# Patient Record
Sex: FEMALE | Race: BLACK OR AFRICAN AMERICAN | NOT HISPANIC OR LATINO | Employment: OTHER | URBAN - METROPOLITAN AREA
[De-identification: names, ages, dates, MRNs, and addresses within clinical notes are randomized per-mention and may not be internally consistent; named-entity substitution may affect disease eponyms.]

---

## 2017-02-03 ENCOUNTER — HISTORICAL (OUTPATIENT)
Dept: INTERNAL MEDICINE | Facility: CLINIC | Age: 52
End: 2017-02-03

## 2017-03-03 ENCOUNTER — HISTORICAL (OUTPATIENT)
Dept: RADIOLOGY | Facility: HOSPITAL | Age: 52
End: 2017-03-03

## 2017-03-31 ENCOUNTER — HISTORICAL (OUTPATIENT)
Dept: SLEEP MEDICINE | Facility: HOSPITAL | Age: 52
End: 2017-03-31

## 2017-03-31 ENCOUNTER — HISTORICAL (OUTPATIENT)
Dept: INTERNAL MEDICINE | Facility: CLINIC | Age: 52
End: 2017-03-31

## 2017-07-07 ENCOUNTER — INITIAL CONSULT (OUTPATIENT)
Dept: RHEUMATOLOGY | Facility: CLINIC | Age: 52
End: 2017-07-07
Payer: COMMERCIAL

## 2017-07-07 VITALS
WEIGHT: 144.69 LBS | BODY MASS INDEX: 26.63 KG/M2 | HEART RATE: 80 BPM | SYSTOLIC BLOOD PRESSURE: 122 MMHG | HEIGHT: 62 IN | DIASTOLIC BLOOD PRESSURE: 74 MMHG

## 2017-07-07 DIAGNOSIS — R76.8 ANA POSITIVE: Primary | ICD-10-CM

## 2017-07-07 DIAGNOSIS — M15.9 PRIMARY OSTEOARTHRITIS INVOLVING MULTIPLE JOINTS: ICD-10-CM

## 2017-07-07 PROCEDURE — 99999 PR PBB SHADOW E&M-EST. PATIENT-LVL III: CPT | Mod: PBBFAC,,, | Performed by: INTERNAL MEDICINE

## 2017-07-07 PROCEDURE — 99205 OFFICE O/P NEW HI 60 MIN: CPT | Mod: S$GLB,,, | Performed by: INTERNAL MEDICINE

## 2017-07-07 ASSESSMENT — ROUTINE ASSESSMENT OF PATIENT INDEX DATA (RAPID3)
PATIENT GLOBAL ASSESSMENT SCORE: 1
MDHAQ FUNCTION SCORE: 0
FATIGUE SCORE: 0
PAIN SCORE: 5
TOTAL RAPID3 SCORE: 2
PSYCHOLOGICAL DISTRESS SCORE: 0

## 2017-07-07 NOTE — PROGRESS NOTES
Subjective:          Chief Complaint: Ashli Valencia is a 51 y.o. female who had concerns including Positive ANN MARIE (referred by Dr. Kennedy).    HPI:    Patient is a 51-year-old female referred for positive ANN MARIE.   ANN MARIE was 1:640 homogeneous pattern with a negative rheumatoid factor sedimentation rate that was within normal limits.    Patient states that joints began 9/2016 with stiffness in hips, knees with hyperflexion and sitting on leg, low back. Lateral epicodyles. Fingers stiff upon waking and if prolonged stiffness. Loosens in a few minutes. Seemed to follow stressful situations does have an intermittent pattern.  Currently she is having pain in her right 2nd MCP.   Hx of LPR-follows with ENT,  chronic microscopic hematuria followed with nephrology and urology. No renal renal bx present and stable for years.   No serologies when evaluated with Dr. Duffy for hematuria but did not think it was CTD/SLE.   Dry eyes was on restasis. No dry mouth.   Patient denies weight loss, rashes,  nasal or palatal ulcerations,  lymphadenopathy, Raynaud's, hx of DVT/miscarriages, psoriasis or family hx of psoriasis, rashes, serositis, anemia or other constitutional symptoms.  She denies any hx of celiac, but feels better off carbs.       REVIEW OF SYSTEMS:    Review of Systems   Constitutional: Negative for fever, malaise/fatigue and weight loss.   HENT: Negative for sore throat.    Eyes: Negative for double vision, photophobia and redness.   Respiratory: Negative for cough, shortness of breath and wheezing.    Cardiovascular: Negative for chest pain, palpitations and orthopnea.   Gastrointestinal: Negative for abdominal pain, constipation and diarrhea.   Genitourinary: Negative for dysuria, flank pain, hematuria and urgency.   Musculoskeletal: Positive for joint pain. Negative for back pain and myalgias.   Skin: Negative for rash.   Neurological: Negative for dizziness, tingling, focal weakness and headaches.    Endo/Heme/Allergies: Does not bruise/bleed easily.   Psychiatric/Behavioral: Negative for depression, hallucinations and suicidal ideas.               Objective:            Past Medical History:   Diagnosis Date    ANN MARIE positive     Anxiety     Arm pain     Back pain     Depression     Edema     Fatigue     Hematuria     History of chicken pox     Insomnia     Laceration     Leukopenia     Migraine     Muscle cramps     Neck pain     Plantar fasciitis     Rotator cuff syndrome of right shoulder     Sciatica     Umbilical hernia     Vertigo     Weight gain      Family History   Problem Relation Age of Onset    Hypertension Mother     Diabetes Mother     Cancer Maternal Grandfather      prostate    Hypertension Maternal Grandmother     Stroke Maternal Grandmother     Cancer Father      lung     Social History   Substance Use Topics    Smoking status: Never Smoker    Smokeless tobacco: Never Used    Alcohol use No         Current Outpatient Prescriptions on File Prior to Visit   Medication Sig Dispense Refill    furosemide (LASIX) 20 MG tablet Take 1 tablet (20 mg total) by mouth daily as needed. 30 tablet 4    multivitamin capsule Take 1 capsule by mouth once daily.      OMEPRAZOLE MAGNESIUM ORAL Take 20.6 mg by mouth once daily.      diethylpropion 25 mg Tab 1 po BID 60 each 1    meloxicam (MOBIC) 15 MG tablet Take 1 tablet (15 mg total) by mouth once daily. 30 tablet 3    rizatriptan (MAXALT) 10 MG tablet Take 1 tablet (10 mg total) by mouth as needed for Migraine. 9 tablet 3     No current facility-administered medications on file prior to visit.        Vitals:    07/07/17 1514   BP: 122/74   Pulse: 80       Physical Exam:    Physical Exam   Constitutional: She appears well-developed and well-nourished.   HENT:   Nose: No septal deviation.   Mouth/Throat: Mucous membranes are normal. No oral lesions.   Eyes: Pupils are equal, round, and reactive to light. Right conjunctiva is  not injected. Left conjunctiva is not injected.   Neck: No JVD present. No thyroid mass and no thyromegaly present.   Cardiovascular: Normal rate, regular rhythm and normal pulses.    No edema   Pulmonary/Chest: Effort normal and breath sounds normal.   Abdominal: Soft. Normal appearance. There is no hepatosplenomegaly.   Musculoskeletal:        Right shoulder: She exhibits normal range of motion, no tenderness and no swelling.        Left shoulder: She exhibits normal range of motion, no tenderness and no swelling.        Right elbow: She exhibits normal range of motion and no swelling. No tenderness found.        Left elbow: She exhibits normal range of motion and no swelling. No tenderness found.        Right wrist: She exhibits normal range of motion, no tenderness and no swelling.        Left wrist: She exhibits normal range of motion, no tenderness and no swelling.        Right hip: She exhibits normal range of motion, normal strength and no swelling.        Left hip: She exhibits normal range of motion, no tenderness and no swelling.        Right knee: She exhibits normal range of motion and no swelling. No tenderness found.        Left knee: She exhibits normal range of motion and no swelling. No tenderness found.        Right ankle: She exhibits normal range of motion and no swelling. No tenderness.        Left ankle: She exhibits normal range of motion and no swelling. No tenderness.        Right hand: She exhibits tenderness. She exhibits no swelling.   5/5 upper and lower extremity bilateral muscle strength   Lymphadenopathy:     She has no cervical adenopathy.     She has no axillary adenopathy.   Neurological: She has normal strength and normal reflexes.   Skin: Skin is dry and intact.   Psychiatric: She has a normal mood and affect.             Assessment:       Encounter Diagnoses   Name Primary?    ANN MARIE positive Yes    Primary osteoarthritis involving multiple joints           Plan:        ANN MARIE  positive  -     Anti Sm/RNP Antibody; Future; Expected date: 07/07/2017  -     Anti-DNA antibody, double-stranded; Future; Expected date: 07/07/2017  -     Anti-Histone Antibody; Future; Expected date: 07/07/2017  -     Anti-Smith antibody; Future; Expected date: 07/07/2017  -     Beta 2 Glycoprotein I Antibody, IgA; Future; Expected date: 07/07/2017  -     C3 complement; Future; Expected date: 07/07/2017  -     C4 complement; Future; Expected date: 07/07/2017  -     Sjogrens syndrome-A extractable nuclear antibody; Future; Expected date: 07/07/2017  -     Sjogrens syndrome-B extractable nuclear antibody; Future; Expected date: 07/07/2017  -     THYROID PEROXIDASE ANTIBODY; Future; Expected date: 07/07/2017  -     Anti-thyroglobulin antibody; Future; Expected date: 07/07/2017  -     C-reactive protein; Future; Expected date: 07/07/2017  -     X-Ray Hand PA Bilateral; Future; Expected date: 07/07/2017  -     X-Ray Knee 1 or 2 View Left; Future; Expected date: 07/07/2017    Primary osteoarthritis involving multiple joints    Will r/o CTD. Not suspicious but given +ANN MARIE  Will complete workup. Pattern of joint pains most suggestive of Degenerative arthritis.     Return in about 4 months (around 11/7/2017).        Thank you for allowing me to participate in the care of this very pleasant patient.    60min consultation with greater than 50% spent in counseling, chart review and coordination of care. All questions answered.

## 2017-07-07 NOTE — LETTER
July 12, 2017      MD Jamey [address], [address]  [place] [address]           [facility]  [address] [facility] [address] [place] [address]  Phone: [phone]  Fax: [phone]          Patient: Ashli Valencia   MR Number: [id number]   YOB: 1965   Date of Visit: 7/7/2017       Dear Dr. Chele Kennedy II:    Thank you for referring Ashli Valencia to me for evaluation. Attached you will find relevant portions of my assessment and plan of care.    If you have questions, please do not hesitate to call me. I look forward to following Ashli Valencia along with you.    Sincerely,    Alyssa Monroe  CC:  No Recipients    If you would like to receive this communication electronically, please contact [email or web address] or [phone] to request more information on [employer] Link access.    For providers and/or their staff who would like to refer a patient to [facility], please contact us through our one-stop-shop provider referral line, Oliver Lawrence, at [phone].    If you feel you have received this communication in error or would no longer like to receive these types of communications, please e-mail [email or web address]

## 2017-07-13 ENCOUNTER — HOSPITAL ENCOUNTER (OUTPATIENT)
Dept: RADIOLOGY | Facility: HOSPITAL | Age: 52
Discharge: HOME | End: 2017-07-13
Attending: INTERNAL MEDICINE
Payer: COMMERCIAL

## 2017-07-13 DIAGNOSIS — R76.8 ANA POSITIVE: ICD-10-CM

## 2017-07-13 PROCEDURE — 73560 X-RAY EXAM OF KNEE 1 OR 2: CPT | Mod: TC,PO,LT

## 2017-07-13 PROCEDURE — 73120 X-RAY EXAM OF HAND: CPT | Mod: TC,50,PO,LT

## 2017-07-13 PROCEDURE — 73560 X-RAY EXAM OF KNEE 1 OR 2: CPT | Mod: 26,LT,, | Performed by: RADIOLOGY

## 2017-07-13 PROCEDURE — 73120 X-RAY EXAM OF HAND: CPT | Mod: 26,50,, | Performed by: RADIOLOGY

## 2017-07-20 ENCOUNTER — OFFICE VISIT (OUTPATIENT)
Dept: RHEUMATOLOGY | Facility: CLINIC | Age: 52
End: 2017-07-20
Payer: COMMERCIAL

## 2017-07-20 ENCOUNTER — TELEPHONE (OUTPATIENT)
Dept: RHEUMATOLOGY | Facility: CLINIC | Age: 52
End: 2017-07-20

## 2017-07-20 VITALS
HEIGHT: 62 IN | HEART RATE: 94 BPM | SYSTOLIC BLOOD PRESSURE: 124 MMHG | BODY MASS INDEX: 26.75 KG/M2 | DIASTOLIC BLOOD PRESSURE: 70 MMHG | WEIGHT: 145.38 LBS

## 2017-07-20 DIAGNOSIS — E06.3 HASHIMOTO'S THYROIDITIS: ICD-10-CM

## 2017-07-20 DIAGNOSIS — R76.8 ANA POSITIVE: Primary | ICD-10-CM

## 2017-07-20 PROCEDURE — 99999 PR PBB SHADOW E&M-EST. PATIENT-LVL III: CPT | Mod: PBBFAC,,, | Performed by: INTERNAL MEDICINE

## 2017-07-20 PROCEDURE — 99214 OFFICE O/P EST MOD 30 MIN: CPT | Mod: S$GLB,,, | Performed by: INTERNAL MEDICINE

## 2017-07-20 NOTE — PROGRESS NOTES
Subjective:          Chief Complaint: Ashli Valencia is a 51 y.o. female who had concerns including Results.    HPI:    Patient is a 51-year-old female referred for positive ANN MARIE.   ANN MARIE was 1:640 homogeneous pattern with a negative rheumatoid factor sedimentation rate that was within normal limits.    Patient states that joints began 9/2016 with stiffness in hips, knees with hyperflexion and sitting on leg, low back. Lateral epicodyles. Fingers stiff upon waking and if prolonged stiffness. Loosens in a few minutes. Seemed to follow stressful situations does have an intermittent pattern.  Currently she is having pain in her right 2nd MCP.   Hx of LPR-follows with ENT,  chronic microscopic hematuria followed with nephrology and urology. No renal renal bx present and stable for years.   No serologies when evaluated with Dr. Duffy for hematuria but did not think it was CTD/SLE.   Dry eyes was on restasis. No dry mouth.   Patient denies weight loss, rashes,  nasal or palatal ulcerations,  lymphadenopathy, Raynaud's, hx of DVT/miscarriages, psoriasis or family hx of psoriasis, rashes, serositis, anemia or other constitutional symptoms.  She denies any hx of celiac, but feels better off carbs.     Component      Latest Ref Rng & Units 7/13/2017 4/3/2017   Anti Sm/RNP Antibody      0.00 - 19.99 EU 0.95    Anti-Sm/RNP Interpretation      Negative Negative    Anti Sm Antibody      0.00 - 19.99 EU 0.54    Anti-Sm Interpretation      Negative Negative    Anti-SSA Antibody      0.00 - 19.99 EU 1.94    Anti-SSA Interpretation      Negative Negative    Anti-SSB Antibody      0.00 - 19.99 EU 1.69    Anti-SSB Interpretation      Negative Negative    Sed Rate      0 - 29 mm/Hr  2   Rheumatoid Factor      0.0 - 15.0 IU/mL  <8.6   ANN MARIE Screen      None Detected  Detected (A)   ANN MARIE IgG by IFA      <1:40  1:640 (H)   ds DNA Ab      Negative 1:10 Negative 1:10    Anti-Histone Antibody      0.0 - 0.9 Units 0.6    Beta-2 Glyco 1  IgA      <=20 DELFINA <9    Complement (C-3)      50 - 180 mg/dL 134    Complement (C-4)      11 - 44 mg/dL 28    Thyroperoxidase Antibodies      <6.0 IU/mL 394.7 (H)    Thyroglobulin Ab Screen      0.0 - 3.9 IU/mL 10.6 (H)      REVIEW OF SYSTEMS:    Review of Systems   Constitutional: Negative for fever, malaise/fatigue and weight loss.   HENT: Negative for sore throat.    Eyes: Negative for double vision, photophobia and redness.   Respiratory: Negative for cough, shortness of breath and wheezing.    Cardiovascular: Negative for chest pain, palpitations and orthopnea.   Gastrointestinal: Negative for abdominal pain, constipation and diarrhea.   Genitourinary: Negative for dysuria, flank pain, hematuria and urgency.   Musculoskeletal: Positive for joint pain. Negative for back pain and myalgias.   Skin: Negative for rash.   Neurological: Negative for dizziness, tingling, focal weakness and headaches.   Endo/Heme/Allergies: Does not bruise/bleed easily.   Psychiatric/Behavioral: Negative for depression, hallucinations and suicidal ideas.               Objective:            Past Medical History:   Diagnosis Date    ANN MARIE positive     Anxiety     Arm pain     Back pain     Depression     Edema     Fatigue     Hematuria     History of chicken pox     Insomnia     Laceration     Leukopenia     Migraine     Muscle cramps     Neck pain     Plantar fasciitis     Rotator cuff syndrome of right shoulder     Sciatica     Umbilical hernia     Vertigo     Weight gain      Family History   Problem Relation Age of Onset    Hypertension Mother     Diabetes Mother     Cancer Maternal Grandfather      prostate    Hypertension Maternal Grandmother     Stroke Maternal Grandmother     Cancer Father      lung     Social History   Substance Use Topics    Smoking status: Never Smoker    Smokeless tobacco: Never Used    Alcohol use No         Current Outpatient Prescriptions on File Prior to Visit   Medication Sig  Dispense Refill    furosemide (LASIX) 20 MG tablet Take 1 tablet (20 mg total) by mouth daily as needed. 30 tablet 4    multivitamin capsule Take 1 capsule by mouth once daily.      OMEPRAZOLE MAGNESIUM ORAL Take 20.6 mg by mouth once daily.      diethylpropion 25 mg Tab 1 po BID 60 each 1    meloxicam (MOBIC) 15 MG tablet Take 1 tablet (15 mg total) by mouth once daily. 30 tablet 3    rizatriptan (MAXALT) 10 MG tablet Take 1 tablet (10 mg total) by mouth as needed for Migraine. 9 tablet 3     No current facility-administered medications on file prior to visit.        Vitals:    07/20/17 1026   BP: 124/70   Pulse: 94       Physical Exam:    Physical Exam   Constitutional: She appears well-developed and well-nourished.   HENT:   Nose: No septal deviation.   Mouth/Throat: Mucous membranes are normal. No oral lesions.   Eyes: Pupils are equal, round, and reactive to light. Right conjunctiva is not injected. Left conjunctiva is not injected.   Neck: No JVD present. No thyroid mass and no thyromegaly present.   Cardiovascular: Normal rate, regular rhythm and normal pulses.    No edema   Pulmonary/Chest: Effort normal and breath sounds normal.   Abdominal: Soft. Normal appearance. There is no hepatosplenomegaly.   Musculoskeletal:        Right shoulder: She exhibits normal range of motion, no tenderness and no swelling.        Left shoulder: She exhibits normal range of motion, no tenderness and no swelling.        Right elbow: She exhibits normal range of motion and no swelling. No tenderness found.        Left elbow: She exhibits normal range of motion and no swelling. No tenderness found.        Right wrist: She exhibits normal range of motion, no tenderness and no swelling.        Left wrist: She exhibits normal range of motion, no tenderness and no swelling.        Right hip: She exhibits normal range of motion, normal strength and no swelling.        Left hip: She exhibits normal range of motion, no tenderness  and no swelling.        Right knee: She exhibits normal range of motion and no swelling. No tenderness found.        Left knee: She exhibits normal range of motion and no swelling. No tenderness found.        Right ankle: She exhibits normal range of motion and no swelling. No tenderness.        Left ankle: She exhibits normal range of motion and no swelling. No tenderness.        Right hand: She exhibits tenderness. She exhibits no swelling.   5/5 upper and lower extremity bilateral muscle strength   Lymphadenopathy:     She has no cervical adenopathy.     She has no axillary adenopathy.   Neurological: She has normal strength and normal reflexes.   Skin: Skin is dry and intact.   Psychiatric: She has a normal mood and affect.                    Encounter Diagnoses   Name Primary?    ANN MARIE positive Yes    Hashimoto's thyroiditis                    Chief Complaint: Ashli Valencia is a 51 y.o. female who had concerns including Results.    HPI:    Patient is a 51-year-old female referred for positive ANN MARIE.   ANN MARIE was 1:640 homogeneous pattern with a negative rheumatoid factor sedimentation rate that was within normal limits.    Patient states that joints began 9/2016 with stiffness in hips, knees with hyperflexion and sitting on leg, low back. Lateral epicodyles. Fingers stiff upon waking and if prolonged stiffness. Loosens in a few minutes. Seemed to follow stressful situations does have an intermittent pattern.  Currently she is having pain in her right 2nd MCP.   Hx of LPR-follows with ENT,  chronic microscopic hematuria followed with nephrology and urology. No renal renal bx present and stable for years.   No serologies when evaluated with Dr. Duffy for hematuria but did not think it was CTD/SLE.   Dry eyes was on restasis. No dry mouth.   Patient denies weight loss, rashes,  nasal or palatal ulcerations,  lymphadenopathy, Raynaud's, hx of DVT/miscarriages, psoriasis or family hx of psoriasis, rashes,  serositis, anemia or other constitutional symptoms.  She denies any hx of celiac, but feels better off carbs.       REVIEW OF SYSTEMS:    Review of Systems   Constitutional: Negative for fever, malaise/fatigue and weight loss.   HENT: Negative for sore throat.    Eyes: Negative for double vision, photophobia and redness.   Respiratory: Negative for cough, shortness of breath and wheezing.    Cardiovascular: Negative for chest pain, palpitations and orthopnea.   Gastrointestinal: Negative for abdominal pain, constipation and diarrhea.   Genitourinary: Negative for dysuria, flank pain, hematuria and urgency.   Musculoskeletal: Positive for joint pain. Negative for back pain and myalgias.   Skin: Negative for rash.   Neurological: Negative for dizziness, tingling, focal weakness and headaches.   Endo/Heme/Allergies: Does not bruise/bleed easily.   Psychiatric/Behavioral: Negative for depression, hallucinations and suicidal ideas.               Objective:            Past Medical History:   Diagnosis Date    ANN MARIE positive     Anxiety     Arm pain     Back pain     Depression     Edema     Fatigue     Hematuria     History of chicken pox     Insomnia     Laceration     Leukopenia     Migraine     Muscle cramps     Neck pain     Plantar fasciitis     Rotator cuff syndrome of right shoulder     Sciatica     Umbilical hernia     Vertigo     Weight gain      Family History   Problem Relation Age of Onset    Hypertension Mother     Diabetes Mother     Cancer Maternal Grandfather      prostate    Hypertension Maternal Grandmother     Stroke Maternal Grandmother     Cancer Father      lung     Social History   Substance Use Topics    Smoking status: Never Smoker    Smokeless tobacco: Never Used    Alcohol use No         Current Outpatient Prescriptions on File Prior to Visit   Medication Sig Dispense Refill    furosemide (LASIX) 20 MG tablet Take 1 tablet (20 mg total) by mouth daily as needed. 30  tablet 4    multivitamin capsule Take 1 capsule by mouth once daily.      OMEPRAZOLE MAGNESIUM ORAL Take 20.6 mg by mouth once daily.      diethylpropion 25 mg Tab 1 po BID 60 each 1    meloxicam (MOBIC) 15 MG tablet Take 1 tablet (15 mg total) by mouth once daily. 30 tablet 3    rizatriptan (MAXALT) 10 MG tablet Take 1 tablet (10 mg total) by mouth as needed for Migraine. 9 tablet 3     No current facility-administered medications on file prior to visit.        Vitals:    07/20/17 1026   BP: 124/70   Pulse: 94       Physical Exam:    Physical Exam   Constitutional: She appears well-developed and well-nourished.   HENT:   Nose: No septal deviation.   Mouth/Throat: Mucous membranes are normal. No oral lesions.   Eyes: Pupils are equal, round, and reactive to light. Right conjunctiva is not injected. Left conjunctiva is not injected.   Neck: No JVD present. No thyroid mass and no thyromegaly present.   Cardiovascular: Normal rate, regular rhythm and normal pulses.    No edema   Pulmonary/Chest: Effort normal and breath sounds normal.   Abdominal: Soft. Normal appearance. There is no hepatosplenomegaly.   Musculoskeletal:        Right shoulder: She exhibits normal range of motion, no tenderness and no swelling.        Left shoulder: She exhibits normal range of motion, no tenderness and no swelling.        Right elbow: She exhibits normal range of motion and no swelling. No tenderness found.        Left elbow: She exhibits normal range of motion and no swelling. No tenderness found.        Right wrist: She exhibits normal range of motion, no tenderness and no swelling.        Left wrist: She exhibits normal range of motion, no tenderness and no swelling.        Right hip: She exhibits normal range of motion, normal strength and no swelling.        Left hip: She exhibits normal range of motion, no tenderness and no swelling.        Right knee: She exhibits normal range of motion and no swelling. No tenderness  found.        Left knee: She exhibits normal range of motion and no swelling. No tenderness found.        Right ankle: She exhibits normal range of motion and no swelling. No tenderness.        Left ankle: She exhibits normal range of motion and no swelling. No tenderness.        Right hand: She exhibits tenderness. She exhibits no swelling.   5/5 upper and lower extremity bilateral muscle strength   Lymphadenopathy:     She has no cervical adenopathy.     She has no axillary adenopathy.   Neurological: She has normal strength and normal reflexes.   Skin: Skin is dry and intact.   Psychiatric: She has a normal mood and affect.             Assessment:       Encounter Diagnoses   Name Primary?    ANN MARIE positive Yes    Hashimoto's thyroiditis           Plan:        ANN MARIE positive    Hashimoto's thyroiditis       No evidence of CTD/inflammatory arthritis.  + thyroid antibodies rec checking thyroid  Pattern of joint pains most suggestive of Degenerative arthritis.   NSAIDs PRN    Return if symptoms worsen or fail to improve.          30min consultation with greater than 50% spent in counseling, chart review and coordination of care. All questions answered.

## 2017-07-20 NOTE — PROGRESS NOTES
Subjective:          Chief Complaint: Ashli Valencia is a 51 y.o. female who had concerns including Results.    HPI:    Patient is a 51-year-old female referred for positive ANN MARIE.   ANN MARIE was 1:640 homogeneous pattern with a negative rheumatoid factor sedimentation rate that was within normal limits.    Patient states that joints began 9/2016 with stiffness in hips, knees with hyperflexion and sitting on leg, low back. Lateral epicodyles. Fingers stiff upon waking and if prolonged stiffness. Loosens in a few minutes. Seemed to follow stressful situations does have an intermittent pattern.  Currently she is having pain in her right 2nd MCP.   Hx of LPR-follows with ENT,  chronic microscopic hematuria followed with nephrology and urology. No renal renal bx present and stable for years.   No serologies when evaluated with Dr. Duffy for hematuria but did not think it was CTD/SLE.   Dry eyes was on restasis. No dry mouth.   Patient denies weight loss, rashes,  nasal or palatal ulcerations,  lymphadenopathy, Raynaud's, hx of DVT/miscarriages, psoriasis or family hx of psoriasis, rashes, serositis, anemia or other constitutional symptoms.  She denies any hx of celiac, but feels better off carbs.     Component      Latest Ref Rng & Units 7/13/2017 4/3/2017   Anti Sm/RNP Antibody      0.00 - 19.99 EU 0.95    Anti-Sm/RNP Interpretation      Negative Negative    Anti Sm Antibody      0.00 - 19.99 EU 0.54    Anti-Sm Interpretation      Negative Negative    Anti-SSA Antibody      0.00 - 19.99 EU 1.94    Anti-SSA Interpretation      Negative Negative    Anti-SSB Antibody      0.00 - 19.99 EU 1.69    Anti-SSB Interpretation      Negative Negative    Sed Rate      0 - 29 mm/Hr  2   Rheumatoid Factor      0.0 - 15.0 IU/mL  <8.6   ANN MARIE Screen      None Detected  Detected (A)   ANN MARIE IgG by IFA      <1:40  1:640 (H)   ds DNA Ab      Negative 1:10 Negative 1:10    Anti-Histone Antibody      0.0 - 0.9 Units 0.6    Beta-2 Glyco 1  IgA      <=20 DELFINA <9    Complement (C-3)      50 - 180 mg/dL 134    Complement (C-4)      11 - 44 mg/dL 28    Thyroperoxidase Antibodies      <6.0 IU/mL 394.7 (H)    Thyroglobulin Ab Screen      0.0 - 3.9 IU/mL 10.6 (H)      REVIEW OF SYSTEMS:    Review of Systems   Constitutional: Negative for fever, malaise/fatigue and weight loss.   HENT: Negative for sore throat.    Eyes: Negative for double vision, photophobia and redness.   Respiratory: Negative for cough, shortness of breath and wheezing.    Cardiovascular: Negative for chest pain, palpitations and orthopnea.   Gastrointestinal: Negative for abdominal pain, constipation and diarrhea.   Genitourinary: Negative for dysuria, flank pain, hematuria and urgency.   Musculoskeletal: Positive for joint pain. Negative for back pain and myalgias.   Skin: Negative for rash.   Neurological: Negative for dizziness, tingling, focal weakness and headaches.   Endo/Heme/Allergies: Does not bruise/bleed easily.   Psychiatric/Behavioral: Negative for depression, hallucinations and suicidal ideas.               Objective:            Past Medical History:   Diagnosis Date    ANN MARIE positive     Anxiety     Arm pain     Back pain     Depression     Edema     Fatigue     Hematuria     History of chicken pox     Insomnia     Laceration     Leukopenia     Migraine     Muscle cramps     Neck pain     Plantar fasciitis     Rotator cuff syndrome of right shoulder     Sciatica     Umbilical hernia     Vertigo     Weight gain      Family History   Problem Relation Age of Onset    Hypertension Mother     Diabetes Mother     Cancer Maternal Grandfather      prostate    Hypertension Maternal Grandmother     Stroke Maternal Grandmother     Cancer Father      lung     Social History   Substance Use Topics    Smoking status: Never Smoker    Smokeless tobacco: Never Used    Alcohol use No         Current Outpatient Prescriptions on File Prior to Visit   Medication Sig  Dispense Refill    furosemide (LASIX) 20 MG tablet Take 1 tablet (20 mg total) by mouth daily as needed. 30 tablet 4    multivitamin capsule Take 1 capsule by mouth once daily.      OMEPRAZOLE MAGNESIUM ORAL Take 20.6 mg by mouth once daily.      diethylpropion 25 mg Tab 1 po BID 60 each 1    meloxicam (MOBIC) 15 MG tablet Take 1 tablet (15 mg total) by mouth once daily. 30 tablet 3    rizatriptan (MAXALT) 10 MG tablet Take 1 tablet (10 mg total) by mouth as needed for Migraine. 9 tablet 3     No current facility-administered medications on file prior to visit.        Vitals:    07/20/17 1026   BP: 124/70   Pulse: 94       Physical Exam:    Physical Exam   Constitutional: She appears well-developed and well-nourished.   HENT:   Nose: No septal deviation.   Mouth/Throat: Mucous membranes are normal. No oral lesions.   Eyes: Pupils are equal, round, and reactive to light. Right conjunctiva is not injected. Left conjunctiva is not injected.   Neck: No JVD present. No thyroid mass and no thyromegaly present.   Cardiovascular: Normal rate, regular rhythm and normal pulses.    No edema   Pulmonary/Chest: Effort normal and breath sounds normal.   Abdominal: Soft. Normal appearance. There is no hepatosplenomegaly.   Musculoskeletal:        Right shoulder: She exhibits normal range of motion, no tenderness and no swelling.        Left shoulder: She exhibits normal range of motion, no tenderness and no swelling.        Right elbow: She exhibits normal range of motion and no swelling. No tenderness found.        Left elbow: She exhibits normal range of motion and no swelling. No tenderness found.        Right wrist: She exhibits normal range of motion, no tenderness and no swelling.        Left wrist: She exhibits normal range of motion, no tenderness and no swelling.        Right hip: She exhibits normal range of motion, normal strength and no swelling.        Left hip: She exhibits normal range of motion, no tenderness  and no swelling.        Right knee: She exhibits normal range of motion and no swelling. No tenderness found.        Left knee: She exhibits normal range of motion and no swelling. No tenderness found.        Right ankle: She exhibits normal range of motion and no swelling. No tenderness.        Left ankle: She exhibits normal range of motion and no swelling. No tenderness.        Right hand: She exhibits tenderness. She exhibits no swelling.   5/5 upper and lower extremity bilateral muscle strength   Lymphadenopathy:     She has no cervical adenopathy.     She has no axillary adenopathy.   Neurological: She has normal strength and normal reflexes.   Skin: Skin is dry and intact.   Psychiatric: She has a normal mood and affect.                    No diagnosis found.                Chief Complaint: Ashli Valencia is a 51 y.o. female who had concerns including Results.    HPI:    Patient is a 51-year-old female referred for positive ANN MARIE.   ANN MARIE was 1:640 homogeneous pattern with a negative rheumatoid factor sedimentation rate that was within normal limits.    Patient states that joints began 9/2016 with stiffness in hips, knees with hyperflexion and sitting on leg, low back. Lateral epicodyles. Fingers stiff upon waking and if prolonged stiffness. Loosens in a few minutes. Seemed to follow stressful situations does have an intermittent pattern.  Currently she is having pain in her right 2nd MCP.   Hx of LPR-follows with ENT,  chronic microscopic hematuria followed with nephrology and urology. No renal renal bx present and stable for years.   No serologies when evaluated with Dr. Duffy for hematuria but did not think it was CTD/SLE.   Dry eyes was on restasis. No dry mouth.   Patient denies weight loss, rashes,  nasal or palatal ulcerations,  lymphadenopathy, Raynaud's, hx of DVT/miscarriages, psoriasis or family hx of psoriasis, rashes, serositis, anemia or other constitutional symptoms.  She denies any  hx of celiac, but feels better off carbs.       REVIEW OF SYSTEMS:    Review of Systems   Constitutional: Negative for fever, malaise/fatigue and weight loss.   HENT: Negative for sore throat.    Eyes: Negative for double vision, photophobia and redness.   Respiratory: Negative for cough, shortness of breath and wheezing.    Cardiovascular: Negative for chest pain, palpitations and orthopnea.   Gastrointestinal: Negative for abdominal pain, constipation and diarrhea.   Genitourinary: Negative for dysuria, flank pain, hematuria and urgency.   Musculoskeletal: Positive for joint pain. Negative for back pain and myalgias.   Skin: Negative for rash.   Neurological: Negative for dizziness, tingling, focal weakness and headaches.   Endo/Heme/Allergies: Does not bruise/bleed easily.   Psychiatric/Behavioral: Negative for depression, hallucinations and suicidal ideas.               Objective:            Past Medical History:   Diagnosis Date    ANN MARIE positive     Anxiety     Arm pain     Back pain     Depression     Edema     Fatigue     Hematuria     History of chicken pox     Insomnia     Laceration     Leukopenia     Migraine     Muscle cramps     Neck pain     Plantar fasciitis     Rotator cuff syndrome of right shoulder     Sciatica     Umbilical hernia     Vertigo     Weight gain      Family History   Problem Relation Age of Onset    Hypertension Mother     Diabetes Mother     Cancer Maternal Grandfather      prostate    Hypertension Maternal Grandmother     Stroke Maternal Grandmother     Cancer Father      lung     Social History   Substance Use Topics    Smoking status: Never Smoker    Smokeless tobacco: Never Used    Alcohol use No         Current Outpatient Prescriptions on File Prior to Visit   Medication Sig Dispense Refill    furosemide (LASIX) 20 MG tablet Take 1 tablet (20 mg total) by mouth daily as needed. 30 tablet 4    multivitamin capsule Take 1 capsule by mouth once daily.       OMEPRAZOLE MAGNESIUM ORAL Take 20.6 mg by mouth once daily.      diethylpropion 25 mg Tab 1 po BID 60 each 1    meloxicam (MOBIC) 15 MG tablet Take 1 tablet (15 mg total) by mouth once daily. 30 tablet 3    rizatriptan (MAXALT) 10 MG tablet Take 1 tablet (10 mg total) by mouth as needed for Migraine. 9 tablet 3     No current facility-administered medications on file prior to visit.        Vitals:    07/20/17 1026   BP: 124/70   Pulse: 94       Physical Exam:    Physical Exam   Constitutional: She appears well-developed and well-nourished.   HENT:   Nose: No septal deviation.   Mouth/Throat: Mucous membranes are normal. No oral lesions.   Eyes: Pupils are equal, round, and reactive to light. Right conjunctiva is not injected. Left conjunctiva is not injected.   Neck: No JVD present. No thyroid mass and no thyromegaly present.   Cardiovascular: Normal rate, regular rhythm and normal pulses.    No edema   Pulmonary/Chest: Effort normal and breath sounds normal.   Abdominal: Soft. Normal appearance. There is no hepatosplenomegaly.   Musculoskeletal:        Right shoulder: She exhibits normal range of motion, no tenderness and no swelling.        Left shoulder: She exhibits normal range of motion, no tenderness and no swelling.        Right elbow: She exhibits normal range of motion and no swelling. No tenderness found.        Left elbow: She exhibits normal range of motion and no swelling. No tenderness found.        Right wrist: She exhibits normal range of motion, no tenderness and no swelling.        Left wrist: She exhibits normal range of motion, no tenderness and no swelling.        Right hip: She exhibits normal range of motion, normal strength and no swelling.        Left hip: She exhibits normal range of motion, no tenderness and no swelling.        Right knee: She exhibits normal range of motion and no swelling. No tenderness found.        Left knee: She exhibits normal range of motion and no  swelling. No tenderness found.        Right ankle: She exhibits normal range of motion and no swelling. No tenderness.        Left ankle: She exhibits normal range of motion and no swelling. No tenderness.        Right hand: She exhibits tenderness. She exhibits no swelling.   5/5 upper and lower extremity bilateral muscle strength   Lymphadenopathy:     She has no cervical adenopathy.     She has no axillary adenopathy.   Neurological: She has normal strength and normal reflexes.   Skin: Skin is dry and intact.   Psychiatric: She has a normal mood and affect.             Assessment:       Encounter Diagnoses   Name Primary?    ANN MARIE positive Yes    Hashimoto's thyroiditis           Plan:        ANN MARIE positive    Hashimoto's thyroiditis       No evidence of CTD/inflammatory arthritis.  + thyroid antibodies rec checking thyroid  Pattern of joint pains most suggestive of Degenerative arthritis.   NSAIDs PRN    Return if symptoms worsen or fail to improve.          30min consultation with greater than 50% spent in counseling, chart review and coordination of care. All questions answered.

## 2017-07-20 NOTE — TELEPHONE ENCOUNTER
----- Message from Jennifer Préez sent at 7/20/2017  8:22 AM CDT -----  Please call pt at [phone]  / pt states she saw her test results on line / very concerned / not sure if she should wait 4 months to see Dr Banks.. scheduled an available appt this morning/ if not necessary / please call to advise

## 2017-07-20 NOTE — PROGRESS NOTES
Subjective:          Chief Complaint: Ashli Valencia is a 51 y.o. female who had concerns including Results.    HPI:    Patient is a 51-year-old female referred for positive ANN MARIE.   ANN MARIE was 1:640 homogeneous pattern with a negative rheumatoid factor sedimentation rate that was within normal limits.    Patient states that joints began 9/2016 with stiffness in hips, knees with hyperflexion and sitting on leg, low back. Lateral epicodyles. Fingers stiff upon waking and if prolonged stiffness. Loosens in a few minutes. Seemed to follow stressful situations does have an intermittent pattern.  Currently she is having pain in her right 2nd MCP.   Hx of LPR-follows with ENT,  chronic microscopic hematuria followed with nephrology and urology. No renal renal bx present and stable for years.   No serologies when evaluated with Dr. Duffy for hematuria but did not think it was CTD/SLE.   Dry eyes was on restasis. No dry mouth.   Patient denies weight loss, rashes,  nasal or palatal ulcerations,  lymphadenopathy, Raynaud's, hx of DVT/miscarriages, psoriasis or family hx of psoriasis, rashes, serositis, anemia or other constitutional symptoms.  She denies any hx of celiac, but feels better off carbs.       REVIEW OF SYSTEMS:    Review of Systems   Constitutional: Negative for fever, malaise/fatigue and weight loss.   HENT: Negative for sore throat.    Eyes: Negative for double vision, photophobia and redness.   Respiratory: Negative for cough, shortness of breath and wheezing.    Cardiovascular: Negative for chest pain, palpitations and orthopnea.   Gastrointestinal: Negative for abdominal pain, constipation and diarrhea.   Genitourinary: Negative for dysuria, flank pain, hematuria and urgency.   Musculoskeletal: Positive for joint pain. Negative for back pain and myalgias.   Skin: Negative for rash.   Neurological: Negative for dizziness, tingling, focal weakness and headaches.   Endo/Heme/Allergies: Does not  bruise/bleed easily.   Psychiatric/Behavioral: Negative for depression, hallucinations and suicidal ideas.               Objective:            Past Medical History:   Diagnosis Date    ANN MARIE positive     Anxiety     Arm pain     Back pain     Depression     Edema     Fatigue     Hematuria     History of chicken pox     Insomnia     Laceration     Leukopenia     Migraine     Muscle cramps     Neck pain     Plantar fasciitis     Rotator cuff syndrome of right shoulder     Sciatica     Umbilical hernia     Vertigo     Weight gain      Family History   Problem Relation Age of Onset    Hypertension Mother     Diabetes Mother     Cancer Maternal Grandfather      prostate    Hypertension Maternal Grandmother     Stroke Maternal Grandmother     Cancer Father      lung     Social History   Substance Use Topics    Smoking status: Never Smoker    Smokeless tobacco: Never Used    Alcohol use No         Current Outpatient Prescriptions on File Prior to Visit   Medication Sig Dispense Refill    furosemide (LASIX) 20 MG tablet Take 1 tablet (20 mg total) by mouth daily as needed. 30 tablet 4    multivitamin capsule Take 1 capsule by mouth once daily.      OMEPRAZOLE MAGNESIUM ORAL Take 20.6 mg by mouth once daily.      diethylpropion 25 mg Tab 1 po BID 60 each 1    meloxicam (MOBIC) 15 MG tablet Take 1 tablet (15 mg total) by mouth once daily. 30 tablet 3    rizatriptan (MAXALT) 10 MG tablet Take 1 tablet (10 mg total) by mouth as needed for Migraine. 9 tablet 3     No current facility-administered medications on file prior to visit.        Vitals:    07/20/17 1026   BP: 124/70   Pulse: 94       Physical Exam:    Physical Exam   Constitutional: She appears well-developed and well-nourished.   HENT:   Nose: No septal deviation.   Mouth/Throat: Mucous membranes are normal. No oral lesions.   Eyes: Pupils are equal, round, and reactive to light. Right conjunctiva is not injected. Left conjunctiva is  not injected.   Neck: No JVD present. No thyroid mass and no thyromegaly present.   Cardiovascular: Normal rate, regular rhythm and normal pulses.    No edema   Pulmonary/Chest: Effort normal and breath sounds normal.   Abdominal: Soft. Normal appearance. There is no hepatosplenomegaly.   Musculoskeletal:        Right shoulder: She exhibits normal range of motion, no tenderness and no swelling.        Left shoulder: She exhibits normal range of motion, no tenderness and no swelling.        Right elbow: She exhibits normal range of motion and no swelling. No tenderness found.        Left elbow: She exhibits normal range of motion and no swelling. No tenderness found.        Right wrist: She exhibits normal range of motion, no tenderness and no swelling.        Left wrist: She exhibits normal range of motion, no tenderness and no swelling.        Right hip: She exhibits normal range of motion, normal strength and no swelling.        Left hip: She exhibits normal range of motion, no tenderness and no swelling.        Right knee: She exhibits normal range of motion and no swelling. No tenderness found.        Left knee: She exhibits normal range of motion and no swelling. No tenderness found.        Right ankle: She exhibits normal range of motion and no swelling. No tenderness.        Left ankle: She exhibits normal range of motion and no swelling. No tenderness.        Right hand: She exhibits tenderness. She exhibits no swelling.   5/5 upper and lower extremity bilateral muscle strength   Lymphadenopathy:     She has no cervical adenopathy.     She has no axillary adenopathy.   Neurological: She has normal strength and normal reflexes.   Skin: Skin is dry and intact.   Psychiatric: She has a normal mood and affect.             Assessment:       Encounter Diagnoses   Name Primary?    ANN MARIE positive Yes    Hashimoto's thyroiditis           Plan:        ANN MARIE positive    Hashimoto's thyroiditis       No evidence of  CTD/inflammatory arthritis.  + thyroid antibodies rec checking thyroid  Pattern of joint pains most suggestive of Degenerative arthritis.   NSAIDs PRN      Return if symptoms worsen or fail to improve.

## 2018-01-31 ENCOUNTER — HISTORICAL (OUTPATIENT)
Dept: INTERNAL MEDICINE | Facility: CLINIC | Age: 53
End: 2018-01-31

## 2018-01-31 LAB
ABS NEUT (OLG): 3.93 X10(3)/MCL (ref 2.1–9.2)
ALBUMIN SERPL-MCNC: 3 GM/DL (ref 3.4–5)
ALBUMIN/GLOB SERPL: 1 RATIO (ref 1–2)
ALP SERPL-CCNC: 116 UNIT/L (ref 45–117)
ALT SERPL-CCNC: 14 UNIT/L (ref 12–78)
AST SERPL-CCNC: 10 UNIT/L (ref 15–37)
BASOPHILS # BLD AUTO: 0.04 X10(3)/MCL
BASOPHILS NFR BLD AUTO: 1 % (ref 0–1)
BILIRUB SERPL-MCNC: 0.6 MG/DL (ref 0.2–1)
BILIRUBIN DIRECT+TOT PNL SERPL-MCNC: 0.1 MG/DL
BILIRUBIN DIRECT+TOT PNL SERPL-MCNC: 0.5 MG/DL
BUN SERPL-MCNC: 14 MG/DL (ref 7–18)
CALCIUM SERPL-MCNC: 8.8 MG/DL (ref 8.5–10.1)
CHLORIDE SERPL-SCNC: 103 MMOL/L (ref 98–107)
CHOLEST SERPL-MCNC: 152 MG/DL
CHOLEST/HDLC SERPL: 4.6 {RATIO} (ref 0–4.4)
CO2 SERPL-SCNC: 30 MMOL/L (ref 21–32)
CREAT SERPL-MCNC: 0.8 MG/DL (ref 0.6–1.3)
EOSINOPHIL # BLD AUTO: 0.06 X10(3)/MCL
EOSINOPHIL NFR BLD AUTO: 1 % (ref 0–5)
ERYTHROCYTE [DISTWIDTH] IN BLOOD BY AUTOMATED COUNT: 14.7 % (ref 11.5–14.5)
EST. AVERAGE GLUCOSE BLD GHB EST-MCNC: 240 MG/DL
GLOBULIN SER-MCNC: 5 GM/ML (ref 2.3–3.5)
GLUCOSE SERPL-MCNC: 151 MG/DL (ref 74–106)
HBA1C MFR BLD: 10 % (ref 4.2–6.3)
HCT VFR BLD AUTO: 40.9 % (ref 35–46)
HDLC SERPL-MCNC: 33 MG/DL
HGB BLD-MCNC: 13.6 GM/DL (ref 12–16)
IMM GRANULOCYTES # BLD AUTO: 0.02 10*3/UL
IMM GRANULOCYTES NFR BLD AUTO: 0 %
LDLC SERPL CALC-MCNC: 102 MG/DL (ref 0–130)
LYMPHOCYTES # BLD AUTO: 1.18 X10(3)/MCL
LYMPHOCYTES NFR BLD AUTO: 21 % (ref 15–40)
MCH RBC QN AUTO: 27.1 PG (ref 26–34)
MCHC RBC AUTO-ENTMCNC: 33.3 GM/DL (ref 31–37)
MCV RBC AUTO: 81.5 FL (ref 80–100)
MONOCYTES # BLD AUTO: 0.34 X10(3)/MCL
MONOCYTES NFR BLD AUTO: 6 % (ref 4–12)
NEUTROPHILS # BLD AUTO: 3.93 X10(3)/MCL
NEUTROPHILS NFR BLD AUTO: 70 X10(3)/MCL
PLATELET # BLD AUTO: 210 X10(3)/MCL (ref 130–400)
PMV BLD AUTO: 10.4 FL (ref 7.4–10.4)
POTASSIUM SERPL-SCNC: 4.5 MMOL/L (ref 3.5–5.1)
PROT SERPL-MCNC: 8 GM/DL (ref 6.4–8.2)
RBC # BLD AUTO: 5.02 X10(6)/MCL (ref 4–5.2)
SODIUM SERPL-SCNC: 138 MMOL/L (ref 136–145)
TRIGL SERPL-MCNC: 86 MG/DL
TSH SERPL-ACNC: 0.91 MIU/L (ref 0.36–3.74)
VLDLC SERPL CALC-MCNC: 17 MG/DL
WBC # SPEC AUTO: 5.6 X10(3)/MCL (ref 4.5–11)

## 2018-10-09 ENCOUNTER — HISTORICAL (OUTPATIENT)
Dept: INTERNAL MEDICINE | Facility: CLINIC | Age: 53
End: 2018-10-09

## 2018-10-09 LAB
ABS NEUT (OLG): 5.8 X10(3)/MCL (ref 2.1–9.2)
ALBUMIN SERPL-MCNC: 2.9 GM/DL (ref 3.4–5)
ALBUMIN/GLOB SERPL: 1 RATIO (ref 1–2)
ALP SERPL-CCNC: 193 UNIT/L (ref 45–117)
ALT SERPL-CCNC: 15 UNIT/L (ref 12–78)
AST SERPL-CCNC: 10 UNIT/L (ref 15–37)
BASOPHILS # BLD AUTO: 0.07 X10(3)/MCL
BASOPHILS NFR BLD AUTO: 1 %
BILIRUB SERPL-MCNC: 0.4 MG/DL (ref 0.2–1)
BILIRUBIN DIRECT+TOT PNL SERPL-MCNC: 0.1 MG/DL
BILIRUBIN DIRECT+TOT PNL SERPL-MCNC: 0.3 MG/DL
BUN SERPL-MCNC: 15 MG/DL (ref 7–18)
CALCIUM SERPL-MCNC: 8.5 MG/DL (ref 8.5–10.1)
CHLORIDE SERPL-SCNC: 105 MMOL/L (ref 98–107)
CHOLEST SERPL-MCNC: 146 MG/DL
CHOLEST/HDLC SERPL: 4.2 {RATIO} (ref 0–4.4)
CO2 SERPL-SCNC: 29 MMOL/L (ref 21–32)
CREAT SERPL-MCNC: 0.8 MG/DL (ref 0.6–1.3)
EOSINOPHIL # BLD AUTO: 0.09 10*3/UL
EOSINOPHIL NFR BLD AUTO: 1 %
ERYTHROCYTE [DISTWIDTH] IN BLOOD BY AUTOMATED COUNT: 15.3 % (ref 11.5–14.5)
EST. AVERAGE GLUCOSE BLD GHB EST-MCNC: 200 MG/DL
GLOBULIN SER-MCNC: 5.2 GM/ML (ref 2.3–3.5)
GLUCOSE SERPL-MCNC: 196 MG/DL (ref 74–106)
HBA1C MFR BLD: 8.6 % (ref 4.2–6.3)
HCT VFR BLD AUTO: 42.5 % (ref 35–46)
HDLC SERPL-MCNC: 35 MG/DL
HGB BLD-MCNC: 13.5 GM/DL (ref 12–16)
IMM GRANULOCYTES # BLD AUTO: 0.03 10*3/UL
IMM GRANULOCYTES NFR BLD AUTO: 0 %
LDLC SERPL CALC-MCNC: 98 MG/DL (ref 0–130)
LYMPHOCYTES # BLD AUTO: 1.67 X10(3)/MCL
LYMPHOCYTES NFR BLD AUTO: 20 % (ref 13–40)
MCH RBC QN AUTO: 26.1 PG (ref 26–34)
MCHC RBC AUTO-ENTMCNC: 31.8 GM/DL (ref 31–37)
MCV RBC AUTO: 82.2 FL (ref 80–100)
MONOCYTES # BLD AUTO: 0.59 X10(3)/MCL
MONOCYTES NFR BLD AUTO: 7 % (ref 4–12)
NEUTROPHILS # BLD AUTO: 5.8 X10(3)/MCL
NEUTROPHILS NFR BLD AUTO: 70 X10(3)/MCL
PLATELET # BLD AUTO: 257 X10(3)/MCL (ref 130–400)
PMV BLD AUTO: 10.9 FL (ref 7.4–10.4)
POTASSIUM SERPL-SCNC: 4.7 MMOL/L (ref 3.5–5.1)
PROT SERPL-MCNC: 8.1 GM/DL (ref 6.4–8.2)
RBC # BLD AUTO: 5.17 X10(6)/MCL (ref 4–5.2)
SODIUM SERPL-SCNC: 139 MMOL/L (ref 136–145)
TRIGL SERPL-MCNC: 64 MG/DL
TSH SERPL-ACNC: 1 MIU/L (ref 0.36–3.74)
VLDLC SERPL CALC-MCNC: 13 MG/DL
WBC # SPEC AUTO: 8.2 X10(3)/MCL (ref 4.5–11)

## 2019-05-14 ENCOUNTER — HOSPITAL ENCOUNTER (OUTPATIENT)
Dept: MEDSURG UNIT | Facility: HOSPITAL | Age: 54
End: 2019-05-17
Attending: INTERNAL MEDICINE | Admitting: INTERNAL MEDICINE

## 2019-05-14 LAB
ABS NEUT (OLG): 5.73 X10(3)/MCL (ref 2.1–9.2)
ALBUMIN SERPL-MCNC: 2.8 GM/DL (ref 3.4–5)
ALBUMIN/GLOB SERPL: 0.7 RATIO (ref 1.1–2)
ALP SERPL-CCNC: 93 UNIT/L (ref 45–117)
ALT SERPL-CCNC: 27 UNIT/L (ref 12–78)
APPEARANCE, UA: CLEAR
AST SERPL-CCNC: 19 UNIT/L (ref 15–37)
BACTERIA #/AREA URNS AUTO: ABNORMAL /[HPF]
BASOPHILS # BLD AUTO: 0.04 X10(3)/MCL
BASOPHILS NFR BLD AUTO: 0 %
BILIRUB SERPL-MCNC: 1.4 MG/DL (ref 0.2–1)
BILIRUB UR QL STRIP: NEGATIVE
BILIRUBIN DIRECT+TOT PNL SERPL-MCNC: 0.5 MG/DL
BILIRUBIN DIRECT+TOT PNL SERPL-MCNC: 0.9 MG/DL
BUN SERPL-MCNC: 15 MG/DL (ref 7–18)
CALCIUM SERPL-MCNC: 8.5 MG/DL (ref 8.5–10.1)
CHLORIDE SERPL-SCNC: 102 MMOL/L (ref 98–107)
CK MB SERPL-MCNC: 2.6 NG/ML (ref 1–3.6)
CK SERPL-CCNC: 146 UNIT/L (ref 26–192)
CO2 SERPL-SCNC: 31 MMOL/L (ref 21–32)
COLOR UR: COLORLESS
CREAT SERPL-MCNC: 1.2 MG/DL (ref 0.6–1.3)
EOSINOPHIL # BLD AUTO: 0.06 X10(3)/MCL
EOSINOPHIL NFR BLD AUTO: 1 %
ERYTHROCYTE [DISTWIDTH] IN BLOOD BY AUTOMATED COUNT: 16.5 % (ref 11.5–14.5)
GLOBULIN SER-MCNC: 3.9 GM/ML (ref 2.3–3.5)
GLUCOSE (UA): NORMAL
GLUCOSE SERPL-MCNC: 198 MG/DL (ref 74–106)
HCO3 UR-SCNC: 29.7 MMOL/L (ref 22–26)
HCT VFR BLD AUTO: 51.2 % (ref 35–46)
HGB BLD-MCNC: 16.3 GM/DL (ref 12–16)
HGB UR QL STRIP: NEGATIVE
HYALINE CASTS #/AREA URNS LPF: ABNORMAL /[LPF]
IMM GRANULOCYTES # BLD AUTO: 0.05 10*3/UL
IMM GRANULOCYTES NFR BLD AUTO: 1 %
KETONES UR QL STRIP: NEGATIVE
LEUKOCYTE ESTERASE UR QL STRIP: NEGATIVE
LYMPHOCYTES # BLD AUTO: 1.61 X10(3)/MCL
LYMPHOCYTES NFR BLD AUTO: 20 % (ref 13–40)
MAGNESIUM SERPL-MCNC: 2.2 MG/DL (ref 1.6–2.6)
MCH RBC QN AUTO: 27 PG (ref 26–34)
MCHC RBC AUTO-ENTMCNC: 31.8 GM/DL (ref 31–37)
MCV RBC AUTO: 84.9 FL (ref 80–100)
MONOCYTES # BLD AUTO: 0.53 X10(3)/MCL
MONOCYTES NFR BLD AUTO: 7 % (ref 4–12)
NEUTROPHILS # BLD AUTO: 5.73 X10(3)/MCL
NEUTROPHILS NFR BLD AUTO: 72 X10(3)/MCL
NITRITE UR QL STRIP: NEGATIVE
O2 HGB ARTERIAL: 88.7 % (ref 94–100)
PCO2 BLDA: 48 MMHG (ref 35–45)
PH SMN: 7.4 [PH] (ref 7.35–7.45)
PH UR STRIP: 5 [PH] (ref 4.5–8)
PLATELET # BLD AUTO: 199 X10(3)/MCL (ref 130–400)
PMV BLD AUTO: 11.9 FL (ref 7.4–10.4)
PO2 BLDA: 73 MMHG (ref 80–100)
POC ALLENS TEST: POSITIVE
POC BE: 3.8 (ref -2–2)
POC CO HGB: 7.3 %
POC CO2: 31.2 MMOL/L (ref 22–26)
POC MET HGB: 1.2 % (ref 0.4–1.5)
POC SAMPLESOURCE: ABNORMAL
POC SATURATED O2: 96.9 %
POC SITE: ABNORMAL
POC THB: 16.2 GM/DL (ref 12–16)
POC TREATMENT: ABNORMAL
POTASSIUM SERPL-SCNC: 3.8 MMOL/L (ref 3.5–5.1)
PROT SERPL-MCNC: 6.7 GM/DL (ref 6.4–8.2)
PROT UR QL STRIP: NEGATIVE
RBC # BLD AUTO: 6.03 X10(6)/MCL (ref 4–5.2)
RBC #/AREA URNS AUTO: ABNORMAL /[HPF]
SETTING 1 ABG: ABNORMAL
SETTING 2 ABG: ABNORMAL
SODIUM SERPL-SCNC: 137 MMOL/L (ref 136–145)
SP GR UR STRIP: 1 (ref 1–1.03)
SQUAMOUS #/AREA URNS LPF: ABNORMAL /[LPF]
TROPONIN I SERPL-MCNC: 0.12 NG/ML (ref 0–0.05)
TROPONIN I SERPL-MCNC: 0.13 NG/ML (ref 0–0.05)
UROBILINOGEN UR STRIP-ACNC: NORMAL
WBC # SPEC AUTO: 8 X10(3)/MCL (ref 4.5–11)
WBC #/AREA URNS AUTO: ABNORMAL /HPF

## 2019-05-15 LAB
ABS NEUT (OLG): 5.28 X10(3)/MCL (ref 2.1–9.2)
ALBUMIN SERPL-MCNC: 2.6 GM/DL (ref 3.4–5)
ALBUMIN/GLOB SERPL: 0.7 RATIO (ref 1.1–2)
ALP SERPL-CCNC: 78 UNIT/L (ref 45–117)
ALT SERPL-CCNC: 25 UNIT/L (ref 12–78)
APTT PPP: 25.4 SECOND(S) (ref 23.3–37)
AST SERPL-CCNC: 14 UNIT/L (ref 15–37)
BASOPHILS # BLD AUTO: 0.03 X10(3)/MCL
BASOPHILS NFR BLD AUTO: 0 %
BILIRUB SERPL-MCNC: 1.3 MG/DL (ref 0.2–1)
BILIRUBIN DIRECT+TOT PNL SERPL-MCNC: 0.5 MG/DL
BILIRUBIN DIRECT+TOT PNL SERPL-MCNC: 0.8 MG/DL
BUN SERPL-MCNC: 14 MG/DL (ref 7–18)
CALCIUM SERPL-MCNC: 8 MG/DL (ref 8.5–10.1)
CHLORIDE SERPL-SCNC: 102 MMOL/L (ref 98–107)
CHOLEST SERPL-MCNC: 99 MG/DL
CHOLEST/HDLC SERPL: 3.4 {RATIO} (ref 0–4.4)
CO2 SERPL-SCNC: 29 MMOL/L (ref 21–32)
CREAT SERPL-MCNC: 1.1 MG/DL (ref 0.6–1.3)
ERYTHROCYTE [DISTWIDTH] IN BLOOD BY AUTOMATED COUNT: 16.2 % (ref 11.5–14.5)
EST. AVERAGE GLUCOSE BLD GHB EST-MCNC: 223 MG/DL
GLOBULIN SER-MCNC: 3.8 GM/ML (ref 2.3–3.5)
GLUCOSE SERPL-MCNC: 265 MG/DL (ref 74–106)
HBA1C MFR BLD: 9.4 % (ref 4.2–6.3)
HCT VFR BLD AUTO: 49.3 % (ref 35–46)
HDLC SERPL-MCNC: 29 MG/DL
HGB BLD-MCNC: 15.9 GM/DL (ref 12–16)
IMM GRANULOCYTES # BLD AUTO: 0.02 10*3/UL
IMM GRANULOCYTES NFR BLD AUTO: 0 %
INR PPP: 1.12 (ref 0.9–1.2)
LDLC SERPL CALC-MCNC: 56 MG/DL (ref 0–130)
LYMPHOCYTES # BLD AUTO: 0.64 X10(3)/MCL
LYMPHOCYTES NFR BLD AUTO: 10 % (ref 13–40)
MCH RBC QN AUTO: 27 PG (ref 26–34)
MCHC RBC AUTO-ENTMCNC: 32.3 GM/DL (ref 31–37)
MCV RBC AUTO: 83.7 FL (ref 80–100)
MONOCYTES # BLD AUTO: 0.22 X10(3)/MCL
MONOCYTES NFR BLD AUTO: 4 % (ref 4–12)
NEUTROPHILS # BLD AUTO: 5.28 X10(3)/MCL
NEUTROPHILS NFR BLD AUTO: 85 X10(3)/MCL
PLATELET # BLD AUTO: 168 X10(3)/MCL (ref 130–400)
PMV BLD AUTO: 11.1 FL (ref 7.4–10.4)
POTASSIUM SERPL-SCNC: 4 MMOL/L (ref 3.5–5.1)
PROT SERPL-MCNC: 6.4 GM/DL (ref 6.4–8.2)
PROTHROMBIN TIME: 14.3 SECOND(S) (ref 11.9–14.4)
RBC # BLD AUTO: 5.89 X10(6)/MCL (ref 4–5.2)
SODIUM SERPL-SCNC: 136 MMOL/L (ref 136–145)
TRIGL SERPL-MCNC: 69 MG/DL
TROPONIN I SERPL-MCNC: 0.1 NG/ML (ref 0–0.05)
TROPONIN I SERPL-MCNC: 0.12 NG/ML (ref 0–0.05)
TSH SERPL-ACNC: 0.44 MIU/L (ref 0.36–3.74)
VLDLC SERPL CALC-MCNC: 14 MG/DL
WBC # SPEC AUTO: 6.2 X10(3)/MCL (ref 4.5–11)

## 2019-05-16 LAB
ABS NEUT (OLG): 8.15 X10(3)/MCL (ref 2.1–9.2)
BASOPHILS # BLD AUTO: 0.05 X10(3)/MCL
BASOPHILS NFR BLD AUTO: 0 %
BUN SERPL-MCNC: 20 MG/DL (ref 7–18)
CALCIUM SERPL-MCNC: 8.2 MG/DL (ref 8.5–10.1)
CHLORIDE SERPL-SCNC: 101 MMOL/L (ref 98–107)
CO2 SERPL-SCNC: 34 MMOL/L (ref 21–32)
CREAT SERPL-MCNC: 0.9 MG/DL (ref 0.6–1.3)
CREAT/UREA NIT SERPL: 22
EOSINOPHIL # BLD AUTO: 0.06 10*3/UL
EOSINOPHIL NFR BLD AUTO: 1 %
ERYTHROCYTE [DISTWIDTH] IN BLOOD BY AUTOMATED COUNT: 16.3 % (ref 11.5–14.5)
FINAL CULTURE: NO GROWTH
GLUCOSE SERPL-MCNC: 141 MG/DL (ref 74–106)
HCT VFR BLD AUTO: 51.5 % (ref 35–46)
HGB BLD-MCNC: 16.2 GM/DL (ref 12–16)
IMM GRANULOCYTES # BLD AUTO: 0.06 10*3/UL
IMM GRANULOCYTES NFR BLD AUTO: 1 %
LYMPHOCYTES # BLD AUTO: 0.98 X10(3)/MCL
LYMPHOCYTES NFR BLD AUTO: 10 % (ref 13–40)
MCH RBC QN AUTO: 26.5 PG (ref 26–34)
MCHC RBC AUTO-ENTMCNC: 31.5 GM/DL (ref 31–37)
MCV RBC AUTO: 84.2 FL (ref 80–100)
MONOCYTES # BLD AUTO: 0.62 X10(3)/MCL
MONOCYTES NFR BLD AUTO: 6 % (ref 4–12)
NEUTROPHILS # BLD AUTO: 8.15 X10(3)/MCL
NEUTROPHILS NFR BLD AUTO: 82 X10(3)/MCL
PLATELET # BLD AUTO: 184 X10(3)/MCL (ref 130–400)
PMV BLD AUTO: 11.2 FL (ref 7.4–10.4)
POTASSIUM SERPL-SCNC: 3.8 MMOL/L (ref 3.5–5.1)
RBC # BLD AUTO: 6.12 X10(6)/MCL (ref 4–5.2)
SODIUM SERPL-SCNC: 139 MMOL/L (ref 136–145)
WBC # SPEC AUTO: 9.9 X10(3)/MCL (ref 4.5–11)

## 2019-06-16 ENCOUNTER — HISTORICAL (OUTPATIENT)
Dept: SLEEP MEDICINE | Facility: HOSPITAL | Age: 54
End: 2019-06-16

## 2019-09-03 ENCOUNTER — HISTORICAL (OUTPATIENT)
Dept: CARDIOLOGY | Facility: CLINIC | Age: 54
End: 2019-09-03

## 2019-09-03 LAB
BUN SERPL-MCNC: 26 MG/DL (ref 7–18)
CALCIUM SERPL-MCNC: 8.8 MG/DL (ref 8.5–10.1)
CHLORIDE SERPL-SCNC: 106 MMOL/L (ref 98–107)
CO2 SERPL-SCNC: 32 MMOL/L (ref 21–32)
CREAT SERPL-MCNC: 0.9 MG/DL (ref 0.6–1.3)
CREAT/UREA NIT SERPL: 29
GLUCOSE SERPL-MCNC: 173 MG/DL (ref 74–106)
MAGNESIUM SERPL-MCNC: 2.3 MG/DL (ref 1.6–2.6)
POTASSIUM SERPL-SCNC: 5.2 MMOL/L (ref 3.5–5.1)
SODIUM SERPL-SCNC: 140 MMOL/L (ref 136–145)

## 2019-09-20 ENCOUNTER — HISTORICAL (OUTPATIENT)
Dept: INTERNAL MEDICINE | Facility: CLINIC | Age: 54
End: 2019-09-20

## 2019-09-20 LAB
BUN SERPL-MCNC: 21 MG/DL (ref 7–18)
CALCIUM SERPL-MCNC: 8.8 MG/DL (ref 8.5–10.1)
CHLORIDE SERPL-SCNC: 106 MMOL/L (ref 98–107)
CO2 SERPL-SCNC: 31 MMOL/L (ref 21–32)
CREAT SERPL-MCNC: 1.1 MG/DL (ref 0.6–1.3)
CREAT/UREA NIT SERPL: 19
GLUCOSE SERPL-MCNC: 154 MG/DL (ref 74–106)
POTASSIUM SERPL-SCNC: 4.7 MMOL/L (ref 3.5–5.1)
SODIUM SERPL-SCNC: 140 MMOL/L (ref 136–145)

## 2019-11-20 ENCOUNTER — HISTORICAL (OUTPATIENT)
Dept: INTERNAL MEDICINE | Facility: CLINIC | Age: 54
End: 2019-11-20

## 2019-11-20 LAB
ABS NEUT (OLG): 6.44 X10(3)/MCL (ref 2.1–9.2)
ALBUMIN SERPL-MCNC: 3.3 GM/DL (ref 3.4–5)
ALBUMIN/GLOB SERPL: 0.7 RATIO (ref 1.1–2)
ALP SERPL-CCNC: 107 UNIT/L (ref 45–117)
ALT SERPL-CCNC: 26 UNIT/L (ref 12–78)
AST SERPL-CCNC: 15 UNIT/L (ref 15–37)
BASOPHILS # BLD AUTO: 0.1 X10(3)/MCL (ref 0–0.2)
BASOPHILS NFR BLD AUTO: 1 %
BILIRUB SERPL-MCNC: 0.6 MG/DL (ref 0.2–1)
BILIRUBIN DIRECT+TOT PNL SERPL-MCNC: 0.1 MG/DL (ref 0–0.2)
BILIRUBIN DIRECT+TOT PNL SERPL-MCNC: 0.5 MG/DL
BUN SERPL-MCNC: 15 MG/DL (ref 7–18)
CALCIUM SERPL-MCNC: 9.6 MG/DL (ref 8.5–10.1)
CHLORIDE SERPL-SCNC: 103 MMOL/L (ref 98–107)
CHOLEST SERPL-MCNC: 202 MG/DL
CHOLEST/HDLC SERPL: 4.9 {RATIO} (ref 0–4.4)
CO2 SERPL-SCNC: 29 MMOL/L (ref 21–32)
CREAT SERPL-MCNC: 0.9 MG/DL (ref 0.6–1.3)
EOSINOPHIL # BLD AUTO: 0.1 X10(3)/MCL (ref 0–0.9)
EOSINOPHIL NFR BLD AUTO: 2 %
ERYTHROCYTE [DISTWIDTH] IN BLOOD BY AUTOMATED COUNT: 14.1 % (ref 11.5–14.5)
EST. AVERAGE GLUCOSE BLD GHB EST-MCNC: 189 MG/DL
GLOBULIN SER-MCNC: 4.9 GM/ML (ref 2.3–3.5)
GLUCOSE SERPL-MCNC: 192 MG/DL (ref 74–106)
HBA1C MFR BLD: 8.2 % (ref 4.2–6.3)
HCT VFR BLD AUTO: 44.1 % (ref 35–46)
HDLC SERPL-MCNC: 41 MG/DL (ref 40–59)
HGB BLD-MCNC: 14 GM/DL (ref 12–16)
IMM GRANULOCYTES # BLD AUTO: 0.04 10*3/UL
IMM GRANULOCYTES NFR BLD AUTO: 0 %
LDLC SERPL CALC-MCNC: 147 MG/DL
LYMPHOCYTES # BLD AUTO: 1.4 X10(3)/MCL (ref 0.6–4.6)
LYMPHOCYTES NFR BLD AUTO: 17 %
MCH RBC QN AUTO: 27.1 PG (ref 26–34)
MCHC RBC AUTO-ENTMCNC: 31.7 GM/DL (ref 31–37)
MCV RBC AUTO: 85.3 FL (ref 80–100)
MONOCYTES # BLD AUTO: 0.5 X10(3)/MCL (ref 0.1–1.3)
MONOCYTES NFR BLD AUTO: 6 %
NEUTROPHILS # BLD AUTO: 6.44 X10(3)/MCL (ref 2.1–9.2)
NEUTROPHILS NFR BLD AUTO: 75 %
PLATELET # BLD AUTO: 268 X10(3)/MCL (ref 130–400)
PMV BLD AUTO: 10.6 FL (ref 7.4–10.4)
POTASSIUM SERPL-SCNC: 4.6 MMOL/L (ref 3.5–5.1)
PROT SERPL-MCNC: 8.2 GM/DL (ref 6.4–8.2)
RBC # BLD AUTO: 5.17 X10(6)/MCL (ref 4–5.2)
SODIUM SERPL-SCNC: 137 MMOL/L (ref 136–145)
TRIGL SERPL-MCNC: 71 MG/DL
TSH SERPL-ACNC: 1.1 MIU/L (ref 0.36–3.74)
VLDLC SERPL CALC-MCNC: 14 MG/DL
WBC # SPEC AUTO: 8.6 X10(3)/MCL (ref 4.5–11)

## 2020-01-29 PROBLEM — Z78.9 GLUTEN FREE DIET: Status: ACTIVE | Noted: 2020-01-29

## 2020-01-29 PROBLEM — M85.88 OSTEOPENIA OF SPINE: Status: ACTIVE | Noted: 2020-01-29

## 2020-09-08 ENCOUNTER — HISTORICAL (OUTPATIENT)
Dept: CARDIOLOGY | Facility: CLINIC | Age: 55
End: 2020-09-08

## 2020-09-08 LAB
BUN SERPL-MCNC: 15 MG/DL (ref 7–18)
CALCIUM SERPL-MCNC: 9.2 MG/DL (ref 8.5–10.1)
CHLORIDE SERPL-SCNC: 100 MMOL/L (ref 98–107)
CHOLEST SERPL-MCNC: 202 MG/DL
CHOLEST/HDLC SERPL: 4.6 {RATIO} (ref 0–4.4)
CO2 SERPL-SCNC: 30 MMOL/L (ref 21–32)
CREAT SERPL-MCNC: 1 MG/DL (ref 0.6–1.3)
CREAT/UREA NIT SERPL: 15 MG/DL (ref 12–14)
GLUCOSE SERPL-MCNC: 259 MG/DL (ref 74–106)
HDLC SERPL-MCNC: 44 MG/DL (ref 40–59)
LDLC SERPL CALC-MCNC: 141 MG/DL
POTASSIUM SERPL-SCNC: 4.6 MMOL/L (ref 3.5–5.1)
SODIUM SERPL-SCNC: 136 MMOL/L (ref 136–145)
TRIGL SERPL-MCNC: 86 MG/DL
VLDLC SERPL CALC-MCNC: 17 MG/DL

## 2020-10-07 ENCOUNTER — HISTORICAL (OUTPATIENT)
Dept: ADMINISTRATIVE | Facility: HOSPITAL | Age: 55
End: 2020-10-07

## 2020-10-07 LAB
ABS NEUT (OLG): 6.11 X10(3)/MCL (ref 2.1–9.2)
ALBUMIN SERPL-MCNC: 3.1 GM/DL (ref 3.4–5)
ALBUMIN/GLOB SERPL: 0.6 RATIO (ref 1.1–2)
ALP SERPL-CCNC: 151 UNIT/L (ref 45–117)
ALT SERPL-CCNC: 24 UNIT/L (ref 12–78)
AST SERPL-CCNC: 9 UNIT/L (ref 15–37)
BASOPHILS # BLD AUTO: 0.1 X10(3)/MCL (ref 0–0.2)
BASOPHILS NFR BLD AUTO: 1 %
BILIRUB SERPL-MCNC: 0.5 MG/DL (ref 0.2–1)
BILIRUBIN DIRECT+TOT PNL SERPL-MCNC: 0.1 MG/DL (ref 0–0.2)
BILIRUBIN DIRECT+TOT PNL SERPL-MCNC: 0.4 MG/DL
BUN SERPL-MCNC: 15 MG/DL (ref 7–18)
CALCIUM SERPL-MCNC: 9.1 MG/DL (ref 8.5–10.1)
CHLORIDE SERPL-SCNC: 101 MMOL/L (ref 98–107)
CHOLEST SERPL-MCNC: 177 MG/DL
CHOLEST/HDLC SERPL: 4 {RATIO} (ref 0–4.4)
CO2 SERPL-SCNC: 31 MMOL/L (ref 21–32)
CREAT SERPL-MCNC: 0.9 MG/DL (ref 0.6–1.3)
EOSINOPHIL # BLD AUTO: 0.2 X10(3)/MCL (ref 0–0.9)
EOSINOPHIL NFR BLD AUTO: 2 %
ERYTHROCYTE [DISTWIDTH] IN BLOOD BY AUTOMATED COUNT: 14.4 % (ref 11.5–14.5)
EST. AVERAGE GLUCOSE BLD GHB EST-MCNC: 269 MG/DL
GLOBULIN SER-MCNC: 4.8 GM/ML (ref 2.3–3.5)
GLUCOSE SERPL-MCNC: 242 MG/DL (ref 74–106)
HBA1C MFR BLD: 11 % (ref 4.2–6.3)
HCT VFR BLD AUTO: 44.9 % (ref 35–46)
HDLC SERPL-MCNC: 44 MG/DL (ref 40–59)
HGB BLD-MCNC: 14 GM/DL (ref 12–16)
IMM GRANULOCYTES # BLD AUTO: 0.03 10*3/UL
IMM GRANULOCYTES NFR BLD AUTO: 0 %
LDLC SERPL CALC-MCNC: 115 MG/DL
LYMPHOCYTES # BLD AUTO: 1.7 X10(3)/MCL (ref 0.6–4.6)
LYMPHOCYTES NFR BLD AUTO: 19 %
MCH RBC QN AUTO: 26.1 PG (ref 26–34)
MCHC RBC AUTO-ENTMCNC: 31.2 GM/DL (ref 31–37)
MCV RBC AUTO: 83.8 FL (ref 80–100)
MONOCYTES # BLD AUTO: 0.6 X10(3)/MCL (ref 0.1–1.3)
MONOCYTES NFR BLD AUTO: 6 %
NEUTROPHILS # BLD AUTO: 6.11 X10(3)/MCL (ref 2.1–9.2)
NEUTROPHILS NFR BLD AUTO: 71 %
PLATELET # BLD AUTO: 237 X10(3)/MCL (ref 130–400)
PMV BLD AUTO: 11.3 FL (ref 7.4–10.4)
POTASSIUM SERPL-SCNC: 4.5 MMOL/L (ref 3.5–5.1)
PROT SERPL-MCNC: 7.9 GM/DL (ref 6.4–8.2)
RBC # BLD AUTO: 5.36 X10(6)/MCL (ref 4–5.2)
SODIUM SERPL-SCNC: 137 MMOL/L (ref 136–145)
T4 FREE SERPL-MCNC: 0.99 NG/DL (ref 0.76–1.46)
TRIGL SERPL-MCNC: 92 MG/DL
TSH SERPL-ACNC: 0.95 MIU/L (ref 0.36–3.74)
VLDLC SERPL CALC-MCNC: 18 MG/DL
WBC # SPEC AUTO: 8.6 X10(3)/MCL (ref 4.5–11)

## 2020-12-03 ENCOUNTER — HISTORICAL (OUTPATIENT)
Dept: ADMINISTRATIVE | Facility: HOSPITAL | Age: 55
End: 2020-12-03

## 2020-12-03 LAB
CREAT UR-MCNC: 72.1 MG/DL (ref 45–106)
MICROALBUMIN UR-MCNC: 15.6 UG/ML
MICROALBUMIN/CREAT RATIO PNL UR: 216.4 MG/GM CR (ref 0–30)

## 2020-12-29 ENCOUNTER — HISTORICAL (OUTPATIENT)
Dept: RADIOLOGY | Facility: HOSPITAL | Age: 55
End: 2020-12-29

## 2021-03-03 ENCOUNTER — HISTORICAL (OUTPATIENT)
Dept: ADMINISTRATIVE | Facility: HOSPITAL | Age: 56
End: 2021-03-03

## 2021-05-05 ENCOUNTER — HISTORICAL (OUTPATIENT)
Dept: CARDIOLOGY | Facility: CLINIC | Age: 56
End: 2021-05-05

## 2021-05-05 ENCOUNTER — HISTORICAL (OUTPATIENT)
Dept: ADMINISTRATIVE | Facility: HOSPITAL | Age: 56
End: 2021-05-05

## 2021-05-05 LAB
CHOLEST SERPL-MCNC: 152 MG/DL
CHOLEST/HDLC SERPL: 4 {RATIO} (ref 0–5)
CREAT UR-MCNC: 72.3 MG/DL (ref 45–106)
HDLC SERPL-MCNC: 38 MG/DL (ref 35–60)
LDLC SERPL CALC-MCNC: 98 MG/DL (ref 50–140)
MICROALBUMIN UR-MCNC: 27.3 MG/L
MICROALBUMIN/CREAT RATIO PNL UR: 37.8 MG/GM CR (ref 0–30)
TRIGL SERPL-MCNC: 78 MG/DL (ref 37–140)
VLDLC SERPL CALC-MCNC: 16 MG/DL

## 2021-05-10 ENCOUNTER — HISTORICAL (OUTPATIENT)
Dept: RADIOLOGY | Facility: HOSPITAL | Age: 56
End: 2021-05-10

## 2021-05-10 ENCOUNTER — PATIENT MESSAGE (OUTPATIENT)
Dept: RESEARCH | Facility: HOSPITAL | Age: 56
End: 2021-05-10

## 2021-08-10 ENCOUNTER — HISTORICAL (OUTPATIENT)
Dept: LAB | Facility: HOSPITAL | Age: 56
End: 2021-08-10

## 2021-08-10 LAB
BUN SERPL-MCNC: 15.8 MG/DL (ref 9.8–20.1)
CALCIUM SERPL-MCNC: 9.7 MG/DL (ref 8.4–10.2)
CHLORIDE SERPL-SCNC: 103 MMOL/L (ref 98–107)
CO2 SERPL-SCNC: 29 MMOL/L (ref 22–29)
CREAT SERPL-MCNC: 0.88 MG/DL (ref 0.55–1.02)
CREAT UR-MCNC: 105.2 MG/DL (ref 45–106)
CREAT/UREA NIT SERPL: 18
EST. AVERAGE GLUCOSE BLD GHB EST-MCNC: 205.9 MG/DL
GLUCOSE SERPL-MCNC: 145 MG/DL (ref 74–100)
HBA1C MFR BLD: 8.8 %
MICROALBUMIN UR-MCNC: 13 MG/L
MICROALBUMIN/CREAT RATIO PNL UR: 12.4 MG/GM CR (ref 0–30)
POTASSIUM SERPL-SCNC: 4.1 MMOL/L (ref 3.5–5.1)
SODIUM SERPL-SCNC: 138 MMOL/L (ref 136–145)

## 2021-10-07 ENCOUNTER — HISTORICAL (OUTPATIENT)
Dept: ADMINISTRATIVE | Facility: HOSPITAL | Age: 56
End: 2021-10-07

## 2021-10-07 LAB
ABS NEUT (OLG): 6.97 X10(3)/MCL (ref 2.1–9.2)
ALBUMIN SERPL-MCNC: 3.3 GM/DL (ref 3.5–5)
ALBUMIN/GLOB SERPL: 0.7 RATIO (ref 1.1–2)
ALP SERPL-CCNC: 124 UNIT/L (ref 40–150)
ALT SERPL-CCNC: 18 UNIT/L (ref 0–55)
AST SERPL-CCNC: 11 UNIT/L (ref 5–34)
BASOPHILS # BLD AUTO: 0.1 X10(3)/MCL (ref 0–0.2)
BASOPHILS NFR BLD AUTO: 1 %
BILIRUB SERPL-MCNC: 0.9 MG/DL
BILIRUBIN DIRECT+TOT PNL SERPL-MCNC: 0.3 MG/DL (ref 0–0.5)
BILIRUBIN DIRECT+TOT PNL SERPL-MCNC: 0.6 MG/DL (ref 0–0.8)
BUN SERPL-MCNC: 14.6 MG/DL (ref 9.8–20.1)
CALCIUM SERPL-MCNC: 10.2 MG/DL (ref 8.4–10.2)
CHLORIDE SERPL-SCNC: 103 MMOL/L (ref 98–107)
CHOLEST SERPL-MCNC: 184 MG/DL
CHOLEST/HDLC SERPL: 4 {RATIO} (ref 0–5)
CO2 SERPL-SCNC: 28 MMOL/L (ref 22–29)
CREAT SERPL-MCNC: 0.95 MG/DL (ref 0.55–1.02)
EOSINOPHIL # BLD AUTO: 0.1 X10(3)/MCL (ref 0–0.9)
EOSINOPHIL NFR BLD AUTO: 1 %
ERYTHROCYTE [DISTWIDTH] IN BLOOD BY AUTOMATED COUNT: 15.4 % (ref 11.5–14.5)
EST. AVERAGE GLUCOSE BLD GHB EST-MCNC: 191.5 MG/DL
GLOBULIN SER-MCNC: 4.6 GM/DL (ref 2.4–3.5)
GLUCOSE SERPL-MCNC: 177 MG/DL (ref 74–100)
HBA1C MFR BLD: 8.3 %
HCT VFR BLD AUTO: 46.5 % (ref 35–46)
HDLC SERPL-MCNC: 41 MG/DL (ref 35–60)
HGB BLD-MCNC: 15 GM/DL (ref 12–16)
IMM GRANULOCYTES # BLD AUTO: 0.05 10*3/UL
IMM GRANULOCYTES NFR BLD AUTO: 0 %
LDLC SERPL CALC-MCNC: 126 MG/DL (ref 50–140)
LYMPHOCYTES # BLD AUTO: 1.4 X10(3)/MCL (ref 0.6–4.6)
LYMPHOCYTES NFR BLD AUTO: 15 %
MCH RBC QN AUTO: 26.1 PG (ref 26–34)
MCHC RBC AUTO-ENTMCNC: 32.3 GM/DL (ref 31–37)
MCV RBC AUTO: 80.9 FL (ref 80–100)
MONOCYTES # BLD AUTO: 0.6 X10(3)/MCL (ref 0.1–1.3)
MONOCYTES NFR BLD AUTO: 6 %
NEUTROPHILS # BLD AUTO: 6.97 X10(3)/MCL (ref 2.1–9.2)
NEUTROPHILS NFR BLD AUTO: 76 %
NRBC BLD AUTO-RTO: 0 % (ref 0–0.2)
PLATELET # BLD AUTO: 265 X10(3)/MCL (ref 130–400)
PMV BLD AUTO: 10.3 FL (ref 7.4–10.4)
POTASSIUM SERPL-SCNC: 4.6 MMOL/L (ref 3.5–5.1)
PROT SERPL-MCNC: 7.9 GM/DL (ref 6.4–8.3)
RBC # BLD AUTO: 5.75 X10(6)/MCL (ref 4–5.2)
SODIUM SERPL-SCNC: 137 MMOL/L (ref 136–145)
T4 FREE SERPL-MCNC: 0.96 NG/DL (ref 0.7–1.48)
TRIGL SERPL-MCNC: 85 MG/DL (ref 37–140)
TSH SERPL-ACNC: 1 UIU/ML (ref 0.35–4.94)
VLDLC SERPL CALC-MCNC: 17 MG/DL
WBC # SPEC AUTO: 9.2 X10(3)/MCL (ref 4.5–11)

## 2022-04-07 ENCOUNTER — HISTORICAL (OUTPATIENT)
Dept: RADIOLOGY | Facility: HOSPITAL | Age: 57
End: 2022-04-07

## 2022-04-07 ENCOUNTER — HISTORICAL (OUTPATIENT)
Dept: ADMINISTRATIVE | Facility: HOSPITAL | Age: 57
End: 2022-04-07

## 2022-04-11 ENCOUNTER — HISTORICAL (OUTPATIENT)
Dept: ADMINISTRATIVE | Facility: HOSPITAL | Age: 57
End: 2022-04-11
Payer: MEDICAID

## 2022-04-27 VITALS
HEIGHT: 64 IN | OXYGEN SATURATION: 97 % | DIASTOLIC BLOOD PRESSURE: 80 MMHG | SYSTOLIC BLOOD PRESSURE: 162 MMHG | BODY MASS INDEX: 49.49 KG/M2 | WEIGHT: 289.88 LBS

## 2022-04-30 NOTE — ED PROVIDER NOTES
Patient:   Linn Mckeon             MRN: [id number]            FIN: [id number]               Age:   53 years     Sex:  Female     :  1965   Associated Diagnoses:   Acute CHF; Non-ST elevation MI (NSTEMI)   Author:   Don Sotelo MD      Basic Information   Time seen: Date 2019, Immediately upon arrival.   History source: Patient.   Arrival mode: Private vehicle, walking.   History limitation: None.   Additional information: Chief Complaint from Nursing Triage Note : Chief Complaint   2019 17:34 CDT      Chief Complaint           Pt c/o sob getting progressively worse over the past couple weeks. Weeping edema noted to lower extremities, and edema noted to abdomen.Reports a fall on  as well with back pain since then. RA o2 85%  .   Provider/Visit info:   Time Seen:  Don Sotelo MD / 2019 17:36  .   History of Present Illness   The patient presents with Patient comes us complaining of a two-week long progressive complaints of shortness of breath.  She does continue to smoke 1 pack a day.  She used CPAP at night and as needed.  She has a history of hypertension, diabetes, arrhythmia.  She does have worsening edema over these last 2 and half weeks now with pitting edema to her lower extremities and leakage of fluid through superficial wounds.  Patient has had some dyspnea on exertion over the last 1 year, but the last 2 weeks have significantly worsened.  She states that the edema lower x-rays has been about 1 week and progressive.  She denies any associated chest pain, diaphoresis, cough, palpitations.  She has no abdominal pain either..  The onset was 2  weeks ago.  The course/duration of symptoms is worsening.  Degree at onset mild.  Degree at present moderate.  The Exacerbating factors is exertion.  There are Relieving factorss including rest and oxygen.  Risk factors consist of diabetes mellitus and hypertension.  Prior episodes: none.  Therapy today: none.   Associated symptoms: none.  Additional history: none.        Review of Systems   Constitutional symptoms:  No fever, no chills.    Skin symptoms:  No rash, no lesion.    Eye symptoms:  Vision unchanged.   ENMT symptoms:  No sore throat, no nasal congestion, no sinus pain.    Respiratory symptoms:  Shortness of breath, no orthopnea, no cough, no hemoptysis, no stridor, no wheezing.    Cardiovascular symptoms:  No chest pain, no palpitations, no syncope, no diaphoresis, no peripheral edema.    Gastrointestinal symptoms:  No abdominal pain, no nausea, no vomiting, no diarrhea.    Genitourinary symptoms:  No dysuria,    Musculoskeletal symptoms:  No back pain, no Muscle pain, no Joint pain.    Neurologic symptoms:  No headache, no numbness, no tingling.    Psychiatric symptoms:  No anxiety, no depression.    Hematologic/Lymphatic symptoms:  No swollen nodes,    Allergy/immunologic symptoms:  No impaired immunity,       Health Status   Allergies:    Allergic Reactions (Selected)  No Known Allergies,    Allergies (1) Active Reaction  No Known Allergies None Documented.   Medications:  (Selected)   Inpatient Medications  Ordered  DuoNeb 0.5 mg-2.5 mg/3 mL inhalation solution: 3 mL, form: Soln, NEB, [id number] (6-12-6-12), first dose 05/14/19 20:00:00 CDT  Lovenox: 40 mg, form: Injection, Subcutaneous, Daily, first dose 05/15/19 9:00:00 CDT, Routine  SOLU-Medrol: 60 mg, form: Injection, IV Push, [id number], first dose 05/14/19 20:09:00 CDT, NOW  amlodipine 10 mg oral tablet: 10 mg, form: Tab, Oral, Daily, first dose 05/15/19 9:00:00 CDT, Routine  aspirin 81 mg oral tablet, CHEWABLE: 81 mg, form: Tab-Chew, Oral, Daily, first dose 05/15/19 9:00:00 CDT, Routine  Prescriptions  Prescribed  Basaglar KwikPen 100 units/mL subcutaneous solution: 30 units, Subcutaneous, Daily, # 10 mL, 11 Refill(s), [facility]  Imdur 30 mg oral tablet, extended release: 30 mg = 1 tab(s), Oral, qAM, # 90 tab(s), 3 Refill(s),  Pharmacy: [facility]  Ventolin HFA 90 mcg/inh inhalation aerosol: 2 puff(s), INH, q6hr, PRN PRN as needed for wheezing, # 18 gm, 11 Refill(s), Pharmacy: [facility]  amlodipine 10 mg oral tablet: = 1 tab(s), Oral, Daily, # 90 tab(s), 3 Refill(s), Pharmacy: [facility]  cpap machine: cpap machine, See Instructions, set at 16, dx pritesh, # 1 EA, 0 Refill(s)  glimepiride 4 mg oral tablet: 4 mg = 1 tab(s), Oral, BID, # 180 tab(s), 3 Refill(s), Pharmacy: [facility]  glucometer, lancets, strips: glucometer, lancets, strips, See Instructions, use daily, # 1 EA, 0 Refill(s), Pharmacy: [facility]  lisinopril 10 mg oral tablet: 30 mg = 3 tab(s), Oral, Daily, # 90 tab(s), 3 Refill(s), Pharmacy: [facility]  metFORMIN 1000 mg oral tablet: 1,000 mg = 1 tab(s), Oral, BID, # 180 tab(s), 3 Refill(s), Pharmacy: [facility]  simvastatin 20 mg oral tablet: 20 mg = 1 tab(s), Oral, Once a day (at bedtime), # 90 tab(s), 3 Refill(s), Pharmacy: [facility]  Documented Medications  Documented  aspirin 81 mg oral tablet: 81 mg = 1 tab(s), Oral, Daily, # 90 tab(s), 0 Refill(s).      Past Medical/ Family/ Social History   Medical history:    No active or resolved past medical history items have been selected or recorded..   Surgical history:    [facility] - Total abdominal hysterectomy and bilateral salpingo-oophorectomy ([id number])..   Family history:    DIABETES MELLITUS  Mother  Dementia  Mother  Alzheimer's disease  Mother  Hypertension.  Mother  .   Social history:    Social & Psychosocial Habits    Alcohol    Comment: does not use alcohol - 10/28/2016 09:02 - Mohamud Lopes LPN    Employment/School  10/28/2016  Description: applying for disability    Highest education: High school    Hazardous equipment operation: No    Exercise  10/28/2016  Self assessment: Poor condition    Home/Environment  04/12/2018  Lives with: Children, Siblings    Living  situation: Home/Independent    Home equipment: CPAP/BiPAP, Glucose monitoring, Respiratory treatments    Alcohol abuse in household: No    Substance abuse in household: No    Smoker in household: Yes    Injuries/Abuse/Neglect in household: No    Feels unsafe at home: No    Safe place to go: Yes    Family/Friends available to help: No    Concern for family members at home: No    Major illness in household: No    Financial concerns: No    Concerns over TV/Computer/Game use: No    Comment: has 2 children - 10/28/2016 09:04 - Mohamud Lopes LPN    Nutrition/Health  10/28/2016  Type of diet: Regular    Substance Abuse  10/28/2016  Use: Never    Tobacco  10/15/2018  Use: Current every day smoker    Type: Cigarettes    Patient Wants Consult For Cessation Counseling No    Tobacco use per day: 10    Previous treatment: None    Ready to change: No    Concerns about tobacco use in household: No    05/14/2019  Use: 10 or more cigarettes (1/    Type: Cigarettes    Patient Wants Consult For Cessation Counseling Yes.   Problem list:    Active Problems (7)  Diabetes   HLD (hyperlipidemia)   HTN (hypertension)   Morbid obesity   DAVION on CPAP   Tobacco user   Tobacco user .      Physical Examination               Vital Signs   Vital Signs   5/14/2019 20:01 CDT      Peripheral Pulse Rate     71 bpm                             Heart Rate Monitored      71 bpm                             Respiratory Rate          24 br/min                             SpO2                      99 %                             Systolic Blood Pressure   161 mmHg  HI                             Diastolic Blood Pressure  103 mmHg  HI                             Mean Arterial Pressure, Cuff              122 mmHg    5/14/2019 19:52 CDT      Peripheral Pulse Rate     73 bpm                             Heart Rate Monitored      73 bpm                             Respiratory Rate          29 br/min  HI                             SpO2                       99 %                             Systolic Blood Pressure   141 mmHg  HI                             Diastolic Blood Pressure  101 mmHg  HI                             Mean Arterial Pressure, Cuff              114 mmHg    5/14/2019 19:44 CDT      Peripheral Pulse Rate     74 bpm                             Heart Rate Monitored      74 bpm                             Respiratory Rate          26 br/min  HI                             SpO2                      100 %    5/14/2019 19:01 CDT      Heart Rate Monitored      72 bpm                             Respiratory Rate          18 br/min                             FIO2                      35 %                             SpO2                      100 %                             Oxygen Therapy            BiPAP                             Systolic Blood Pressure   145 mmHg  HI                             Diastolic Blood Pressure  98 mmHg  HI                             Mean Arterial Pressure, Cuff              114 mmHg    5/14/2019 18:30 CDT      Heart Rate Monitored      78 bpm                             Respiratory Rate          20 br/min                             FIO2                      35 %                             SpO2                      100 %                             Oxygen Therapy            BiPAP                             Systolic Blood Pressure   160 mmHg  HI                             Diastolic Blood Pressure  101 mmHg  HI                             Mean Arterial Pressure, Cuff              121 mmHg    5/14/2019 18:00 CDT      Peripheral Pulse Rate     82 bpm                             FIO2                      35 %                             SpO2                      99 %    5/14/2019 17:34 CDT      Temperature Oral          36.8 DegC                             Temperature Oral (calculated)             98.24 DegF                             Peripheral Pulse Rate     95 bpm                             Respiratory Rate          40 br/min   HI                             SpO2                      87 %  LOW                             Oxygen Therapy            Room air                             Systolic Blood Pressure   185 mmHg  HI                             Diastolic Blood Pressure  127 mmHg  HI                             Mean Arterial Pressure, Cuff              146 mmHg  .      Vital Signs (last 24 hrs)_____  Last Charted___________  Temp Oral     36.8 DegC  (MAY 14 17:34)  Heart Rate Peripheral   71 bpm  (MAY 14 20:01)  Resp Rate         24 br/min  (MAY 14 20:01)  SBP      H 161mmHg  (MAY 14 20:01)  DBP      H 103mmHg  (MAY 14 20:01)  SpO2      99 %  (MAY 14 20:01).   Measurements   5/14/2019 17:34 CDT      Weight Dosing             155.2 kg                             Weight Measured and Calculated in Lbs     342.15 lb                             Weight Estimated          155.2 kg                             Height/Length Dosing      162 cm                             Height/Length Estimated   162 cm                             Body Mass Index Estimated 59.14 kg/m2  .   Basic Oxygen Information   5/14/2019 20:01 CDT      SpO2                      99 %    5/14/2019 19:52 CDT      SpO2                      99 %    5/14/2019 19:44 CDT      SpO2                      100 %    5/14/2019 19:01 CDT      SpO2                      100 %                             Oxygen Therapy            BiPAP    5/14/2019 18:30 CDT      SpO2                      100 %                             Oxygen Therapy            BiPAP    5/14/2019 18:00 CDT      SpO2                      99 %    5/14/2019 17:34 CDT      SpO2                      87 %  LOW                             Oxygen Therapy            Room air  .   General:  Alert, no acute distress.    Skin:  Warm, intact, moist, no pallor, no rash, normal for ethnicity, Not cyanotic,    Head:  Normocephalic, atraumatic.    Neck:  Supple, trachea midline, no tenderness, no JVD, no carotid bruit.    Eye:  Extraocular  movements are intact, normal conjunctiva.    Ears, nose, mouth and throat:  Oral mucosa moist.   Cardiovascular:  Regular rate and rhythm, No murmur, Normal peripheral perfusion, No edema.    Respiratory:  Lungs are clear to auscultation, respirations are non-labored, breath sounds are equal, Symmetrical chest wall expansion, Clubbing of nails: None.    Chest wall:  No tenderness, No deformity.    Back:  Nontender, Normal range of motion, Normal alignment.    Musculoskeletal:  Normal ROM, normal strength, no tenderness, no swelling, no deformity.    Gastrointestinal:  Soft, Nontender, Non distended, Normal bowel sounds, No organomegaly.    Genitourinary:  No tenderness (no falnk tenderness), no discharge.    Neurological:  Alert and oriented to person, place, time, and situation, No focal neurological deficit observed, CN II-XII intact, normal sensory observed, normal motor observed, normal speech observed, normal coordination observed.    Lymphatics:  No lymphadenopathy.   Psychiatric:  Cooperative, appropriate mood & affect, normal judgment.       Medical Decision Making   Differential Diagnosis:  Pneumonia, bronchitis, chronic obstructive pulmonary disease, pulmonary edema, not asthma.    Documents reviewed:  Emergency department nurses' notes.   Results review:  Lab results : Lab View   5/14/2019 18:43 CDT      UA Appear                 Clear                             UA Color                  COLORLESS                             UA Spec Grav              1.002  LOW                             UA Bili                   Negative                             UA pH                     5.0                             UA Urobilinogen           Normal                             UA Blood                  Negative                             UA Glucose                Normal                             UA Ketones                Negative                             UA Protein                Negative                              UA Nitrite                Negative                             UA Leuk Est               Negative                             UA WBC Interp             0-2 /HPF                             UA RBC Interp             0-2                             UA Bact Interp            None Seen                             UA Squam Epi Interp       2-20                             UA Hyal Cast Interp       3-5    5/14/2019 17:40 CDT      Sodium Lvl                137 mmol/L                             Potassium Lvl             3.8 mmol/L                             Chloride                  102 mmol/L                             CO2                       31 mmol/L                             Calcium Lvl               8.5 mg/dL                             Magnesium Lvl             2.2 mg/dL                             Glucose Lvl               198 mg/dL  HI                             BUN                       15 mg/dL                             Creatinine                1.20 mg/dL                             eGFR-AA                   60 mL/min  LOW                             eGFR-GUANAKO                  50 mL/min  LOW                             Bili Total                1.4 mg/dL  HI                             Bili Direct               0.5 mg/dL  HI                             Bili Indirect             0.9 mg/dL                             AST                       19 unit/L                             ALT                       27 unit/L                             Alk Phos                  93 unit/L                             Total Protein             6.7 gm/dL                             Albumin Lvl               2.8 gm/dL  LOW                             Globulin                  3.90 gm/mL  HI                             A/G Ratio                 0.7 ratio  LOW                             NT pro BNP.               3,437 pg/mL  HI                             Total CK                  146 unit/L                              CK MB                     2.6 ng/mL                             Troponin-I                0.128 ng/mL  HI                             WBC                       8.0 x10(3)/mcL                             RBC                       6.03 x10(6)/mcL  HI                             Hgb                       16.3 gm/dL  HI                             Hct                       51.2 %  HI                             Platelet                  199 x10(3)/mcL                             MCV                       84.9 fL                             MCH                       27.0 pg                             MCHC                      31.8 gm/dL                             RDW                       16.5 %  HI                             MPV                       11.9 fL  HI                             Abs Neut                  5.73 x10(3)/mcL                             Neutro Auto               72 x10(3)/mcL  NA                             Lymph Auto                20 %                             Mono Auto                 7 %                             Eos Auto                  1  NA                             Abs Eos                   0.06 x10(3)/mcL  NA                             Basophil Auto             0  NA                             Abs Neutro                5.73 x10(3)/mcL  NA                             Abs Lymph                 1.61 x10(3)/mcL  NA                             Abs Mono                  0.53 x10(3)/mcL  NA                             Abs Baso                  0.04 x10(3)/mcL  NA                             IG%                       1 %  NA                             IG#                       0.0500  NA  ,    Labs (Last four charted values)  WBC                  8.0 (MAY 14)   Hgb                  H 16.3 (MAY 14)   Hct                  H 51.2 (MAY 14)   Plt                  199 (MAY 14)   Na                   137 (MAY 14)   K                    3.8 (MAY 14)   CO2                  31 (MAY  14)   Cl                   102 (MAY 14)   Cr                   1.20 (MAY 14)   BUN                  15 (MAY 14)   Glucose Random       H 198 (MAY 14) .    Radiology results:  Rad Results (ST)  < 12 hrs   Accession: [id number]  Order: XR Chest 1 View  Report Dt/Tm: 05/14/2019 18:30  Report:   EXAMINATION  XR Chest 1 View     INDICATION  Dyspnea     Comparison: There are no available comparisons at the time of  dictation.     FINDINGS  Lines/tubes/devices:  None present.     There is notable enlargement of the cardiac silhouette with associated  central vascular congestion and diffuse coarsening of the interstitial  lung markings.      The trachea is midline. No definite organized consolidation is  appreciated. However, subtle assessment is degraded secondary to  patient body habitus and resulting severe image underpenetration. No  large pleural effusion or pneumothorax is appreciated.     There is no acute osseous or extrathoracic abnormality.     IMPRESSION  1.  Limited evaluation secondary to patient body habitus and resulting  image underpenetration.  2.  Enlargement of the cardiac silhouette with findings of pulmonary  vascular congestion.    .      Reexamination/ Reevaluation   Vital signs   Basic Oxygen Information   5/14/2019 20:01 CDT      SpO2                      99 %    5/14/2019 19:52 CDT      SpO2                      99 %    5/14/2019 19:44 CDT      SpO2                      100 %    5/14/2019 19:01 CDT      SpO2                      100 %                             Oxygen Therapy            BiPAP    5/14/2019 18:30 CDT      SpO2                      100 %                             Oxygen Therapy            BiPAP    5/14/2019 18:00 CDT      SpO2                      99 %    5/14/2019 17:34 CDT      SpO2                      87 %  LOW                             Oxygen Therapy            Room air        Impression and Plan   Diagnosis   Acute CHF ([id number] I50.9)   Non-ST elevation MI (NSTEMI)  (GQS44-FA I21.4)      Calls-Consults   -  5/14/2019 19:30:00 , Tahira CAMARENA, [facility], IM, phone call, consult, recommends will see in Er for admit.

## 2022-05-04 NOTE — HISTORICAL [facility] CERNER
This is a historical note converted from Jen. Formatting and pictures may have been removed.  Please reference Jen for original formatting and attached multimedia. Chief Complaint  Pt c/o sob getting progressively worse over the past couple weeks. Weeping edema noted to lower extremities, and edema noted to abdomen.Reports a fall on sunday as well with back pain since then. RA o2 85%  Admission to Team 2A  History of Present Illness  53-year-old -American female w hx of ?obstructive sleep apnea with CPAP at home, noncompliance of medications for conditions hypertension, diabetes mellitus, and?hyperlipidemia. 1/2 - 1 PPD x 30+ years. PCP is Smiley Mcguire last seen October 2018.  Presented to the emergency room today, brought in by?sister, for shortness of breath progressively worsening for the last 4 weeks.? Patient with notable?abdominal and LE?edema. Sister at bedside states patient has been refusing to come into the hospital and that since the death?of their mother she has had?severe depression and does not take her medications or care for herself. She does?however?wear her CPAP around the clock as it helps her breathe better and? helps her anxiety. Sister concerned due to wounds on feet and ankles?from a fall that are seeping fluid and?her significant body edema.  At present the patient is wearing Bipap and reports feeling improved on it. No CP or N/V/D, HA, blurry vision, or dizziness at present.  In ED was given 40 mg of IV Lasix with 1250 cc output. Placed on bipap. saturating well. IM on call paged for admission.  Review of Systems  ???as above?though somewhat limited to clinical condition and use?of BiPap  ?????  Physical Exam  Vitals & Measurements  T:?36.8? ?C (Oral)? HR:?71(Peripheral)? HR:?71(Monitored)? RR:?24? BP:?161/103? SpO2:?99%? WT:?155.2?kg?  General:?Obese in mild respiratory distress  Eye: PERRLA, EOMI, clear conjunctiva, eyelids normal  HENT:?normocephalic with bipap mask in  place  Neck: full range of motion  Respiratory:?+wheezing, +crackles, +diminished throughout  Cardiovascular:?regular rate and rhythm without murmurs, gallops or rubs, +3 LE edema Pitting  Gastrointestinal:?soft, non-tender,?+distended/edematous with normal bowel sounds  Genitourinary: Betancur in place  Musculoskeletal:?full range of motion of all extremities/spine without limitation or discomfort  Integumentary: Feet: B/L plantar with callouses with no breakdown but significant keratin, Right hallux w red and slightly ulcerated callus, Left shin with areas of bullae and desquamation draining clear fluid  Neurologic: cranial nerves intact, no signs of peripheral neurological deficit, motor/sensory function intact  Psychiatric: calm and cooperative, sad but no HI/SI/AH/VH  Assessment/Plan  CHF exacerbation?I50.9  New Onset CHF  Echo in AM  40mg IV Lasix?in ED with output of 1250 cc  patient is?significantly?third spacing  60mg IV Lasix this evening-re-eval in AM  Strict I and O with one liter fluid restriction and low sodium diet ordered  daily weights  Ordered:  Admit as Inpatient, 05/14/19 19:57:00 CDT, Telemetry with Monitor Peng CAMARENA, Franco aHrdy, CHF exacerbation  COPD type A  DAVION on CPAP  Diabetes, No  Echocardiogram Complete Adult, 05/15/19 7:00:00 CDT, Routine, Congestive Heart Failure (CHF), Stop date 05/15/19 7:00:00 CDT, [facility], CHF exacerbation  ?  COPD mixed type?J44.9  Suspect COPD with [facility]  Exam with wheezing and history of good response to bronchodilators. 1/2 PPD x 30+ years w strong suspicion for underlying COPD.  Will order ABG.  Pulmonary function testing November 2, 2016 with mild restriction with significant bronchodilator response.? Lung volumes are decreased especially likely due to body habitus.? Diffusion is severely reduced but is normal per alveolar volume.  Duo nebs Q4 hrs  Solumedrol 60 Q8  ?  Diabetes?E11.9  Restart half? home dose of Lantus at 15  Moderate  ISS since will initiate Steroids  Hold glipizide for now  Diabetic diet  Poor feet hygiene as well and LE wounds: consulted wound care for eval and treat; no signs of cellulitis at present.  A1c in AM  Ordered:  Admit as Inpatient, 05/14/19 19:57:00 CDT, Telemetry with Monitor Franco Khoury MD, CHF exacerbation  COPD type A  DAVION on CPAP  Diabetes, No  ?  HTN (hypertension)?I10  Restart Norvasc  PRN Hydralazine  ?  Non-ST elevation MI (NSTEMI)?I21.4  Elevated troponin in ED. Low suspicion this is due to active NSTEMI but will trend w EKG x 3  Pt Med list with IMDUR but unsure of indication for this. not in documentation. CAD? Stress test in past was inconclusive.  Restart on statin.  ?  DAVION on CPAP?G47.33  Continue at bedtime? although in ED was placed on Bipap. Will defer to primary team.  Ordered:  Admit as Inpatient, 05/14/19 19:57:00 CDT, Telemetry with Monitor Franco Khoury MD, CHF exacerbation  COPD type A  DAVION on CPAP  Diabetes, No  ?  Dispo: Will place on Telemetry unit. Full code.   Problem List/Past Medical History  Ongoing  Diabetes  HLD (hyperlipidemia)  HTN (hypertension)  Morbid obesity  DAVION on CPAP  Tobacco user  Historical  No qualifying data  Procedure/Surgical History  CHIP BSO - Total abdominal hysterectomy and bilateral salpingo-oophorectomy   Medications  Inpatient  amlodipine 10 mg oral tablet, 10 mg= 1 tab(s), Oral, Daily  aspirin 81 mg oral tablet, CHEWABLE, 81 mg= 1 tab(s), Oral, Daily  Crestor, 10 mg= 1 tab(s), Oral, Once a day (at bedtime)  Dextrose 50% and Water (50 mL vial/syringe), 12.5 gm= 25 mL, IV Push, As Directed, PRN  Dextrose 50% and Water (50 mL vial/syringe), 25 gm= 50 mL, IV Push, As Directed  Dextrose 50% and Water (50 mL vial/syringe), 12.5 gm= 25 mL, IV Push, Once, PRN  Dextrose 50% in Water intravenous solution, 12.5 gm= 25 mL, IV Push, As Directed, PRN  DuoNeb 0.5 mg-2.5 mg/3 mL inhalation solution, 3 mL, NEB, [id number]  glucagon recombinant 1 mg  injection, 1 mg= 1 EA, IM, q10min, PRN  glucagon recombinant 1 mg injection, 1 mg= 1 EA, IM, q10min, PRN  glucose 40% oral gel, 15 gm= 0.5 tube(s), Oral, As Directed, PRN  hydrALAZINE (Apresoline) Inj., 10 mg= 0.5 mL, IV Push, q2hr, PRN  insulin lispro 100 units/mL subcutaneous injection, 3-21 units, Subcutaneous, As Directed, PRN  Lantus 100 U/mL (per pen), 15 units= 0.15 mL, Subcutaneous, At Bedtime  Lasix inject. (IV Push or IM) 10 mg/mL, 60 mg= 6 mL, IV Push, Once  Lovenox, 40 mg= 0.4 mL, Subcutaneous, Daily  SOLU-Medrol, 60 mg= 1.5 mL, IV Push, [id number]  Home  amlodipine 10 mg oral tablet, 1 tab(s), Oral, Daily, 3 refills,? ?Not taking: pts sister states pt is not taking any of her meds  aspirin 81 mg oral tablet, 81 mg= 1 tab(s), Oral, Daily,? ?Not taking: pts sister states pt is not taking any of her meds  Basaglar KwikPen 100 units/mL subcutaneous solution, 30 units, Subcutaneous, Daily, 11 refills,? ?Not taking: pts sister states pt is not taking any of her meds  cpap machine, See Instructions,? ?Not taking: pts sister states pt is not taking any of her meds  glimepiride 4 mg oral tablet, 4 mg= 1 tab(s), Oral, BID, 3 refills,? ?Not taking: pts sister states pt is not taking any of her meds  glucometer, lancets, strips, See Instructions,? ?Not taking: pts sister states pt is not taking any of her meds  Imdur 30 mg oral tablet, extended release, 30 mg= 1 tab(s), Oral, qAM, 3 refills,? ?Not taking: pts sister states pt is not taking any of her meds  lisinopril 10 mg oral tablet, 30 mg= 3 tab(s), Oral, Daily, 3 refills,? ?Not taking: pts sister states pt is not taking any of her meds  metFORMIN 1000 mg oral tablet, 1000 mg= 1 tab(s), Oral, BID, 3 refills,? ?Not taking: pts sister states pt is not taking any of her meds  simvastatin 20 mg oral tablet, 20 mg= 1 tab(s), Oral, Once a day (at bedtime), 3 refills,? ?Not taking: pts sister states pt is not taking any of her meds  Ventolin HFA 90 mcg/inh inhalation  aerosol, 2 puff(s), INH, q6hr, PRN, 11 refills,? ?Not taking: pts sister states pt is not taking any of her meds  Allergies  No Known Allergies  Social History  Alcohol  Employment/School  Work/School description: applying for disability. Highest education level: High school. Operates hazardous equipment: No., 10/28/2016  Exercise  Self assessment: Poor condition., 10/28/2016  Home/Environment  Lives with Children, Siblings. Living situation: Home/Independent. Home equipment: CPAP/BiPAP, Glucose monitoring, Respiratory treatments. Alcohol abuse in household: No. Substance abuse in household: No. Smoker in household: Yes. Injuries/Abuse/Neglect in household: No. Feels unsafe at home: No. Safe place to go: Yes. Family/Friends available for support: No. Concern for family members at home: No. Major illness in household: No. Financial concerns: No. TV/Computer concerns: No., 04/12/2018  Nutrition/Health  Regular, 10/28/2016  Substance Abuse  Never, 10/28/2016  Tobacco  10 or more cigarettes (1/2 pack or more)/day in last 30 days, Cigarettes, Yes, 05/14/2019  Current every day smoker, Cigarettes, No, 10 per day. Previous treatment: None. Ready to change: No. Household tobacco concerns: No., 10/15/2018  Family History  Alzheimers disease: Mother.  DIABETES MELLITUS: Mother.  Dementia: Mother.  Hypertension.: Mother.  Lab Results  Test Name Test Result Date/Time Comments   Sodium Lvl 137 mmol/L 05/14/2019 17:40 CDT    Potassium Lvl 3.8 mmol/L 05/14/2019 17:40 CDT    Chloride 102 mmol/L 05/14/2019 17:40 CDT    CO2 31 mmol/L 05/14/2019 17:40 CDT    Calcium Lvl 8.5 mg/dL 05/14/2019 17:40 CDT    Magnesium Lvl 2.2 mg/dL 05/14/2019 17:40 CDT    Glucose Lvl 198 mg/dL (High) 05/14/2019 17:40 CDT    BUN 15 mg/dL 05/14/2019 17:40 CDT    Creatinine 1.20 mg/dL 05/14/2019 17:40 CDT    eGFR-AA 60 mL/min (Low) 05/14/2019 17:40 CDT    eGFR-GUANAKO 50 mL/min (Low) 05/14/2019 17:40 CDT    Bili Total 1.4 mg/dL (High) 05/14/2019 17:40 CDT    Bili  Direct 0.5 mg/dL (High) 05/14/2019 17:40 CDT    Bili Indirect 0.9 mg/dL 05/14/2019 17:40 CDT    AST 19 unit/L 05/14/2019 17:40 CDT    ALT 27 unit/L 05/14/2019 17:40 CDT    Alk Phos 93 unit/L 05/14/2019 17:40 CDT    Total Protein 6.7 gm/dL 05/14/2019 17:40 CDT    Albumin Lvl 2.8 gm/dL (Low) 05/14/2019 17:40 CDT    Globulin 3.90 gm/mL (High) 05/14/2019 17:40 CDT    A/G Ratio 0.7 ratio (Low) 05/14/2019 17:40 CDT    NT pro BNP. 3437 pg/mL (High) 05/14/2019 17:40 CDT    Total  unit/L 05/14/2019 17:40 CDT    CK MB 2.6 ng/mL 05/14/2019 17:40 CDT    Troponin-I 0.128 ng/mL (High) 05/14/2019 17:40 CDT 0.5 ng/mL is the accepted discriminant level for mycardial injury rather than a statistical normal limit for the general population.   WBC 8.0 x10(3)/mcL 05/14/2019 17:40 CDT    RBC 6.03 x10(6)/mcL (High) 05/14/2019 17:40 CDT    Hgb 16.3 gm/dL (High) 05/14/2019 17:40 CDT    Hct 51.2 % (High) 05/14/2019 17:40 CDT    Platelet 199 x10(3)/mcL 05/14/2019 17:40 CDT    MCV 84.9 fL 05/14/2019 17:40 CDT    MCH 27.0 pg 05/14/2019 17:40 CDT    MCHC 31.8 gm/dL 05/14/2019 17:40 CDT    RDW 16.5 % (High) 05/14/2019 17:40 CDT    MPV 11.9 fL (High) 05/14/2019 17:40 CDT    Abs Neut 5.73 x10(3)/mcL 05/14/2019 17:40 CDT    Neutro Auto 72 x10(3)/mcL 05/14/2019 17:40 CDT    Lymph Auto 20 % 05/14/2019 17:40 CDT    Mono Auto 7 % 05/14/2019 17:40 CDT    Eos Auto 1 05/14/2019 17:40 CDT    Abs Eos 0.06 x10(3)/mcL 05/14/2019 17:40 CDT    Basophil Auto 0 05/14/2019 17:40 CDT    Abs Neutro 5.73 x10(3)/mcL 05/14/2019 17:40 CDT    Abs Lymph 1.61 x10(3)/mcL 05/14/2019 17:40 CDT    Abs Mono 0.53 x10(3)/mcL 05/14/2019 17:40 CDT    Abs Baso 0.04 x10(3)/mcL 05/14/2019 17:40 CDT    IG% 1 % 05/14/2019 17:40 CDT    IG# 0.0500 05/14/2019 17:40 CDT    UA Appear Clear 05/14/2019 18:43 CDT Result created by Discern Rule.   UA Color COLORLESS 05/14/2019 18:43 CDT    UA Spec Grav 1.002 (Low) 05/14/2019 18:43 CDT    UA Bili Negative 05/14/2019 18:43 CDT Result created  by Discern Rule.   UA pH 5.0 05/14/2019 18:43 CDT    UA Urobilinogen Normal 05/14/2019 18:43 CDT Result created by Discern Rule.   UA Blood Negative 05/14/2019 18:43 CDT Result created by Discern Rule.   UA Glucose Normal 05/14/2019 18:43 CDT Result created by Discern Rule.   UA Ketones Negative 05/14/2019 18:43 CDT Result created by Discern Rule.   UA Protein Negative 05/14/2019 18:43 CDT Result created by Discern Rule.   UA Nitrite Negative 05/14/2019 18:43 CDT Result created by Discern Rule.   UA Leuk Est Negative 05/14/2019 18:43 CDT Result created by Discern Rule.   UA WBC Interp 0-2 05/14/2019 18:43 CDT Result created by Discern Rule.   UA RBC Interp 0-2 05/14/2019 18:43 CDT Result created by Discern Rule.   UA Bact Interp None Seen 05/14/2019 18:43 CDT    UA Squam Epi Interp 2-20 05/14/2019 18:43 CDT Result created by Discern Rule.   UA Hyal Cast Interp 3-5 (Abnormal) 05/14/2019 18:43 CDT Result created by Discern Rule.   Diagnostic Results  EKG:  Vent. rate 88 BPM  MS interval 184 ms  QRS duration 86 ms  QT/QTc 384/464 ms  P-R-T axes 37 -67 -43  Normal sinus rhythm  Pulmonary disease pattern  Left anterior fascicular block  Septal infarct , age undetermined  T wave abnormality, consider inferior ischemia  Abnormal ECG  No previous ECGs available  ?  ?  ?  (05/14/2019 18:20 CDT XR Chest 1 View)  * Final Report *  ?  Reason For Exam  Dyspnea  ?  Radiology Report  EXAMINATION  XR Chest 1 View  ?  INDICATION  Dyspnea  ?  Comparison: There are no available comparisons at the time of  dictation.  ?  FINDINGS  Lines/tubes/devices:  None present.  ?  There is notable enlargement of the cardiac silhouette with associated  central vascular congestion and diffuse coarsening of the interstitial  lung markings. ? ?  The trachea is midline. No definite organized consolidation is  appreciated. However, subtle assessment is degraded secondary to  patient body habitus and resulting severe image underpenetration. No  large  pleural effusion or pneumothorax is appreciated.  ?  There is no acute osseous or extrathoracic abnormality.  ?  IMPRESSION  1. ?Limited evaluation secondary to patient body habitus and resulting  image underpenetration.  2. ?Enlargement of the cardiac silhouette with findings of pulmonary  vascular congestion.  ?  ?  Signature Line  Electronically Signed By: Matt Wills MD  Date/Time Signed: 05/14/2019 18:28  ?  ? [1]     [1]?XR Chest 1 View; Matt Wills MD 05/14/2019 18:20 CDT   I was present with the resident during the history and physical examination.  ???  [x ] I discussed the case with the resident and agree with the findings and plan as documented in the residents note.  [ ] I discussed the case with the resident and agree with the findings and plan as documented in the residents note except:  Obese female with worsening shortness of breath over the past week, using CPAP during the day and night  Medical hx of DAVION on CPAP,?OHS, DM, HTN, morbid obesity- not taking meds as prescribed  No fever chills, increasing lower extremity edema  Elevated BNP, cardiac silhouette, hilar congestion  EKG- left axis, poor r wave progression, sinus, non specific t wave changes  Obese female  unable to assess neck veins due to habitus  heart-rrr?? Lungs- clear BS ( initially had wheezing per ED note) abd- soft with suprapubic mild tenderness ( since cronin placed) ext- 2+ lower ext edema  ?   Clinically tx as acute heart failure- left/right  Diuresing, echocardiogram, evaluate for home O2 , control glucose, BP

## 2022-05-04 NOTE — HISTORICAL [facility]
This is a historical note converted from Jen. Formatting and pictures may have been removed.  Please reference Jen for original formatting and attached multimedia. Chief Complaint  diabetic foot/callus/nail care  History of Present Illness  54 yo Black female being seen today for diabetic foot care.? Last visit in clinic 12/29/2020.? Applies Lac-Hydrin to dry skin several times a week.? Hx includes?Type 2 diabetes, HTN, HLD, DAVION on CPAP, morbid obesity (BMI 49.17), and smoking.?  ?  Today 3/3/2021:  No skin breakdown over either foot.? Has two calluses on bottom of left foot and one callus on bottom of right foot.? Has never been followed by podiatrist for nail and callus care.? Sleeping well; eating well.? Denies chest pain, shortness of breath,?abdominal pain, nausea,?vomiting, diarrhea, constipation, changes in urinary patterns, burning/odor with urination, depression, anxiety, and SI/HI.??  ?  Review of Systems  Except as stated in HPI, all other 10 body systems normal  ?  Physical Exam  Vitals & Measurements  T:?36.8? ?C (Oral)? HR:?76(Peripheral)? RR:?20? BP:?175/69?  HT:?163?cm? WT:?130?kg?  General:??BP grossly elevated today; states she did not take her blood pressure medications this morning; asymptomatic with; afebrile.??Morbidly obese; in no acute distress  Respiratory:?breathing even, quiet, and unlabored.?? No coughing.?  Cardiovascular:? Generalized lipidema in all extremities.? No hemosiderin staining or varicosities.? No hair distribution over BLE.? Feet and toes warm to touch.? No temperature differences between BLE.? Several of her toenails grossly dystrophic.?  Musculoskeletal:?full range of motion of all extremities; no joint deformities or edema  Neurologic: A&O X 3; cranial nerves intact, no signs of peripheral neurological deficit, motor/sensory function intact  Psychiatric:?Calm, cooperative.??Mood and affect normal.??Responses appropriate  Integumentary:? Two tender calluses, with  underlying corns,?over plantar surface of left foot.? Large thick callus growth at?distal tip of left hallux.? Hallux and two smaller toenails with significant dystrophic changes.? One tender callus, with underlying corn, on plantar surface of right foot.? Thick callus over distal tip of right hallux.? Right hallux and smallest toe with significant dystrophic changes.?  ?  ?   Assessment/Plan:  ?  Type 2 diabetes:  Poorly-controlled.?  POC A1C 10.4 on 2/3/2021  Teaching provided on diabetic foot care principles, and when to notify medical provider of toenail, foot, or nail changes.?  ?   Calluses and Corns:  Teaching provided on underlying causes, s/s of complications, insurance coverages for diabetic foot care, and importance of establishing care with podiatrist to address the significant calluses she has over both feet and bilateral halluxes.?  Procedure Today:?? Using # 7 dermal curette, pared down calluses and removed underlying corns on bilateral plantar surfaces of feet.? No bleeding.? No? local anesthetic required.? Encouraged her to apply Lac-Hydrin to dry scales on plantar feet twice/day, followed by Vaseline, from base of toes over heels.? Instructed on risk of diabetic foot ulcers and infections that can occur from dried cracking feet.  External referral to Dr. Dubose, who accepts Medicaid insurance.? Phone number and address provided.? Instructed her to call us back, if she is not able to get an appointment with Dr. Dubose scheduled for July.? Instructed her that foot care needs to be done more frequently than six months, given extent of her calluses.?  ?   Dystrophic toenails:  Teaching provided on underlying causes, s/s of complications, insurance coverages for diabetic foot care, and importance of establishing care with podiatrist to address the significant calluses she has over both feet and bilateral halluxes.?  Procedure today:? Debrided 5 of her grossly dystrophic toenails?using nail clippers.?  Cut away tops of thickened nails to decrease thickness of nailbed.? Using nail clippers, trimmed remaining five toenails.?  ?  ?  Assessment/Plan  1.?Diabetes?E11.9  2.?Corn of foot?L84,?Callus of foot?L84  Referrals  Ambulatory Referral, Specialty: Podiatry, Reason: Significant calluses, corns, and dystrophic toenails in diabetic patient. Needs appointment scheduled for sometime in July. Foot care done by [facility] wound clinic on 3/3/2021., Refer To: Sedrick Lo DPM, Jimmie CHING, Jimmie Antonio...   Problem List/Past Medical History  Ongoing  Diabetes  HLD (hyperlipidemia)  HTN (hypertension)  Morbid obesity  DAVION on CPAP  Tobacco user  Tobacco user  Historical  No qualifying data  Procedure/Surgical History  CHIP BSO - Total abdominal hysterectomy and bilateral salpingo-oophorectomy   Medications  Accucheck BG Meter, See Instructions  Accucheck BG Meter Test Strips, See Instructions, 11 refills  aspirin 81 mg oral tablet, 81 mg= 1 tab(s), Oral, Daily  atorvastatin 80 mg oral tablet, 80 mg= 1 tab(s), Oral, Daily, 3 refills  budesonide 0.25 mg/2 mL inhalation suspension, NEB, BID  Coreg 12.5 mg oral tablet, 12.5 mg= 1 tab(s), Oral, BID, 3 refills  cpap machine, See Instructions  Flovent Diskus 250 mcg/inh inhalation powder, 2 puff(s), INH, BID, 11 refills  glipiZIDE 10 mg oral tablet, extended release (XL tab), 10 mg= 1 tab(s), Oral, Daily, 3 refills  Glucometer, See Instructions  Glucose Strips, See Instructions, 11 refills  Januvia 100 mg oral tablet, 100 mg= 1 tab(s), Oral, Daily, 3 refills  Lac-Hydrin 12% topical cream, 1 griffin, TOP, Daily, 1 refills  Lancets, See Instructions, 11 refills  Lancets, See Instructions, 11 refills  Lasix 40 mg oral tablet, 40 mg= 1 tab(s), Oral, Daily, 3 refills  lisinopril 40 mg oral tablet, 40 mg= 1 tab(s), Oral, Daily, 3 refills  metFORMIN 1000 mg oral tablet, 1000 mg= 1 tab(s), Oral, BID, 3 refills  Oxygen, See Instructions  sertraline 25 mg oral tablet, 25 mg= 1 tab(s), Oral,  Daily  spironolactone 25 mg oral tablet, 25 mg= 1 tab(s), Oral, Daily, 3 refills  Trulicity Pen 0.75 mg/0.5 mL subcutaneous solution, 0.75 mg, Subcutaneous, qWeek  Trulicity Pen 1.5 mg/0.5 mL subcutaneous solution, 1.5 mg, Subcutaneous, qWeek, 4 refills  Ventolin HFA 90 mcg/inh inhalation aerosol, 2 puff(s), INH, q6hr, PRN, 11 refills  Allergies  No Known Allergies  Social History  Abuse/Neglect  No, No, Yes, 03/03/2021  Alcohol  Current, Wine, 1-2 times per year, Alcohol use interferes with work or home: No. Others hurt by drinking: No. Household alcohol concerns: No., 12/03/2020  Employment/School  Disabled, 12/03/2020  Exercise  Self assessment: Poor condition., 10/28/2016  Financial/Legal Situation  Social Security Disability, 02/03/2021  Home/Environment  Lives with Children, Siblings. Living situation: Home/Independent. Home equipment: CPAP/BiPAP, Glucose monitoring, Respiratory treatments. Alcohol abuse in household: No. Substance abuse in household: No. Smoker in household: Yes. Injuries/Abuse/Neglect in household: No. Feels unsafe at home: No. Safe place to go: Yes. Family/Friends available for support: No. Concern for family members at home: No. Major illness in household: No. Financial concerns: No. TV/Computer concerns: No., 04/12/2018    Never in , 12/03/2020  Nutrition/Health  Regular, 10/28/2016  Sexual  Gender Identity Identifies as female., 08/27/2019  Spiritual/Cultural  Oriental orthodox, 12/03/2020  Substance Use  Never, 10/28/2016  Tobacco  10 or more cigarettes (1/2 pack or more)/day in last 30 days, No, 03/03/2021  Family History  Alzheimers disease: Mother.  DIABETES MELLITUS: Mother.  Dementia: Mother.  Hypertension.: Mother.  Health Maintenance  Health Maintenance  ???Pending?(in the next year)  ??? ??OverDue  ??? ? ? ?HF-LVEF due??05/14/20??and every 1??year(s)  ??? ? ? ?Influenza Vaccine due??10/01/20??and every 1??day(s)  ??? ? ? ?Smoking Cessation due??01/01/21??and every  1??year(s)  ??? ? ? ?Alcohol Misuse Screening due??01/02/21??and every 1??year(s)  ??? ??Due?  ??? ? ? ?Aspirin Therapy for CVD Prevention due??01/03/21??and every 1??year(s)  ??? ? ? ?Hypertension Management-Education due??03/03/21??and every 1??year(s)  ??? ? ? ?Lung Cancer Screening due??03/03/21??and every 1??year(s)  ??? ? ? ?Tetanus Vaccine due??03/03/21??and every 10??year(s)  ??? ? ? ?Zoster Vaccine due??03/03/21??Unknown Frequency  ??? ??Due In Future?  ??? ? ? ?Hypertension Management-BMP not due until??10/07/21??and every 1??year(s)  ??? ? ? ?ADL Screening not due until??10/07/21??and every 1??year(s)  ??? ? ? ?Diabetes Maintenance-Fasting Lipid Profile not due until??10/07/21??and every 1??year(s)  ??? ? ? ?Diabetes Maintenance-Serum Creatinine not due until??10/07/21??and every 1??year(s)  ??? ? ? ?Colorectal Screening not due until??10/14/21??and every 1??year(s)  ??? ? ? ?Diabetes Maintenance-Foot Exam not due until??12/03/21??and every 1??year(s)  ??? ? ? ?HF-Heart Failure Education not due until??12/03/21??and every 1??year(s)  ??? ? ? ?Obesity Screening not due until??01/01/22??and every 1??year(s)  ??? ? ? ?Diabetes Maintenance-Eye Exam not due until??01/06/22??and every 2??year(s)  ??? ? ? ?Body Mass Index Check not due until??02/03/22??and every 1??year(s)  ??? ? ? ?Depression Screening not due until??02/03/22??and every 1??year(s)  ??? ? ? ?Diabetes Maintenance-HgbA1c not due until??02/03/22??and every 1??year(s)  ???Satisfied?(in the past 1 year)  ??? ??Satisfied?  ??? ? ? ?ADL Screening on??10/07/20.??Satisfied by Mohamud Lopes LPN  ??? ? ? ?Blood Pressure Screening on??03/03/21.??Satisfied by Yuan Ann RN  ??? ? ? ?Body Mass Index Check on??02/03/21.??Satisfied by Liane Julian LPN  ??? ? ? ?Breast Cancer Screening on??12/29/20.??Satisfied by Ros Cunningham  ??? ? ? ?Colorectal Screening on??10/14/20.??Satisfied by Kailyn Aguilera  ??? ? ? ?Depression  Screening on??02/03/21.??Satisfied by Liane Julian LPN  ??? ? ? ?Diabetes Maintenance-HgbA1c on??02/03/21.??Satisfied by Liane Julian LPN  ??? ? ? ?Diabetes Maintenance-Foot Exam on??12/03/20.??Satisfied by Jacqueline Forrester NP  ??? ? ? ?Diabetes Maintenance-Microalbumin on??12/03/20.??Satisfied by Edgardo Sorenson  ??? ? ? ?Diabetes Maintenance-Fasting Lipid Profile on??10/07/20.??Satisfied by Edgardo Sorenson  ??? ? ? ?Diabetes Maintenance-Serum Creatinine on??10/07/20.??Satisfied by Edgardo Sorenson  ??? ? ? ?Diabetes Screening on??10/07/20.??Satisfied by Edgardo Sorenson  ??? ? ? ?Hypertension Management-Blood Pressure on??03/03/21.??Satisfied by Yuan Ann RN  ??? ? ? ?Hypertension Management-BMP on??12/03/20.??Satisfied by Edgardo Sorenson  ??? ? ? ?Influenza Vaccine on??03/03/21.??Satisfied by Yuan Ann RN  ??? ? ? ?Lipid Screening on??10/07/20.??Satisfied by Edgardo Sorenson  ??? ? ? ?Obesity Screening on??02/03/21.??Satisfied by Liane Julian LPN  ?

## 2022-05-05 ENCOUNTER — HOSPITAL ENCOUNTER (OUTPATIENT)
Dept: RADIOLOGY | Facility: HOSPITAL | Age: 57
Discharge: HOME | End: 2022-05-05
Attending: NURSE PRACTITIONER
Payer: MEDICAID

## 2022-05-05 DIAGNOSIS — Z12.31 BREAST CANCER SCREENING BY MAMMOGRAM: ICD-10-CM

## 2022-05-05 PROCEDURE — 77061 BREAST TOMOSYNTHESIS UNI: CPT | Mod: 26,LT,, | Performed by: RADIOLOGY

## 2022-05-05 PROCEDURE — 77065 DX MAMMO INCL CAD UNI: CPT | Mod: TC,LT

## 2022-05-05 PROCEDURE — 77065 MAMMO DIGITAL DIAGNOSTIC LEFT WITH TOMO: ICD-10-PCS | Mod: 26,LT,, | Performed by: RADIOLOGY

## 2022-05-05 PROCEDURE — 77061 MAMMO DIGITAL DIAGNOSTIC LEFT WITH TOMO: ICD-10-PCS | Mod: 26,LT,, | Performed by: RADIOLOGY

## 2022-05-05 PROCEDURE — 77065 DX MAMMO INCL CAD UNI: CPT | Mod: 26,LT,, | Performed by: RADIOLOGY

## 2022-05-06 ENCOUNTER — APPOINTMENT (OUTPATIENT)
Dept: LAB | Facility: HOSPITAL | Age: 57
End: 2022-05-06
Attending: NURSE PRACTITIONER
Payer: MEDICAID

## 2022-05-13 DIAGNOSIS — E55.9 VITAMIN D DEFICIENCY: Primary | ICD-10-CM

## 2022-05-13 RX ORDER — ERGOCALCIFEROL 1.25 MG/1
50000 CAPSULE ORAL
Qty: 8 CAPSULE | Refills: 0 | Status: ON HOLD | OUTPATIENT
Start: 2022-05-13 | End: 2022-09-12 | Stop reason: HOSPADM

## 2022-05-16 ENCOUNTER — TELEPHONE (OUTPATIENT)
Dept: INTERNAL MEDICINE | Facility: CLINIC | Age: 57
End: 2022-05-16
Payer: MEDICAID

## 2022-05-16 NOTE — TELEPHONE ENCOUNTER
----- Message from NOEMI Watson sent at 5/13/2022  2:54 PM CDT -----  Please notify patient that Vitamin D level is low. I did prescribe Vitamin D 50,000 units weekly x8 weeks then patient is to take Vitamin D3 OTC 2,000 units daily. Labs ordered for patient complete prior to follow up appointment scheduled 09/2022

## 2022-05-20 ENCOUNTER — OFFICE VISIT (OUTPATIENT)
Dept: CARDIOLOGY | Facility: CLINIC | Age: 57
End: 2022-05-20
Payer: MEDICAID

## 2022-05-20 VITALS
HEIGHT: 64 IN | SYSTOLIC BLOOD PRESSURE: 148 MMHG | TEMPERATURE: 98 F | DIASTOLIC BLOOD PRESSURE: 90 MMHG | BODY MASS INDEX: 50.02 KG/M2 | OXYGEN SATURATION: 95 % | HEART RATE: 71 BPM | RESPIRATION RATE: 18 BRPM | WEIGHT: 293 LBS

## 2022-05-20 DIAGNOSIS — G47.33 OSA ON CPAP: ICD-10-CM

## 2022-05-20 DIAGNOSIS — I50.32 CHRONIC HEART FAILURE WITH PRESERVED EJECTION FRACTION: ICD-10-CM

## 2022-05-20 DIAGNOSIS — I10 PRIMARY HYPERTENSION: ICD-10-CM

## 2022-05-20 DIAGNOSIS — E78.5 HYPERLIPIDEMIA LDL GOAL <70: ICD-10-CM

## 2022-05-20 DIAGNOSIS — R60.0 PERIPHERAL EDEMA: ICD-10-CM

## 2022-05-20 DIAGNOSIS — R06.09 DOE (DYSPNEA ON EXERTION): ICD-10-CM

## 2022-05-20 PROBLEM — E11.9 TYPE 2 DIABETES MELLITUS: Status: ACTIVE | Noted: 2022-05-20

## 2022-05-20 PROCEDURE — 1160F PR REVIEW ALL MEDS BY PRESCRIBER/CLIN PHARMACIST DOCUMENTED: ICD-10-PCS | Mod: CPTII,,, | Performed by: NURSE PRACTITIONER

## 2022-05-20 PROCEDURE — 1160F RVW MEDS BY RX/DR IN RCRD: CPT | Mod: CPTII,,, | Performed by: NURSE PRACTITIONER

## 2022-05-20 PROCEDURE — 3077F PR MOST RECENT SYSTOLIC BLOOD PRESSURE >= 140 MM HG: ICD-10-PCS | Mod: CPTII,,, | Performed by: NURSE PRACTITIONER

## 2022-05-20 PROCEDURE — 3080F PR MOST RECENT DIASTOLIC BLOOD PRESSURE >= 90 MM HG: ICD-10-PCS | Mod: CPTII,,, | Performed by: NURSE PRACTITIONER

## 2022-05-20 PROCEDURE — 3008F BODY MASS INDEX DOCD: CPT | Mod: CPTII,,, | Performed by: NURSE PRACTITIONER

## 2022-05-20 PROCEDURE — 99214 PR OFFICE/OUTPT VISIT, EST, LEVL IV, 30-39 MIN: ICD-10-PCS | Mod: S$PBB,,, | Performed by: NURSE PRACTITIONER

## 2022-05-20 PROCEDURE — 3008F PR BODY MASS INDEX (BMI) DOCUMENTED: ICD-10-PCS | Mod: CPTII,,, | Performed by: NURSE PRACTITIONER

## 2022-05-20 PROCEDURE — 3066F PR DOCUMENTATION OF TREATMENT FOR NEPHROPATHY: ICD-10-PCS | Mod: CPTII,,, | Performed by: NURSE PRACTITIONER

## 2022-05-20 PROCEDURE — 99214 OFFICE O/P EST MOD 30 MIN: CPT | Mod: S$PBB,,, | Performed by: NURSE PRACTITIONER

## 2022-05-20 PROCEDURE — 3066F NEPHROPATHY DOC TX: CPT | Mod: CPTII,,, | Performed by: NURSE PRACTITIONER

## 2022-05-20 PROCEDURE — 3080F DIAST BP >= 90 MM HG: CPT | Mod: CPTII,,, | Performed by: NURSE PRACTITIONER

## 2022-05-20 PROCEDURE — 1159F PR MEDICATION LIST DOCUMENTED IN MEDICAL RECORD: ICD-10-PCS | Mod: CPTII,,, | Performed by: NURSE PRACTITIONER

## 2022-05-20 PROCEDURE — 3077F SYST BP >= 140 MM HG: CPT | Mod: CPTII,,, | Performed by: NURSE PRACTITIONER

## 2022-05-20 PROCEDURE — 99214 OFFICE O/P EST MOD 30 MIN: CPT | Mod: PBBFAC | Performed by: NURSE PRACTITIONER

## 2022-05-20 PROCEDURE — 1159F MED LIST DOCD IN RCRD: CPT | Mod: CPTII,,, | Performed by: NURSE PRACTITIONER

## 2022-05-20 PROCEDURE — 4010F PR ACE/ARB THEARPY RXD/TAKEN: ICD-10-PCS | Mod: CPTII,,, | Performed by: NURSE PRACTITIONER

## 2022-05-20 PROCEDURE — 3060F POS MICROALBUMINURIA REV: CPT | Mod: CPTII,,, | Performed by: NURSE PRACTITIONER

## 2022-05-20 PROCEDURE — 4010F ACE/ARB THERAPY RXD/TAKEN: CPT | Mod: CPTII,,, | Performed by: NURSE PRACTITIONER

## 2022-05-20 PROCEDURE — 3060F PR POS MICROALBUMINURIA RESULT DOCUMENTED/REVIEW: ICD-10-PCS | Mod: CPTII,,, | Performed by: NURSE PRACTITIONER

## 2022-05-20 RX ORDER — LISINOPRIL 40 MG/1
40 TABLET ORAL DAILY
Qty: 90 TABLET | Refills: 3 | Status: SHIPPED | OUTPATIENT
Start: 2022-05-20 | End: 2022-08-04

## 2022-05-20 RX ORDER — LATANOPROST 50 UG/ML
1 SOLUTION/ DROPS OPHTHALMIC
COMMUNITY
Start: 2021-09-08

## 2022-05-20 RX ORDER — METFORMIN HYDROCHLORIDE 1000 MG/1
1000 TABLET ORAL
COMMUNITY
Start: 2021-11-18 | End: 2022-09-21 | Stop reason: SDUPTHER

## 2022-05-20 RX ORDER — FUROSEMIDE 40 MG/1
40 TABLET ORAL
COMMUNITY
Start: 2021-10-07 | End: 2022-05-20 | Stop reason: SDUPTHER

## 2022-05-20 RX ORDER — SPIRONOLACTONE 25 MG/1
25 TABLET ORAL DAILY
Qty: 90 TABLET | Refills: 3 | Status: SHIPPED | OUTPATIENT
Start: 2022-05-20 | End: 2023-05-10 | Stop reason: SDUPTHER

## 2022-05-20 RX ORDER — ATORVASTATIN CALCIUM 80 MG/1
80 TABLET, FILM COATED ORAL
COMMUNITY
Start: 2021-10-07 | End: 2022-05-20 | Stop reason: SDUPTHER

## 2022-05-20 RX ORDER — EZETIMIBE 10 MG/1
10 TABLET ORAL
COMMUNITY
Start: 2021-10-07 | End: 2022-05-20 | Stop reason: SDUPTHER

## 2022-05-20 RX ORDER — FUROSEMIDE 40 MG/1
40 TABLET ORAL DAILY
Qty: 90 TABLET | Refills: 3 | Status: SHIPPED | OUTPATIENT
Start: 2022-05-20 | End: 2022-08-04 | Stop reason: SDUPTHER

## 2022-05-20 RX ORDER — BLOOD SUGAR DIAGNOSTIC
STRIP MISCELLANEOUS
Status: ON HOLD | COMMUNITY
Start: 2022-01-06 | End: 2022-09-12 | Stop reason: HOSPADM

## 2022-05-20 RX ORDER — LISINOPRIL 40 MG/1
40 TABLET ORAL
COMMUNITY
Start: 2021-10-07 | End: 2022-05-20 | Stop reason: SDUPTHER

## 2022-05-20 RX ORDER — SPIRONOLACTONE 25 MG/1
25 TABLET ORAL
COMMUNITY
Start: 2021-10-07 | End: 2022-05-20 | Stop reason: SDUPTHER

## 2022-05-20 RX ORDER — GLIPIZIDE 10 MG/1
10 TABLET, FILM COATED, EXTENDED RELEASE ORAL
COMMUNITY
Start: 2021-11-18 | End: 2022-09-21

## 2022-05-20 RX ORDER — CARVEDILOL 25 MG/1
25 TABLET ORAL 2 TIMES DAILY
Qty: 180 TABLET | Refills: 3 | Status: SHIPPED | OUTPATIENT
Start: 2022-05-20 | End: 2023-05-10

## 2022-05-20 RX ORDER — EZETIMIBE 10 MG/1
10 TABLET ORAL DAILY
Qty: 90 TABLET | Refills: 3 | Status: SHIPPED | OUTPATIENT
Start: 2022-05-20 | End: 2023-05-10 | Stop reason: SDUPTHER

## 2022-05-20 RX ORDER — ATORVASTATIN CALCIUM 80 MG/1
80 TABLET, FILM COATED ORAL DAILY
Qty: 90 TABLET | Refills: 3 | Status: SHIPPED | OUTPATIENT
Start: 2022-05-20 | End: 2023-05-10 | Stop reason: SDUPTHER

## 2022-05-20 RX ORDER — CARVEDILOL 25 MG/1
25 TABLET ORAL
COMMUNITY
Start: 2021-12-21 | End: 2022-05-20 | Stop reason: SDUPTHER

## 2022-05-20 NOTE — PROGRESS NOTES
CHIEF COMPLAINT:   Chief Complaint   Patient presents with    Follow-up    Shortness of Breath     Sob upon exertion denies other targets                   Review of patient's allergies indicates:  No Known Allergies                                       HPI:  Linn Mckeon 56 y.o. female with a past medical history of cor pulmonale, Diabetes, DAVION on CPAP, HTN, HLD, Depression, and Tobacco Use presents for 6 month follow up and ongoing care. is time.  She completed an Echocardiogram on May 10, 2021 which revealed mild concentric LVH, EF of approximately 50 to 55%, mild MR, and mild aortic valve sclerosis without stenosis.     Patient reports worsening shortness of breath and increased edema since her previous appointment.  She denies chest pain, orthopnea, PND, or syncope.  She reports compliance with her CPAP nightly and feels she is benefiting from use. She continues to smoke approximately 1 pack of cigarettes per day, but is not interested in quitting at this time.  Patient reports compliance with her medications.                                                                                                                                                                                        There is no problem list on file for this patient.    Past Surgical History:   Procedure Laterality Date    HYSTERECTOMY       Social History     Socioeconomic History    Marital status:    Tobacco Use    Smoking status: Current Every Day Smoker     Packs/day: 3.00    Smokeless tobacco: Never Used    Tobacco comment: 4 pks weekly   Substance and Sexual Activity    Alcohol use: Never    Drug use: Never        Family History   Problem Relation Age of Onset    Hypertension Mother     Heart disease Mother     Diabetes Mother     Heart attack Father     Diabetes Sister     Hypertension Sister     Diabetes Brother     Hypertension Brother          Current Outpatient Medications:     atorvastatin  "(LIPITOR) 80 MG tablet, Take 80 mg by mouth., Disp: , Rfl:     carvediloL (COREG) 25 MG tablet, Take 25 mg by mouth., Disp: , Rfl:     dulaglutide (TRULICITY) 3 mg/0.5 mL pen injector, Inject 3 mg into the skin., Disp: , Rfl:     ezetimibe (ZETIA) 10 mg tablet, Take 10 mg by mouth., Disp: , Rfl:     furosemide (LASIX) 40 MG tablet, Take 40 mg by mouth., Disp: , Rfl:     glipiZIDE (GLUCOTROL) 10 MG TR24, Take 10 mg by mouth., Disp: , Rfl:     latanoprost 0.005 % ophthalmic solution, Apply 1 drop to eye., Disp: , Rfl:     lisinopriL (PRINIVIL,ZESTRIL) 40 MG tablet, Take 40 mg by mouth., Disp: , Rfl:     metFORMIN (GLUCOPHAGE) 1000 MG tablet, Take 1,000 mg by mouth., Disp: , Rfl:     SITagliptin (JANUVIA) 100 MG Tab, Take 100 mg by mouth., Disp: , Rfl:     spironolactone (ALDACTONE) 25 MG tablet, Take 25 mg by mouth., Disp: , Rfl:     ACCU-CHEK GUIDE TEST STRIPS Strp, USE TO TEST BLOOD SUGAR LEVELS BEFORE EACH MEAL DAILY, Disp: , Rfl:     ergocalciferol (ERGOCALCIFEROL) 50,000 unit Cap, Take 1 capsule (50,000 Units total) by mouth every 7 days., Disp: 8 capsule, Rfl: 0     ROS:                                                                                                                                                                             Review of Systems   Constitutional: Negative.    Respiratory: Positive for shortness of breath.    Cardiovascular: Positive for leg swelling.   Gastrointestinal: Negative.    Musculoskeletal: Negative.    Skin: Negative.    Neurological: Negative.    Psychiatric/Behavioral: Negative.         Blood pressure (!) 152/83, pulse 71, temperature 98.4 °F (36.9 °C), resp. rate 18, height 5' 4" (1.626 m), weight 135.7 kg (299 lb 2.6 oz), SpO2 95 %.   PE:  Physical Exam  Constitutional:       Appearance: Normal appearance.   HENT:      Head: Normocephalic and atraumatic.   Eyes:      Extraocular Movements: Extraocular movements intact.      Pupils: Pupils are equal, round, " and reactive to light.   Cardiovascular:      Rate and Rhythm: Normal rate and regular rhythm.   Pulmonary:      Effort: Pulmonary effort is normal.      Breath sounds: Normal breath sounds.   Abdominal:      Palpations: Abdomen is soft.   Musculoskeletal:         General: Normal range of motion.      Cervical back: Normal range of motion. No tenderness.      Right lower leg: Edema present.      Left lower leg: Edema present.   Skin:     General: Skin is warm and dry.   Neurological:      General: No focal deficit present.      Mental Status: She is alert and oriented to person, place, and time.   Psychiatric:         Mood and Affect: Mood normal.         Behavior: Behavior normal.         ASSESSMENT/PLAN:  HFpEF - NYHA Class II-III  EF 50-55% per Echo May 10, 2021  Patient reports worsening SOB and increased edema  Continue Lasix and Spironolactone   Will repeat Echocardiogram due to worsening SOB  Counseled on importance of good BP control    HTN  BP not at goal - 148/90 - patient has not yet taken her BP medications  Continue lisinopril, coreg, lasix, and spironolactone  Patient instructed to monitor BP at goal with goal of < 130/80  Counseled on low salt diet and exercise as tolerated    HLD  LDL not at goal - 95  Continue Atorvastatin 80mg daily and Zetia 10mg daily  Stressed the importance of consuming a low cholesterol diet and exercise as tolerated      DM  Uncontrolled - Last A1C - 10.0  Counseled on ADA diet and exercise as tolerated    Tobacco Abuse  Counseled on the importance of smoking cessation  She continues to smoke 1 pack of cigarettes per day - not interested in quitting at this time    DAVION on CPAP  She reports nightly CPAP compliance and feels she is benefitting from use  Recommended continued nightly CPAP use    Peripheral Edema  Instructed on low salt diet and leg elevation when able    Echocardiogram  Follow up in Cardiology Clinic in 4 months  Follow up with PCP as directed

## 2022-05-25 ENCOUNTER — HOSPITAL ENCOUNTER (OUTPATIENT)
Dept: RADIOLOGY | Facility: HOSPITAL | Age: 57
Discharge: HOME | End: 2022-05-25
Attending: NURSE PRACTITIONER
Payer: MEDICAID

## 2022-05-25 DIAGNOSIS — R92.1 BREAST CALCIFICATION, LEFT: Primary | ICD-10-CM

## 2022-05-25 DIAGNOSIS — R92.1 CALCIFICATION OF LEFT BREAST: ICD-10-CM

## 2022-05-25 PROCEDURE — 19081 BX BREAST 1ST LESION STRTCTC: CPT | Mod: LT,,, | Performed by: RADIOLOGY

## 2022-05-25 PROCEDURE — 27000549 MAMMO BREAST STEREOTACTIC BIOPSY 1ST SITE COMPLETE

## 2022-05-25 PROCEDURE — 19081 MAMMO BREAST STEREOTACTIC BIOPSY 1ST SITE COMPLETE: ICD-10-PCS | Mod: LT,,, | Performed by: RADIOLOGY

## 2022-05-25 RX ORDER — LIDOCAINE HCL/EPINEPHRINE/PF 2%-1:200K
VIAL (ML) INJECTION
Status: DISCONTINUED
Start: 2022-05-25 | End: 2022-05-26 | Stop reason: HOSPADM

## 2022-05-25 RX ORDER — LIDOCAINE HYDROCHLORIDE 10 MG/ML
INJECTION, SOLUTION EPIDURAL; INFILTRATION; INTRACAUDAL; PERINEURAL
Status: DISCONTINUED
Start: 2022-05-25 | End: 2022-05-26 | Stop reason: HOSPADM

## 2022-05-27 ENCOUNTER — TELEPHONE (OUTPATIENT)
Dept: RADIOLOGY | Facility: HOSPITAL | Age: 57
End: 2022-05-27

## 2022-05-27 LAB
ESTROGEN SERPL-MCNC: NORMAL PG/ML
INSULIN SERPL-ACNC: NORMAL U[IU]/ML
LAB AP CLINICAL INFORMATION: NORMAL
LAB AP GROSS DESCRIPTION: NORMAL
LAB AP REPORT FOOTNOTES: NORMAL
T3RU NFR SERPL: NORMAL %

## 2022-05-27 NOTE — PROCEDURE NOTE ADDENDUM
RE: Benign Pathology  Received: Today  ANN-MARIE Yoon MD; NOEMI Watson  Caller: Unspecified (Today,  2:52 PM)  Pathology results and recommendations were discussed with the patient at approximately 4:20 pm on 5/27/22 by Sona Almanza [facility] Breast Imaging Nurse Navigator.             Previous Messages       ----- Message -----   From: Joel Hernandez MD   Sent: 5/27/2022   2:54 PM CDT   To: NOEMI Watson, Sona Almanza RN   Subject: Benign Pathology                                 Please see below.     Marcela, please communicate the results of the biopsy and recommendations to the patient.     Thanks.           Pathology is now available for review, and demonstrates:   Stereotactic/tomosynthesis-guided core biopsy of left upper inner breast group of calcifications, anterior depth, spanning approximately 8 mm.  Hydromark open coil-shaped biopsy clip is in appropriate position.   -  Fibroadenomatoid change with microcalcifications.  No evidence of malignancy or atypia.         Please see pathology report for full details. These pathology results are concordant with imaging findings.     Recommend continued annual screening mammography (with Digital Breast Tomosynthesis) due in 04/2023, according to [employer] guidelines.     Pathology results and recommendations will be communicated by Sona Almanza nurse zoëator, to the patient/provider and documented in the electronic medical record.

## 2022-07-07 ENCOUNTER — HOSPITAL ENCOUNTER (OUTPATIENT)
Dept: WOUND CARE | Facility: HOSPITAL | Age: 57
Discharge: HOME | End: 2022-07-07
Attending: NURSE PRACTITIONER
Payer: MEDICAID

## 2022-07-07 VITALS — DIASTOLIC BLOOD PRESSURE: 103 MMHG | SYSTOLIC BLOOD PRESSURE: 166 MMHG | HEART RATE: 83 BPM | TEMPERATURE: 98 F

## 2022-07-07 DIAGNOSIS — L84 CALLUS OF FOOT: Primary | ICD-10-CM

## 2022-07-07 DIAGNOSIS — E66.01 MORBID OBESITY WITH BODY MASS INDEX (BMI) GREATER THAN OR EQUAL TO 50: ICD-10-CM

## 2022-07-07 DIAGNOSIS — Z91.148 NON COMPLIANCE W MEDICATION REGIMEN: ICD-10-CM

## 2022-07-07 DIAGNOSIS — L60.3 DYSTROPHIC NAIL: ICD-10-CM

## 2022-07-07 DIAGNOSIS — Z91.199 NONCOMPLIANCE WITH TREATMENT PLAN: ICD-10-CM

## 2022-07-07 DIAGNOSIS — L85.3 XEROSIS OF SKIN: ICD-10-CM

## 2022-07-07 DIAGNOSIS — L60.2 OVERGROWN NAIL: ICD-10-CM

## 2022-07-07 PROBLEM — E11.628 TYPE 2 DIABETES MELLITUS WITH SKIN COMPLICATION: Status: ACTIVE | Noted: 2022-05-20

## 2022-07-07 PROCEDURE — G0127 PR TRIM NAIL(S): ICD-10-PCS | Mod: 59,,, | Performed by: NURSE PRACTITIONER

## 2022-07-07 PROCEDURE — 11057 PARNG/CUTG B9 HYPRKR LES >4: CPT

## 2022-07-07 PROCEDURE — G0127 TRIM NAIL(S): HCPCS | Mod: 59,,, | Performed by: NURSE PRACTITIONER

## 2022-07-07 PROCEDURE — 11719 TRIM NAIL(S) ANY NUMBER: CPT

## 2022-07-07 PROCEDURE — 11057 PR TRIM BENIGN HYPERKERATOTIC SKIN LESION,>4: ICD-10-PCS | Mod: ,,, | Performed by: NURSE PRACTITIONER

## 2022-07-07 PROCEDURE — 99214 PR OFFICE/OUTPT VISIT, EST, LEVL IV, 30-39 MIN: ICD-10-PCS | Mod: 25,,, | Performed by: NURSE PRACTITIONER

## 2022-07-07 PROCEDURE — 99214 OFFICE O/P EST MOD 30 MIN: CPT | Mod: 25,,, | Performed by: NURSE PRACTITIONER

## 2022-07-07 PROCEDURE — 11057 PARNG/CUTG B9 HYPRKR LES >4: CPT | Mod: ,,, | Performed by: NURSE PRACTITIONER

## 2022-07-07 NOTE — PROGRESS NOTES
Chief Complaint:  Diabetic foot/callus/nail care       History of Present Illness:  55 yo Black female being seen today for diabetic foot, toenail, and callus/corn care.  Prescribed Lac-Hydrin for dry skin over feet; however, not using.  Hx includes treatment plan and medication non-compliance, uncontrolledType 2 diabetes, HTN, HLD, DVAION on CPAP, morbid obesity (BMI 51.05), and smoking (25 pk/year history).   Followed by [facility] IM FNP for PCP.  Followed by [facility] endocrine clinic. Does not desire smoking cessation today. Smoking, on average, 1/2 ppd.     Review of Most Recent Labs:  5/5/2022:  eGFR >60, with CR 0.8.  Chol 149, HDL 38, LDL 95, Trig 80.  HgbA1C 10.0.    3/21/2022: HgbA1C POC 10.3.   12/6/2021: eGFR 78, with CR 0.95. Albumin 3.3. CBC unremarkable.  10/7/2021: eGFR 78, with CR 0.95. Albumin 3.3. HgbA1C 8.3. . H&H 15.0 & 46.5. Plts 265. MCV and MCH nl.   8/10/2021: eGFR 85, with CR 0.88. HgbA1C 8.8.   5/5/2021: HgbA1C 9.6. (down from 11.6 on 12/3/2020). LDL 98.      Today 7/7/2022:  Says she is wearing her CPAP at night, as prescribed.  Still not wanting to stop smoking.  Did not take morning medications before coming to visit.  Wearing sandals again today.  Says she remembers she has Lac-Hydrin cream ordered for feet, however, forgets to use it.  No pain at numerous calluses over bottoms of both feet.  Denies numbness, tingling, and pain in feet/toes.   No new rashes, lesions, or skin breakdown.     4/5/2022:  Denies numbness, tingling, and pain in feet/toes. Says multiple recurrent calluses over bilateral feet and ends of big toes are not bothering her. Did not take her morning medications this morning before coming to clinic. Did not check CBG last night or this morning. Does not want to stop smoking. Still wearing sandals. States she has Lac-Hydrin in home.    1/31/2022:  Denies numbness, tingling, and pain in feet/toes. Continues wearing open toed sandals. Calluses not bothering her too much on  bottoms of feet. She is not sure why she continues to get recurrent calluses to her big toes. No new skin breakdown, rashes, or other skin issues.    12/17/2021:  Does not desire smoking cessation. Not able to feel any pain over bottoms of her feet from significant calluses. Continues wearing sandals due to comfort. No rashes, skin lesions, or skin breakdown. Prescribed oral antibiotics Monday of last week for mild case of pneumonia; still finishing those out. Feeling better.     10/29/2021:  Callus on bottom of left foot is hurting again with walking. Still wearing sandals. Denies numbness, tingling, and pain in feet/toes. Denies rashes, lesions, or skin breakdown.     9/8/2021:  Calluses over feet and big toes have recurred; have been having increased tenderness in those places with walking. No rashes, lesions, or skin breakdown over feet. Continues wearing sandals much of the day. Denies wearing shoes that are too tight against big toes.     7/28/2021:  Other than calluses on both feet, big toes, and long toenails denies any new rashes, lesions, or skin breakdown today. States calluses are tender for a couple of days after trimming, not having any pain with walking with significant calluses. Wears slippers at all times when she is at home.       Review of Systems Except as stated in HPI, all other 10 body systems normal      Physical Exam Vitals & Measurements:  Vitals:    07/07/22 0921   BP: (!) 166/103   Pulse: 83   Temp: 97.7 °F (36.5 °C)       General: Hypertensive; asymptomatic with; afebrile. Morbidly obese; in no acute distress  Eyes:  Severe erythemic conjunctivae bilaterally; no drainage.  Respiratory:  Dyspneac on minimal exertion; no coughing.    Cardiovascular:  2+ pitting edema to BLE.  No hemosiderin staining or varicosities. No hair distribution over BLE; skin is shiny and taut over anterior tibial areas. Feet and toes warm to touch. No temperature differences between BLE. DP pulses bounding  bilaterally.   Musculoskeletal: decreased range of motion, and strength, of all extremities.   Loss of anatomical sites from knees distally secondary to body habitus.   Neurologic: A&O X 3; cranial nerves intact.  Pes planus bilaterally.  No LOS sensation on monofilament testing bilaterally.   Psychiatric: Calm, cooperative. Mood and affect normal. Responses appropriate  Integumentary:   Plantar foot skin very erythemic today.      Very thick recurrent calluses over bilateral distal halluxes.  One very deep callus/corn on left plantar foot over 3rd-4th met head.  One small callus to left heel.    One very deep callus/corn to right plantar forefoot  One smaller callus/corn to right medial instep.  One smaller callus/corn to right lateral midfoot.  One smaller callus/corn to right lateral hindfoot.    Ms. Mckeon presented today wearing sandals.     Severe generalized xerosis over bilateral plantar feet, extending up around bilateral heels.     All ten toenails overgrown and dystrophic.                     Assessment/Plan:    Morbid obesity, BMI 51.05:  This is a co-factor to extensive, deep, and multiple calluses/corns over bilateral feet/toes. Ms. Mckeon is at extreme risk of non-healing diabetic foot wound, given her diabetes and ongoing smoking.      Non-Compliance with Treatment Plan and Medications:  Last HgbA1C 10.0 on 5/5/2022.   Did not take her morning medications before coming to clinic.  Not checking CBGs.  Does not want to stop smoking.  Xerosis of bilateral feet does not reflect adherence with Lac-Hydrin and Vaseline tx.  Wearing sandals to clinic today, despite reminders at each visit on importance of wearing protective shoes with diabetes.    Presentation of severe eye conjunctivae and plantar erythema are consistent with non-compliance with CPAP therapy.  Teaching provided on risk of death, during sleep, without wearing CPAP consistently every night.  Teaching provided on sleep apnea and how CPAP  therapy manages that condition.      Type 2 diabetes:  Poorly-controlled, per last HgbA1C.  5/5/2022:  eGFR >60, with CR 0.8.  Chol 149, HDL 38, LDL 95, Trig 80.  HgbA1C 10.0.    Being followed by [facility] endocrine.   Teaching reinforced on diabetic foot care principles, and when to notify medical provider of toenail, foot, or nail changes. Instructions reinforced on end organ complications and vascular disease with poorly-controlled diabetes, hypertension, and ongoing smoking. Handouts have been given during prior visits on diabetic foot care.     Calluses and Corns:  Teaching reinforced on underlying causes, s/s of complications, and need for daily inspections of feet, with prompt notification to wound clinic M-F, or [facility] over the weekends.   Procedure Today: Paring and sanding down of eight extensive calluses  Rationale: untreated calluses can lead to diabetic foot wounds, osteomyeliltis, and lower limb amputations.  Informed consent: Instructed on benefits and risks of today's procedure, with rationale. Patient gave verbal consent prior to beginning procedure.   Using #3 dermal curettes, Dremmel, and fahad boards, I pared and sanded down total of eight (8) calluses and removed underlying corns on bilateral plantar surfaces of feet. No bleeding. No local anesthetic required. Encouraged her to apply Lac-Hydrin to dry scales on plantar feet twice/day, followed by Vaseline, from base of toes over heels. Instructions reinforced on risk of diabetic foot ulcers and infections that can occur from dried cracking feet. Instructed not to apply creams or lotions between toes, with rationale.     Overgrown Dystrophic Toenails:  Procedure Today:  cutting and filing of ten toenails.  Rationale:  Overgrown toenails, as patient presented today, can lead to diabetic foot/toe ulcers, osteomyelitis, and lower limb amputations.   Informed consent obtained.  Using standard podiatry clippers, Dremmel, and fahad boards, I cut and sanded  down ten toenails.  No bleeding or discomfort during procedure.  Patient tolerated procedure without complications.      Xerosis of bilateral heels:  Presentation of feet today, once again, indicates she is not using Lac-Hydrin and Vaseline, as prescribed. Xerosis is severe over bilateral plantar feet and heels.  Prescribed Lac-Hydrin followed by Vaseline BID.  Instructions reinforced on use, rationale, how to use, possible s/e, and expected outcomes of medication.     Smoking:  Teaching reinforced on role smoking plays in long term complications.  Smoking cessation encouraged. Instructions reinforced on smoking cessation program available here at the hospital, as well as medications available to her for cessation efforts. She declined smoking cessation today. States she is aware of long-term risks associated with smoking.   Being managed by PCP.         RTC in three months. Instructed on s/s of deterioration to call wound clinic for promptly in between clinic visits so we can schedule her that day, or the next day. Stressed instructions for her to call wound clinic before three months if her calluses become painful or severely thickened before three months, with rationale. UCC precautions reinforced.

## 2022-07-26 ENCOUNTER — HOSPITAL ENCOUNTER (OUTPATIENT)
Dept: CARDIOLOGY | Facility: HOSPITAL | Age: 57
Discharge: HOME | End: 2022-07-26
Attending: NURSE PRACTITIONER
Payer: MEDICAID

## 2022-07-26 VITALS
SYSTOLIC BLOOD PRESSURE: 187 MMHG | BODY MASS INDEX: 50.02 KG/M2 | DIASTOLIC BLOOD PRESSURE: 120 MMHG | HEIGHT: 64 IN | WEIGHT: 293 LBS

## 2022-07-26 DIAGNOSIS — I50.32 CHRONIC HEART FAILURE WITH PRESERVED EJECTION FRACTION: ICD-10-CM

## 2022-07-26 DIAGNOSIS — R06.09 DOE (DYSPNEA ON EXERTION): ICD-10-CM

## 2022-07-26 DIAGNOSIS — R60.0 PERIPHERAL EDEMA: ICD-10-CM

## 2022-07-26 LAB
AV INDEX (PROSTH): 0.87
AV MEAN GRADIENT: 2 MMHG
AV PEAK GRADIENT: 3 MMHG
AV VALVE AREA: 2.82 CM2
AV VELOCITY RATIO: 0.78
BSA FOR ECHO PROCEDURE: 2.48 M2
CV ECHO LV RWT: 0.67 CM
DOP CALC AO PEAK VEL: 0.92 M/S
DOP CALC AO VTI: 18.5 CM
DOP CALC LVOT AREA: 3.2 CM2
DOP CALC LVOT DIAMETER: 2.03 CM
DOP CALC LVOT PEAK VEL: 0.72 M/S
DOP CALC LVOT STROKE VOLUME: 52.08 CM3
DOP CALC MV VTI: 18.7 CM
DOP CALCLVOT PEAK VEL VTI: 16.1 CM
E WAVE DECELERATION TIME: 164.65 MSEC
E/A RATIO: 0.71
E/E' RATIO: 18 M/S
ECHO LV POSTERIOR WALL: 1.55 CM (ref 0.6–1.1)
EJECTION FRACTION: 40 %
FRACTIONAL SHORTENING: 19 % (ref 28–44)
INTERVENTRICULAR SEPTUM: 1.75 CM (ref 0.6–1.1)
LEFT ATRIUM SIZE: 4.33 CM
LEFT INTERNAL DIMENSION IN SYSTOLE: 3.76 CM (ref 2.1–4)
LEFT VENTRICLE DIASTOLIC VOLUME INDEX: 42.82 ML/M2
LEFT VENTRICLE DIASTOLIC VOLUME: 99.34 ML
LEFT VENTRICLE MASS INDEX: 144 G/M2
LEFT VENTRICLE SYSTOLIC VOLUME INDEX: 26 ML/M2
LEFT VENTRICLE SYSTOLIC VOLUME: 60.26 ML
LEFT VENTRICULAR INTERNAL DIMENSION IN DIASTOLE: 4.64 CM (ref 3.5–6)
LEFT VENTRICULAR MASS: 333.96 G
LV LATERAL E/E' RATIO: 18 M/S
LV SEPTAL E/E' RATIO: 18 M/S
LVOT MG: 1.01 MMHG
LVOT MV: 0.46 CM/S
MV MEAN GRADIENT: 1 MMHG
MV PEAK A VEL: 1.02 M/S
MV PEAK E VEL: 0.72 M/S
MV PEAK GRADIENT: 3 MMHG
MV STENOSIS PRESSURE HALF TIME: 47.75 MS
MV VALVE AREA BY CONTINUITY EQUATION: 2.79 CM2
MV VALVE AREA P 1/2 METHOD: 4.61 CM2
PISA TR MAX VEL: 3.19 M/S
RA PRESSURE: 15 MMHG
RIGHT VENTRICULAR END-DIASTOLIC DIMENSION: 4.3 CM
TDI LATERAL: 0.04 M/S
TDI SEPTAL: 0.04 M/S
TDI: 0.04 M/S
TR MAX PG: 41 MMHG
TV REST PULMONARY ARTERY PRESSURE: 56 MMHG

## 2022-07-26 PROCEDURE — 93306 TTE W/DOPPLER COMPLETE: CPT

## 2022-08-04 ENCOUNTER — OFFICE VISIT (OUTPATIENT)
Dept: CARDIOLOGY | Facility: CLINIC | Age: 57
End: 2022-08-04
Payer: MEDICAID

## 2022-08-04 VITALS
BODY MASS INDEX: 50.02 KG/M2 | DIASTOLIC BLOOD PRESSURE: 92 MMHG | OXYGEN SATURATION: 99 % | SYSTOLIC BLOOD PRESSURE: 176 MMHG | HEIGHT: 64 IN | WEIGHT: 293 LBS | HEART RATE: 80 BPM | RESPIRATION RATE: 22 BRPM | TEMPERATURE: 99 F

## 2022-08-04 DIAGNOSIS — M79.605 BILATERAL LOWER EXTREMITY PAIN: ICD-10-CM

## 2022-08-04 DIAGNOSIS — M79.604 BILATERAL LOWER EXTREMITY PAIN: ICD-10-CM

## 2022-08-04 DIAGNOSIS — R60.0 PERIPHERAL EDEMA: ICD-10-CM

## 2022-08-04 DIAGNOSIS — E78.5 HYPERLIPIDEMIA LDL GOAL <70: ICD-10-CM

## 2022-08-04 DIAGNOSIS — R06.09 DOE (DYSPNEA ON EXERTION): Primary | ICD-10-CM

## 2022-08-04 DIAGNOSIS — G47.33 OSA ON CPAP: ICD-10-CM

## 2022-08-04 DIAGNOSIS — I50.20 HEART FAILURE WITH REDUCED EJECTION FRACTION: ICD-10-CM

## 2022-08-04 DIAGNOSIS — I10 PRIMARY HYPERTENSION: ICD-10-CM

## 2022-08-04 DIAGNOSIS — Z72.0 TOBACCO USE: ICD-10-CM

## 2022-08-04 PROCEDURE — 3077F PR MOST RECENT SYSTOLIC BLOOD PRESSURE >= 140 MM HG: ICD-10-PCS | Mod: CPTII,,, | Performed by: NURSE PRACTITIONER

## 2022-08-04 PROCEDURE — 3060F POS MICROALBUMINURIA REV: CPT | Mod: CPTII,,, | Performed by: NURSE PRACTITIONER

## 2022-08-04 PROCEDURE — 4010F PR ACE/ARB THEARPY RXD/TAKEN: ICD-10-PCS | Mod: CPTII,,, | Performed by: NURSE PRACTITIONER

## 2022-08-04 PROCEDURE — 93005 ELECTROCARDIOGRAM TRACING: CPT

## 2022-08-04 PROCEDURE — 1160F RVW MEDS BY RX/DR IN RCRD: CPT | Mod: CPTII,,, | Performed by: NURSE PRACTITIONER

## 2022-08-04 PROCEDURE — 3080F DIAST BP >= 90 MM HG: CPT | Mod: CPTII,,, | Performed by: NURSE PRACTITIONER

## 2022-08-04 PROCEDURE — 1159F PR MEDICATION LIST DOCUMENTED IN MEDICAL RECORD: ICD-10-PCS | Mod: CPTII,,, | Performed by: NURSE PRACTITIONER

## 2022-08-04 PROCEDURE — 3080F PR MOST RECENT DIASTOLIC BLOOD PRESSURE >= 90 MM HG: ICD-10-PCS | Mod: CPTII,,, | Performed by: NURSE PRACTITIONER

## 2022-08-04 PROCEDURE — 3008F BODY MASS INDEX DOCD: CPT | Mod: CPTII,,, | Performed by: NURSE PRACTITIONER

## 2022-08-04 PROCEDURE — 99214 OFFICE O/P EST MOD 30 MIN: CPT | Mod: S$PBB,,, | Performed by: NURSE PRACTITIONER

## 2022-08-04 PROCEDURE — 4010F ACE/ARB THERAPY RXD/TAKEN: CPT | Mod: CPTII,,, | Performed by: NURSE PRACTITIONER

## 2022-08-04 PROCEDURE — 3077F SYST BP >= 140 MM HG: CPT | Mod: CPTII,,, | Performed by: NURSE PRACTITIONER

## 2022-08-04 PROCEDURE — 3066F PR DOCUMENTATION OF TREATMENT FOR NEPHROPATHY: ICD-10-PCS | Mod: CPTII,,, | Performed by: NURSE PRACTITIONER

## 2022-08-04 PROCEDURE — 3008F PR BODY MASS INDEX (BMI) DOCUMENTED: ICD-10-PCS | Mod: CPTII,,, | Performed by: NURSE PRACTITIONER

## 2022-08-04 PROCEDURE — 3066F NEPHROPATHY DOC TX: CPT | Mod: CPTII,,, | Performed by: NURSE PRACTITIONER

## 2022-08-04 PROCEDURE — 99214 PR OFFICE/OUTPT VISIT, EST, LEVL IV, 30-39 MIN: ICD-10-PCS | Mod: S$PBB,,, | Performed by: NURSE PRACTITIONER

## 2022-08-04 PROCEDURE — 3060F PR POS MICROALBUMINURIA RESULT DOCUMENTED/REVIEW: ICD-10-PCS | Mod: CPTII,,, | Performed by: NURSE PRACTITIONER

## 2022-08-04 PROCEDURE — 1159F MED LIST DOCD IN RCRD: CPT | Mod: CPTII,,, | Performed by: NURSE PRACTITIONER

## 2022-08-04 PROCEDURE — 1160F PR REVIEW ALL MEDS BY PRESCRIBER/CLIN PHARMACIST DOCUMENTED: ICD-10-PCS | Mod: CPTII,,, | Performed by: NURSE PRACTITIONER

## 2022-08-04 PROCEDURE — 99214 OFFICE O/P EST MOD 30 MIN: CPT | Mod: PBBFAC | Performed by: NURSE PRACTITIONER

## 2022-08-04 RX ORDER — FUROSEMIDE 40 MG/1
40 TABLET ORAL 2 TIMES DAILY
Qty: 180 TABLET | Refills: 3 | Status: SHIPPED | OUTPATIENT
Start: 2022-08-04 | End: 2023-05-10 | Stop reason: SDUPTHER

## 2022-08-04 RX ORDER — SACUBITRIL AND VALSARTAN 24; 26 MG/1; MG/1
1 TABLET, FILM COATED ORAL 2 TIMES DAILY
Qty: 60 TABLET | Refills: 11 | Status: SHIPPED | OUTPATIENT
Start: 2022-08-04 | End: 2023-05-10 | Stop reason: SDUPTHER

## 2022-08-04 NOTE — PATIENT INSTRUCTIONS
EKG today  Lexiscan Stress Test  KWADWO testing  Nurse Visit in 2 weeks for BP/Edema check with BMP (labwork)  Follow up in Cardiology Clinic in 2 months

## 2022-08-04 NOTE — PROGRESS NOTES
CHIEF COMPLAINT:   Chief Complaint   Patient presents with    Follow up for echo results      Complains of difficulty breathing, legs swelling, and calf pain when walking, did not take daily meds.                    Review of patient's allergies indicates:  No Known Allergies                                       HPI:  Linn Mckeon 56 y.o. female with a past medical history of Cor Pulmonale, Diabetes, DAVION on CPAP, HTN, HLD, Depression, and Tobacco Use presents for follow up and results of Echocardiogram.  She completed an Echocardiogram on July 26, 2022 which revealed a reduced ejection fraction of 40% with Grade 1 diastolic dysfunction.  See report below.  Patient continues to endorse shortness of breath with minimal exertion and states she does use her O2 at home for her COPD.  She denies chest pain, palpitations, or syncope.  She also endorses increased bilateral lower extremity edema as well as cramping in her bilateral calves.  She states she has not yet taken her morning medications.  She previously completed an Echocardiogram on May 10, 2021 which revealed mild concentric LVH, EF of approximately 50 to 55%, mild MR, and mild aortic valve sclerosis without stenosis. She reports compliance with her CPAP nightly and feels she is benefiting from use. She continues to smoke approximately 1 pack of cigarettes per day, but is not interested in quitting at this time.                                                                                                                                                                                                                                                                                                                                                                                                                                                                                             CARDIAC TESTING:  Results for orders placed during the hospital encounter of  07/26/22    Echo    Interpretation Summary  · Concentric hypertrophy and mildly decreased systolic function.  · The estimated ejection fraction is 40%.  · Grade I left ventricular diastolic dysfunction.  · Severe right ventricular enlargement.  · Mild left atrial enlargement.  · Moderate right atrial enlargement.  · Mild mitral regurgitation.  · Mild tricuspid regurgitation.  · Elevated central venous pressure (15 mmHg).  · The estimated PA systolic pressure is 56 mmHg.  · There is pulmonary hypertension.  · IVC is dilated and collapses<50% with inspiration.  · Mild to moderate pulmonic regurgitation.           Patient Active Problem List   Diagnosis    GOODMAN (dyspnea on exertion)    Chronic heart failure with preserved ejection fraction    Primary hypertension    Hyperlipidemia LDL goal <70    Type 2 diabetes mellitus with skin complication    Peripheral edema    DAVION on CPAP    Morbid obesity with body mass index (BMI) greater than or equal to 50    Noncompliance with treatment plan    Xerosis of skin    Callus of foot    Dystrophic nail    Non compliance w medication regimen     Past Surgical History:   Procedure Laterality Date    HYSTERECTOMY       Social History     Socioeconomic History    Marital status:    Tobacco Use    Smoking status: Current Every Day Smoker     Packs/day: 0.50     Years: 39.00     Pack years: 19.50    Smokeless tobacco: Never Used    Tobacco comment: 4 pks weekly   Substance and Sexual Activity    Alcohol use: Yes    Drug use: Never        Family History   Problem Relation Age of Onset    Hypertension Mother     Heart disease Mother     Diabetes Mother     Heart attack Father     Diabetes Sister     Hypertension Sister     Diabetes Brother     Hypertension Brother          Current Outpatient Medications:     ACCU-CHEK GUIDE TEST STRIPS Strp, USE TO TEST BLOOD SUGAR LEVELS BEFORE EACH MEAL DAILY, Disp: , Rfl:     atorvastatin (LIPITOR) 80 MG tablet,  "Take 1 tablet (80 mg total) by mouth once daily., Disp: 90 tablet, Rfl: 3    carvediloL (COREG) 25 MG tablet, Take 1 tablet (25 mg total) by mouth 2 (two) times daily., Disp: 180 tablet, Rfl: 3    dulaglutide (TRULICITY) 3 mg/0.5 mL pen injector, Inject 3 mg into the skin., Disp: , Rfl:     ezetimibe (ZETIA) 10 mg tablet, Take 1 tablet (10 mg total) by mouth once daily., Disp: 90 tablet, Rfl: 3    furosemide (LASIX) 40 MG tablet, Take 1 tablet (40 mg total) by mouth once daily., Disp: 90 tablet, Rfl: 3    glipiZIDE (GLUCOTROL) 10 MG TR24, Take 10 mg by mouth., Disp: , Rfl:     latanoprost 0.005 % ophthalmic solution, Apply 1 drop to eye., Disp: , Rfl:     lisinopriL (PRINIVIL,ZESTRIL) 40 MG tablet, Take 1 tablet (40 mg total) by mouth once daily., Disp: 90 tablet, Rfl: 3    metFORMIN (GLUCOPHAGE) 1000 MG tablet, Take 1,000 mg by mouth., Disp: , Rfl:     SITagliptin (JANUVIA) 100 MG Tab, Take 100 mg by mouth., Disp: , Rfl:     spironolactone (ALDACTONE) 25 MG tablet, Take 1 tablet (25 mg total) by mouth once daily., Disp: 90 tablet, Rfl: 3    ergocalciferol (ERGOCALCIFEROL) 50,000 unit Cap, Take 1 capsule (50,000 Units total) by mouth every 7 days. (Patient not taking: Reported on 8/4/2022), Disp: 8 capsule, Rfl: 0     ROS:                                                                                                                                                                             Review of Systems   Constitutional: Negative.    Respiratory: Positive for shortness of breath.    Cardiovascular: Positive for leg swelling.   Gastrointestinal: Negative.    Musculoskeletal:        BLE pain   Skin: Negative.    Neurological: Negative.    Psychiatric/Behavioral: Negative.         Blood pressure (!) 176/92, pulse 80, temperature 98.6 °F (37 °C), resp. rate (!) 22, height 5' 3.98" (1.625 m), weight (!) 144.2 kg (317 lb 14.5 oz), SpO2 99 %.   PE:  Physical Exam  Constitutional:       Appearance: Normal " appearance.   HENT:      Head: Normocephalic and atraumatic.   Eyes:      Extraocular Movements: Extraocular movements intact.      Pupils: Pupils are equal, round, and reactive to light.   Cardiovascular:      Rate and Rhythm: Normal rate and regular rhythm.   Pulmonary:      Effort: Pulmonary effort is normal.      Breath sounds: Wheezing present.   Abdominal:      Palpations: Abdomen is soft.      Comments: Obese abdomen   Musculoskeletal:      Cervical back: Normal range of motion. No tenderness.      Comments: 2+ pitting edema BLE   Skin:     General: Skin is warm and dry.   Neurological:      General: No focal deficit present.      Mental Status: She is alert and oriented to person, place, and time.   Psychiatric:         Mood and Affect: Mood normal.         Behavior: Behavior normal.       ASSESSMENT/PLAN:  HFrEF - EF 40% per Echo 7.26.22 - NYHA Class III  EF 50-55% per Echo May 10, 2021  She reports SOB with minimal exertion  She also has 2+ pitting edema BLE  Will order a Lexiscan Stress Test to rule out ischemic causes of systolic CHF  Increase Lasix to 40mg BID  Continue GDMT with Spironolactone and Coreg  Discontinue Lisinopril   Start Entresto 24/26mg BID - start 36 hours after last does of Lisinopril  BMP in 2 weeks  Nurse Visit in 2 weeks for BP/edema check  Counseled on the importance of a low salt diet - max 2g daily     HTN  BP not at goal - 176/92 - has not taken her medications  Increase Lasix to 40mg BID  ContinueSpironolactone and Coreg  Discontinue Lisinopril   Start Entresto 24/26mg BID - start 36 hours after last does of Lisinopril  BMP in 2 weeks  Nurse Visit in 2 weeks for BP/edema check  Counseled on low salt diet and exercise as tolerated    HLD  LDL not at goal - 95  Continue Atorvastatin 80mg daily and Zetia 10mg daily  Counseled on medication compliance  Counseled on low cholesterol diet     DM  Uncontrolled - last A1C - 8.8  Counseled on ADA diet and exercise as tolerated    Tobacco  Abuse  Counseled on the importance of smoking cessation  She continues to smoke 1 pack of cigarettes per day - not interested in quitting at this time    DAVION on CPAP  She reports nightly CPAP compliance and feels she is benefitting from use  Recommended continued nightly CPAP use    BLE pain  Will order KWADWO testing    EKG today  Lexiscan Stress Test  KWADWO testing  Nurse Visit in 2 weeks for BP/Edema check  Follow up in Cardiology Clinic in 2 months

## 2022-08-30 ENCOUNTER — HOSPITAL ENCOUNTER (OUTPATIENT)
Dept: RADIOLOGY | Facility: HOSPITAL | Age: 57
Discharge: HOME | End: 2022-08-30
Attending: NURSE PRACTITIONER
Payer: MEDICAID

## 2022-08-30 DIAGNOSIS — R06.09 DOE (DYSPNEA ON EXERTION): ICD-10-CM

## 2022-08-30 DIAGNOSIS — I50.20 HEART FAILURE WITH REDUCED EJECTION FRACTION: ICD-10-CM

## 2022-09-07 ENCOUNTER — HOSPITAL ENCOUNTER (OUTPATIENT)
Dept: CARDIOLOGY | Facility: HOSPITAL | Age: 57
Discharge: HOME | End: 2022-09-07
Attending: NURSE PRACTITIONER
Payer: MEDICAID

## 2022-09-07 ENCOUNTER — HOSPITAL ENCOUNTER (OUTPATIENT)
Dept: RADIOLOGY | Facility: HOSPITAL | Age: 57
Discharge: HOME | End: 2022-09-07
Attending: NURSE PRACTITIONER
Payer: MEDICAID

## 2022-09-07 VITALS — DIASTOLIC BLOOD PRESSURE: 94 MMHG | SYSTOLIC BLOOD PRESSURE: 159 MMHG | HEART RATE: 78 BPM | RESPIRATION RATE: 20 BRPM

## 2022-09-07 LAB
CV STRESS BASE HR: 77 BPM
DIASTOLIC BLOOD PRESSURE: 94 MMHG
NUC REST EJECTION FRACTION: 55
NUC STRESS EJECTION FRACTION: 51 %
OHS CV CPX 85 PERCENT MAX PREDICTED HEART RATE MALE: 132
OHS CV CPX ESTIMATED METS: 1
OHS CV CPX MAX PREDICTED HEART RATE: 156
OHS CV CPX PATIENT IS FEMALE: 1
OHS CV CPX PATIENT IS MALE: 0
OHS CV CPX PEAK DIASTOLIC BLOOD PRESSURE: 99 MMHG
OHS CV CPX PEAK HEAR RATE: 110 BPM
OHS CV CPX PEAK RATE PRESSURE PRODUCT: NORMAL
OHS CV CPX PEAK SYSTOLIC BLOOD PRESSURE: 191 MMHG
OHS CV CPX PERCENT MAX PREDICTED HEART RATE ACHIEVED: 71
OHS CV CPX RATE PRESSURE PRODUCT PRESENTING: NORMAL
STRESS ECHO POST EXERCISE DUR MIN: 1 MINUTES
STRESS ECHO POST EXERCISE DUR SEC: 32 SECONDS
SYSTOLIC BLOOD PRESSURE: 159 MMHG

## 2022-09-07 PROCEDURE — A9502 TC99M TETROFOSMIN: HCPCS

## 2022-09-07 PROCEDURE — 63600175 PHARM REV CODE 636 W HCPCS: Performed by: NURSE PRACTITIONER

## 2022-09-07 RX ORDER — REGADENOSON 0.08 MG/ML
0.4 INJECTION, SOLUTION INTRAVENOUS
Status: COMPLETED | OUTPATIENT
Start: 2022-09-07 | End: 2022-09-07

## 2022-09-07 RX ADMIN — REGADENOSON 0.4 MG: 0.08 INJECTION, SOLUTION INTRAVENOUS at 07:09

## 2022-09-11 ENCOUNTER — HOSPITAL ENCOUNTER (OUTPATIENT)
Facility: HOSPITAL | Age: 57
Discharge: HOME | End: 2022-09-12
Attending: STUDENT IN AN ORGANIZED HEALTH CARE EDUCATION/TRAINING PROGRAM | Admitting: INTERNAL MEDICINE
Payer: MEDICAID

## 2022-09-11 DIAGNOSIS — F17.200 NEEDS SMOKING CESSATION EDUCATION: ICD-10-CM

## 2022-09-11 DIAGNOSIS — E11.628 TYPE 2 DIABETES MELLITUS WITH OTHER SKIN COMPLICATION, WITHOUT LONG-TERM CURRENT USE OF INSULIN: ICD-10-CM

## 2022-09-11 DIAGNOSIS — I21.4 NSTEMI (NON-ST ELEVATED MYOCARDIAL INFARCTION): Primary | ICD-10-CM

## 2022-09-11 DIAGNOSIS — I50.20 HEART FAILURE WITH REDUCED EJECTION FRACTION: ICD-10-CM

## 2022-09-11 DIAGNOSIS — J44.1 COPD EXACERBATION: ICD-10-CM

## 2022-09-11 DIAGNOSIS — G47.33 OSA ON CPAP: ICD-10-CM

## 2022-09-11 DIAGNOSIS — I50.9 ACUTE ON CHRONIC CONGESTIVE HEART FAILURE, UNSPECIFIED HEART FAILURE TYPE: ICD-10-CM

## 2022-09-11 LAB
ALBUMIN SERPL-MCNC: 2.9 GM/DL (ref 3.5–5)
ALBUMIN/GLOB SERPL: 0.9 RATIO (ref 1.1–2)
ALP SERPL-CCNC: 95 UNIT/L (ref 40–150)
ALT SERPL-CCNC: 33 UNIT/L (ref 0–55)
APPEARANCE UR: CLEAR
AST SERPL-CCNC: 22 UNIT/L (ref 5–34)
BACTERIA #/AREA URNS AUTO: ABNORMAL /HPF
BASOPHILS # BLD AUTO: 0.09 X10(3)/MCL (ref 0–0.2)
BASOPHILS NFR BLD AUTO: 1.1 %
BILIRUB UR QL STRIP.AUTO: NEGATIVE MG/DL
BILIRUBIN DIRECT+TOT PNL SERPL-MCNC: 1.7 MG/DL
BNP BLD-MCNC: 694.3 PG/ML
BUN SERPL-MCNC: 13 MG/DL (ref 9.8–20.1)
CALCIUM SERPL-MCNC: 9.1 MG/DL (ref 8.4–10.2)
CHLORIDE SERPL-SCNC: 102 MMOL/L (ref 98–107)
CO2 SERPL-SCNC: 27 MMOL/L (ref 22–29)
COLOR UR AUTO: COLORLESS
CORRECTED TEMPERATURE (PCO2): 59 MMHG (ref 20–50)
CORRECTED TEMPERATURE (PH): 7.32 (ref 7.35–7.45)
CORRECTED TEMPERATURE (PO2): 53 MMHG
CREAT SERPL-MCNC: 0.87 MG/DL (ref 0.55–1.02)
EOSINOPHIL # BLD AUTO: 0.09 X10(3)/MCL (ref 0–0.9)
EOSINOPHIL NFR BLD AUTO: 1.1 %
ERYTHROCYTE [DISTWIDTH] IN BLOOD BY AUTOMATED COUNT: 18.6 % (ref 11.5–17)
EST. AVERAGE GLUCOSE BLD GHB EST-MCNC: 289.1 MG/DL
FLUAV AG UPPER RESP QL IA.RAPID: NOT DETECTED
FLUBV AG UPPER RESP QL IA.RAPID: NOT DETECTED
GFR SERPLBLD CREATININE-BSD FMLA CKD-EPI: >60 MLS/MIN/1.73/M2
GLOBULIN SER-MCNC: 3.3 GM/DL (ref 2.4–3.5)
GLUCOSE SERPL-MCNC: 334 MG/DL (ref 74–100)
GLUCOSE UR QL STRIP.AUTO: ABNORMAL MG/DL
HBA1C MFR BLD: 11.7 %
HCO3 UR-SCNC: 30.4 MMOL/L (ref 22–26)
HCT VFR BLD AUTO: 56.6 % (ref 37–47)
HGB BLD-MCNC: 17.3 GM/DL (ref 12–16)
HGB BLD-MCNC: 17.8 G/DL (ref 12–18)
HYALINE CASTS #/AREA URNS LPF: ABNORMAL /LPF
IMM GRANULOCYTES # BLD AUTO: 0.03 X10(3)/MCL (ref 0–0.04)
IMM GRANULOCYTES NFR BLD AUTO: 0.4 %
KETONES UR QL STRIP.AUTO: NEGATIVE MG/DL
LEUKOCYTE ESTERASE UR QL STRIP.AUTO: 75 UNIT/L
LYMPHOCYTES # BLD AUTO: 1.01 X10(3)/MCL (ref 0.6–4.6)
LYMPHOCYTES NFR BLD AUTO: 11.8 %
MCH RBC QN AUTO: 25.1 PG (ref 27–31)
MCHC RBC AUTO-ENTMCNC: 30.6 MG/DL (ref 33–36)
MCV RBC AUTO: 82.1 FL (ref 80–94)
MONOCYTES # BLD AUTO: 0.67 X10(3)/MCL (ref 0.1–1.3)
MONOCYTES NFR BLD AUTO: 7.8 %
MUCOUS THREADS URNS QL MICRO: ABNORMAL /LPF
NEUTROPHILS # BLD AUTO: 6.7 X10(3)/MCL (ref 2.1–9.2)
NEUTROPHILS NFR BLD AUTO: 77.8 %
NITRITE UR QL STRIP.AUTO: NEGATIVE
NRBC BLD AUTO-RTO: 0 %
PCO2 BLDA: 59 MMHG (ref 20–50)
PH SMN: 7.32 [PH] (ref 7.35–7.45)
PH UR STRIP.AUTO: 5 [PH]
PHOSPHATE SERPL-MCNC: 4.4 MG/DL (ref 2.3–4.7)
PLATELET # BLD AUTO: 177 X10(3)/MCL (ref 130–400)
PLATELETS.RETICULATED NFR BLD AUTO: 6.9 % (ref 0.9–11.2)
PMV BLD AUTO: 10.8 FL (ref 7.4–10.4)
PO2 BLDA: 53 MMHG
POC BASE DEFICIT: 2.2 MMOL/L (ref -2–2)
POC COHB: 6.6 % (ref 0.5–1.5)
POC METHB: 0.8 % (ref 0–1.5)
POC O2HB: 82.9 % (ref 94–100)
POC PERFORMED BY: ABNORMAL
POC SATURATED O2: 84 % (ref 90–100)
POC TEMPERATURE: 37 C
POCT GLUCOSE: 293 MG/DL (ref 70–110)
POCT GLUCOSE: 376 MG/DL (ref 70–110)
POCT GLUCOSE: 384 MG/DL (ref 70–110)
POTASSIUM SERPL-SCNC: 5.1 MMOL/L (ref 3.5–5.1)
PROT SERPL-MCNC: 6.2 GM/DL (ref 6.4–8.3)
PROT UR QL STRIP.AUTO: NEGATIVE MG/DL
RBC # BLD AUTO: 6.89 X10(6)/MCL (ref 4.2–5.4)
RBC #/AREA URNS AUTO: ABNORMAL /HPF
RBC UR QL AUTO: NEGATIVE UNIT/L
SARS-COV-2 RNA RESP QL NAA+PROBE: NOT DETECTED
SODIUM SERPL-SCNC: 138 MMOL/L (ref 136–145)
SP GR UR STRIP.AUTO: 1.01
SPECIMEN SOURCE: ABNORMAL
SQUAMOUS #/AREA URNS LPF: ABNORMAL /HPF
TROPONIN I SERPL-MCNC: 0.05 NG/ML (ref 0–0.04)
TROPONIN I SERPL-MCNC: 0.07 NG/ML (ref 0–0.04)
TSH SERPL-ACNC: 0.64 UIU/ML (ref 0.35–4.94)
UROBILINOGEN UR STRIP-ACNC: NORMAL MG/DL
WBC # SPEC AUTO: 8.6 X10(3)/MCL (ref 4.5–11.5)
WBC #/AREA URNS AUTO: ABNORMAL /HPF

## 2022-09-11 PROCEDURE — 84484 ASSAY OF TROPONIN QUANT: CPT | Performed by: STUDENT IN AN ORGANIZED HEALTH CARE EDUCATION/TRAINING PROGRAM

## 2022-09-11 PROCEDURE — 36600 WITHDRAWAL OF ARTERIAL BLOOD: CPT

## 2022-09-11 PROCEDURE — 94660 CPAP INITIATION&MGMT: CPT

## 2022-09-11 PROCEDURE — 97162 PT EVAL MOD COMPLEX 30 MIN: CPT

## 2022-09-11 PROCEDURE — 96376 TX/PRO/DX INJ SAME DRUG ADON: CPT

## 2022-09-11 PROCEDURE — 96365 THER/PROPH/DIAG IV INF INIT: CPT

## 2022-09-11 PROCEDURE — 36415 COLL VENOUS BLD VENIPUNCTURE: CPT | Performed by: STUDENT IN AN ORGANIZED HEALTH CARE EDUCATION/TRAINING PROGRAM

## 2022-09-11 PROCEDURE — 25000003 PHARM REV CODE 250

## 2022-09-11 PROCEDURE — 82803 BLOOD GASES ANY COMBINATION: CPT

## 2022-09-11 PROCEDURE — G0378 HOSPITAL OBSERVATION PER HR: HCPCS

## 2022-09-11 PROCEDURE — 83880 ASSAY OF NATRIURETIC PEPTIDE: CPT | Performed by: STUDENT IN AN ORGANIZED HEALTH CARE EDUCATION/TRAINING PROGRAM

## 2022-09-11 PROCEDURE — 93005 ELECTROCARDIOGRAM TRACING: CPT

## 2022-09-11 PROCEDURE — 99900035 HC TECH TIME PER 15 MIN (STAT)

## 2022-09-11 PROCEDURE — 82962 GLUCOSE BLOOD TEST: CPT

## 2022-09-11 PROCEDURE — 96372 THER/PROPH/DIAG INJ SC/IM: CPT | Performed by: STUDENT IN AN ORGANIZED HEALTH CARE EDUCATION/TRAINING PROGRAM

## 2022-09-11 PROCEDURE — 87040 BLOOD CULTURE FOR BACTERIA: CPT

## 2022-09-11 PROCEDURE — 25000242 PHARM REV CODE 250 ALT 637 W/ HCPCS

## 2022-09-11 PROCEDURE — 63600175 PHARM REV CODE 636 W HCPCS: Performed by: STUDENT IN AN ORGANIZED HEALTH CARE EDUCATION/TRAINING PROGRAM

## 2022-09-11 PROCEDURE — 83036 HEMOGLOBIN GLYCOSYLATED A1C: CPT

## 2022-09-11 PROCEDURE — 87636 SARSCOV2 & INF A&B AMP PRB: CPT | Performed by: STUDENT IN AN ORGANIZED HEALTH CARE EDUCATION/TRAINING PROGRAM

## 2022-09-11 PROCEDURE — 94640 AIRWAY INHALATION TREATMENT: CPT | Mod: XB

## 2022-09-11 PROCEDURE — 96375 TX/PRO/DX INJ NEW DRUG ADDON: CPT

## 2022-09-11 PROCEDURE — 84484 ASSAY OF TROPONIN QUANT: CPT

## 2022-09-11 PROCEDURE — 84100 ASSAY OF PHOSPHORUS: CPT

## 2022-09-11 PROCEDURE — 99285 EMERGENCY DEPT VISIT HI MDM: CPT | Mod: 25

## 2022-09-11 PROCEDURE — 25000003 PHARM REV CODE 250: Performed by: STUDENT IN AN ORGANIZED HEALTH CARE EDUCATION/TRAINING PROGRAM

## 2022-09-11 PROCEDURE — 94761 N-INVAS EAR/PLS OXIMETRY MLT: CPT

## 2022-09-11 PROCEDURE — 85025 COMPLETE CBC W/AUTO DIFF WBC: CPT | Performed by: STUDENT IN AN ORGANIZED HEALTH CARE EDUCATION/TRAINING PROGRAM

## 2022-09-11 PROCEDURE — 80053 COMPREHEN METABOLIC PANEL: CPT | Performed by: STUDENT IN AN ORGANIZED HEALTH CARE EDUCATION/TRAINING PROGRAM

## 2022-09-11 PROCEDURE — 27000190 HC CPAP FULL FACE MASK W/VALVE

## 2022-09-11 PROCEDURE — 84443 ASSAY THYROID STIM HORMONE: CPT

## 2022-09-11 PROCEDURE — 63600175 PHARM REV CODE 636 W HCPCS

## 2022-09-11 PROCEDURE — 25000242 PHARM REV CODE 250 ALT 637 W/ HCPCS: Performed by: STUDENT IN AN ORGANIZED HEALTH CARE EDUCATION/TRAINING PROGRAM

## 2022-09-11 PROCEDURE — 27000221 HC OXYGEN, UP TO 24 HOURS

## 2022-09-11 PROCEDURE — 96372 THER/PROPH/DIAG INJ SC/IM: CPT | Mod: 59

## 2022-09-11 PROCEDURE — 81001 URINALYSIS AUTO W/SCOPE: CPT

## 2022-09-11 RX ORDER — CARVEDILOL 12.5 MG/1
25 TABLET ORAL 2 TIMES DAILY
Status: DISCONTINUED | OUTPATIENT
Start: 2022-09-11 | End: 2022-09-12 | Stop reason: HOSPADM

## 2022-09-11 RX ORDER — FUROSEMIDE 10 MG/ML
60 INJECTION INTRAMUSCULAR; INTRAVENOUS
Status: COMPLETED | OUTPATIENT
Start: 2022-09-11 | End: 2022-09-11

## 2022-09-11 RX ORDER — IBUPROFEN 200 MG
24 TABLET ORAL
Status: DISCONTINUED | OUTPATIENT
Start: 2022-09-11 | End: 2022-09-11

## 2022-09-11 RX ORDER — LEVOFLOXACIN 5 MG/ML
750 INJECTION, SOLUTION INTRAVENOUS
Status: DISCONTINUED | OUTPATIENT
Start: 2022-09-11 | End: 2022-09-12 | Stop reason: HOSPADM

## 2022-09-11 RX ORDER — METHYLPREDNISOLONE SOD SUCC 125 MG
125 VIAL (EA) INJECTION
Status: COMPLETED | OUTPATIENT
Start: 2022-09-11 | End: 2022-09-11

## 2022-09-11 RX ORDER — NALOXONE HCL 0.4 MG/ML
0.02 VIAL (ML) INJECTION
Status: DISCONTINUED | OUTPATIENT
Start: 2022-09-11 | End: 2022-09-12 | Stop reason: HOSPADM

## 2022-09-11 RX ORDER — INSULIN ASPART 100 [IU]/ML
1-10 INJECTION, SOLUTION INTRAVENOUS; SUBCUTANEOUS
Status: DISCONTINUED | OUTPATIENT
Start: 2022-09-11 | End: 2022-09-12 | Stop reason: HOSPADM

## 2022-09-11 RX ORDER — NYSTATIN 100000 [USP'U]/G
POWDER TOPICAL 2 TIMES DAILY
Status: DISCONTINUED | OUTPATIENT
Start: 2022-09-11 | End: 2022-09-12 | Stop reason: HOSPADM

## 2022-09-11 RX ORDER — IBUPROFEN 200 MG
24 TABLET ORAL
Status: DISCONTINUED | OUTPATIENT
Start: 2022-09-11 | End: 2022-09-12 | Stop reason: HOSPADM

## 2022-09-11 RX ORDER — NAPROXEN SODIUM 220 MG/1
324 TABLET, FILM COATED ORAL
Status: COMPLETED | OUTPATIENT
Start: 2022-09-11 | End: 2022-09-11

## 2022-09-11 RX ORDER — IPRATROPIUM BROMIDE AND ALBUTEROL SULFATE 2.5; .5 MG/3ML; MG/3ML
3 SOLUTION RESPIRATORY (INHALATION) EVERY 4 HOURS PRN
Status: DISCONTINUED | OUTPATIENT
Start: 2022-09-11 | End: 2022-09-12 | Stop reason: HOSPADM

## 2022-09-11 RX ORDER — ENOXAPARIN SODIUM 100 MG/ML
40 INJECTION SUBCUTANEOUS EVERY 12 HOURS
Status: DISCONTINUED | OUTPATIENT
Start: 2022-09-11 | End: 2022-09-12 | Stop reason: HOSPADM

## 2022-09-11 RX ORDER — FUROSEMIDE 10 MG/ML
40 INJECTION INTRAMUSCULAR; INTRAVENOUS
Status: DISCONTINUED | OUTPATIENT
Start: 2022-09-11 | End: 2022-09-12 | Stop reason: HOSPADM

## 2022-09-11 RX ORDER — TERBUTALINE SULFATE 1 MG/ML
0.25 INJECTION SUBCUTANEOUS ONCE
Status: COMPLETED | OUTPATIENT
Start: 2022-09-11 | End: 2022-09-11

## 2022-09-11 RX ORDER — ATORVASTATIN CALCIUM 40 MG/1
80 TABLET, FILM COATED ORAL DAILY
Status: DISCONTINUED | OUTPATIENT
Start: 2022-09-11 | End: 2022-09-12 | Stop reason: HOSPADM

## 2022-09-11 RX ORDER — MAGNESIUM SULFATE HEPTAHYDRATE 40 MG/ML
2 INJECTION, SOLUTION INTRAVENOUS
Status: COMPLETED | OUTPATIENT
Start: 2022-09-11 | End: 2022-09-11

## 2022-09-11 RX ORDER — SPIRONOLACTONE 25 MG/1
25 TABLET ORAL DAILY
Status: DISCONTINUED | OUTPATIENT
Start: 2022-09-11 | End: 2022-09-12 | Stop reason: HOSPADM

## 2022-09-11 RX ORDER — IPRATROPIUM BROMIDE AND ALBUTEROL SULFATE 2.5; .5 MG/3ML; MG/3ML
3 SOLUTION RESPIRATORY (INHALATION)
Status: COMPLETED | OUTPATIENT
Start: 2022-09-11 | End: 2022-09-11

## 2022-09-11 RX ORDER — SODIUM CHLORIDE 0.9 % (FLUSH) 0.9 %
10 SYRINGE (ML) INJECTION EVERY 12 HOURS PRN
Status: DISCONTINUED | OUTPATIENT
Start: 2022-09-11 | End: 2022-09-12 | Stop reason: HOSPADM

## 2022-09-11 RX ORDER — GLUCAGON 1 MG
1 KIT INJECTION
Status: DISCONTINUED | OUTPATIENT
Start: 2022-09-11 | End: 2022-09-11

## 2022-09-11 RX ORDER — PREDNISONE 20 MG/1
40 TABLET ORAL DAILY
Status: DISCONTINUED | OUTPATIENT
Start: 2022-09-11 | End: 2022-09-12 | Stop reason: HOSPADM

## 2022-09-11 RX ORDER — GLUCAGON 1 MG
1 KIT INJECTION
Status: DISCONTINUED | OUTPATIENT
Start: 2022-09-11 | End: 2022-09-12 | Stop reason: HOSPADM

## 2022-09-11 RX ORDER — IBUPROFEN 200 MG
16 TABLET ORAL
Status: DISCONTINUED | OUTPATIENT
Start: 2022-09-11 | End: 2022-09-11

## 2022-09-11 RX ORDER — IBUPROFEN 200 MG
16 TABLET ORAL
Status: DISCONTINUED | OUTPATIENT
Start: 2022-09-11 | End: 2022-09-12 | Stop reason: HOSPADM

## 2022-09-11 RX ORDER — LABETALOL HCL 20 MG/4 ML
10 SYRINGE (ML) INTRAVENOUS EVERY 6 HOURS PRN
Status: DISCONTINUED | OUTPATIENT
Start: 2022-09-11 | End: 2022-09-12 | Stop reason: HOSPADM

## 2022-09-11 RX ADMIN — IPRATROPIUM BROMIDE AND ALBUTEROL SULFATE 3 ML: 2.5; .5 SOLUTION RESPIRATORY (INHALATION) at 03:09

## 2022-09-11 RX ADMIN — INSULIN DETEMIR 30 UNITS: 100 INJECTION, SOLUTION SUBCUTANEOUS at 09:09

## 2022-09-11 RX ADMIN — MAGNESIUM SULFATE 2 G: 2 INJECTION INTRAVENOUS at 03:09

## 2022-09-11 RX ADMIN — CARVEDILOL 25 MG: 12.5 TABLET, FILM COATED ORAL at 10:09

## 2022-09-11 RX ADMIN — SACUBITRIL AND VALSARTAN 1 TABLET: 24; 26 TABLET, FILM COATED ORAL at 09:09

## 2022-09-11 RX ADMIN — PREDNISONE 40 MG: 20 TABLET ORAL at 10:09

## 2022-09-11 RX ADMIN — SPIRONOLACTONE 25 MG: 25 TABLET, FILM COATED ORAL at 10:09

## 2022-09-11 RX ADMIN — TERBUTALINE SULFATE 0.25 MG: 1 INJECTION, SOLUTION SUBCUTANEOUS at 03:09

## 2022-09-11 RX ADMIN — IPRATROPIUM BROMIDE AND ALBUTEROL SULFATE 3 ML: 2.5; .5 SOLUTION RESPIRATORY (INHALATION) at 07:09

## 2022-09-11 RX ADMIN — IPRATROPIUM BROMIDE AND ALBUTEROL SULFATE 3 ML: 2.5; .5 SOLUTION RESPIRATORY (INHALATION) at 11:09

## 2022-09-11 RX ADMIN — IPRATROPIUM BROMIDE AND ALBUTEROL SULFATE 3 ML: 2.5; .5 SOLUTION RESPIRATORY (INHALATION) at 10:09

## 2022-09-11 RX ADMIN — METHYLPREDNISOLONE SODIUM SUCCINATE 125 MG: 125 INJECTION, POWDER, FOR SOLUTION INTRAMUSCULAR; INTRAVENOUS at 03:09

## 2022-09-11 RX ADMIN — IPRATROPIUM BROMIDE AND ALBUTEROL SULFATE 3 ML: 2.5; .5 SOLUTION RESPIRATORY (INHALATION) at 08:09

## 2022-09-11 RX ADMIN — ASPIRIN 81 MG 324 MG: 81 TABLET ORAL at 04:09

## 2022-09-11 RX ADMIN — ATORVASTATIN CALCIUM 80 MG: 40 TABLET, FILM COATED ORAL at 10:09

## 2022-09-11 RX ADMIN — ENOXAPARIN SODIUM 40 MG: 40 INJECTION SUBCUTANEOUS at 09:09

## 2022-09-11 RX ADMIN — CARVEDILOL 25 MG: 12.5 TABLET, FILM COATED ORAL at 09:09

## 2022-09-11 RX ADMIN — NYSTATIN: 100000 POWDER TOPICAL at 09:09

## 2022-09-11 RX ADMIN — FUROSEMIDE 40 MG: 10 INJECTION, SOLUTION INTRAMUSCULAR; INTRAVENOUS at 10:09

## 2022-09-11 RX ADMIN — SACUBITRIL AND VALSARTAN 1 TABLET: 24; 26 TABLET, FILM COATED ORAL at 10:09

## 2022-09-11 RX ADMIN — FUROSEMIDE 60 MG: 10 INJECTION INTRAMUSCULAR; INTRAVENOUS at 03:09

## 2022-09-11 RX ADMIN — INSULIN ASPART 10 UNITS: 100 INJECTION, SOLUTION INTRAVENOUS; SUBCUTANEOUS at 06:09

## 2022-09-11 RX ADMIN — ENOXAPARIN SODIUM 40 MG: 40 INJECTION SUBCUTANEOUS at 10:09

## 2022-09-11 RX ADMIN — INSULIN ASPART 5 UNITS: 100 INJECTION, SOLUTION INTRAVENOUS; SUBCUTANEOUS at 09:09

## 2022-09-11 NOTE — CARE UPDATE
Nurse called about skin discoloration under pt breast B/L that look like fungal infection. I went to assess pt and I noted dry, white discoloration under the skin folds of both of her breast. I ordered nystatin powder for pt.

## 2022-09-11 NOTE — PLAN OF CARE
Resident Attestation:     Note made in conjunction with Dr. Ekta Jara, agree with assessment and plan as indicated in the history and physical with included inclusions and addendum. Of note, the patient is a 56 y.o. female with PMH pertinent for an IDDM, HTN, HLD, COPD, DAVION (on CPAP), HF our ER (EF of 40%) who presented to  ED on 09/11/2022 with complaint of increasing shortness of breath.  She states that the last few nights she has been having increasing SOB as well as increasing lower extremity edema. she states that she has been having difficulty using her CPAP at night and has not been able to lay flat in the bed while trying to sleep.  She states she is having increasing dyspnea with exertion as well and has trouble with walking around the house now.  Of note, patient states that she did recently restart smoking.  With onset of symptoms, patient admits to chills, coughing (productive with clear sputum), malaise, and potential subjective fever over the last few days.  Of note, she denies any nausea, vomiting, diarrhea, constipation, chest pain, paresthesias, abdominal pain, sore throat, sinus congestion, headache, lightheadedness.  She has no other complaints at this time.    In the ED, patient was found to have increasing oxygen demand from her baseline baseline home O2 of 2 L.  she is found to have BNP of 694, and troponin of 0.073.  She required increased to nasal cannula at 3 L, was given Lasix 60 mg once, Solu-Medrol 125 mg once, and magnesium sulfate.  [facility] IM was consulted for admission.    ROS:  Constitutional: no fever, fatigue, weakness, admits to chills  Eye: no vision loss, eye redness, drainage, or pain  ENMT: no sore throat, ear pain, sinus pain/congestion, nasal congestion/drainage  Respiratory: admits to productive cough with clear sputum, admits to wheezing, admits to SOB  Cardiovascular: no chest pain, no palpitations, admits to BLE   Gastrointestinal: no nausea, vomiting, or diarrhea.  No abdominal pain  Genitourinary: no dysuria, no urinary frequency or urgency, no hematuria  Hema/Lymph: no abnormal bruising or bleeding  Endocrine: no heat or cold intolerance, no excessive thirst or excessive urination  Musculoskeletal: no muscle or joint pain, no joint swelling  Integumentary: no skin rash or abnormal lesion  Neurologic: no headache, no dizziness, no weakness or numbness    Physical Exam:  General: Patient resting comfortably on bedside commode, in no acute distress   Eye: EOMI, clear conjunctiva, eyelids normal  HENT: Head-normocephalic and atraumatic  Neck: full range of motion, no thyromegaly or lymphadenopathy, trachea midline, supple, no palpable thyroid nodules  Respiratory:  Coarse breath sounds auscultated bilateral bases, mild inspiratory wheezes noted bilateral apices, no rales  Cardiovascular: regular rate and rhythm without murmurs.  No gallops or rubs no JVD.  Capillary refill within normal limits.  1+ nonpitting edema bilateral lower extremities  Gastrointestinal:  Obese abdomen, soft, nontender, mild flank edema  Musculoskeletal: full range of motion of all extremities/spine without limitation or discomfort  Integumentary: no rashes or skin lesions present  Neurologic: no signs of peripheral neurological deficit, motor/sensory function intact  Psychiatric:  alert and oriented, cognitive function intact, cooperative with exam, good eye contact, judgement and insight intact, mood and affect full range.      Assessment and Plan:    RLL CAP  Acute Respiratory Failure with Hypercapnia  COPD Exacerbation  - Satting 94% on 3L  - Prednisone 40mg PO daily to complete a 5 day course  - Levaquin 750mg PO daily to complete a 5 day course  - Duonebs q4h while awake and prn with IS  - Guaifenesin and Codeine cough syrup prn cough  -CXR 2 packs RLL consolidation  -ABG dip aches pH 7.32, pCO2 59, pO2 53 bicarb 30  -blood cultures, respiratory cultures, MRSA PCR pending  -will plan for trilogy  machine at home  -will plan to discharge with trelogy inhaler    Acute exacerbation of chronic HFrEF  - Most recent TTE on 7/26/22 demonstrates: LVef 40%,   - EKG :  NSR with no acute ST changes  -Lexiscan on 08/04/2022 with no signs of ischemia  - Troponin 0.07. Cycle troponin to rule out ischemic etiology  -  and CXR with pulmonary edema  - Fluid restriction at 2000 cc with strict I/Os and daily weights   - Admit weight 140 kg.  - IV diuresis with Lasix 40 mg IV BID and monitor response.  Goal diuresis 2-3L/day.  Home diuretic dose:  Lasix 40 bid  - Maintain on telemetry and daily EKGs   - Up to date risk stratification : TSH, Lipids, HbA1c with optimization of risk factors is necessary  - Check Electrolytes, keep Mag >2 & K+ >4  - SCDs, TEDs, Nursing communication to elevated LE   - Ambulate as tolerated    -continue atorvastatin 80 mg, Entresto 24/26, carvedilol 25 mg, spironolactone 25 mg    HX of HTN   -BP at time of admission 157/noted to   -restart home COVID will 25 mg b.i.d., Entresto 24-26 mg b.i.d., and spironolactone 25 mg daily  -p.r.n. labetalol q.6 p.r.n. in place     HLD   -continue home atorvastatin 80 mg daily     DM type 2   -holding home oral antihyperglycemics   -begin moderate dose SSI, Accu Cheks a.c. and HS   -will begin 30 units Lantus HS    DAVION   -continue CPAP at night and with naps  -encourage usage     Tobacco abuse   -encourage patient on cessation      Michael Beckman, DO  [facility], -2

## 2022-09-11 NOTE — PT/OT/SLP EVAL
Physical Therapy Evaluation    Patient Name:  Linn Mckeon   MRN:  [id number]    Recommendations:     Discharge Recommendations:  home health PT   Discharge Equipment Recommendations: shower chair   Barriers to discharge: None    Assessment:     Linn Mckeon is a 56 y.o. female admitted with a medical diagnosis of <principal problem not specified>.  She presents with the following impairments/functional limitations:  weakness, impaired endurance, impaired functional mobility, gait instability.    Rehab Prognosis: Good; patient would benefit from acute skilled PT services to address these deficits and reach maximum level of function.    Recent Surgery: * No surgery found *      Plan:     During this hospitalization, patient to be seen  (3-5x/week) to address the identified rehab impairments via gait training, therapeutic activities, therapeutic exercises and progress toward the following goals:    Plan of Care Expires:       Subjective     Chief Complaint: None  Patient/Family Comments/goals: Pt wants to get better and go home.  Pain/Comfort:  Pain Rating 1: 0/10     Pre session vitals Post session vitals   BP mmHG 148/86 153/90   HR bpm 77 85   Ox sats % 94 90       Patients cultural, spiritual, Judaism conflicts given the current situation: no    Living Environment:  Pt stated she lives with her daughter, sister and brother in a single story house; has 2 steps into home with left handrail.  Pt stated she uses a rolling walker when ambulating.  Prior to admission, patients level of function was independent.  Equipment used at home: walker, rolling, oxygen, grab bar.  DME owned (not currently used): none.  Upon discharge, patient will have assistance from family.    Objective:     Communicated with nurse prior to session.  Patient found sitting edge of bed with peripheral IV  upon PT entry to room.    General Precautions: Standard,     Orthopedic Precautions:N/A   Braces: N/A  Respiratory Status:   CPAP    Exams:  Cognitive Exam:  Patient is oriented to Person, Place, Time, and Situation  Sensation:    -       Intact  light/touch UE & LE bilaterally.  RUE ROM: WFL  RUE Strength: 3+/5  LUE ROM: WFL  LUE Strength: 3+/5  RLE ROM: WFL  RLE Strength: 3/5  LLE ROM: WFL  LLE Strength: 3/5    Functional Mobility:  Bed Mobility:     Rolling Left:  stand by assistance  Rolling Right: stand by assistance  Supine to Sit: stand by assistance  Sit to Supine: stand by assistance  Transfers:     Sit to Stand:  contact guard assistance with rolling walker  Bed to Chair: contact guard assistance with  rolling walker  using  Stand Pivot  Gait: 15' w/ RW, CGA and VC.  Pt limited 2/2 CPAP.      Balance:   Static Sit: GOOD: Takes MODERATE challenges from all directions  Dynamic Sit: FAIR+: Maintains balance through MINIMAL excursions of active trunk motion  Static Stand: FAIR: Maintains without assist but unable to take challenges  Dynamic stand: FAIR: Needs CONTACT GUARD during gait           Patient left sitting edge of bed with call button in reach, nurse notified, and family present.    GOALS:   Multidisciplinary Problems       Physical Therapy Goals          Problem: Physical Therapy    Goal Priority Disciplines Outcome Goal Variances Interventions   Physical Therapy Goal     PT, PT/OT Initial POC     Description: PT goals to be achieved by discharge:     1. Pt will perform sit <=> stand w/ RW and SBA.  2. Pt will perform bed <=> chair w/ RW and SBA.  3. Pt will ambulate 75' w/ RW and CGA.                       History:     Past Medical History:   Diagnosis Date    Callus of foot 07/07/2022    CHF (congestive heart failure)     Chronic heart failure with preserved ejection fraction 05/20/2022    COPD (chronic obstructive pulmonary disease)     Diabetes mellitus     GOODMAN (dyspnea on exertion) 05/20/2022    Dystrophic nail 07/07/2022    Hyperlipidemia     Hyperlipidemia LDL goal <70 05/20/2022    Hypertension     Morbid obesity  with body mass index (BMI) greater than or equal to 50 07/07/2022    Non compliance w medication regimen 07/07/2022    Noncompliance with treatment plan 07/07/2022    DAVION on CPAP 05/20/2022    Peripheral edema 05/20/2022    Primary hypertension 05/20/2022    Type 2 diabetes mellitus with skin complication 05/20/2022    Xerosis of skin 07/07/2022       Past Surgical History:   Procedure Laterality Date    HYSTERECTOMY         Time Tracking:     PT Received On: 09/11/22  PT Start Time: 1230     PT Stop Time: 1310  PT Total Time (min): 40 min     Billable Minutes: Evaluation 40      09/11/2022

## 2022-09-11 NOTE — H&P
[facility] Medicine Wards   History & Physical Note     Resident Team: [facility] Medicine List 3  Attending Physician: Rigoberto Hart MD  Resident: Rk  Intern: Marek      Date of Admit: 9/11/2022    Chief Complaint:     Shortness of Breath (Pt reports sob/wheezing worsening over the last 2 days, [place]  unit 3, reports pt was 88% on room air, she normally in on 2 liters nc. Pt pt got 1 duo neb en route. Pt on 3 liters nc. Breathing 36/min otherwise vss. )    Subjective:      History of Present Illness:  Linn Mckeon is a 56 y.o. female with a history of cor pulmonale, T2DM, HTN, HLD, COPD, DAVION on CPAP, HFrEF (EF 40% w/ grade 1 diastolic dysfunction), depression, and tobacco abuse who presented to [facility] ED on 9/11/2022  with complaint of shortness of breath.  Pt states she woke up in the middle of the night last night and felt like she could not breath. She is on 2LNC at home for COPD and CPAP at night for DAVION and states that lately she has been having worsening dyspnea on exertion.  She also says she is unable to lie flat in bed while sleeping. She is unable to walk from her bed to the bathroom without getting short of breath which is off from her baseline.  She also states that she noticed her legs and abdominal region have been swollen over the last several weeks. She states she has missed some of her medication doses and has also started smoking cigarettes again over the last year.  She is endorsing a productive cough with some mucus and clear sputum which has been occurring over the last week.  She is also endorsing fatigue, chills, and possible episode of fever over the last week.  She denies any N/V, diarrhea, constipation, chest pain, abdominal pain, sore throat, congestion, or blurry vision.      In the ED, pt was tachypnic and satting 94% on 3L NC with desats into the 80s on room air and during exertion.  Labs showed normal WBC and electrolytes wnl.  Her blood sugar was elevated at 334 and her BNP was elevated  at 694.  Her troponin was also found to be mildly elevated at 0.073.  Chest x-ray was performed showing congestion and possible RLL consolidation. Pt was given Lasix 60mg x1 with improvement in LE edema and solumedrol 125mg x1 in the ED.  She was also loaded with aspirin.  Pt was admitted for close monitoring and treatment of CHF exacerbation, COPD exacerbation, and possible CAP.       Past Medical History:   has a past medical history of Callus of foot (07/07/2022), CHF (congestive heart failure), Chronic heart failure with preserved ejection fraction (05/20/2022), COPD (chronic obstructive pulmonary disease), Diabetes mellitus, GOODMAN (dyspnea on exertion) (05/20/2022), Dystrophic nail (07/07/2022), Hyperlipidemia, Hyperlipidemia LDL goal <70 (05/20/2022), Hypertension, Morbid obesity with body mass index (BMI) greater than or equal to 50 (07/07/2022), Non compliance w medication regimen (07/07/2022), Noncompliance with treatment plan (07/07/2022), DAVION on CPAP (05/20/2022), Peripheral edema (05/20/2022), Primary hypertension (05/20/2022), Type 2 diabetes mellitus with skin complication (05/20/2022), and Xerosis of skin (07/07/2022).     Past Surgical History:   has a past surgical history that includes Hysterectomy.     Family History:  family history includes Diabetes in her brother, mother, and sister; Heart attack in her father; Heart disease in her mother; Hypertension in her brother, mother, and sister.     Social History:   reports that she has been smoking cigarettes. She has a 19.50 pack-year smoking history. She has never used smokeless tobacco. She reports current alcohol use. She reports that she does not use drugs.     Allergies:  has No Known Allergies.     Home Medications:  Prior to Admission medications    Medication Sig Start Date End Date Taking? Authorizing Provider   ACCU-CHEK GUIDE TEST STRIPS Strp USE TO TEST BLOOD SUGAR LEVELS BEFORE EACH MEAL DAILY 1/6/22   Historical Provider   atorvastatin  (LIPITOR) 80 MG tablet Take 1 tablet (80 mg total) by mouth once daily. 5/20/22 5/20/23  CELESTE Tavares   carvediloL (COREG) 25 MG tablet Take 1 tablet (25 mg total) by mouth 2 (two) times daily. 5/20/22 5/20/23  CELESTE Tavares   dulaglutide (TRULICITY) 3 mg/0.5 mL pen injector Inject 3 mg into the skin. 11/18/21   Historical Provider   ergocalciferol (ERGOCALCIFEROL) 50,000 unit Cap Take 1 capsule (50,000 Units total) by mouth every 7 days.  Patient not taking: Reported on 8/4/2022 5/13/22   Leslie Gabriel, NOEMI   ezetimibe (ZETIA) 10 mg tablet Take 1 tablet (10 mg total) by mouth once daily. 5/20/22 5/20/23  CELESTE Tavares   furosemide (LASIX) 40 MG tablet Take 1 tablet (40 mg total) by mouth 2 (two) times a day. 8/4/22 8/4/23  CELESTE Tavares   glipiZIDE (GLUCOTROL) 10 MG TR24 Take 10 mg by mouth. 11/18/21   Historical Provider   latanoprost 0.005 % ophthalmic solution Apply 1 drop to eye. 9/8/21   Historical Provider   metFORMIN (GLUCOPHAGE) 1000 MG tablet Take 1,000 mg by mouth. 11/18/21   Historical Provider   sacubitriL-valsartan (ENTRESTO) 24-26 mg per tablet Take 1 tablet by mouth 2 (two) times daily. 8/4/22 8/4/23  CELESTE Tavares   SITagliptin (JANUVIA) 100 MG Tab Take 100 mg by mouth. 11/18/21   Historical Provider   spironolactone (ALDACTONE) 25 MG tablet Take 1 tablet (25 mg total) by mouth once daily. 5/20/22 5/20/23  CELESTE Tavares         Review of Systems:  Pertinent items are noted in HPI.       Objective:     Vital Signs (Most Recent):  Temp: 98.4 °F (36.9 °C) (09/11/22 0306)  Pulse: 84 (09/11/22 0515)  Resp: (!) 29 (09/11/22 0515)  BP: (!) 142/80 (09/11/22 0515)  SpO2: (!) 94 % (09/11/22 0515)   Vital Signs (24h Range):  Temp:  [98.4 °F (36.9 °C)] 98.4 °F (36.9 °C)  Pulse:  [76-87] 84  Resp:  [10-36] 29  SpO2:  [92 %-97 %] 94 %  BP: (139-157)/() 142/80       Physical Examination:  General: Patient resting comfortably on the commode,  in no acute distress   Eye: PERRLA, EOMI, clear conjunctiva, eyelids normal  HENT: Head-normocephalic and atraumatic  Neck: full range of motion, no thyromegaly or lymphadenopathy, trachea midline, supple, no palpable thyroid nodules  Respiratory: mild inspiratory wheezes noted BL   Cardiovascular: regular rate and rhythm without murmurs.  No gallops or rubs no JVD.  Capillary refill within normal limits.  Gastrointestinal: moderately distended, NTTP  Genitourinary: no CVA tenderness to palpation  Musculoskeletal: non-pitting edema noted in BLLE   Integumentary: no rashes or skin lesions present  Neurologic: no signs of peripheral neurological deficit, motor/sensory function intact  Psychiatric:  alert and oriented, cognitive function intact, cooperative with exam, good eye contact, judgement and insight intact, mood and affect full range.     Laboratory:  Most Recent Data:  Recent Lab Results         09/11/22  0600   09/11/22  0354   09/11/22  0333   09/11/22  0315        Influenza A, Molecular       Not Detected       Influenza B, Molecular       Not Detected       Base Deficit 2.2  Comment: Result is outside patient normal (reference) range.             Performed By: BS             Correct Temperature (PH) 7.32  Comment: Result is outside patient normal (reference) range.             Corrected Temperature (pCO2) 59  Comment: Result is outside panic range (critical limits).             Corrected Temperature (pO2) 53  Comment: Result is outside panic range (critical limits).             POC COHb 6.6  Comment: Result is outside patient normal (reference) range.             POC MetHb 0.80             POC O2Hb 82.9  Comment: Result is outside patient normal (reference) range.             Specimen source Arterial sample             Immature Platelet Fraction     6.9         Albumin/Globulin Ratio   0.9           Albumin   2.9           Alkaline Phosphatase   95           ALT   33           AST   22           Baso #      0.09         Basophil %     1.1         BILIRUBIN TOTAL   1.7           BNP     694.3         BUN   13.0           Calcium   9.1           Chloride   102           CO2   27           Creatinine   0.87           eGFR   >60           Eos #     0.09         Eosinophil %     1.1         Globulin, Total   3.3           Glucose   334           Hematocrit     56.6         Hemoglobin     17.3         Immature Grans (Abs)     0.03         Immature Granulocytes     0.4         Lymph #     1.01         LYMPH %     11.8         MCH     25.1         MCHC     30.6         MCV     82.1         Mono #     0.67         Mono %     7.8         MPV     10.8         Neut #     6.7         Neut %     77.8         nRBC     0.0         Platelets     177         POC HCO3 30.4  Comment: Result is outside patient normal (reference) range.             POC HEMOGLOBIN 17.8             POC PCO2 59  Comment: Result is outside panic range (critical limits).             POC PH 7.32  Comment: Result is outside patient normal (reference) range.             POC PO2 53  Comment: Result is outside panic range (critical limits).             POC SATURATED O2 84.0  Comment: Result is outside patient normal (reference) range.             POC Temp 37.0             Potassium   5.1           PROTEIN TOTAL   6.2           RBC     6.89         RDW     18.6         SARS-CoV2 (COVID-19) Qualitative PCR       Not Detected       Sodium   138           Troponin I   0.073           WBC     8.6                   Microbiology Data:  Blood cultures and respiratory cultures ordered   MRS PCR ordered     Other Results:  EKG (my interpretation): normal sinus rhythm, left axis deviation, low-voltage, normal Qtc    Radiology:  Imaging Results              X-Ray Chest AP Portable (In process)                        Lines/Drains/Airways       Peripheral Intravenous Line  Duration                  Peripheral IV - Single Lumen 09/11/22 0313 18 G Right Antecubital <1 day                      Assessment & Plan:     COPD exacerbation   CAP  -PFTs performed 3/25/2021 show FEV1 64%, and TLC of 102.  Showed mixed picture for restrictive vs obstructive disease   -pt on 2L NC at home.  Placed on 3L in ED   -pt endorses using Flovent BID and albuterol PRN at home for COPD but has been previously prescribed Budesonide BID and Spirivia daily per IM clinic note on 3/30/22  -Chest x-ray performed showing possible RLL consolidation with bilateral congestion.   -solumedrol 125mg given in ED   -started prednisone 40mg daily and Levoquin 750mg IV   -pt currently satting 94% on 3L NC  -ABG performed showing respiratory acidosis with pH 7.32, CO2 59, and bicarb 30  -blood cultures ordered   -respiratory cultures ordered   -MRSA PCR ordered   -Covid and flu negative   -will continue to monitor respiratory status     CHF exacerbation   HFrEF (EF 40%)  -most recent echo performed 7/26/22 showed reduced EF of 40% (previously 50-55% from may 2021) with Grade 1 diastolic dysfunction.  -Lexiscan performed 8/4 showing no signs of ischemia   -.3  -troponin 0.073 - will continue to trend   -Given Lasix 60mg in ED with improvement in LE edema.    -Pt able to urinate -- given strict I's and O's   -will continue to monitor with plan to give more Lasix depending on clinical picture.     HTN  -BP on admission 157/92   -continue carvedilol 25mg BID, sacubitril-valsartan 24-26mg BID, and spironolactone 25mg daily.    -Labetalol q6PRN for SBP >160 and DBP >100 and HR >70     HLD  -lipid panel done 5/5/22  -continue atorvastatin 80mg     T2DM  -Blood sugar 334 on admission   -holding home PO medications   -placed on moderate-dose sliding scale   -will continue to monitor CBG's and titrate as needed     DAVION   -continue CPAP at night and during naps     Tobacco Abuse  -counseled pt on the importance of quitting all tobacco products     CODE STATUS: Full  Lines: PIV  Antibiotics: Levaquin   Diet: Diabetic and cardiac    Oxygenation: 3L NC   DVT Prophylaxis: Lovenox     Dispo: Pt admitted for HF exacerbation, COPD exacerbation, and possible CAP.  Will continue to monitor clinical picture with possible discharge home tomorrow.     Ekta Jara MD  [facility] Internal Medicine, -

## 2022-09-12 VITALS
WEIGHT: 293 LBS | SYSTOLIC BLOOD PRESSURE: 118 MMHG | HEIGHT: 64 IN | RESPIRATION RATE: 16 BRPM | BODY MASS INDEX: 50.02 KG/M2 | TEMPERATURE: 98 F | HEART RATE: 63 BPM | OXYGEN SATURATION: 93 % | DIASTOLIC BLOOD PRESSURE: 65 MMHG

## 2022-09-12 PROBLEM — J44.1 COPD EXACERBATION: Status: ACTIVE | Noted: 2022-09-12

## 2022-09-12 LAB
ALBUMIN SERPL-MCNC: 2.5 GM/DL (ref 3.5–5)
ALBUMIN/GLOB SERPL: 0.9 RATIO (ref 1.1–2)
ALP SERPL-CCNC: 82 UNIT/L (ref 40–150)
ALT SERPL-CCNC: 26 UNIT/L (ref 0–55)
AST SERPL-CCNC: 12 UNIT/L (ref 5–34)
BILIRUBIN DIRECT+TOT PNL SERPL-MCNC: 1.5 MG/DL
BUN SERPL-MCNC: 23.7 MG/DL (ref 9.8–20.1)
CALCIUM SERPL-MCNC: 8.9 MG/DL (ref 8.4–10.2)
CHLORIDE SERPL-SCNC: 100 MMOL/L (ref 98–107)
CO2 SERPL-SCNC: 31 MMOL/L (ref 22–29)
CREAT SERPL-MCNC: 1.01 MG/DL (ref 0.55–1.02)
GFR SERPLBLD CREATININE-BSD FMLA CKD-EPI: >60 MLS/MIN/1.73/M2
GLOBULIN SER-MCNC: 2.9 GM/DL (ref 2.4–3.5)
GLUCOSE SERPL-MCNC: 285 MG/DL (ref 74–100)
MAGNESIUM SERPL-MCNC: 2.2 MG/DL (ref 1.6–2.6)
POCT GLUCOSE: 215 MG/DL (ref 70–110)
POCT GLUCOSE: 252 MG/DL (ref 70–110)
POCT GLUCOSE: 261 MG/DL (ref 70–110)
POCT GLUCOSE: 287 MG/DL (ref 70–110)
POTASSIUM SERPL-SCNC: 4.8 MMOL/L (ref 3.5–5.1)
PROT SERPL-MCNC: 5.4 GM/DL (ref 6.4–8.3)
SODIUM SERPL-SCNC: 141 MMOL/L (ref 136–145)

## 2022-09-12 PROCEDURE — 96367 TX/PROPH/DG ADDL SEQ IV INF: CPT

## 2022-09-12 PROCEDURE — 63600175 PHARM REV CODE 636 W HCPCS

## 2022-09-12 PROCEDURE — 96376 TX/PRO/DX INJ SAME DRUG ADON: CPT

## 2022-09-12 PROCEDURE — 97116 GAIT TRAINING THERAPY: CPT

## 2022-09-12 PROCEDURE — 80053 COMPREHEN METABOLIC PANEL: CPT

## 2022-09-12 PROCEDURE — 94660 CPAP INITIATION&MGMT: CPT

## 2022-09-12 PROCEDURE — 25000003 PHARM REV CODE 250

## 2022-09-12 PROCEDURE — 99900035 HC TECH TIME PER 15 MIN (STAT)

## 2022-09-12 PROCEDURE — 36415 COLL VENOUS BLD VENIPUNCTURE: CPT

## 2022-09-12 PROCEDURE — 94640 AIRWAY INHALATION TREATMENT: CPT | Mod: XB

## 2022-09-12 PROCEDURE — 25000242 PHARM REV CODE 250 ALT 637 W/ HCPCS

## 2022-09-12 PROCEDURE — G0378 HOSPITAL OBSERVATION PER HR: HCPCS

## 2022-09-12 PROCEDURE — 83735 ASSAY OF MAGNESIUM: CPT

## 2022-09-12 PROCEDURE — 27000221 HC OXYGEN, UP TO 24 HOURS

## 2022-09-12 PROCEDURE — 94761 N-INVAS EAR/PLS OXIMETRY MLT: CPT

## 2022-09-12 PROCEDURE — 96372 THER/PROPH/DIAG INJ SC/IM: CPT

## 2022-09-12 PROCEDURE — 97166 OT EVAL MOD COMPLEX 45 MIN: CPT

## 2022-09-12 RX ORDER — NYSTATIN 100000 U/G
OINTMENT TOPICAL 2 TIMES DAILY
Qty: 15 G | Refills: 0 | Status: SHIPPED | OUTPATIENT
Start: 2022-09-12 | End: 2022-11-18 | Stop reason: SDUPTHER

## 2022-09-12 RX ORDER — LANCETS
EACH MISCELLANEOUS
Qty: 200 EACH | Refills: 0 | Status: SHIPPED | OUTPATIENT
Start: 2022-09-12 | End: 2022-10-04

## 2022-09-12 RX ORDER — PEN NEEDLE, DIABETIC 30 GX3/16"
1 NEEDLE, DISPOSABLE MISCELLANEOUS 2 TIMES DAILY WITH MEALS
Qty: 100 EACH | Refills: 11 | Status: SHIPPED | OUTPATIENT
Start: 2022-09-12 | End: 2022-09-21 | Stop reason: SDUPTHER

## 2022-09-12 RX ORDER — PREDNISONE 20 MG/1
40 TABLET ORAL DAILY
Qty: 6 TABLET | Refills: 0 | Status: SHIPPED | OUTPATIENT
Start: 2022-09-13 | End: 2022-10-03 | Stop reason: ALTCHOICE

## 2022-09-12 RX ORDER — BENZONATATE 200 MG/1
200 CAPSULE ORAL 3 TIMES DAILY PRN
Qty: 20 CAPSULE | Refills: 0 | Status: SHIPPED | OUTPATIENT
Start: 2022-09-12 | End: 2022-09-22

## 2022-09-12 RX ORDER — IPRATROPIUM BROMIDE AND ALBUTEROL 20; 100 UG/1; UG/1
2 SPRAY, METERED RESPIRATORY (INHALATION) EVERY 6 HOURS PRN
Qty: 4 G | Refills: 1 | Status: SHIPPED | OUTPATIENT
Start: 2022-09-12 | End: 2022-10-03 | Stop reason: SDUPTHER

## 2022-09-12 RX ORDER — LEVOFLOXACIN 750 MG/1
750 TABLET ORAL DAILY
Qty: 3 TABLET | Refills: 0 | Status: SHIPPED | OUTPATIENT
Start: 2022-09-12 | End: 2022-10-03 | Stop reason: ALTCHOICE

## 2022-09-12 RX ORDER — INSULIN GLARGINE 100 [IU]/ML
30 INJECTION, SOLUTION SUBCUTANEOUS NIGHTLY
Qty: 9 ML | Refills: 11 | Status: SHIPPED | OUTPATIENT
Start: 2022-09-12 | End: 2022-09-21 | Stop reason: SDUPTHER

## 2022-09-12 RX ORDER — INSULIN PUMP SYRINGE, 3 ML
EACH MISCELLANEOUS
Qty: 1 EACH | Refills: 0 | Status: SHIPPED | OUTPATIENT
Start: 2022-09-12 | End: 2023-09-18

## 2022-09-12 RX ADMIN — ENOXAPARIN SODIUM 40 MG: 40 INJECTION SUBCUTANEOUS at 09:09

## 2022-09-12 RX ADMIN — PREDNISONE 40 MG: 20 TABLET ORAL at 09:09

## 2022-09-12 RX ADMIN — IPRATROPIUM BROMIDE AND ALBUTEROL SULFATE 3 ML: 2.5; .5 SOLUTION RESPIRATORY (INHALATION) at 03:09

## 2022-09-12 RX ADMIN — IPRATROPIUM BROMIDE AND ALBUTEROL SULFATE 3 ML: 2.5; .5 SOLUTION RESPIRATORY (INHALATION) at 07:09

## 2022-09-12 RX ADMIN — FUROSEMIDE 40 MG: 10 INJECTION, SOLUTION INTRAMUSCULAR; INTRAVENOUS at 09:09

## 2022-09-12 RX ADMIN — ATORVASTATIN CALCIUM 80 MG: 40 TABLET, FILM COATED ORAL at 09:09

## 2022-09-12 RX ADMIN — LEVOFLOXACIN 750 MG: 750 INJECTION, SOLUTION INTRAVENOUS at 09:09

## 2022-09-12 RX ADMIN — IPRATROPIUM BROMIDE AND ALBUTEROL SULFATE 3 ML: 2.5; .5 SOLUTION RESPIRATORY (INHALATION) at 11:09

## 2022-09-12 RX ADMIN — INSULIN ASPART 4 UNITS: 100 INJECTION, SOLUTION INTRAVENOUS; SUBCUTANEOUS at 11:09

## 2022-09-12 RX ADMIN — NYSTATIN: 100000 POWDER TOPICAL at 09:09

## 2022-09-12 RX ADMIN — SACUBITRIL AND VALSARTAN 1 TABLET: 24; 26 TABLET, FILM COATED ORAL at 09:09

## 2022-09-12 RX ADMIN — CARVEDILOL 25 MG: 12.5 TABLET, FILM COATED ORAL at 09:09

## 2022-09-12 RX ADMIN — INSULIN ASPART 6 UNITS: 100 INJECTION, SOLUTION INTRAVENOUS; SUBCUTANEOUS at 09:09

## 2022-09-12 RX ADMIN — SPIRONOLACTONE 25 MG: 25 TABLET, FILM COATED ORAL at 09:09

## 2022-09-12 NOTE — PLAN OF CARE
Marla from [employer] has spoken to patient. Prescriptions for trilogy and nebulizer have been signed and will be delivered to patient's home.

## 2022-09-12 NOTE — PLAN OF CARE
09/12/22 1146   Discharge Assessment   Assessment Type Discharge Planning Assessment   Confirmed/corrected address, phone number and insurance Yes   Confirmed Demographics Correct on Facesheet   Source of Information patient   When was your last doctors appointment?   (PCP: Leslie Gabriel)   Does patient/caregiver understand observation status Yes   Communicated LEONIDAS with patient/caregiver Date not available/Unable to determine   Reason For Admission COPD exacerbation   Lives With child(marco a), adult;sibling(s)   Do you expect to return to your current living situation? Yes   Do you have help at home or someone to help you manage your care at home? Yes   Who are your caregiver(s) and their phone number(s)? Rajwinder Hood, daughter, P: [phone]; Jeanette Hood, sister, P: [phone]   Prior to hospitilization cognitive status: Alert/Oriented;No Deficits   Current cognitive status: Alert/Oriented;No Deficits   Walking or Climbing Stairs Difficulty ambulation difficulty, requires equipment;stair climbing difficulty, assistance 1 person   Mobility Management RW, family   Dressing/Bathing Difficulty none   Home Accessibility not wheelchair accessible;stairs to enter home   Number of Stairs, Main Entrance two   Stair Railings, Main Entrance railings safe and in good condition;railing on left side (ascending)   Home Layout Able to live on 1st floor   Equipment Currently Used at Home walker, rolling;CPAP;glucometer;oxygen  (Patient has O2 concentrator and tanks from [place])   Readmission within 30 days? No   Patient currently being followed by outpatient case management? No   Do you currently have service(s) that help you manage your care at home? No   Do you take prescription medications? Yes  ([facility])   Do you have prescription coverage? Yes   Coverage [employer]   Do you have any problems affording any of your prescribed medications? No   Is the patient taking medications as prescribed? yes   Who  is going to help you get home at discharge? Jeanette Hood, sister, P: [phone]   How do you get to doctors appointments? family or friend will provide   Are you on dialysis? No   Do you take coumadin? No   Discharge Plan A Home with family   DME Needed Upon Discharge  none   Discharge Plan discussed with: Patient   Discharge Barriers Identified None   Physical Activity   On average, how many days per week do you engage in moderate to strenuous exercise (like a brisk walk)? 0 days   On average, how many minutes do you engage in exercise at this level? 0 min   Financial Resource Strain   How hard is it for you to pay for the very basics like food, housing, medical care, and heating? Not very   Housing Stability   In the last 12 months, was there a time when you were not able to pay the mortgage or rent on time? N   In the last 12 months, how many places have you lived? 1   In the last 12 months, was there a time when you did not have a steady place to sleep or slept in a shelter (including now)? N   Transportation Needs   In the past 12 months, has lack of transportation kept you from medical appointments or from getting medications? no   In the past 12 months, has lack of transportation kept you from meetings, work, or from getting things needed for daily living? No   Food Insecurity   Within the past 12 months, you worried that your food would run out before you got the money to buy more. Never true   Within the past 12 months, the food you bought just didn't last and you didn't have money to get more. Never true   Stress   Do you feel stress - tense, restless, nervous, or anxious, or unable to sleep at night because your mind is troubled all the time - these days? Not at all   Social Connections   In a typical week, how many times do you talk on the phone with family, friends, or neighbors? Never   How often do you get together with friends or relatives? More than 3   How often do you attend Christian or Scientology  services? Never   Do you belong to any clubs or organizations such as Bahai groups, unions, fraternal or athletic groups, or school groups? No   How often do you attend meetings of the clubs or organizations you belong to? Never   Are you , , , , never , or living with a partner?    Alcohol Use   Q1: How often do you have a drink containing alcohol? Monthly or l   Q2: How many drinks containing alcohol do you have on a typical day when you are drinking? 1 or 2   Q3: How often do you have six or more drinks on one occasion? Never   Relationship/Environment   Name(s) of Who Lives With Patient Rajwinder Hood, daughter, P: [phone]; also a brother and another daughter     Advance directive packet has been provided with instructions, and has been reviewed with patient. Consult for trilogy. Referral sent to [facility] via [facility]. Marla will be coming with trilogy prescription to be signed by MD. Will follow.

## 2022-09-12 NOTE — PLAN OF CARE
Problem: Adult Inpatient Plan of Care  Goal: Plan of Care Review  9/12/2022 1239 by Radha León RN  Outcome: Met  9/12/2022 1239 by Radha León RN  Outcome: Ongoing, Progressing     Problem: Bariatric Environmental Safety  Goal: Safety Maintained with Care  9/12/2022 1239 by Radha León RN  Outcome: Met  9/12/2022 1239 by Radha León RN  Outcome: Ongoing, Progressing     Problem: Diabetes Comorbidity  Goal: Blood Glucose Level Within Targeted Range  9/12/2022 1239 by Radha León RN  Outcome: Met  9/12/2022 1239 by Radha León RN  Outcome: Ongoing, Progressing     Problem: Physical Therapy  Goal: Physical Therapy Goal  Description: PT goals to be achieved by discharge:     1. Pt will perform sit <=> stand w/ RW and SBA.  2. Pt will perform bed <=> chair w/ RW and SBA.  3. Pt will ambulate 75' w/ RW and CGA.  Outcome: Met

## 2022-09-12 NOTE — DISCHARGE SUMMARY
[facility] Discharge Summary    Admitting Physician: Tita Schwartz MD  Attending Physician: Tita Schwartz MD  Date of Admit: 9/11/2022  Date of Discharge: 9/12/2022    Condition: Good  Outcome: Patient tolerated treatment/procedure well without complication and is now ready for discharge.  DISPOSITION: Home or Self Care        Discharge Diagnoses:     Patient Active Problem List   Diagnosis    GOODMAN (dyspnea on exertion)    Chronic heart failure with preserved ejection fraction    Primary hypertension    Hyperlipidemia LDL goal <70    Type 2 diabetes mellitus with skin complication    Peripheral edema    DAVION on CPAP    Morbid obesity with body mass index (BMI) greater than or equal to 50    Noncompliance with treatment plan    Xerosis of skin    Callus of foot    Dystrophic nail    Non compliance w medication regimen    Heart failure with reduced ejection fraction    Tobacco use    COPD exacerbation       Principal Problem:  COPD exacerbation    Consultants and Procedures:     Consultants:  IP CONSULT TO INTERNAL MEDICINE  IP CONSULT TO SOCIAL WORK/CASE MANAGEMENT  IP CONSULT TO SOCIAL WORK/CASE MANAGEMENT    Procedures:   * No surgery found *      Brief Admission History:      the patient is a 56 y.o. female with PMH pertinent for an IDDM, HTN, HLD, COPD, DAVION (on CPAP), HF our ER (EF of 40%) who presented to  ED on 09/11/2022 with complaint of increasing shortness of breath.  She states that the last few nights she has been having increasing SOB as well as increasing lower extremity edema. she states that she has been having difficulty using her CPAP at night and has not been able to lay flat in the bed while trying to sleep.  She states she is having increasing dyspnea with exertion as well and has trouble with walking around the house now.  Of note, patient states that she did recently restart smoking.  With onset of symptoms, patient admits to chills, coughing (productive with clear sputum),  malaise, and potential subjective fever over the last few days.  Of note, she denies any nausea, vomiting, diarrhea, constipation, chest pain, paresthesias, abdominal pain, sore throat, sinus congestion, headache, lightheadedness.  She has no other complaints at this time.    In the ED, patient was found to have increasing oxygen demand from her baseline baseline home O2 of 2 L.  she is found to have BNP of 694, and troponin of 0.073.  She required increased to nasal cannula at 3 L, was given Lasix 60 mg once, Solu-Medrol 125 mg once, and magnesium sulfate.  [facility] IM was consulted for admission.    Hospital Course with Pertinent Findings:      During her stay, patient was found to be in COPD exacerbation with RLL consolidation requiring increase in her oxygenation requirement.  Patient was started on prednisone 40 mg daily and Levaquin 750 mg daily to complete total 5 day courses.  Patient was also started on DuoNebs q.4 hours.  Patient responded well to this treatment regimen and is back to her baseline oxygen demand of 2 L which she chronically uses at home.  Patient will be discharged with prednisone 40 mg daily and Levaquin 750 mg daily for 3 days to complete 5 day total course.  Patient will also be discharged with Tessalon Perles as needed t.i.d. for coughing.  Patient will also be discharged with Trelegy inhaler.  Patient meets and discharge with plan for outpatient PFT as well as ambulatory referral to pulmonology.  Potentially made for patient to receive trilogy machine at home.  Patient has chronic hypercapnic respiratory failure S/T COPD and OHS.  Volume ventilation is indicated.  Ordering a home noninvasive volume ventilator to be used during hours of sleep as well as during waking hours when symptoms arise.  Home BiPAP he is not appropriate for patient's ventilatory requirements.    During his stay, patient was found to have acute exacerbation of her chronic HFrEF.  Patient recently had TTE on 07/26/2022  "with EF of 40%.  Patient was given Lasix 60 IV in the ER with good diuresis and resolution of for lower extremity edema.  Patient was continued on her home in regiment of 40 Lasix b.i.d. which will be discharged with.  Patient is continue on her home regimen of atorvastatin 80 mg daily, Entresto 24/26, carvedilol 25 mg b.i.d., spironolactone 25 mg daily.  Patient will be discharged with plans to follow up at her upcoming cardiology appointment on 10/04/2022.    During her stay, patient was found to have significant hyper glycemia.  Patient's A1c at time of admission was found to be 11.7 and patient was started on insulin detemir 30 units HS.  Patient will be discharged with insulin 30 units HS, with plans for close follow-up with [facility] IM and post [facility] for further management of her diabetes.  Patient was instructed to continue her home oral anti hyperglycemics until at time as well as begin taking the insulin nightly.    Patient has instructions for follow-up with [facility] IM post words in 1-2 weeks, cardiology in upcoming appointment on 10/04/2022, and patient's PCP in 1-2 weeks.  Patient is agreeable with plan of care and given return precautions.    Discharge physical exam:  Vitals  BP: 107/69  Temp: 97.6 °F (36.4 °C)  Temp src: Oral  Pulse: 72  Resp: 18  SpO2: (!) 92 %  Height: 5' 4" (162.6 cm)  Weight: (!) 140 kg (308 lb 10.3 oz)    General: Patient resting comfortably in bed, in no acute distress   Eye: PERRLA, EOMI, clear conjunctiva, eyelids normal  HENT: Head-normocephalic and atraumatic  Neck: full range of motion, no thyromegaly or lymphadenopathy, trachea midline, supple, no palpable thyroid nodules  Respiratory: clear to auscultation bilaterally without wheezes, rales, rhonchi  Cardiovascular: regular rate and rhythm without murmurs.  No gallops or rubs no JVD.  Capillary refill within normal limits.  Gastrointestinal: soft, non-tender, non-distended with normal bowel sounds, without masses to " palpation  Genitourinary: no CVA tenderness to palpation  Musculoskeletal: full range of motion of all extremities/spine without limitation or discomfort  Integumentary: no rashes or skin lesions present  Neurologic: no signs of peripheral neurological deficit, motor/sensory function intact  Psychiatric:  alert and oriented, cognitive function intact, cooperative with exam, good eye contact, judgement and insight intact, mood and affect full range.    TIME SPENT ON DISCHARGE: 35 minutes    Discharge Medications:         Medication List        START taking these medications      benzonatate 200 MG capsule  Commonly known as: TESSALON  Take 1 capsule (200 mg total) by mouth 3 (three) times daily as needed for Cough.     blood-glucose meter kit  To check BG 2 times daily, to use with insurance preferred meter     CombiVENT RESPIMAT  mcg/actuation inhaler  Generic drug: ipratropium-albuteroL  Inhale 2 puffs into the lungs every 6 (six) hours as needed for Wheezing. Rescue     fluticasone-umeclidin-vilanter 100-62.5-25 mcg Dsdv  Commonly known as: TRELEGY ELLIPTA  Inhale 1 puff into the lungs once daily.     insulin glargine 100 unit/mL injection  Commonly known as: LANTUS U-100 INSULIN  Inject 30 Units into the skin every evening.     lancets Misc  To check BG 2 times daily, to use with insurance preferred meter     levoFLOXacin 750 MG tablet  Commonly known as: LEVAQUIN  Take 1 tablet (750 mg total) by mouth once daily.     nystatin ointment  Commonly known as: MYCOSTATIN  Apply topically 2 (two) times daily.     predniSONE 20 MG tablet  Commonly known as: DELTASONE  Take 2 tablets (40 mg total) by mouth once daily.  Start taking on: September 13, 2022            CHANGE how you take these medications      blood sugar diagnostic Strp  To check BG 2 times daily, to use with insurance preferred meter  What changed: See the new instructions.            CONTINUE taking these medications      atorvastatin 80 MG  "tablet  Commonly known as: LIPITOR  Take 1 tablet (80 mg total) by mouth once daily.     carvediloL 25 MG tablet  Commonly known as: COREG  Take 1 tablet (25 mg total) by mouth 2 (two) times daily.     dulaglutide 3 mg/0.5 mL pen injector  Commonly known as: TRULICITY     ENTRESTO 24-26 mg per tablet  Generic drug: sacubitriL-valsartan  Take 1 tablet by mouth 2 (two) times daily.     ezetimibe 10 mg tablet  Commonly known as: ZETIA  Take 1 tablet (10 mg total) by mouth once daily.     furosemide 40 MG tablet  Commonly known as: LASIX  Take 1 tablet (40 mg total) by mouth 2 (two) times a day.     glipiZIDE 10 MG Tr24  Commonly known as: GLUCOTROL     latanoprost 0.005 % ophthalmic solution     metFORMIN 1000 MG tablet  Commonly known as: GLUCOPHAGE     SITagliptin 100 MG Tab  Commonly known as: JANUVIA     spironolactone 25 MG tablet  Commonly known as: ALDACTONE  Take 1 tablet (25 mg total) by mouth once daily.            STOP taking these medications      ergocalciferol 50,000 unit Cap  Commonly known as: ERGOCALCIFEROL               Where to Get Your Medications        These medications were sent to [address] [address]      Phone: [phone]   benzonatate 200 MG capsule  blood sugar diagnostic Strp  blood-glucose meter kit  CombiVENT RESPIMAT  mcg/actuation inhaler  fluticasone-umeclidin-vilanter 100-62.5-25 mcg Dsdv  insulin glargine 100 unit/mL injection  lancets Misc  levoFLOXacin 750 MG tablet  nystatin ointment  predniSONE 20 MG tablet         Discharge Instructions:         Linn Mckeon is being discharged .    Discharge Procedure Orders   NEBULIZER KIT (SUPPLIES) FOR HOME USE     Order Specific Question Answer Comments   Height: 5' 4" (1.626 m)    Weight: 140 kg (308 lb 10.3 oz)    Does patient have medical equipment at home? walker, rolling    Does patient have medical equipment at home? CPAP  "   Does patient have medical equipment at home? oxygen    Does patient have medical equipment at home? grab bar    Length of need (1-99 months): 99    Mask or Mouthpiece? Mouthpiece    Vendor: Other (use comments)    Expected Date of Delivery: 9/12/2022      Ambulatory referral/consult to Pulmonology   Standing Status: Future   Referral Priority: Routine Referral Type: Consultation   Referral Reason: Specialty Services Required   Requested Specialty: Pulmonary Disease   Number of Visits Requested: 1     Ambulatory referral/consult to Diabetes Education   Standing Status: Future   Referral Priority: Routine Referral Type: Consultation   Referral Reason: Specialty Services Required   Requested Specialty: Diabetes   Number of Visits Requested: 1 Expiration Date: 09/12/23     Complete PFT w/ bronchodilator   Standing Status: Future Standing Exp. Date: 09/12/23     Order Specific Question Answer Comments   Release to patient Immediate         Follow-Up Appointments:   Follow-up Information       NOEMI Watson Follow up in 2 week(s).    Specialty: Family Medicine  Contact information:  [address] [place] [address] [phone]               Michael Beckman, DO Follow up in 1 week(s).    Specialty: Internal Medicine  Why: for post wards clinic  Contact information:  [address] [place] [address] [phone]               [facility]. Go on 10/4/2022.    Specialty: Cardiology  Why: follow up at upcoming appointment  Contact information:  [address] [place] [id number] [phone]             [facility] Pulmonology. Schedule an appointment as soon as possible for a visit in 1 month(s).    Specialty: Pulmonology  Why: office will call with appointment  Contact information:  [address] [place] [address] [phone]                             To address at follow-up:  -The following labs are to be drawn at the [facility]  [facility] visit: cbg  -The following imaging studies are to be ordered at the post le visit: PFT, trilogy machine     At this time, Linn Mckeon is determined to have maximized benefits of IP hospitalization. she is discharged in stable condition with OP f/u recommendations and instructions. All questions answered, and patient verbalized agreement with the POC. They were given return precautions prior to d/c including symptoms that should prompt return to ED or to call PCP. Total time spent of DC of 35 minutes.       Michael Beckman,   [facility], HO-2

## 2022-09-12 NOTE — PT/OT/SLP EVAL
"Occupational Therapy   Evaluation/Eval only    Name: Linn Mckeon  MRN: [id number]    Admitting Diagnosis:    COPD exacerbation  CHF exacerbation  Patient Active Problem List   Diagnosis    GOODMAN (dyspnea on exertion)    Chronic heart failure with preserved ejection fraction    Primary hypertension    Hyperlipidemia LDL goal <70    Type 2 diabetes mellitus with skin complication    Peripheral edema    DAVION on CPAP    Morbid obesity with body mass index (BMI) greater than or equal to 50    Noncompliance with treatment plan    Xerosis of skin    Callus of foot    Dystrophic nail    Non compliance w medication regimen    Heart failure with reduced ejection fraction    Tobacco use    COPD exacerbation      Recent Surgery: * No surgery found *      Recommendations:     Discharge Recommendations: other (see comments) (home with responsible caregiver)  Discharge Equipment Recommendations:  bath bench  Barriers to discharge:  Other (Comment) (endurance)    Assessment:     Linn Mckeon is a 56 y.o. female with a medical diagnosis of COPD exacerbation.  Pt is supervision functional mobiltiy in room  without AD and for basic self care tasks in acute care setting; No skilled OT services needed at this time in acute care setting.         Plan:     Patient to be seen  (OT eval only) to address the above listed problems via    Plan of Care Expires:    Plan of Care Reviewed with: patient    Subjective     Chief Complaint: SOB with ambulating long distances  "with PT"  Patient/Family Comments/goals: go home when feeling better    Occupational Profile:  Living Environment: Pt lives with daughters, sister and brother in single story home with 2 steps and rail on left to enter; pt has tub/shower combo and grab bars in tub/shower  Previous level of function: Pt was modified independent basic self care tasks and ambulated with RW and without AD at times household level and with RW in community  Roles and Routines: 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 " passenger and did go grocery shopping with portable O2; Pt did not cook but heated up frozen meals in oven and microwave; other fly member living with her responsible for majority of household chores;   Equipment Used at Home:  walker, rolling, CPAP, oxygen, grab bar  Assistance upon Discharge: Pt will have assist from fly members    Pain/Comfort:  Pain Rating 1: 0/10  Pain Rating Post-Intervention 1: 0/10    VITALS  Presession  Post session  BP   97/65   105/70  HR  75   72    Patients cultural, spiritual, Voodoo conflicts given the current situation: no     Objective:     Communicated with: Nurse Mchugh  prior to session.  Patient found sitting edge of bed with peripheral IV, telemetry upon OT entry to room.    General Precautions: Standard, fall   Orthopedic Precautions:N/A   Braces: N/A  Respiratory Status: Nasal cannula, flow 2 L/min    Occupational Performance:    Bed Mobility:        Functional Mobility/Transfers:  Patient completed Sit <> Stand Transfer with supervision  with  no assistive device   Patient completed Toilet Transfer Step Transfer technique with supervision with  no AD  Functional Mobility: supervision  without AD ambulate in room short distances to [facility] and lavatory for grooming    Activities of Daily Living:  Grooming: supervision standing at sink  Lower Body Dressing: supervision with sock aide d/t  obesity and SOB reaching to feet   Toileting: supervision clothing mgt and hygiene    Cognitive/Visual Perceptual:  Alert and oriented x 4 ; followed 2-3 step commands; safety WFL's    Physical Exam:  Balance: -       sitting WFL EOB; standing balance supervision dynamic during ADL 's and functional mobility   Sensation:    -       Intact  Dominant hand: -       left  Upper Extremity Range of Motion:  -       Right Upper Extremity: WFL  -       Left Upper Extremity: WFL  Upper Extremity Strength: -       Right Upper Extremity: WFL  -       Left Upper Extremity: WFL   Strength: -        Right Upper Extremity: WFL  -       Left Upper Extremity: WFL  Fine Motor Coordination:    -       Intact  Gross motor coordination:   WFL    AMPAC 6 Click ADL:  AMPAC Total Score:      Treatment & Education:  Pt educated in use sock aide for donning socks ; issued sock aide for home use; pt ed on no skilled OT services needed in acute care setting    Patient left sitting edge of bed with all lines intact, call button in reach, and nurse Shuntane notified    GOALS:   Multidisciplinary Problems       Occupational Therapy Goals       Not on file                    History:     Past Medical History:   Diagnosis Date    Callus of foot 07/07/2022    CHF (congestive heart failure)     Chronic heart failure with preserved ejection fraction 05/20/2022    COPD (chronic obstructive pulmonary disease)     Diabetes mellitus     GOODMAN (dyspnea on exertion) 05/20/2022    Dystrophic nail 07/07/2022    Hyperlipidemia     Hyperlipidemia LDL goal <70 05/20/2022    Hypertension     Morbid obesity with body mass index (BMI) greater than or equal to 50 07/07/2022    Non compliance w medication regimen 07/07/2022    Noncompliance with treatment plan 07/07/2022    DAVION on CPAP 05/20/2022    Peripheral edema 05/20/2022    Primary hypertension 05/20/2022    Type 2 diabetes mellitus with skin complication 05/20/2022    Xerosis of skin 07/07/2022         Past Surgical History:   Procedure Laterality Date    HYSTERECTOMY         Time Tracking:     OT Date of Treatment: 09/19/22  OT Start Time: 1007  OT Stop Time: 1034  OT Total Time (min): 27 min    Billable Minutes:Evaluation 27 min    9/12/2022

## 2022-09-12 NOTE — ED PROVIDER NOTES
Encounter Date: 9/11/2022       History     Chief Complaint   Patient presents with    Shortness of Breath     Pt reports sob/wheezing worsening over the last 2 days, [facility]  unit 3, reports pt was 88% on room air, she normally in on 2 liters nc. Pt pt got 1 duo neb en route. Pt on 3 liters nc. Breathing 36/min otherwise vss.      56-year-old female presents to ED for shortness of breath.  Reports baseline 2L oxygen requirement.  States she is now up to 3L and still feels short of breath.  Cannot make it across the room without being very winded.  Family member states she looks more swollen and edematous. states she has gained weight recently. history of CHF.  Has been intermittently on Lasix in the past.  reports compliance. adamantly denies any chest pain or pressure, no confusion. Does endorse minimal urinary output recently.  Patient states the breathing treatments EN route did help.  Her initial respiratory rate was not as high as initially indicated.  Approximately 25.  No other complaints or concerns at this time.    Review of patient's allergies indicates:  No Known Allergies  Past Medical History:   Diagnosis Date    Callus of foot 07/07/2022    CHF (congestive heart failure)     Chronic heart failure with preserved ejection fraction 05/20/2022    COPD (chronic obstructive pulmonary disease)     Diabetes mellitus     GOODMAN (dyspnea on exertion) 05/20/2022    Dystrophic nail 07/07/2022    Hyperlipidemia     Hyperlipidemia LDL goal <70 05/20/2022    Hypertension     Morbid obesity with body mass index (BMI) greater than or equal to 50 07/07/2022    Non compliance w medication regimen 07/07/2022    Noncompliance with treatment plan 07/07/2022    DAVION on CPAP 05/20/2022    Peripheral edema 05/20/2022    Primary hypertension 05/20/2022    Type 2 diabetes mellitus with skin complication 05/20/2022    Xerosis of skin 07/07/2022     Past Surgical History:   Procedure Laterality Date     HYSTERECTOMY       Family History   Problem Relation Age of Onset    Hypertension Mother     Heart disease Mother     Diabetes Mother     Heart attack Father     Diabetes Sister     Hypertension Sister     Diabetes Brother     Hypertension Brother      Social History     Tobacco Use    Smoking status: Former     Types: Cigarettes    Smokeless tobacco: Never    Tobacco comments:     4 pks weekly   Substance Use Topics    Alcohol use: Yes    Drug use: Never     Review of Systems   Constitutional:  Positive for fatigue. Negative for chills, diaphoresis and fever.   HENT:  Negative for congestion, rhinorrhea, sinus pain and sore throat.    Eyes:  Negative for pain, discharge and itching.   Respiratory:  Positive for shortness of breath. Negative for cough and chest tightness.    Cardiovascular:  Positive for leg swelling. Negative for chest pain and palpitations.   Gastrointestinal:  Negative for abdominal pain, nausea and vomiting.   Genitourinary:  Negative for dysuria, flank pain and hematuria.   Musculoskeletal:  Negative for back pain and myalgias.   Skin:  Negative for color change and rash.   Neurological:  Negative for dizziness, weakness and headaches.   Psychiatric/Behavioral:  Negative for confusion. The patient is not hyperactive.      Physical Exam     Initial Vitals   BP Pulse Resp Temp SpO2   09/11/22 0315 09/11/22 0306 09/11/22 0306 09/11/22 0306 09/11/22 0306   139/83 84 (!) 36 98.4 °F (36.9 °C) 97 %      MAP       --                Physical Exam    Vitals reviewed.  Constitutional: She is not diaphoretic. No distress.   HENT:   Head: Normocephalic and atraumatic.   Eyes: Conjunctivae and EOM are normal. Pupils are equal, round, and reactive to light.   Neck: Neck supple. No tracheal deviation present.   Normal range of motion.  Cardiovascular:  Normal rate, regular rhythm, normal heart sounds and intact distal pulses.           Pulmonary/Chest: No accessory muscle usage. Tachypnea noted.  No respiratory distress. She has decreased breath sounds. She has wheezes. She has rales.   Abdominal: Abdomen is soft. There is no abdominal tenderness. There is no rebound and no guarding.   Musculoskeletal:         General: Edema present. Normal range of motion.      Cervical back: Normal range of motion and neck supple.     Neurological: She is alert and oriented to person, place, and time. She has normal strength. GCS score is 15. GCS eye subscore is 4. GCS verbal subscore is 5. GCS motor subscore is 6.   Skin: Skin is warm and dry. Capillary refill takes less than 2 seconds. No rash noted.   Psychiatric: She has a normal mood and affect. Her behavior is normal. Judgment and thought content normal.       ED Course   Critical Care    Date/Time: 9/11/2022 7:58 PM  Performed by: Rigoberto Hart MD  Authorized by: Rigoberto Hart MD   Total critical care time (exclusive of procedural time) : 30 minutes  Critical care time was exclusive of separately billable procedures and treating other patients.  Critical care was necessary to treat or prevent imminent or life-threatening deterioration of the following conditions: cardiac failure.  Critical care was time spent personally by me on the following activities: evaluation of patient's response to treatment, obtaining history from patient or surrogate, ordering and review of laboratory studies, pulse oximetry, review of old charts, development of treatment plan with patient or surrogate, examination of patient, ordering and performing treatments and interventions, ordering and review of radiographic studies and re-evaluation of patient's condition.  Comments: NSTEMI      Labs Reviewed   COMPREHENSIVE METABOLIC PANEL - Abnormal; Notable for the following components:       Result Value    Glucose Level 334 (*)     Protein Total 6.2 (*)     Albumin Level 2.9 (*)     Albumin/Globulin Ratio 0.9 (*)     Bilirubin Total 1.7 (*)     All other components within normal limits    TROPONIN I - Abnormal; Notable for the following components:    Troponin-I 0.073 (*)     All other components within normal limits   B-TYPE NATRIURETIC PEPTIDE - Abnormal; Notable for the following components:    Natriuretic Peptide 694.3 (*)     All other components within normal limits   CBC WITH DIFFERENTIAL - Abnormal; Notable for the following components:    RBC 6.89 (*)     Hgb 17.3 (*)     Hct 56.6 (*)     MCH 25.1 (*)     MCHC 30.6 (*)     RDW 18.6 (*)     MPV 10.8 (*)     All other components within normal limits   HEMOGLOBIN A1C - Abnormal; Notable for the following components:    Hemoglobin A1c 11.7 (*)     All other components within normal limits   URINALYSIS, REFLEX TO URINE CULTURE - Abnormal; Notable for the following components:    Glucose, UA Trace (*)     Squamous Epithelial Cells, UA Trace (*)     Mucous, UA Trace (*)     Hyaline Casts, UA 6-10 (*)     All other components within normal limits   POCT GLUCOSE - Abnormal; Notable for the following components:    POCT Glucose 293 (*)     All other components within normal limits   COVID/FLU A&B PCR - Normal   TSH - Normal   PHOSPHORUS - Normal   CBC W/ AUTO DIFFERENTIAL    Narrative:     The following orders were created for panel order CBC auto differential.  Procedure                               Abnormality         Status                     ---------                               -----------         ------                     CBC with Differential[[id number]]        Abnormal            Final result                 Please view results for these tests on the individual orders.     EKG Readings: (Independently Interpreted)   Initial Reading: No STEMI. Rhythm: Normal Sinus Rhythm. Ectopy: No Ectopy. Conduction: Normal. Axis: Left Axis Deviation. Clinical Impression: Normal Sinus Rhythm Other Impression: diffuse t wave flattening.   ECG Results              EKG 12-lead (Final result)  Result time 09/14/22 19:10:25      Final result by Interface,  Lab In [facility] (09/14/22 19:10:25)                   Narrative:    Test Reason : R07.9,    Vent. Rate : 078 BPM     Atrial Rate : 078 BPM     P-R Int : 186 ms          QRS Dur : 080 ms      QT Int : 316 ms       P-R-T Axes : 036 -84 003 degrees     QTc Int : 360 ms    Normal sinus rhythm  Left axis deviation  Anterolateral infarct (cited on or before 04-AUG-2022)  Abnormal ECG  When compared with ECG of 04-AUG-2022 09:31,  Nonspecific T wave abnormality, worse in Lateral leads  QT has shortened  Confirmed by Reynold Eaton MD ([id number]) on 9/14/2022 7:10:18 PM    Referred By: [email or web address]   SELF           Confirmed By:Reynold Eaton MD                                     EKG 12-LEAD (Final result)  Result time 09/16/22 10:43:07      Final result by Unknown User (09/16/22 10:43:07)                                      Imaging Results              X-Ray Chest AP Portable (Final result)  Result time 09/11/22 09:09:27      Final result by Norbert Kuo MD (09/11/22 09:09:27)                   Impression:      Increase interstitial and pulmonary vascular markings indicating the presence of pulmonary vascular congestion.    Cardiomegaly      Electronically signed by: Norbert Kuo  Date:    09/11/2022  Time:    09:09               Narrative:    EXAMINATION:  XR CHEST AP PORTABLE    CLINICAL HISTORY:  Chest Pain;    TECHNIQUE:  Single frontal view of the chest was performed.    COMPARISON:  December 6, 2021    FINDINGS:  Examination reveals mediastinal silhouette to be within normal limits cardiac silhouette is enlarged there is increase in interstitial and pulmonary vascular markings indicating the presence of pulmonary vascular congestion and cardiac decompensation.    No focal consolidative changes                                       Medications   albuterol-ipratropium 2.5 mg-0.5 mg/3 mL nebulizer solution 3 mL (3 mLs Nebulization Given by Other 9/11/22 7211)   furosemide injection 60 mg (60 mg Intravenous Given  9/11/22 0341)   methylPREDNISolone sodium succinate injection 125 mg (125 mg Intravenous Given 9/11/22 0337)   magnesium sulfate 2g in water 50mL IVPB (premix) (0 g Intravenous Stopped 9/11/22 0415)   terbutaline injection 0.25 mg (0.25 mg Subcutaneous Given 9/11/22 0339)   aspirin chewable tablet 324 mg (324 mg Oral Given 9/11/22 1457)     Medical Decision Making:   Clinical Tests:   Lab Tests: Reviewed and Ordered  Radiological Study: Reviewed and Ordered  Medical Tests: Reviewed and Ordered  ED Management:  56-year-old female presents to ED for worsening shortness of breath and swelling.  history of CHF, intermittent lasix use recently.  Denies any chest pain or pressure. endorses increased oxygen requirement above baseline 2L recently.  On arrival she is tachypneic with 3+ L O2 requirement. Lungs diminished with wheeze and rales. lower extremity edema demonstrates some pitting consistent clinically with fluid overload state. Otherwise exam is unremarkable acute.  Mentation appropriate. chest x-ray shows evidence of fluid overload, +.  Initial troponin level minimally elevated.  EKG does not demonstrate evidence acute ischemic changes only T-wave flattening.  Otherwise electrolytes/labs grossly unremarkable. given breathing treatment and Lasix with some improvement however pt does require continued management by inpatient team until back to baseline levels o2 levels and appropriately diuresised. Patient and family voiced understanding and care was formally transitioned to inpatient team. ([place])                    Clinical Impression:   Final diagnoses:  [I21.4] NSTEMI (non-ST elevated myocardial infarction) (Primary)  [I50.9] Acute on chronic congestive heart failure, unspecified heart failure type  [J44.1] COPD exacerbation        ED Disposition Condition    Observation                 Rigoberto Hart MD  09/26/22 2008

## 2022-09-12 NOTE — PROGRESS NOTES
"Inpatient Nutrition Evaluation    Admit Date: 2022   Total duration of encounter: 1 day    Nutrition Recommendation/Prescription     Diabetic, Heart Healthy diet  Monitor Weights Biweekly    Nutrition Assessment     Chart Review    Reason Seen: continuous nutrition monitoring    Diagnosis:  RLL CAP, Acute Respiratory Failure with hypercapnia, COPD exacerbation, acute exacerbation of chronic HFrEF, DAVION, DM    Relevant Medical History: DM, HTN, HLD, COPD, DAVION, HF    Nutrition-Related Medications: Atorvastatin, Lasix, Insulin, Prednisone, Spironolactone    Nutrition-Related Labs:  22 -- Hgb A1c 11.7 H  22 -- Glu 285 H, BUN 23 H, Cr 1    Diet Order: Diet heart healthy Diabetic  Oral Supplement Order: none at this time  Appetite/Oral Intake: good/% of meals  Factors Affecting Nutritional Intake: none identified at this time  Food/Buddhism/Cultural Preferences: none reported    Skin Integrity: intact  Wound(s):   skin intact    Comments    22 -- Pt reports good appetite, denies difficulty eating despite shortness of breath; current wt elevated compared to pt's reports UBW of 267 lb, 1+ LE edema present, receiving diuretic; Encouraged Low Na diet;  Glu/HgbA1C (H) - h/o DM, continue Diabetic diet as ordered    Anthropometrics    Height: 5' 4" (162.6 cm)    Last Weight: (!) 140 kg (308 lb 10.3 oz) (22 0306) Weight Method: Standard Scale  BMI (Calculated): 53  BMI Classification: obese grade III (BMI >/=40)  Ideal Body Weight (IBW), Female: 120 lb  % Ideal Body Weight, Female (lb): 257.21 %                 Usual Body Weight (UBW), k.3 kg  % Usual Body Weight: 115.66    Wt Readings from Last 5 Encounters:   22 (!) 140 kg (308 lb 10.3 oz)   22 (!) 144.2 kg (317 lb 14.5 oz)   22 136.1 kg (300 lb)   22 135.7 kg (299 lb 2.6 oz)   21 131.5 kg (289 lb 14.5 oz)     Weight Change(s) Since Admission:  Admit Weight: (!) 140 kg (308 lb 10.3 oz) (22 " 0302)      Patient Education    Education Provided: low sodium diet  Teaching Method: explanation  Comprehension: verbalizes understanding  Barriers to Learning: none evident  Expected Compliance: fair  Comments: All questions were answered and dietitian's contact information was provided.     Monitoring & Evaluation     Dietitian will monitor food and beverage intake, weight change, electrolyte/renal panel, food/nutrition knowledge skill, glucose/endocrine profile, and gastrointestinal profile.  Nutrition Risk/Follow-Up: low (follow-up in 5-7 days)  Patients assigned 'low nutrition risk' status do not qualify for a full nutritional assessment but will be monitored and re-evaluated in a 5-7 day time period.

## 2022-09-12 NOTE — PT/OT/SLP PROGRESS
"Physical Therapy Treatment    Patient Name:  Linn Mckeon   MRN:  [id number]    Recommendations:     Discharge Recommendations:  home health PT   Discharge Equipment Recommendations: shower chair   Barriers to discharge:  SOB upon ambulation, fall risk    Assessment:     Linn Mckeon is a 56 y.o. female admitted with a medical diagnosis of COPD exacerbation.      Patient Active Problem List   Diagnosis    GOODMAN (dyspnea on exertion)    Chronic heart failure with preserved ejection fraction    Primary hypertension    Hyperlipidemia LDL goal <70    Type 2 diabetes mellitus with skin complication    Peripheral edema    DAVION on CPAP    Morbid obesity with body mass index (BMI) greater than or equal to 50    Noncompliance with treatment plan    Xerosis of skin    Callus of foot    Dystrophic nail    Non compliance w medication regimen    Heart failure with reduced ejection fraction    Tobacco use    COPD exacerbation        She presents with the following impairments/functional limitations:  weakness, impaired endurance, impaired self care skills, impaired functional mobility, gait instability, decreased coordination, pain, impaired coordination. Pt tolerated session well, however displayed increased SOB after each trial of gait training and required seated rest breaks. Pt's SP02 fell to 88% at lowest after gait training.     Rehab Prognosis: Fair; patient would benefit from acute skilled PT services to address these deficits and reach maximum level of function.    Recent Surgery: * No surgery found *      Plan:     During this hospitalization, patient to be seen  (3-5x/week) to address the identified rehab impairments via gait training, therapeutic activities, therapeutic exercises and progress toward the following goals:    Plan of Care Expires:       Subjective     Chief Complaint: "I have some tightness in my lower back toady."  Patient/Family Comments/goals: None noted  Pain/Comfort:  Pain Rating 1: " 3/10  Location - Orientation 1: lower  Location 1: back      Objective:     Communicated with RN prior to session.  Patient found sitting edge of bed with   upon PT entry to room.     General Precautions: Standard, fall   Orthopedic Precautions:N/A   Braces:    Respiratory Status: Nasal cannula, flow 3 L/min     Functional Mobility:  Transfers:     Sit to Stand:  stand by assistance with rolling walker  Gait: 130ft with RW with CGA with SP02: 92% after trail and required seated rest break due to SOB. Additional 130ft with RW at CGA with 88% SP02 with SOB and required seated rest and return to 92% SPO2 after 2 minutes.   Additional 130ft with RW at CGA with 90% SP02 and increased SOB with seated rest breaks after.   (Pt performed gait training with 3L of 02 on nasal cannula.)     Patient left sitting edge of bed with all lines intact, call button in reach, Rn notified, and CNA present..    GOALS:   Multidisciplinary Problems       Physical Therapy Goals          Problem: Physical Therapy    Goal Priority Disciplines Outcome Goal Variances Interventions   Physical Therapy Goal     PT, PT/OT Ongoing, Progressing     Description: PT goals to be achieved by discharge:     1. Pt will perform sit <=> stand w/ RW and SBA.  2. Pt will perform bed <=> chair w/ RW and SBA.  3. Pt will ambulate 75' w/ RW and CGA.                       Time Tracking:     PT Received On: 09/12/22  PT Start Time: 0921     PT Stop Time: 0938  PT Total Time (min): 17 min     Billable Minutes: Gait Training 17    Treatment Type: Treatment  PT/PTA: PT           09/12/2022

## 2022-09-13 ENCOUNTER — HOSPITAL ENCOUNTER (OUTPATIENT)
Dept: RADIOLOGY | Facility: HOSPITAL | Age: 57
Discharge: HOME | End: 2022-09-13
Attending: NURSE PRACTITIONER
Payer: MEDICAID

## 2022-09-13 DIAGNOSIS — M79.605 BILATERAL LOWER EXTREMITY PAIN: ICD-10-CM

## 2022-09-13 DIAGNOSIS — M79.604 BILATERAL LOWER EXTREMITY PAIN: ICD-10-CM

## 2022-09-13 LAB
LEFT ANT TIBIAL SYS PSV: 27 CM/S
LEFT CFA PSV: 109 CM/S
LEFT DORSALIS PEDIS PSV: 26 CM/S
LEFT POPLITEAL PSV: 66 CM/S
LEFT POST TIBIAL SYS PSV: 40 CM/S
LEFT PROFUNDA SYS PSV: 67 CM/S
LEFT SUPER FEMORAL DIST SYS PSV: 88 CM/S
LEFT SUPER FEMORAL MID SYS PSV: 71 CM/S
LEFT SUPER FEMORAL PROX SYS PSV: 102 CM/S
OHS CV LEFT LOWER EXTREMITY ABI (NO CALC): 0.93
OHS CV RIGHT ABI LOWER EXTREMITY (NO CALC): 0.91
RIGHT ANT TIBIAL SYS PSV: 76 CM/S
RIGHT CFA PSV: 101 CM/S
RIGHT DORSALIS PEDIS PSV: 96 CM/S
RIGHT POPLITEAL PSV: 68 CM/S
RIGHT POST TIBIAL SYS PSV: 65 CM/S
RIGHT PROFUNDA SYS PSV: 91 CM/S
RIGHT SUPER FEMORAL DIST SYS PSV: 74 CM/S
RIGHT SUPER FEMORAL MID SYS PSV: 89 CM/S
RIGHT SUPER FEMORAL PROX SYS PSV: 87 CM/S

## 2022-09-13 PROCEDURE — 93925 LOWER EXTREMITY STUDY: CPT

## 2022-09-14 NOTE — PT/OT/SLP DISCHARGE
Physical Therapy Discharge Summary    Name: Linn Mckeon  MRN: [id number]   Principal Problem: COPD exacerbation     Patient Discharged from acute Physical Therapy on 09/12/22.  Please refer to prior PT noted date on 09/12/22 for functional status.     Assessment:     Pt did not meet goals due to pt being D/C from hospital.     Objective:     GOALS:   Multidisciplinary Problems       Physical Therapy Goals       Not on file              Multidisciplinary Problems (Resolved)          Problem: Physical Therapy    Goal Priority Disciplines Outcome Goal Variances Interventions   Physical Therapy Goal   (Resolved)     PT, PT/OT Met     Description: PT goals to be achieved by discharge:     1. Pt will perform sit <=> stand w/ RW and SBA.  2. Pt will perform bed <=> chair w/ RW and SBA.  3. Pt will ambulate 75' w/ RW and CGA.                       Reasons for Discontinuation of Therapy Services  Transfer to alternate level of care.  Pt d/c from hospital.     Plan:     Patient Discharged to: Home with Home Health Service.      9/14/2022

## 2022-09-16 LAB
BACTERIA BLD CULT: NORMAL
BACTERIA BLD CULT: NORMAL

## 2022-09-21 ENCOUNTER — OFFICE VISIT (OUTPATIENT)
Dept: ENDOCRINOLOGY | Facility: CLINIC | Age: 57
End: 2022-09-21
Payer: MEDICAID

## 2022-09-21 VITALS
WEIGHT: 291.5 LBS | RESPIRATION RATE: 24 BRPM | DIASTOLIC BLOOD PRESSURE: 87 MMHG | TEMPERATURE: 98 F | HEART RATE: 70 BPM | SYSTOLIC BLOOD PRESSURE: 163 MMHG | HEIGHT: 64 IN | BODY MASS INDEX: 49.76 KG/M2

## 2022-09-21 DIAGNOSIS — E11.65 UNCONTROLLED TYPE 2 DIABETES MELLITUS WITH HYPERGLYCEMIA: Primary | ICD-10-CM

## 2022-09-21 DIAGNOSIS — N28.9 NEPHROPATHY: ICD-10-CM

## 2022-09-21 PROCEDURE — 4010F ACE/ARB THERAPY RXD/TAKEN: CPT | Mod: CPTII,,, | Performed by: NURSE PRACTITIONER

## 2022-09-21 PROCEDURE — 4010F PR ACE/ARB THEARPY RXD/TAKEN: ICD-10-PCS | Mod: CPTII,,, | Performed by: NURSE PRACTITIONER

## 2022-09-21 PROCEDURE — 3008F PR BODY MASS INDEX (BMI) DOCUMENTED: ICD-10-PCS | Mod: CPTII,,, | Performed by: NURSE PRACTITIONER

## 2022-09-21 PROCEDURE — 3077F PR MOST RECENT SYSTOLIC BLOOD PRESSURE >= 140 MM HG: ICD-10-PCS | Mod: CPTII,,, | Performed by: NURSE PRACTITIONER

## 2022-09-21 PROCEDURE — 99214 OFFICE O/P EST MOD 30 MIN: CPT | Mod: S$PBB,,, | Performed by: NURSE PRACTITIONER

## 2022-09-21 PROCEDURE — 1159F MED LIST DOCD IN RCRD: CPT | Mod: CPTII,,, | Performed by: NURSE PRACTITIONER

## 2022-09-21 PROCEDURE — 3060F POS MICROALBUMINURIA REV: CPT | Mod: CPTII,,, | Performed by: NURSE PRACTITIONER

## 2022-09-21 PROCEDURE — 1160F RVW MEDS BY RX/DR IN RCRD: CPT | Mod: CPTII,,, | Performed by: NURSE PRACTITIONER

## 2022-09-21 PROCEDURE — 3066F PR DOCUMENTATION OF TREATMENT FOR NEPHROPATHY: ICD-10-PCS | Mod: CPTII,,, | Performed by: NURSE PRACTITIONER

## 2022-09-21 PROCEDURE — 3079F DIAST BP 80-89 MM HG: CPT | Mod: CPTII,,, | Performed by: NURSE PRACTITIONER

## 2022-09-21 PROCEDURE — 3066F NEPHROPATHY DOC TX: CPT | Mod: CPTII,,, | Performed by: NURSE PRACTITIONER

## 2022-09-21 PROCEDURE — 3077F SYST BP >= 140 MM HG: CPT | Mod: CPTII,,, | Performed by: NURSE PRACTITIONER

## 2022-09-21 PROCEDURE — 3060F PR POS MICROALBUMINURIA RESULT DOCUMENTED/REVIEW: ICD-10-PCS | Mod: CPTII,,, | Performed by: NURSE PRACTITIONER

## 2022-09-21 PROCEDURE — 99214 PR OFFICE/OUTPT VISIT, EST, LEVL IV, 30-39 MIN: ICD-10-PCS | Mod: S$PBB,,, | Performed by: NURSE PRACTITIONER

## 2022-09-21 PROCEDURE — 3008F BODY MASS INDEX DOCD: CPT | Mod: CPTII,,, | Performed by: NURSE PRACTITIONER

## 2022-09-21 PROCEDURE — 1159F PR MEDICATION LIST DOCUMENTED IN MEDICAL RECORD: ICD-10-PCS | Mod: CPTII,,, | Performed by: NURSE PRACTITIONER

## 2022-09-21 PROCEDURE — 3079F PR MOST RECENT DIASTOLIC BLOOD PRESSURE 80-89 MM HG: ICD-10-PCS | Mod: CPTII,,, | Performed by: NURSE PRACTITIONER

## 2022-09-21 PROCEDURE — 1160F PR REVIEW ALL MEDS BY PRESCRIBER/CLIN PHARMACIST DOCUMENTED: ICD-10-PCS | Mod: CPTII,,, | Performed by: NURSE PRACTITIONER

## 2022-09-21 PROCEDURE — 99215 OFFICE O/P EST HI 40 MIN: CPT | Mod: PBBFAC | Performed by: NURSE PRACTITIONER

## 2022-09-21 RX ORDER — INSULIN GLARGINE 100 [IU]/ML
30 INJECTION, SOLUTION SUBCUTANEOUS 2 TIMES DAILY
Qty: 30 ML | Refills: 11 | Status: SHIPPED | OUTPATIENT
Start: 2022-09-21 | End: 2023-10-02 | Stop reason: SDUPTHER

## 2022-09-21 RX ORDER — PEN NEEDLE, DIABETIC 30 GX3/16"
1 NEEDLE, DISPOSABLE MISCELLANEOUS 2 TIMES DAILY WITH MEALS
Qty: 100 EACH | Refills: 11 | Status: SHIPPED | OUTPATIENT
Start: 2022-09-21 | End: 2022-11-02 | Stop reason: SDUPTHER

## 2022-09-21 RX ORDER — GLIPIZIDE 5 MG/1
5 TABLET, FILM COATED, EXTENDED RELEASE ORAL
Qty: 30 TABLET | Refills: 5 | Status: SHIPPED | OUTPATIENT
Start: 2022-09-21 | End: 2022-10-03 | Stop reason: ALTCHOICE

## 2022-09-21 RX ORDER — METFORMIN HYDROCHLORIDE 1000 MG/1
1000 TABLET ORAL 2 TIMES DAILY WITH MEALS
Qty: 60 TABLET | Refills: 11 | Status: SHIPPED | OUTPATIENT
Start: 2022-09-21 | End: 2023-10-17

## 2022-09-21 NOTE — PROGRESS NOTES
Subjective:       Patient ID: Linn Mckeon is a 56 y.o. female.    Chief Complaint: Diabetes (Type II follow up)    Previous endocrine clinic notes   08/11/2021 telemedicine visit- endocrine Clinic Note: 55-year-old female scheduled today for endocrine clinic follow-up.  Due to recent surge and Covid and patient's comorbid conditions visit was changed to telemedicine visit.  History of uncontrolled type 2 diabetes, hypertension, hyperlipidemia, obstructive sleep apnea, noncompliance due to history of depression.  Uncontrolled type 2 diabetes current A1c improved to 8.8 from previous 9.6 and 10.4.  A1C improving not at goal.  Patient reports checking CBGs occasionally and states most of her CBGs are in the 100s and 200s and she is no longer seeing 300s she denies any hypoglycemia.  Patient was recently changed to Trulicity and denies any side effects.  Discussed with patient ADA diet and making dietary changes along with increasing Trulicity.  Nephropathy urine micro was elevated 12/3/2020 normal on 8/10/2021 patient is currently on ACE with improved A1c. [1]      11/18/2021 endocrine clinic note : 55-year-old female scheduled today for endocrine clinic follow-up.  History of uncontrolled type 2 diabetes, hypertension, hyperlipidemia, obstructive sleep apnea and depression.  Uncontrolled type 2 diabetes current A1C 8.6 Previous A1c 8.3, 8.8, 9.6 and 10.4.  Patient has been improving over the last year.  Patient checks CBGs occasionally ranges 100s and 200s. Patient states she forgets her morning medications about 2 to 3 days/week. When discussed with patient she does not use a pillbox. Patient was given a pillbox today with a.m. and p.m. slots. Patient will work on compliance with morning medications. Instructed patient this will help improve her A1c. Hypertension mild elevation today. Patient did not take her medication prior to her visit. History of nephropathy Urine Micro Creatine/ratio: 12/3/2020 Elevated  normal 08/10/2021. [1]     3/21/2022 endocrine clinic note: 56-year-old female scheduled today for endocrine clinic follow-up.  History of uncontrolled type 2 diabetes, hypertension, hyperlipidemia, obstructive sleep apnea and depression.  Type 2 diabetes current A1C 10.3 Previous A1c 8.6, 8.3, 8.8, 9.6 and 10.4.  On patient's previous visit she was given medication pillbox to help with compliance due to forgetting medications. pt checks CBG's occasionally not recently last CBG log  check Feb 17th no CBG's in 1 month. Pt states she had pneumonia (chart shows Dec 6th) and had a hard time swallowing and was not taking her medications at that time. pt has not been checking CBG's in over a month. Shes denies hypoglycemic symptoms. Pt attended DM education Dec 2020 until March 2021 with medication changes at that time with improved A1C. Nephropathy Urine Micro Creatine/ratio: 12/03/2020 Elevated, normal 08/10/2021 we will recheck urine micro today. HTN at goal.    Current endocrine clinic note 09/21/2022:  56 year female scheduled today for endocrine clinic follow-up.  History of uncontrolled type 2 diabetes, hypertension, hyperlipidemia obstructive sleep apnea and depression.  Patient was recently hospitalized with congestive heart failure and returns to clinic today with medication changes for type 2 diabetes.  Uncontrolled type 2 diabetes recent A1c 11.7 previous 10.0 and 8.3.  Patient has a history of nonadherence to medications today her sisters with her and states that the patient has been taking her medications and now has a pillbox.  Patient restarted her metformin, glipizide, Januvia and was started on Lantus at the hospital patient states she is been taking 30 units twice a day hospital instructions were written for once a day but needles were written for twice a day but patient clarified that she is been taking twice a day.  Patient had not restarted her Trulicity yet but states since she started her regimen  back in the last 2 days her blood sugars have been in the 80s in the morning and she is been feeling weak.  Patient restarted her regimen about a week and a half ago and states in the last 2 days with consistently taking medication she is been having hypoglycemia in the morning.  She states yesterday her sister held her 30 units of Lantus at night due to blood sugars being too low.  Instructed patient she did not hold Lantus but will decrease her glipizide and she is return to clinic in 1 month with CBGS to titrate her medications.  She is currently hold Trulicity and when she returns to clinic in 1 month with glucose numbers will restart Trulicity at 0.75 mg and titrate patient's regimen.    Review of Systems   Constitutional:  Negative for activity change, appetite change and fatigue.   HENT:  Negative for dental problem, hearing loss, tinnitus, trouble swallowing and goiter.    Eyes:  Negative for photophobia, pain and visual disturbance.   Respiratory:  Negative for cough, chest tightness and wheezing.    Cardiovascular:  Negative for chest pain, palpitations and leg swelling.   Gastrointestinal:  Negative for abdominal pain, constipation, diarrhea, nausea and reflux.   Endocrine: Negative for cold intolerance, heat intolerance, polydipsia and polyphagia.   Genitourinary:  Negative for difficulty urinating, flank pain, hematuria, hot flashes, menstrual irregularity, menstrual problem, nocturia and urgency.   Musculoskeletal:  Negative for back pain, gait problem, joint swelling, leg pain and joint deformity.   Integumentary:  Negative for color change, pallor, rash and breast discharge.   Allergic/Immunologic: Negative for environmental allergies, food allergies and immunocompromised state.   Neurological:  Negative for tremors, seizures, headaches, coordination difficulties, memory loss and coordination difficulties.   Psychiatric/Behavioral:  Negative for agitation, behavioral problems and sleep disturbance.  The patient is not nervous/anxious.        Objective:      Physical Exam  Constitutional:       General: She is not in acute distress.     Appearance: Normal appearance. She is not ill-appearing.   HENT:      Head: Normocephalic and atraumatic.      Right Ear: External ear normal.      Left Ear: External ear normal.      Nose: Nose normal. No congestion or rhinorrhea.      Mouth/Throat:      Mouth: Mucous membranes are moist.      Pharynx: Oropharynx is clear. No oropharyngeal exudate.   Eyes:      General:         Right eye: No discharge.         Left eye: No discharge.      Conjunctiva/sclera: Conjunctivae normal.      Pupils: Pupils are equal, round, and reactive to light.   Neck:      Thyroid: No thyroid mass, thyromegaly or thyroid tenderness.   Cardiovascular:      Rate and Rhythm: Normal rate and regular rhythm.      Pulses: Normal pulses.      Heart sounds: Normal heart sounds. No murmur heard.  Pulmonary:      Effort: Pulmonary effort is normal. No respiratory distress.      Breath sounds: Normal breath sounds.   Abdominal:      General: Abdomen is flat. Bowel sounds are normal. There is no distension.      Palpations: Abdomen is soft.      Tenderness: There is no abdominal tenderness.   Musculoskeletal:         General: No swelling or tenderness. Normal range of motion.      Cervical back: Normal range of motion and neck supple. No tenderness.      Right lower leg: No edema.      Left lower leg: No edema.   Feet:      Right foot:      Skin integrity: Skin integrity normal.      Left foot:      Skin integrity: Skin integrity normal.   Lymphadenopathy:      Cervical: No cervical adenopathy.   Skin:     General: Skin is warm and dry.      Coloration: Skin is not jaundiced or pale.   Neurological:      General: No focal deficit present.      Mental Status: She is alert and oriented to person, place, and time. Mental status is at baseline.      Coordination: Coordination normal.      Gait: Gait normal.  "  Psychiatric:         Mood and Affect: Mood normal.         Behavior: Behavior normal.         Thought Content: Thought content normal.       Assessment:       Problem List Items Addressed This Visit    None  Visit Diagnoses       Uncontrolled type 2 diabetes mellitus with hyperglycemia    -  Primary    Relevant Medications    SITagliptin (JANUVIA) 100 MG Tab    metFORMIN (GLUCOPHAGE) 1000 MG tablet    insulin glargine (LANTUS U-100 INSULIN) 100 unit/mL injection    glipiZIDE 5 MG TR24    pen needle, diabetic 31 gauge x 5/16" Ndle    Nephropathy                Plan:       Uncontrolled type 2 diabetes mellitus with hyperglycemia  Current A1C 11.7 previous 10.0 and 8.3    Urine Micro Creatine/ratio: 12/3/2020 Elevated, normal 08/10/2021, elevated 05/06/2022  Medications: Metformin 1000 mg twice daily, Januvia 100 mg, Glipizide 10XL once a day, Trulicity 3mg ( on Hold), Lantus 30 units BID  Changes:  Decrease glipizide to 5 mg once a day, continue Lantus at 30 units b.i.d., order Harper, patient's return to clinic in 1 month we will start Trulicity 0.75 mg will titrate patient's medication dose   Home Glucometer Use:  Once a day  Eye Exam: 09/28/2021  Last Hypoglycemic episode:  In the a.m. in the 80s with weakness  Follow ADA diet, avoid soda, simple sweets, and limit rice, breads and starches  Maintain healthy weight, exercise 4-5 times a week for 30 minutes  Diet and medication compliance discussed on visit  RTC 1 month with CBGS to titrate medications   Component Ref Range & Units 10 d ago   (9/11/22) 4 mo ago   (5/5/22) 11 mo ago   (10/7/21) 1 yr ago   (8/10/21) 1 yr ago   (10/7/20) 2 yr ago   (11/20/19) 3 yr ago   (5/15/19)   Hemoglobin A1c <=7.0 % 11.7 High   10.0 High   8.3 High  R  8.8 High  R  11.0 High  R  8.2 High  R  9.4 High  R    Estimated Average Glucose mg/dL 289.1  240.3  191.5  205.9  269 High  R  189 High  R  223 High        Nephropathy  Patient is currently on Entrestro which contains " ACE  Component Ref Range & Units 4 mo ago   (5/6/22) 1 yr ago   (8/10/21) 1 yr ago   (5/5/21) 1 yr ago   (12/3/20)   Urine Microalbumin <=30.0 ug/ml 63.4 High   13.0 R  27.3 R  15.6 R    Urine Creatinine 47.0 - 110.0 mg/dL 70.3  105.2 R  72.3 R  72.1 R    Microalbumin Creatinine Ratio 0.0 - 30.0 mg/gm Cr 90.2 High   12.4  37.8 High   216.4 High         I spent a total of 30 minutes on the day of the visit.  This includes face to face time and non-face to face time preparing to see the patient (eg, review of tests), obtaining and/or reviewing separately obtained history, documenting clinical information in the electronic or other health record, independently interpreting results and communicating results to the patient/family/caregiver, or care coordinator.

## 2022-09-23 ENCOUNTER — TELEPHONE (OUTPATIENT)
Dept: ENDOCRINOLOGY | Facility: CLINIC | Age: 57
End: 2022-09-23
Payer: MEDICAID

## 2022-09-23 NOTE — TELEPHONE ENCOUNTER
----- Message from Lore Fabian sent at 9/23/2022  1:26 PM CDT -----  Regarding: Pt call  #KATERINA#      Pt called wanted to let Katerina know that her blood sugar reading was 74 @12:30'          Call back [phone]

## 2022-09-26 ENCOUNTER — TELEPHONE (OUTPATIENT)
Dept: ENDOCRINOLOGY | Facility: CLINIC | Age: 57
End: 2022-09-26
Payer: MEDICAID

## 2022-09-26 NOTE — TELEPHONE ENCOUNTER
----- Message from Lore Fabian sent at 9/26/2022 12:10 PM CDT -----  Regarding: Pt call  #KATERINA PT#      Pt called to update you on her blood sugar levels from the weekend [phone].

## 2022-09-30 ENCOUNTER — LAB VISIT (OUTPATIENT)
Dept: LAB | Facility: HOSPITAL | Age: 57
End: 2022-09-30
Attending: NURSE PRACTITIONER
Payer: MEDICAID

## 2022-09-30 DIAGNOSIS — E55.9 VITAMIN D DEFICIENCY: ICD-10-CM

## 2022-09-30 DIAGNOSIS — I50.20 HEART FAILURE WITH REDUCED EJECTION FRACTION: ICD-10-CM

## 2022-09-30 LAB
ANION GAP SERPL CALC-SCNC: 6 MEQ/L
BUN SERPL-MCNC: 22.2 MG/DL (ref 9.8–20.1)
CALCIUM SERPL-MCNC: 9.7 MG/DL (ref 8.4–10.2)
CHLORIDE SERPL-SCNC: 99 MMOL/L (ref 98–107)
CO2 SERPL-SCNC: 34 MMOL/L (ref 22–29)
CREAT SERPL-MCNC: 0.97 MG/DL (ref 0.55–1.02)
CREAT/UREA NIT SERPL: 23
DEPRECATED CALCIDIOL+CALCIFEROL SERPL-MC: 10.9 NG/ML (ref 30–80)
GFR SERPLBLD CREATININE-BSD FMLA CKD-EPI: >60 MLS/MIN/1.73/M2
GLUCOSE SERPL-MCNC: 92 MG/DL (ref 74–100)
POTASSIUM SERPL-SCNC: 4.9 MMOL/L (ref 3.5–5.1)
SODIUM SERPL-SCNC: 139 MMOL/L (ref 136–145)

## 2022-09-30 PROCEDURE — 82306 VITAMIN D 25 HYDROXY: CPT

## 2022-09-30 PROCEDURE — 80048 BASIC METABOLIC PNL TOTAL CA: CPT

## 2022-09-30 PROCEDURE — 36415 COLL VENOUS BLD VENIPUNCTURE: CPT

## 2022-10-03 ENCOUNTER — OFFICE VISIT (OUTPATIENT)
Dept: INTERNAL MEDICINE | Facility: CLINIC | Age: 57
End: 2022-10-03
Payer: MEDICAID

## 2022-10-03 VITALS
HEART RATE: 71 BPM | SYSTOLIC BLOOD PRESSURE: 122 MMHG | WEIGHT: 286.81 LBS | DIASTOLIC BLOOD PRESSURE: 76 MMHG | TEMPERATURE: 98 F | HEIGHT: 64 IN | RESPIRATION RATE: 20 BRPM | OXYGEN SATURATION: 93 % | BODY MASS INDEX: 48.96 KG/M2

## 2022-10-03 DIAGNOSIS — E66.01 MORBID OBESITY WITH BODY MASS INDEX (BMI) GREATER THAN OR EQUAL TO 50: ICD-10-CM

## 2022-10-03 DIAGNOSIS — E11.9 TYPE 2 DIABETES MELLITUS WITHOUT COMPLICATION, WITH LONG-TERM CURRENT USE OF INSULIN: ICD-10-CM

## 2022-10-03 DIAGNOSIS — Z79.4 TYPE 2 DIABETES MELLITUS WITHOUT COMPLICATION, WITH LONG-TERM CURRENT USE OF INSULIN: ICD-10-CM

## 2022-10-03 DIAGNOSIS — J44.9 CHRONIC OBSTRUCTIVE PULMONARY DISEASE, UNSPECIFIED COPD TYPE: ICD-10-CM

## 2022-10-03 DIAGNOSIS — I50.20 HEART FAILURE WITH REDUCED EJECTION FRACTION: ICD-10-CM

## 2022-10-03 DIAGNOSIS — G47.33 OSA ON CPAP: ICD-10-CM

## 2022-10-03 DIAGNOSIS — J44.1 COPD EXACERBATION: Primary | ICD-10-CM

## 2022-10-03 LAB — GLUCOSE SERPL-MCNC: 110 MG/DL (ref 70–110)

## 2022-10-03 PROCEDURE — 99215 OFFICE O/P EST HI 40 MIN: CPT | Mod: PBBFAC

## 2022-10-03 PROCEDURE — 82962 GLUCOSE BLOOD TEST: CPT | Mod: PBBFAC

## 2022-10-03 RX ORDER — IPRATROPIUM BROMIDE AND ALBUTEROL 20; 100 UG/1; UG/1
2 SPRAY, METERED RESPIRATORY (INHALATION) EVERY 6 HOURS PRN
Qty: 4 G | Refills: 1 | Status: SHIPPED | OUTPATIENT
Start: 2022-10-03

## 2022-10-03 NOTE — PROGRESS NOTES
Attending Addendum:   Patient seen and examined in clinic. Management and Plan were discussed with resident. Care was reasonable and necessary.   Tita Schwartz MD  [facility]

## 2022-10-03 NOTE — PROGRESS NOTES
"[facility] Visit    Chief Complaint:      Post Le visit (Patient states feeling much better. Also states that symptoms since admission to hospital have subsided. Patient has O2 set at 2% day and night via nasal canula. Patient states 0/10 pain at the moment.)     Subjective:     HPI:  Linn Mckeon is a 56 y.o. female  with PMH significant for an DM Type II, HTN, HLD, COPD on 2L home O2, DAVION (on CPAP), HFrEF (EF of 40%) who is here [facility] on 09/11/2022 with complaint of increasing shortness of breath, found to have a COPD exacerbation and was treated appropriately with Abx and breathing treatments, as well as a CHF exacerbation. She is here today, reports no wheezing, no fever, chills, SOB or increased sputum; only stable/chronic GOODMAN and occasionally uses her home O2. CBGs at home have been 100-150 range. Recently seen in Endocrine clinic who stopped glipizide. Will refill rescue inhaler today.      Review of Systems  Constitutional: no fever, fatigue, weakness  Eye: no vision loss, eye redness, drainage, or pain  ENMT: no sore throat, ear pain, sinus pain/congestion, nasal congestion/drainage  Respiratory: no cough, no wheezing, no shortness of breath at rest; stable GOODMAN  Cardiovascular: no chest pain, no palpitations, no edema  Gastrointestinal: no nausea, vomiting, or diarrhea. No abdominal pain  Genitourinary: no dysuria, no urinary frequency or urgency, no hematuria  Hema/Lymph: no abnormal bruising or bleeding  Endocrine: no heat or cold intolerance, no excessive thirst or excessive urination  Musculoskeletal: no muscle or joint pain, no joint swelling  Integumentary: no skin rash or abnormal lesion  Neurologic: no headache, no dizziness, no weakness or numbness    Objective:   Last 24 Hour Vital Signs:  Vitals  BP: 122/76  Temp: 98.2 °F (36.8 °C)  Temp src: Oral  Pulse: 71  Resp: 20  SpO2: (!) 93 %  Height: 5' 4" (162.6 " cm)  Weight: 130.1 kg (286 lb 12.8 oz)    Physical Examination:    General: Obese, well developed  woman in NAD on room air  HEENT: PERRLA, NC/AT, MMM  Respiratory: CTAB, normal respiratory effort  Cardiovascular: RRR, no m/r/g, no leg swelling  Gastrointestinal: S, NT, ND, active BS, no masses palpable  Musculoskeletal: full range of motion of all extremities/spine without limitation or discomfort  Integumentary: no rashes or skin lesions present  Neurologic: AAOx4, CN II-XII grossly intact, normal gait    Labs  BMP:   Lab Results   Component Value Date    CHLORIDE 99 09/30/2022    CO2 34 (H) 09/30/2022    BUN 22.2 (H) 09/30/2022    CREATININE 0.97 09/30/2022    GLUCOSE 92 09/30/2022    CALCIUM 9.7 09/30/2022     CBC:   Lab Results   Component Value Date    WBC 8.6 09/11/2022    HGB 17.3 (H) 09/11/2022    HCT 56.6 (H) 09/11/2022    MCV 82.1 09/11/2022    RDW 18.6 (H) 09/11/2022     LFTs:   Lab Results   Component Value Date    LABPROT 5.4 (L) 09/12/2022    ALBUMIN 2.5 (L) 09/12/2022    AST 12 09/12/2022    ALT 26 09/12/2022    ALKPHOS 82 09/12/2022     FLP:   Lab Results   Component Value Date    CHOL 149 05/05/2022    HDL 38 05/05/2022    LDL 95.00 05/05/2022    TRIG 80 05/05/2022    TOTALCHOLEST 4 05/05/2022     DM:   Lab Results   Component Value Date    HGBA1C 11.7 (H) 09/11/2022    .1 09/11/2022    CREATININE 0.97 09/30/2022    CREATRANDUR 70.3 05/06/2022     Thyroid:   Lab Results   Component Value Date    TSH 0.6432 09/11/2022    PCFIVF8ULGF 0.96 10/07/2021     Anemia: No results found for: IRON, TIBC, FERRITIN, [id number], FOLATE          Assessment & Plan:     COPD (unquantitated)  DAVION  [facility]  -Started on Trelegy Ellipta 1 puff daily, Combivent inhaler PRN for SOB (using once daily)  -Completed Levaquin and steroid course with improved symptoms; resolved COPD exacerbation  -On 2L NC at home as needed; O2 93% on RA today  -PFT's pending 10/12/22; Will need to f/u with PCP once PFT's  complete to see if qualifies for Trilogy machine    HFrEF (EF 40%)  -Pt discharged after short stay with CHF exacerbation, improved with diuresis and restarted on home regimen  -Continue home GDMT: Entresto 24/26 mg BID, Coreg 25 mg BID, lasix 40 mg BID, Aldactone 25 mg daily  -Would benefit from starting SGLT-2 as now part of GDMT and patient has DM Type II; will start Jardiance 10 mg daily    DM Type II  -Most recent A1c 11.7 on 9/11/22;  today  -Medications: Lantus 30 units BID, Metformin 1000 mg BID, Januvia 100 mg daily; Will start Jardiance 10 mg daily  -Pt instructed to check CBG daily, if low <100 regularly (several days per week) or <60-> instructed to stop Januvia and call Endocrine clinic NP    Post-hernandez orders:  -CBG completed in clinic: 110  -PFT scheduled 10/12/22  -Will need to f/u with PCP for Trilogy breathing machine pending PFT's    To be addressed at next follow-up  -PFT results will need to be reviewed by PCP and see if eligible for Trilogy breathing machine at night      Follow-ups  -Follow-up with PCP as needed      Kelly Delacruz MD  [employer] IM PGY II

## 2022-10-04 ENCOUNTER — OFFICE VISIT (OUTPATIENT)
Dept: CARDIOLOGY | Facility: CLINIC | Age: 57
End: 2022-10-04
Payer: MEDICAID

## 2022-10-04 VITALS
BODY MASS INDEX: 48.55 KG/M2 | RESPIRATION RATE: 20 BRPM | TEMPERATURE: 98 F | WEIGHT: 284.38 LBS | SYSTOLIC BLOOD PRESSURE: 128 MMHG | OXYGEN SATURATION: 100 % | DIASTOLIC BLOOD PRESSURE: 66 MMHG | HEIGHT: 64 IN | HEART RATE: 61 BPM

## 2022-10-04 DIAGNOSIS — I10 PRIMARY HYPERTENSION: ICD-10-CM

## 2022-10-04 DIAGNOSIS — G47.33 OSA ON CPAP: ICD-10-CM

## 2022-10-04 DIAGNOSIS — E11.9 TYPE 2 DIABETES MELLITUS WITHOUT COMPLICATION, WITH LONG-TERM CURRENT USE OF INSULIN: ICD-10-CM

## 2022-10-04 DIAGNOSIS — Z72.0 TOBACCO USE: ICD-10-CM

## 2022-10-04 DIAGNOSIS — I50.20 HEART FAILURE WITH REDUCED EJECTION FRACTION: ICD-10-CM

## 2022-10-04 DIAGNOSIS — R06.09 DOE (DYSPNEA ON EXERTION): Primary | ICD-10-CM

## 2022-10-04 DIAGNOSIS — Z79.4 TYPE 2 DIABETES MELLITUS WITHOUT COMPLICATION, WITH LONG-TERM CURRENT USE OF INSULIN: ICD-10-CM

## 2022-10-04 DIAGNOSIS — R60.0 PERIPHERAL EDEMA: ICD-10-CM

## 2022-10-04 DIAGNOSIS — E78.5 HYPERLIPIDEMIA LDL GOAL <70: ICD-10-CM

## 2022-10-04 PROCEDURE — 1160F PR REVIEW ALL MEDS BY PRESCRIBER/CLIN PHARMACIST DOCUMENTED: ICD-10-PCS | Mod: CPTII,,, | Performed by: NURSE PRACTITIONER

## 2022-10-04 PROCEDURE — 99214 PR OFFICE/OUTPT VISIT, EST, LEVL IV, 30-39 MIN: ICD-10-PCS | Mod: S$PBB,,, | Performed by: NURSE PRACTITIONER

## 2022-10-04 PROCEDURE — 3066F PR DOCUMENTATION OF TREATMENT FOR NEPHROPATHY: ICD-10-PCS | Mod: CPTII,,, | Performed by: NURSE PRACTITIONER

## 2022-10-04 PROCEDURE — 3074F PR MOST RECENT SYSTOLIC BLOOD PRESSURE < 130 MM HG: ICD-10-PCS | Mod: CPTII,,, | Performed by: NURSE PRACTITIONER

## 2022-10-04 PROCEDURE — 1159F MED LIST DOCD IN RCRD: CPT | Mod: CPTII,,, | Performed by: NURSE PRACTITIONER

## 2022-10-04 PROCEDURE — 3008F PR BODY MASS INDEX (BMI) DOCUMENTED: ICD-10-PCS | Mod: CPTII,,, | Performed by: NURSE PRACTITIONER

## 2022-10-04 PROCEDURE — 3074F SYST BP LT 130 MM HG: CPT | Mod: CPTII,,, | Performed by: NURSE PRACTITIONER

## 2022-10-04 PROCEDURE — 1159F PR MEDICATION LIST DOCUMENTED IN MEDICAL RECORD: ICD-10-PCS | Mod: CPTII,,, | Performed by: NURSE PRACTITIONER

## 2022-10-04 PROCEDURE — 1160F RVW MEDS BY RX/DR IN RCRD: CPT | Mod: CPTII,,, | Performed by: NURSE PRACTITIONER

## 2022-10-04 PROCEDURE — 3060F PR POS MICROALBUMINURIA RESULT DOCUMENTED/REVIEW: ICD-10-PCS | Mod: CPTII,,, | Performed by: NURSE PRACTITIONER

## 2022-10-04 PROCEDURE — 99215 OFFICE O/P EST HI 40 MIN: CPT | Mod: PBBFAC | Performed by: NURSE PRACTITIONER

## 2022-10-04 PROCEDURE — 3008F BODY MASS INDEX DOCD: CPT | Mod: CPTII,,, | Performed by: NURSE PRACTITIONER

## 2022-10-04 PROCEDURE — 3060F POS MICROALBUMINURIA REV: CPT | Mod: CPTII,,, | Performed by: NURSE PRACTITIONER

## 2022-10-04 PROCEDURE — 4010F ACE/ARB THERAPY RXD/TAKEN: CPT | Mod: CPTII,,, | Performed by: NURSE PRACTITIONER

## 2022-10-04 PROCEDURE — 3078F DIAST BP <80 MM HG: CPT | Mod: CPTII,,, | Performed by: NURSE PRACTITIONER

## 2022-10-04 PROCEDURE — 3066F NEPHROPATHY DOC TX: CPT | Mod: CPTII,,, | Performed by: NURSE PRACTITIONER

## 2022-10-04 PROCEDURE — 3078F PR MOST RECENT DIASTOLIC BLOOD PRESSURE < 80 MM HG: ICD-10-PCS | Mod: CPTII,,, | Performed by: NURSE PRACTITIONER

## 2022-10-04 PROCEDURE — 99214 OFFICE O/P EST MOD 30 MIN: CPT | Mod: S$PBB,,, | Performed by: NURSE PRACTITIONER

## 2022-10-04 PROCEDURE — 4010F PR ACE/ARB THEARPY RXD/TAKEN: ICD-10-PCS | Mod: CPTII,,, | Performed by: NURSE PRACTITIONER

## 2022-10-04 NOTE — PROGRESS NOTES
CHIEF COMPLAINT:   Chief Complaint   Patient presents with    F/U with dain, KWADWO, labs.     Pt was tx CHF Sept11.  She reports improvement in edema/SOB.                     Review of patient's allergies indicates:  No Known Allergies                                       HPI:  Linn Mckeon 56 y.o. female with a past medical history of Cor Pulmonale, Diabetes, DAVION on CPAP, HTN, HLD, Depression, COPD on home O2, and Tobacco Use presents for follow up and results of Lexiscan Stress Test and KWADWO testing.  Patient completed Lexiscan stress test on September 7, 2022 which was normal.  See report below.  She also completed KWADWO testing on September 13, 2022 which revealed mild arterial flow reduction in the bilateral lower extremities.  See report below.  She completed an Echocardiogram on July 26, 2022 which revealed a reduced ejection fraction of 40% with Grade 1 diastolic dysfunction.  See report below.    She previously completed an Echocardiogram on May 10, 2021 which revealed mild concentric LVH, EF of approximately 50 to 55%, mild MR, and mild aortic valve sclerosis without stenosis.     Patient had a recent hospital admission on September 11, 2022 1st COPD exacerbation and acute CHF exacerbation.  She was treated with IV Lasix, antibiotics, steroids, and neb treatments.  Patient reports significant improvement in her shortness of breath and peripheral edema following her discharge from the hospital.  She denies chest pain, palpitations, or orthopnea.  She reports nightly compliance with her CPAP and feels she is benefitting from use.  Of note, patient states she is currently only smoking 1-2 cigarettes per day and was recently referred to the smoking cessation program with plans to quit altogether.  She continues with home O2 for the COPD.            KWADWO Testing 9.13.22:  The right lower extremity demonstrated multiphasic waveforms at all levels.   The KWADWO on the right was mildly abnormal.  The right lower  extremity demonstrated mild arterial flow reduction.  The left lower extremity demonstrated multiphasic waveforms at all levels.   The KWADWO on the left was mildly abnormal.  The left lower extremity demonstrated mild arterial flow reduction.                                                                                                                                                                                   Lexiscan Stress Test 9.7.22:  Normal myocardial perfusion scan. There is no evidence of myocardial ischemia or infarction.  The gated perfusion images showed an ejection fraction of 55% at rest. The gated perfusion images showed an ejection fraction of 51% post stress.  The EKG portion of this study is abnormal but not diagnostic.  The patient reported no chest pain during the stress test.  There were no arrhythmias during stress.                                                                                                                                                                                                                                                                                                    CARDIAC TESTING:  Results for orders placed during the hospital encounter of 07/26/22    Echo    Interpretation Summary  · Concentric hypertrophy and mildly decreased systolic function.  · The estimated ejection fraction is 40%.  · Grade I left ventricular diastolic dysfunction.  · Severe right ventricular enlargement.  · Mild left atrial enlargement.  · Moderate right atrial enlargement.  · Mild mitral regurgitation.  · Mild tricuspid regurgitation.  · Elevated central venous pressure (15 mmHg).  · The estimated PA systolic pressure is 56 mmHg.  · There is pulmonary hypertension.  · IVC is dilated and collapses<50% with inspiration.  · Mild to moderate pulmonic regurgitation.           Patient Active Problem List   Diagnosis    GOODMAN (dyspnea on exertion)    Chronic heart failure with preserved  ejection fraction    Primary hypertension    Hyperlipidemia LDL goal <70    Type 2 diabetes mellitus without complication, with long-term current use of insulin    Peripheral edema    DAVION on CPAP    Morbid obesity with body mass index (BMI) greater than or equal to 50    Noncompliance with treatment plan    Xerosis of skin    Callus of foot    Dystrophic nail    Non compliance w medication regimen    Heart failure with reduced ejection fraction    Tobacco use    COPD exacerbation     Past Surgical History:   Procedure Laterality Date    HYSTERECTOMY       Social History     Socioeconomic History    Marital status:    Tobacco Use    Smoking status: Some Days     Packs/day: 0.15     Types: Cigarettes    Smokeless tobacco: Never   Substance and Sexual Activity    Alcohol use: Not Currently    Drug use: Never    Sexual activity: Not Currently     Social Determinants of Health     Financial Resource Strain: Low Risk     Difficulty of Paying Living Expenses: Not very hard   Food Insecurity: No Food Insecurity    Worried About Running Out of Food in the Last Year: Never true    Ran Out of Food in the Last Year: Never true   Transportation Needs: No Transportation Needs    Lack of Transportation (Medical): No    Lack of Transportation (Non-Medical): No   Physical Activity: Inactive    Days of Exercise per Week: 0 days    Minutes of Exercise per Session: 0 min   Stress: No Stress Concern Present    Feeling of Stress : Not at all   Social Connections: Socially Isolated    Frequency of Communication with Friends and Family: Never    Frequency of Social Gatherings with Friends and Family: More than three times a week    Attends [employer] Services: Never    Active Member of Clubs or Organizations: No    Attends Club or Organization Meetings: Never    Marital Status:    Housing Stability: Low Risk     Unable to Pay for Housing in the Last Year: No    Number of Places Lived in the Last Year: 1    Unstable Housing  in the Last Year: No        Family History   Problem Relation Age of Onset    Hypertension Mother     Heart disease Mother     Diabetes Mother     Heart attack Father     Diabetes Sister     Hypertension Sister     Diabetes Brother     Hypertension Brother          Current Outpatient Medications:     atorvastatin (LIPITOR) 80 MG tablet, Take 1 tablet (80 mg total) by mouth once daily., Disp: 90 tablet, Rfl: 3    carvediloL (COREG) 25 MG tablet, Take 1 tablet (25 mg total) by mouth 2 (two) times daily., Disp: 180 tablet, Rfl: 3    empagliflozin (JARDIANCE) 10 mg tablet, Take 1 tablet (10 mg total) by mouth once daily., Disp: 30 tablet, Rfl: 6    ezetimibe (ZETIA) 10 mg tablet, Take 1 tablet (10 mg total) by mouth once daily., Disp: 90 tablet, Rfl: 3    fluticasone-umeclidin-vilanter (TRELEGY ELLIPTA) 100-62.5-25 mcg DsDv, Inhale 1 puff into the lungs once daily., Disp: 28 each, Rfl: 3    furosemide (LASIX) 40 MG tablet, Take 1 tablet (40 mg total) by mouth 2 (two) times a day., Disp: 180 tablet, Rfl: 3    insulin glargine (LANTUS U-100 INSULIN) 100 unit/mL injection, Inject 30 Units into the skin 2 (two) times daily., Disp: 30 mL, Rfl: 11    ipratropium-albuteroL (COMBIVENT RESPIMAT)  mcg/actuation inhaler, Inhale 2 puffs into the lungs every 6 (six) hours as needed for Wheezing. Rescue, Disp: 4 g, Rfl: 1    latanoprost 0.005 % ophthalmic solution, Apply 1 drop to eye., Disp: , Rfl:     metFORMIN (GLUCOPHAGE) 1000 MG tablet, Take 1 tablet (1,000 mg total) by mouth 2 (two) times daily with meals., Disp: 60 tablet, Rfl: 11    sacubitriL-valsartan (ENTRESTO) 24-26 mg per tablet, Take 1 tablet by mouth 2 (two) times daily., Disp: 60 tablet, Rfl: 11    SITagliptin (JANUVIA) 100 MG Tab, Take 1 tablet (100 mg total) by mouth once daily., Disp: 30 tablet, Rfl: 11    spironolactone (ALDACTONE) 25 MG tablet, Take 1 tablet (25 mg total) by mouth once daily., Disp: 90 tablet, Rfl: 3    blood sugar diagnostic Strp, To check  "BG 2 times daily, to use with insurance preferred meter, Disp: 200 strip, Rfl: 0    blood-glucose meter kit, To check BG 2 times daily, to use with insurance preferred meter, Disp: 1 each, Rfl: 0    lancets Misc, To check BG 2 times daily, to use with insurance preferred meter, Disp: 200 each, Rfl: 0    nystatin (MYCOSTATIN) ointment, Apply topically 2 (two) times daily. (Patient not taking: Reported on 10/4/2022), Disp: 15 g, Rfl: 0    pen needle, diabetic 31 gauge x 5/16" Ndle, 1 each by Misc.(Non-Drug; Combo Route) route 2 (two) times daily with meals., Disp: 100 each, Rfl: 11     ROS:                                                                                                                                                                             Review of Systems   Constitutional: Negative.    Respiratory:  Positive for shortness of breath.    Cardiovascular:  Positive for leg swelling.   Gastrointestinal: Negative.    Musculoskeletal:         BLE pain   Skin: Negative.    Neurological: Negative.    Psychiatric/Behavioral: Negative.        Blood pressure 128/66, pulse 61, temperature 98.1 °F (36.7 °C), resp. rate 20, height 5' 3.78" (1.62 m), weight 129 kg (284 lb 6.3 oz), SpO2 100 %.   PE:  Physical Exam  Constitutional:       Appearance: Normal appearance.   HENT:      Head: Normocephalic and atraumatic.   Eyes:      Extraocular Movements: Extraocular movements intact.      Pupils: Pupils are equal, round, and reactive to light.   Cardiovascular:      Rate and Rhythm: Normal rate and regular rhythm.   Pulmonary:      Effort: Pulmonary effort is normal.   Abdominal:      Palpations: Abdomen is soft.      Comments: Obese abdomen   Musculoskeletal:      Cervical back: Normal range of motion.      Right lower leg: Edema present.      Left lower leg: Edema present.      Comments: 2+ pitting edema BLE   Skin:     General: Skin is warm and dry.   Neurological:      General: No focal deficit present.      " Mental Status: She is alert and oriented to person, place, and time.   Psychiatric:         Mood and Affect: Mood normal.         Behavior: Behavior normal.     ASSESSMENT/PLAN:  HFrEF - EF 40% per Echo 7.26.22 - NYHA Class II  EF 50-55% per Echo May 10, 2021  Lexiscan Stress Test 9.7.22:  Normal myocardial perfusion  Continue GDMT with Spironolactone, Lasix, Coreg, Entresto  Counseled on the importance of a low salt diet - max 2g daily  Will repeat Echocardiogram in 2 months     HTN  BP at goal - 128/66  Continue Spironolactone, Lasix, Entresto, and Coreg  Counseled on low salt diet and exercise as tolerated    HLD  LDL not at goal - 95  Continue Atorvastatin 80mg daily and Zetia 10mg daily  Counseled on medication compliance  Counseled on low cholesterol diet     DM  Uncontrolled - last A1C - 11.7  Counseled on ADA diet and exercise as tolerated    Tobacco Abuse  Counseled on the importance of smoking cessation  She continues to smoke 1-2 cigarettes per day -  recently referred to the smoking cessation program     DAVION on CPAP  She reports nightly CPAP compliance and feels she is benefitting from use  Recommended continued nightly CPAP use    BLE pain - improved  KWADWO testing 9.13.22: mild arterial flow reduction in the bilateral lower extremities.     Echocardiogram in 2 months   FLP in 2 months   Follow-up in cardiology clinic in 3 months  Follow-up with PCP as directed

## 2022-10-04 NOTE — PATIENT INSTRUCTIONS
Echocardiogram in 2 months   FLP in 2 months   Follow-up in cardiology clinic in 3 months  Follow-up with PCP as directed

## 2022-10-06 ENCOUNTER — HOSPITAL ENCOUNTER (OUTPATIENT)
Dept: WOUND CARE | Facility: HOSPITAL | Age: 57
Discharge: HOME | End: 2022-10-06
Attending: NURSE PRACTITIONER
Payer: MEDICAID

## 2022-10-06 VITALS — SYSTOLIC BLOOD PRESSURE: 136 MMHG | DIASTOLIC BLOOD PRESSURE: 82 MMHG | HEART RATE: 73 BPM

## 2022-10-06 DIAGNOSIS — E66.01 MORBID OBESITY WITH BODY MASS INDEX (BMI) GREATER THAN OR EQUAL TO 50: ICD-10-CM

## 2022-10-06 DIAGNOSIS — L60.3 DYSTROPHIC NAIL: ICD-10-CM

## 2022-10-06 DIAGNOSIS — L85.3 XEROSIS OF SKIN: ICD-10-CM

## 2022-10-06 DIAGNOSIS — L60.2 OVERGROWN NAIL: ICD-10-CM

## 2022-10-06 DIAGNOSIS — L84 CALLUS OF FOOT: Primary | ICD-10-CM

## 2022-10-06 DIAGNOSIS — Z91.199 NONCOMPLIANCE WITH TREATMENT PLAN: ICD-10-CM

## 2022-10-06 DIAGNOSIS — Z91.148 NON COMPLIANCE W MEDICATION REGIMEN: ICD-10-CM

## 2022-10-06 PROCEDURE — 11721 DEBRIDE NAIL 6 OR MORE: CPT | Mod: 59,,, | Performed by: NURSE PRACTITIONER

## 2022-10-06 PROCEDURE — 11057 PR TRIM BENIGN HYPERKERATOTIC SKIN LESION,>4: ICD-10-PCS | Mod: ,,, | Performed by: NURSE PRACTITIONER

## 2022-10-06 PROCEDURE — 11721 PR DEBRIDEMENT OF NAILS, 6 OR MORE: ICD-10-PCS | Mod: 59,,, | Performed by: NURSE PRACTITIONER

## 2022-10-06 PROCEDURE — 11721 DEBRIDE NAIL 6 OR MORE: CPT

## 2022-10-06 PROCEDURE — 11057 PARNG/CUTG B9 HYPRKR LES >4: CPT | Mod: ,,, | Performed by: NURSE PRACTITIONER

## 2022-10-06 PROCEDURE — 11057 PARNG/CUTG B9 HYPRKR LES >4: CPT

## 2022-10-06 PROCEDURE — 99499 UNLISTED E&M SERVICE: CPT | Mod: ,,, | Performed by: NURSE PRACTITIONER

## 2022-10-06 PROCEDURE — 99499 NO LOS: ICD-10-PCS | Mod: ,,, | Performed by: NURSE PRACTITIONER

## 2022-10-06 NOTE — PROGRESS NOTES
Chief Complaint:  Diabetic foot/callus/nail care       History of Present Illness:  55 yo Black female being seen today for diabetic foot, toenail, and callus/corn care.  Prescribed Lac-Hydrin for dry skin over feet; however, not using.  Hx includes treatment plan and medication non-compliance, COPD, HFpEF (40%), chronic hypercapnic respiratory failure, uncontrolled Type 2 diabetes, HTN, HLD, DAVION on CPAP, morbid obesity (BMI 49.15), and smoking (25 pk/year history).   Does not desire smoking cessation. Smoking, on average, 1/2 ppd.   Followed by [facility] for PCP.  Followed by [facility] endocrine clinic.  Followed by [facility] cardiology.      Hospitalized 2022 - 2022 for acute CHF and COPD exacerbation.  Now oxygen-dependent most of the time.  Has been referred to pulmonology for Trilogy evaluation.      Review of Most Recent Labs:  2022:  CR 0.97.  eGFR >60.  Vit D 10.9.    2022:  eGFR >60, with CR 0.8.  Chol 149, HDL 38, LDL 95, Trig 80.  HgbA1C 10.0.    3/21/2022: HgbA1C POC 10.3.   2021: eGFR 78, with CR 0.95. Albumin 3.3. CBC unremarkable.  10/7/2021: eGFR 78, with CR 0.95. Albumin 3.3. HgbA1C 8.3. . H&H 15.0 & 46.5. Plts 265. MCV and MCH nl.   8/10/2021: eGFR 85, with CR 0.88. HgbA1C 8.8.   2021: HgbA1C 9.6. (down from 11.6 on 12/3/2020). LDL 98.    Imagin2022 arterial NIVA:    The right lower extremity demonstrated multiphasic waveforms at all levels.  The KWADWO on the right was mildly abnormal. The right lower extremity demonstrated mild arterial flow reduction.     The left lower extremity demonstrated multiphasic waveforms at all levels.   The KWADWO on the left was mildly abnormal.  The left lower extremity demonstrated mild arterial flow reduction.      Today 10/6/2022:  Denies numbness, tingling, and pain in feet/toes.  Denies pain in thighs/legs with walking or laying down.  No pain in feet from severe calluses.  Not aware that she had deep cracks in right heel.  Sister  sees her every day and is willing to remind Ms. Mckeon to apply Lac-Hydrin and Vaseline to feet.      7/7/2022:  Says she is wearing her CPAP at night, as prescribed.  Still not wanting to stop smoking.  Did not take morning medications before coming to visit.  Wearing sandals again today.  Says she remembers she has Lac-Hydrin cream ordered for feet, however, forgets to use it.  No pain at numerous calluses over bottoms of both feet.  Denies numbness, tingling, and pain in feet/toes.   No new rashes, lesions, or skin breakdown.     4/5/2022:  Denies numbness, tingling, and pain in feet/toes. Says multiple recurrent calluses over bilateral feet and ends of big toes are not bothering her. Did not take her morning medications this morning before coming to clinic. Did not check CBG last night or this morning. Does not want to stop smoking. Still wearing sandals. States she has Lac-Hydrin in home.    1/31/2022:  Denies numbness, tingling, and pain in feet/toes. Continues wearing open toed sandals. Calluses not bothering her too much on bottoms of feet. She is not sure why she continues to get recurrent calluses to her big toes. No new skin breakdown, rashes, or other skin issues.    12/17/2021:  Does not desire smoking cessation. Not able to feel any pain over bottoms of her feet from significant calluses. Continues wearing sandals due to comfort. No rashes, skin lesions, or skin breakdown. Prescribed oral antibiotics Monday of last week for mild case of pneumonia; still finishing those out. Feeling better.     10/29/2021:  Callus on bottom of left foot is hurting again with walking. Still wearing sandals. Denies numbness, tingling, and pain in feet/toes. Denies rashes, lesions, or skin breakdown.     9/8/2021:  Calluses over feet and big toes have recurred; have been having increased tenderness in those places with walking. No rashes, lesions, or skin breakdown over feet. Continues wearing sandals much of the day.  Denies wearing shoes that are too tight against big toes.     7/28/2021:  Other than calluses on both feet, big toes, and long toenails denies any new rashes, lesions, or skin breakdown today. States calluses are tender for a couple of days after trimming, not having any pain with walking with significant calluses. Wears slippers at all times when she is at home.       Review of Systems:   Except as stated in HPI, all other 10 body systems normal      Physical Exam Vitals & Measurements:    Vitals:    10/06/22 1020   BP: 136/82   Pulse: 73       General: Hypertensive with diabetes; asymptomatic with; afebrile. Morbidly obese; in no acute distress.  Ms. Mckeon presents with wearing slip on sandals today.  Sister with her.  Respiratory:  Dyspneac on minimal exertion; no coughing.  Wearing oxygen via nasal cannula.   Cardiovascular:   [place] brawny edema to BLE.  Generalized non-pitting edema around bilateral ankles.  DP pulses palpated.  PT pulses dopplered.  No hemosiderin staining or varicosities. No hair distribution over BLE.  Skin is shiny and taut over anterior tibial areas. Feet and toes warm to touch. No temperature differences between BLE.  Toenails with mycotic, thickened, and severely dystrophic changes; all nails overgrown.    Musculoskeletal:  Significantly decreased range of motion, and strength, of all extremities.   Loss of anatomical sites from knees distally secondary to body habitus.   Pes planus bilaterally.  No bunions or hammertoes.   Neurologic: A&O X 3; cranial nerves intact.   No LOS sensation on monofilament testing bilaterally.   Psychiatric: Calm, cooperative. Mood and affect normal. Responses appropriate  Integumentary:      Very thick recurrent calluses over bilateral distal halluxes:  One very deep callus/corn on left plantar foot over 3rd-4th met head.  One small callus to left heel.    One very deep callus/corn to right plantar forefoot.  One smaller callus/corn to right medial  instep.  One smaller callus/corn to right lateral midfoot.  One smaller callus/corn to right lateral hindfoot.    Severe generalized xerosis over bilateral plantar feet, extending up around bilateral heels.  Cracks and crevices noted over right medial heel area.    All ten toenails with severe mycotic, dystrophic, and thickened changes.  All nails overgrown.                        Assessment/Plan:    Morbid obesity, BMI 49.15:  This is a co-factor to extensive, deep, and multiple calluses/corns over bilateral feet/toes. Ms. Mckeon is at extreme risk of non-healing diabetic foot wounds, given her diabetes, treatment plan non-compliance, medication non-compliance, and ongoing smoking.      Non-Compliance with Treatment Plan and Medications:  Last HgbA1C 10.0 on 5/5/2022.   Not checking CBGs.  Does not want to stop smoking.  Xerosis of bilateral feet does not reflect adherence with Lac-Hydrin and Vaseline tx.  Wearing sandals to clinic today, despite reminders at each visit on importance of wearing protective shoes with diabetes.      Type 2 diabetes:  Poorly-controlled, per last HgbA1C.  5/5/2022:  eGFR >60, with CR 0.8.  Chol 149, HDL 38, LDL 95, Trig 80.  HgbA1C 10.0.    Being followed by [facility] endocrine.   Teaching reinforced on diabetic foot care principles, and when to notify medical provider of toenail, foot, or nail changes.  Handouts have been given during prior visits on diabetic foot care.     Calluses and Corns:  Teaching reinforced on underlying causes, s/s of complications, and need for daily inspections of feet, with prompt notification to wound clinic M-F, or [facility] over [place] weekends.   Procedure Today: Paring and sanding down of eight extensive calluses  Rationale: untreated calluses can lead to diabetic foot wounds, osteomyeliltis, and lower limb amputations.  Verbal informed consent obtained.     Using #3 dermal curettes, Dremmel, and Lawley boards, I pared and sanded down total of eight (8) calluses and  removed underlying corns on bilateral plantar surfaces of feet.  Mild bleeding from left plantar callus.     Open wound right plantar foot, skin:  Wound Care Today/New Wound Care Treatment Plan:  Clean with Vashe and pat dry, followed by Silver Alginate Advantage, followed by secondary dressing.   To be changed QOD.  RTC Tuesday of next visit for re-evaluation of wound.      Overgrown Dystrophic Toenails:  Procedure Today:  cutting and filing of ten toenails.  Rationale:  Overgrown toenails, as patient presented today, can lead to diabetic foot/toe ulcers, osteomyelitis, and lower limb amputations.   Informed consent obtained.  Using standard podiatry clippers, Dremmel, and Vital Access boards, I cut and sanded down ten toenails.  No bleeding or discomfort during procedure.  Patient tolerated procedure without complications.      Xerosis of bilateral heels:  Presentation of feet today, once again, indicates she is not using Lac-Hydrin and Vaseline, as prescribed. Xerosis is severe over bilateral plantar feet and heels.  Prescribed Lac-Hydrin followed by Vaseline BID.  Instructions reinforced on use, rationale, how to use, possible s/e, and expected outcomes of medication.  Demonstrated deep crevices and cracks in right medial heel, with teaching on risk of skin infection, osteomyelitis, and non-healing diabetic foot ulcers from untreated dry/cracking skin.     Smoking:  Does not desire cessation.  Smoking, on average, of 1/2 ppd.            RTC in one week for re-evaluation of wound.  Then, will plan to see her in three months, and will block 60 minutes off for her visit.  Extensive calluses and nail care require more time than usual visit.

## 2022-10-11 ENCOUNTER — HOSPITAL ENCOUNTER (OUTPATIENT)
Dept: WOUND CARE | Facility: HOSPITAL | Age: 57
Discharge: HOME | End: 2022-10-11
Attending: NURSE PRACTITIONER
Payer: MEDICAID

## 2022-10-11 VITALS — HEART RATE: 97 BPM | SYSTOLIC BLOOD PRESSURE: 141 MMHG | RESPIRATION RATE: 20 BRPM | DIASTOLIC BLOOD PRESSURE: 83 MMHG

## 2022-10-11 DIAGNOSIS — Z91.199 NONCOMPLIANCE WITH TREATMENT PLAN: ICD-10-CM

## 2022-10-11 DIAGNOSIS — S91.302A OPEN WOUND OF PLANTAR ASPECT OF LEFT FOOT: Primary | ICD-10-CM

## 2022-10-11 DIAGNOSIS — L85.3 XEROSIS OF SKIN: ICD-10-CM

## 2022-10-11 DIAGNOSIS — Z91.148 NON COMPLIANCE W MEDICATION REGIMEN: ICD-10-CM

## 2022-10-11 PROCEDURE — 99212 PR OFFICE/OUTPT VISIT, EST, LEVL II, 10-19 MIN: ICD-10-PCS | Mod: ,,, | Performed by: NURSE PRACTITIONER

## 2022-10-11 PROCEDURE — 99212 OFFICE O/P EST SF 10 MIN: CPT | Mod: ,,, | Performed by: NURSE PRACTITIONER

## 2022-10-11 NOTE — PROGRESS NOTES
TODAY'S VISIT NOTE WAS IMPORTED FROM LAST WOUND CLINIC VISIT OF 10/6/2022.  I ATTEST THAT I REVIEWED THE HPI, ROS, LABS, WOUND-RELATED IMAGING, PHYSICAL EXAMINATION, EVALUATION AND PLAN SECTIONS OF IMPORTED NOTE AND REVISED TODAY'S VISIT NOTE TO REFLECT TODAY'S NEW ASSESSMENT FINDINGS AND TODAY'S UPDATES TO WOUND TREATMENT PLAN.          Chief Complaint:  Follow-up of wound to bottom of left foot      History of Present Illness:  55 yo Black female being seen today for follow-up of a new wound to bottom of left foot, after callus was pared in wound clinic last week.  Wound has healed.  Wearing tennis shoes after last week's visit, as encouraged.  Hx includes treatment plan and medication non-compliance, COPD, HFpEF (40%), chronic hypercapnic respiratory failure with oxygen dependency, uncontrolled Type 2 diabetes, HTN, HLD, DAVION on CPAP, morbid obesity (BMI 49.15), and smoking (25 pk/year history).   Does not desire smoking cessation. Smoking, on average, 1/2 ppd.   Followed by [facility] FNP for PCP.  Followed by [facility] endocrine clinic.  Followed by [facility] cardiology.      Hospitalized 2022 - 2022 for acute CHF and COPD exacerbation.  Now oxygen-dependent most of the time.  Has been referred to pulmonology for Trilogy evaluation.      Review of Most Recent Labs:  2022:  CR 0.97.  eGFR >60.  Vit D 10.9.    2022:  eGFR >60, with CR 0.8.  Chol 149, HDL 38, LDL 95, Trig 80.  HgbA1C 10.0.    3/21/2022: HgbA1C POC 10.3.   2021: eGFR 78, with CR 0.95. Albumin 3.3. CBC unremarkable.  10/7/2021: eGFR 78, with CR 0.95. Albumin 3.3. HgbA1C 8.3. . H&H 15.0 & 46.5. Plts 265. MCV and MCH nl.   8/10/2021: eGFR 85, with CR 0.88. HgbA1C 8.8.   2021: HgbA1C 9.6. (down from 11.6 on 12/3/2020). LDL 98.    Imagin2022 arterial NIVA:    The right lower extremity demonstrated multiphasic waveforms at all levels.  The KWADWO on the right was mildly abnormal. The right lower extremity demonstrated mild  arterial flow reduction.     The left lower extremity demonstrated multiphasic waveforms at all levels.   The KWADWO on the left was mildly abnormal.  The left lower extremity demonstrated mild arterial flow reduction.      Today 10/11/2022:  Sister encouraging her to wear tennis shoes since last week, which she wore to today's visit.  Calluses are not bothering her on feet.  Sister relays she will check Ms. Mckeon' calluses on a regular basis, as well as have other family members check calluses/feet/toes.  No reported complications with wound care or current treatment plan.  Denies fevers, chills, wound odor, wound redness, wound pain, or yellow/green drainage.  No new rashes, lesions, or skin breakdown.  Picked up Lac-Hydrin yesterday and sister is applying that, with Vaseline.      10/6/2022:  Denies numbness, tingling, and pain in feet/toes.  Denies pain in thighs/legs with walking or laying down.  No pain in feet from severe calluses.  Not aware that she had deep cracks in right heel.  Sister sees her every day and is willing to remind Ms. Mckeon to apply Lac-Hydrin and Vaseline to feet.      7/7/2022:  Says she is wearing her CPAP at night, as prescribed.  Still not wanting to stop smoking.  Did not take morning medications before coming to visit.  Wearing sandals again today.  Says she remembers she has Lac-Hydrin cream ordered for feet, however, forgets to use it.  No pain at numerous calluses over bottoms of both feet.  Denies numbness, tingling, and pain in feet/toes.   No new rashes, lesions, or skin breakdown.     4/5/2022:  Denies numbness, tingling, and pain in feet/toes. Says multiple recurrent calluses over bilateral feet and ends of big toes are not bothering her. Did not take her morning medications this morning before coming to clinic. Did not check CBG last night or this morning. Does not want to stop smoking. Still wearing sandals. States she has Lac-Hydrin in home.    1/31/2022:  Denies  numbness, tingling, and pain in feet/toes. Continues wearing open toed sandals. Calluses not bothering her too much on bottoms of feet. She is not sure why she continues to get recurrent calluses to her big toes. No new skin breakdown, rashes, or other skin issues.    12/17/2021:  Does not desire smoking cessation. Not able to feel any pain over bottoms of her feet from significant calluses. Continues wearing sandals due to comfort. No rashes, skin lesions, or skin breakdown. Prescribed oral antibiotics Monday of last week for mild case of pneumonia; still finishing those out. Feeling better.     10/29/2021:  Callus on bottom of left foot is hurting again with walking. Still wearing sandals. Denies numbness, tingling, and pain in feet/toes. Denies rashes, lesions, or skin breakdown.     9/8/2021:  Calluses over feet and big toes have recurred; have been having increased tenderness in those places with walking. No rashes, lesions, or skin breakdown over feet. Continues wearing sandals much of the day. Denies wearing shoes that are too tight against big toes.     7/28/2021:  Other than calluses on both feet, big toes, and long toenails denies any new rashes, lesions, or skin breakdown today. States calluses are tender for a couple of days after trimming, not having any pain with walking with significant calluses. Wears slippers at all times when she is at home.       Review of Systems:   Except as stated in HPI, all other 10 body systems normal      Physical Exam Vitals & Measurements:    Vitals:    10/11/22 1328   BP: (!) 141/83   Pulse: 97   Resp: 20       General: Hypertensive with diabetes; asymptomatic with; afebrile. Morbidly obese; in no acute distress.  Wearing tennis shoes today.  Sister with her.  Respiratory:   Breathing improved today from last visit.  Dyspneac on moderate exertion; no coughing.  Not wearing oxygen today.   Cardiovascular:   [place] brawny edema to BLE.  Generalized non-pitting edema around  bilateral ankles.  No hemosiderin staining or varicosities. No hair distribution over BLE.  Skin is shiny and taut over anterior tibial areas. Feet and toes warm to touch. No temperature differences between BLE.      Musculoskeletal:  Significantly decreased range of motion, and strength, of all extremities.   Loss of anatomical sites from knees distally secondary to body habitus.   Pes planus bilaterally.  No bunions or hammertoes.   Neurologic: A&O X 3; cranial nerves intact.     Psychiatric: Calm, cooperative. Mood and affect normal. Responses appropriate  Integumentary:     Mild improvement in generalized xerosis over bilateral plantar feet, extending up around bilateral heels.  Cracks and crevices improved over right medial heel area.    Wound to left plantar foot:  100% epithelialized.  No erythema, purulent drainage, periwound edema, odors, induration, bogginess, or fluctuance.                               Assessment/Plan:    Morbid obesity, BMI 49.15:  This is a co-factor to extensive, deep, and multiple calluses/corns over bilateral feet/toes. Ms. Mckeon is at extreme risk of non-healing diabetic foot wounds, given her diabetes, treatment plan non-compliance, medication non-compliance, and ongoing smoking.      Non-Compliance with Treatment Plan and Medications:  Mild improvement since last week.  She has begun using Lac-Hydrin and Vaseline to feet.  She is wearing tennis shoes, instead of her CROCs today.    Last HgbA1C 10.0 on 5/5/2022.   Not checking CBGs.  Does not want to stop smoking.    Type 2 diabetes:  Poorly-controlled, per last HgbA1C.  5/5/2022:  eGFR >60, with CR 0.8.  Chol 149, HDL 38, LDL 95, Trig 80.  HgbA1C 10.0.    Being followed by [facility] endocrine.   Teaching reinforced on diabetic foot care principles, and when to notify medical provider of toenail, foot, or nail changes.  Handouts have been given during prior visits on diabetic foot care.     Calluses and Corns:  Stable today.   Reinforced Ms. Mckeon and sister that they need to schedule a sooner visit, if calluses are recurring and getting deep/thick like they were last week.  Reinforced teaching that unrelieved pressure from calluses can lead to diabetic foot ulcers, bone infections, and lower limb amputations.  Instructed not to wait for 3-month f/u visit to address worsening calluses, with rationale.      Open wound left plantar foot, skin:  Wound is healed today.      Xerosis of bilateral heels:  Mild improvement today.  Sister is checking feet and applying Lac-Hydrin and Vaseline since yesterday.    Prescribed Lac-Hydrin followed by Vaseline BID.  Reinforced teaching to not apply lotions/creams between toes, with rationale.      Overgrown Dystrophic Toenails:  Last cut/sanded on 10/6/2022    Smoking:  Does not desire cessation.  Smoking, on average, of 1/2 ppd.            RTC in three months for routine DFC and extensive callus care.  60 minutes blocked off for visit, as calluses are extensive and take quite a bit of time to address.

## 2022-10-12 ENCOUNTER — HOSPITAL ENCOUNTER (OUTPATIENT)
Dept: PULMONOLOGY | Facility: HOSPITAL | Age: 57
Discharge: HOME | End: 2022-10-12
Attending: STUDENT IN AN ORGANIZED HEALTH CARE EDUCATION/TRAINING PROGRAM
Payer: MEDICAID

## 2022-10-12 DIAGNOSIS — G47.33 OSA ON CPAP: ICD-10-CM

## 2022-10-12 DIAGNOSIS — Z72.0 TOBACCO USE: Primary | ICD-10-CM

## 2022-10-12 DIAGNOSIS — J44.1 COPD EXACERBATION: ICD-10-CM

## 2022-10-12 LAB
DLCO SINGLE BREATH LLN: 17.45
DLCO SINGLE BREATH PRE REF: 29.7 %
DLCO SINGLE BREATH REF: 23.18
DLCOC SBVA LLN: 3.2
DLCOC SBVA REF: 4.69
DLCOC SINGLE BREATH LLN: 17.45
DLCOC SINGLE BREATH REF: 23.18
DLCOVA LLN: 3.2
DLCOVA PRE REF: 58.9 %
DLCOVA PRE: 2.76 ML/(MIN*MMHG*L) (ref 3.2–6.19)
DLCOVA REF: 4.69
ERV LLN: -16449.16
ERV PRE REF: 50.8 %
ERV REF: 0.84
FEF 25 75 CHG: 27.6 %
FEF 25 75 LLN: 0.93
FEF 25 75 POST REF: 43.6 %
FEF 25 75 PRE REF: 34.1 %
FEF 25 75 REF: 2.09
FET100 CHG: 13 %
FEV1 CHG: 17.4 %
FEV1 FVC CHG: 3 %
FEV1 FVC LLN: 69
FEV1 FVC POST REF: 92 %
FEV1 FVC PRE REF: 89.3 %
FEV1 FVC REF: 80
FEV1 LLN: 1.62
FEV1 POST REF: 59.1 %
FEV1 PRE REF: 50.4 %
FEV1 REF: 2.21
FRCPLETH LLN: 1.88
FRCPLETH PREREF: 87.1 %
FRCPLETH REF: 2.7
FVC CHG: 14 %
FVC LLN: 2.06
FVC POST REF: 64 %
FVC PRE REF: 56.1 %
FVC REF: 2.77
IVC PRE: 1.69 L (ref 2.06–3.51)
IVC SINGLE BREATH LLN: 2.06
IVC SINGLE BREATH PRE REF: 61.1 %
IVC SINGLE BREATH REF: 2.77
MVV LLN: 81
MVV PRE REF: 37.1 %
MVV REF: 96
PEF CHG: 54.5 %
PEF LLN: 3.79
PEF POST REF: 72.1 %
PEF PRE REF: 46.7 %
PEF REF: 5.8
POST FEF 25 75: 0.91 L/S (ref 0.93–3.73)
POST FET 100: 8.06 SEC
POST FEV1 FVC: 73.59 % (ref 68.58–89.82)
POST FEV1: 1.3 L (ref 1.62–2.77)
POST FVC: 1.77 L (ref 2.06–3.51)
POST PEF: 4.18 L/S (ref 3.79–7.8)
PRE DLCO: 6.89 ML/(MIN*MMHG) (ref 17.45–28.92)
PRE ERV: 0.43 L (ref -16449.16–16450.84)
PRE FEF 25 75: 0.71 L/S (ref 0.93–3.73)
PRE FET 100: 7.14 SEC
PRE FEV1 FVC: 71.47 % (ref 68.58–89.82)
PRE FEV1: 1.11 L (ref 1.62–2.77)
PRE FRC PL: 2.35 L (ref 1.88–3.52)
PRE FVC: 1.55 L (ref 2.06–3.51)
PRE MVV: 35.51 L/MIN (ref 81.47–110.22)
PRE PEF: 2.71 L/S (ref 3.79–7.8)
PRE RV: 1.92 L (ref 1.28–2.43)
PRE TLC: 3.73 L (ref 3.95–5.93)
RAW LLN: 3.06
RAW PRE REF: 104.1 %
RAW PRE: 3.18 CMH2O*S/L (ref 3.06–3.06)
RAW REF: 3.06
RV LLN: 1.28
RV PRE REF: 103.5 %
RV REF: 1.85
RVTLC LLN: 29
RVTLC PRE REF: 134.3 %
RVTLC PRE: 51.5 % (ref 28.75–47.93)
RVTLC REF: 38
TLC LLN: 3.95
TLC PRE REF: 75.5 %
TLC REF: 4.94
VA PRE: 2.5 L (ref 4.79–4.79)
VA SINGLE BREATH LLN: 4.79
VA SINGLE BREATH PRE REF: 52.1 %
VA SINGLE BREATH REF: 4.79
VC LLN: 2.06
VC PRE REF: 65.3 %
VC PRE: 1.81 L (ref 2.06–3.51)
VC REF: 2.77

## 2022-10-12 PROCEDURE — 94060 EVALUATION OF WHEEZING: CPT

## 2022-10-12 PROCEDURE — 94726 PLETHYSMOGRAPHY LUNG VOLUMES: CPT

## 2022-10-12 PROCEDURE — 94729 DIFFUSING CAPACITY: CPT

## 2022-10-12 PROCEDURE — 94640 AIRWAY INHALATION TREATMENT: CPT

## 2022-10-12 RX ORDER — IPRATROPIUM BROMIDE 0.5 MG/2.5ML
0.5 SOLUTION RESPIRATORY (INHALATION) ONCE
Status: COMPLETED | OUTPATIENT
Start: 2022-10-12 | End: 2022-10-12

## 2022-10-12 RX ORDER — ALBUTEROL SULFATE 0.83 MG/ML
2.5 SOLUTION RESPIRATORY (INHALATION) ONCE
Status: COMPLETED | OUTPATIENT
Start: 2022-10-12 | End: 2022-10-12

## 2022-10-12 RX ADMIN — IPRATROPIUM BROMIDE 0.5 MG: 0.5 SOLUTION RESPIRATORY (INHALATION) at 07:10

## 2022-10-12 RX ADMIN — ALBUTEROL SULFATE 2.5 MG: 0.83 SOLUTION RESPIRATORY (INHALATION) at 07:10

## 2022-10-14 ENCOUNTER — CLINICAL SUPPORT (OUTPATIENT)
Dept: DIABETES | Facility: CLINIC | Age: 57
End: 2022-10-14
Payer: MEDICAID

## 2022-10-14 VITALS — BODY MASS INDEX: 48.72 KG/M2 | WEIGHT: 281.88 LBS

## 2022-10-14 DIAGNOSIS — E11.628 TYPE 2 DIABETES MELLITUS WITH OTHER SKIN COMPLICATION, WITHOUT LONG-TERM CURRENT USE OF INSULIN: ICD-10-CM

## 2022-10-14 PROCEDURE — G0108 DIAB MANAGE TRN  PER INDIV: HCPCS | Mod: PBBFAC | Performed by: DIETITIAN, REGISTERED

## 2022-10-14 PROCEDURE — 99212 OFFICE O/P EST SF 10 MIN: CPT | Mod: PBBFAC | Performed by: DIETITIAN, REGISTERED

## 2022-10-14 NOTE — PROGRESS NOTES
Diabetes Care Specialist Progress Note  Author: Lynda Paige RD  Date: 10/14/2022    Program Intake  Reason for Diabetes Program Visit:: Initial Diabetes Assessment  Current diabetes risk level:: high    Lab Results   Component Value Date    HGBA1C 11.7 (H) 09/11/2022       Clinical         Problem Review  Active comorbidities affecting diabetes self-care.: yes  Comorbidities: Cardiovascular Disease    Clinical Assessment  Current Diabetes Treatment: Oral Medication, Insulin  Have you ever experienced hypoglycemia (low blood sugar)?: yes  Are you able to tell when your blood sugar is low?: Yes  What symptoms do you experience?: Shaky, Sweaty, Tiredness, Dizzy/Light-headed, Rapid heartbeat, Irritable, Nausea  How do you treat hypoglycemia (low blood sugar)?: 5-6 pieces of hard candy  Have you ever experienced hyperglycemia (high blood sugar)?: yes  Are you able to tell when your blood sugar is high?: Yes  What are your symptoms?: blurry vision, rapid breathing, thirst    Medication Information  How do you obtain your medications?: Family picks up  How many days a week do you miss your medications?: Never  Do you sometimes have difficulty refilling your medications?: No  Medication adherence impacting ability to self-manage diabetes?: No    Labs  Do you have regular lab work to monitor your medications?: Yes  Lab Compliance Barriers: No    Nutritional Status  Meal Plan 24 Hour Recall: Lunch  Meal Plan 24 Hour Recall - Lunch: beans and rice with meat  Change in appetite?: No  Current nutritional status an area of need that is impacting patient's ability to self-manage diabetes?: No    Additional Social History    Support  Does anyone support you with your diabetes care?: yes  Who supports you?: son/daughter  Who takes you to your medical appointments?: son/daughter  Does the current support meet the patient's needs?: Yes  Is Support an area impacting ability to self-manage diabetes?: No         Cognitive/Behavioral  Health  Alert and Oriented: Yes  Difficulty Thinking: No  Requires Prompting: No  Requires assistance for routine expression?: No  Cognitive or behavioral barriers impacting ability to self-manage diabetes?: No         Communication  Hearing Problems: No  Vision Problems: No  Communication needs impacting ability to self-manage diabetes?: No    Health Literacy  How often do you need to have someone help you read instructions, pamphlets, or written material from your doctor or pharmacy?: Rarely  Health literacy needs impacting ability to self-manage diabetes?: No      Diabetes Self-Management Skills Assessment    Diabetes Disease Process/Treatment Options  Patient/caregiver knows what type of diabetes they have.: yes  Diabetes Type : Type II  Patient/caregiver able to identify at least three signs and symptoms of diabetes.: yes  Identified signs and symptoms:: blurred vision, fatigue, frequent urination, increased thirst  Diabetes Disease Process/Treatment Options: Skills Assessment Completed: Yes  Assessment indicates:: Adequate understanding  Area of need?: No    Nutrition/Healthy Eating  Method of carbohydrate measurement:: no method  Patient can identify foods that impact blood sugar.: no (see comments)  Nutrition/Healthy Eating Skills Assessment Completed:: Yes  Assessment indicates:: Instruction Needed  Area of need?: Yes    Physical Activity/Exercise  Patient formally exercises outside of work.: no  Reasons for not exercising:: physically unable to exercise currently  Physical Activity/Exercise Skills Assessment Completed: : Yes  Assessment indicates:: Adequate understanding  Area of need?: No    Medications  Patient is able to describe current diabetes management routine.: yes  Diabetes management routine:: insulin, oral medications  Patient is able to identify current diabetes medications, dosages, and appropriate timing of medications.: yes (Lantus 25 units BID, Metformin 1000mg BID and Jardiance 10  mg)  Patient understands the purpose of the medications taken for diabetes.: no  Patient reports problems or concerns with current medication regimen.: no  Medication Skills Assessment Completed:: Yes  Assessment indicates:: Instruction Needed  Area of need?: Yes    Home Blood Glucose Monitoring  Patient states that blood sugar is checked at home daily.: yes  Monitoring Method:: home glucometer  How often do you check your blood sugar?: Twice a day  When do you check your blood sugar?: Before breakfast, Before bedtime  When you check what is your typical blood sugar range? :   Blood glucose logs:: no  Blood glucose logs reviewed today?: no  Home Blood Glucose Monitoring Skills Assessment Completed: : Yes  Assessment indicates:: Instruction Needed  Area of need?: Yes    Acute Complications  Patient is able to identify types of acute complications: Yes  Patient Identified:: Hypoglycemia  Patient is able to state the basic meaning of hypoglycemia?: Yes  Patient can identify general symptoms of hypoglycemia: yes  Patient identified:: anxiety, dizziness, fatigue, nausea, rapid heartbeat, shakiness, sweating  Able to state proper treatment of hypoglycemia?: yes  Patient identified:: 5-6 pieces of hard candy  Acute Complications Skills Assessment Completed: : Yes  Assessment indicates:: Adequate understanding  Area of need?: No    Chronic Complications  Chronic Complications Skills Assessment Completed: : No  Deferred due to:: Time    Psychosocial/Coping  Patient can identify ways of coping with chronic disease.: yes  Patient-stated ways of coping with chronic disease:: support from loved ones, spiritual/Islam practices  Psychosocial/Coping Skills Assessment Completed: : Yes  Assessment indicates:: Adequate understanding  Area of need?: No      Diabetes Self Support Plan         Assessment Summary and Plan    Based on today's diabetes care assessment, the following areas of need were identified:      Social  10/14/2022   Support No   Cognitive/Behavioral Health No   Communication No   Health Literacy No        Clinical 10/14/2022   Medication Adherence No   Lab Compliance No   Nutritional Status No        Diabetes Self-Management Skills 10/14/2022   Diabetes Disease Process/Treatment Options No   Nutrition/Healthy Eating Yes - Reviewed the sources and role of Carbohydrate. Reviewed label reading for Total Carbohydrates and how sugars impact total carbohydrates. Reviewed serving sizes of starches, milk, fruit, starchy vegetables and snacks. Reviewed non-starchy vegetable list. Provided sample menus and snack ideas.    Physical Activity/Exercise No   Medication Yes - Provided review of current diabetes medication action, dosage, frequency and side effects. Discussed guidelines for preventing, detecting and treating hypoglycemia and hyperglycemia.    Home Blood Glucose Monitoring Yes - see care plan   Acute Complications No   Psychosocial/Coping No          Today's interventions were provided through individual discussion, instruction, and written materials were provided.  Pt lives with children who provide support. She is here with her daughter who assisted with Harper sensor application and training. Pt stated that she started Jardiance and d/c'd Januvia and Trulicity at last hospitalization and decreased Lantus to 25 units. Did not provide glucose log bust states  and checking BID. Reviewed What is Diabetes, A1C levels and target goals (instructed that she may be at high risk to achieve A1C < =7 and should consult provider for goal).  She will see ENDO in 3 weeks.    Patient verbalized understanding of instruction and written materials.  Pt was able to return back demonstration of instructions today. Patient understood key points, needs reinforcement and further instruction.     Diabetes Self-Management Care Plan:    Today's Diabetes Self-Management Care Plan was developed with Linn's input. Linn has  agreed to work toward the following goal(s) to improve his/her overall diabetes control.      Care Plan: Diabetes Management   Updates made since 9/14/2022 12:00 AM        Problem: Blood Glucose Self-Monitoring         Goal: Patient agrees to scan Harper 1 sensor at leat every 8 hours.    Start Date: 10/14/2022   Priority: High   Barriers: No Barriers Identified   Note:    Patient is here in clinic today for placement of personal continuous glucose monitoring system (CGMS) Freestyle Harper 2.  Patient will use  to obtain continuous glucose readings. An appropriate site was selected and prepared. Patient inserted sensor into arm. Pt verbalizes understanding to change sensor every 14 days. Pt is also aware that each time a new sensor is placed there is a 1 hour warm up period. Instructed to continue using glucometer during times of any potential supply failure/issues, to verify glucose if symptoms do not match reading and during 1 hour warm up time. Reviewed care of sensor and instructed to wear overpatches. Discussed supplies with patient--company/pharmacy to reorder items and who to call (technical support) if a sensor fails. Advised to keep serial # for replacement, if necessary. A detailed explanation of Freestyle Harper 2 Continuous Glucose Monitoring System was provided. Instructed to avoid excess amounts of vitamin C, xrays/CT/MRI and full-body scanner at airport. Reviewed alarms, arrow indications and when to calibrate  Patient was allowed to ask questions. Patient will notify provider if episode of glucose less than 70 mg/dL presents. Reviewed Rule of 15 for hypoglycemia and to report if reoccuring. Patient verbalizes understanding.         High alert set at 180 mg/dL  Low alert set at 70 mg/dL          Follow Up Plan     Follow up in about 2 months (around 12/14/2022) for Harper reader download.    Today's care plan and follow up schedule was discussed with patient.  Linn verbalized understanding of  the care plan, goals, and agrees to follow up plan.        The patient was encouraged to communicate with his/her health care provider/physician and care team regarding his/her condition(s) and treatment.  I provided the patient with my contact information today and encouraged to contact me via phone or [facility] Patient Portal as needed.     Length of Visit   Total Time: 0 Minutes

## 2022-10-26 ENCOUNTER — CLINICAL SUPPORT (OUTPATIENT)
Dept: SMOKING CESSATION | Facility: CLINIC | Age: 57
End: 2022-10-26
Payer: COMMERCIAL

## 2022-10-26 DIAGNOSIS — F17.200 NICOTINE DEPENDENCE: Primary | ICD-10-CM

## 2022-10-26 PROCEDURE — 99404 PREV MED CNSL INDIV APPRX 60: CPT | Mod: S$GLB,,, | Performed by: INTERNAL MEDICINE

## 2022-10-26 PROCEDURE — 99404 PR PREVENT COUNSEL,INDIV,60 MIN: ICD-10-PCS | Mod: S$GLB,,, | Performed by: INTERNAL MEDICINE

## 2022-10-26 RX ORDER — BUPROPION HYDROCHLORIDE 150 MG/1
TABLET, EXTENDED RELEASE ORAL
Qty: 60 TABLET | Refills: 0 | Status: SHIPPED | OUTPATIENT
Start: 2022-10-26 | End: 2022-11-23 | Stop reason: SDUPTHER

## 2022-10-26 NOTE — PROGRESS NOTES
Patient will be participating in biweekly tobacco cessation meetings and will begin the prescribed tobacco cessation medication regimen of 150 mg Wellbutrin SR BID.  Patient denies any low moods or SI/HI. Pt currently smokes 5 cigarettes per day.  Discussed the role of tobacco cessation program, role of Wellbutrin and behavioral changes to assist the patient to reach her goal of being tobacco free.  Pt started on rate reduction and wait time of 15 min prior to smoking.  Pt wears oxygen 24/7.  Pt came into the office with her oxygen tank.

## 2022-10-31 ENCOUNTER — TELEPHONE (OUTPATIENT)
Dept: INTERNAL MEDICINE | Facility: CLINIC | Age: 57
End: 2022-10-31

## 2022-10-31 NOTE — TELEPHONE ENCOUNTER
Pt called stating that her oxygen tank keeps breaking. Pt states that in order to get a new one a new order has to be put in for her and she would like it sent to [facility]

## 2022-11-02 ENCOUNTER — OFFICE VISIT (OUTPATIENT)
Dept: ENDOCRINOLOGY | Facility: CLINIC | Age: 57
End: 2022-11-02
Payer: MEDICAID

## 2022-11-02 ENCOUNTER — CLINICAL SUPPORT (OUTPATIENT)
Dept: ENDOCRINOLOGY | Facility: CLINIC | Age: 57
End: 2022-11-02
Payer: MEDICAID

## 2022-11-02 VITALS
WEIGHT: 275.56 LBS | SYSTOLIC BLOOD PRESSURE: 124 MMHG | HEART RATE: 75 BPM | BODY MASS INDEX: 47.04 KG/M2 | RESPIRATION RATE: 16 BRPM | HEIGHT: 64 IN | DIASTOLIC BLOOD PRESSURE: 84 MMHG | TEMPERATURE: 98 F

## 2022-11-02 DIAGNOSIS — E11.9 DIABETIC EYE EXAM: Primary | ICD-10-CM

## 2022-11-02 DIAGNOSIS — Z01.00 DIABETIC EYE EXAM: Primary | ICD-10-CM

## 2022-11-02 DIAGNOSIS — N28.9 NEPHROPATHY: ICD-10-CM

## 2022-11-02 DIAGNOSIS — Z11.4 SCREENING FOR HIV (HUMAN IMMUNODEFICIENCY VIRUS): ICD-10-CM

## 2022-11-02 DIAGNOSIS — E11.65 UNCONTROLLED TYPE 2 DIABETES MELLITUS WITH HYPERGLYCEMIA: Primary | ICD-10-CM

## 2022-11-02 DIAGNOSIS — E16.2 HYPOGLYCEMIA: ICD-10-CM

## 2022-11-02 DIAGNOSIS — Z11.59 NEED FOR HEPATITIS C SCREENING TEST: ICD-10-CM

## 2022-11-02 DIAGNOSIS — E11.9 TYPE 2 DIABETES MELLITUS WITHOUT COMPLICATIONS: ICD-10-CM

## 2022-11-02 LAB
ANION GAP SERPL CALC-SCNC: 9 MEQ/L
BUN SERPL-MCNC: 28 MG/DL (ref 9.8–20.1)
CALCIUM SERPL-MCNC: 9.9 MG/DL (ref 8.4–10.2)
CHLORIDE SERPL-SCNC: 102 MMOL/L (ref 98–107)
CO2 SERPL-SCNC: 27 MMOL/L (ref 22–29)
CREAT SERPL-MCNC: 1.11 MG/DL (ref 0.55–1.02)
CREAT/UREA NIT SERPL: 25
GFR SERPLBLD CREATININE-BSD FMLA CKD-EPI: 58 MLS/MIN/1.73/M2
GLUCOSE SERPL-MCNC: 153 MG/DL (ref 74–100)
HAV IGM SERPL QL IA: NONREACTIVE
HBV CORE IGM SERPL QL IA: NONREACTIVE
HBV SURFACE AG SERPL QL IA: NONREACTIVE
HCV AB SERPL QL IA: NONREACTIVE
HIV 1+2 AB+HIV1 P24 AG SERPL QL IA: NONREACTIVE
POTASSIUM SERPL-SCNC: 4.5 MMOL/L (ref 3.5–5.1)
SODIUM SERPL-SCNC: 138 MMOL/L (ref 136–145)

## 2022-11-02 PROCEDURE — 3079F PR MOST RECENT DIASTOLIC BLOOD PRESSURE 80-89 MM HG: ICD-10-PCS | Mod: CPTII,,, | Performed by: NURSE PRACTITIONER

## 2022-11-02 PROCEDURE — 1159F PR MEDICATION LIST DOCUMENTED IN MEDICAL RECORD: ICD-10-PCS | Mod: CPTII,,, | Performed by: NURSE PRACTITIONER

## 2022-11-02 PROCEDURE — 3079F DIAST BP 80-89 MM HG: CPT | Mod: CPTII,,, | Performed by: NURSE PRACTITIONER

## 2022-11-02 PROCEDURE — 1159F MED LIST DOCD IN RCRD: CPT | Mod: CPTII,,, | Performed by: NURSE PRACTITIONER

## 2022-11-02 PROCEDURE — 80048 BASIC METABOLIC PNL TOTAL CA: CPT | Performed by: NURSE PRACTITIONER

## 2022-11-02 PROCEDURE — 3060F POS MICROALBUMINURIA REV: CPT | Mod: CPTII,,, | Performed by: NURSE PRACTITIONER

## 2022-11-02 PROCEDURE — 3074F PR MOST RECENT SYSTOLIC BLOOD PRESSURE < 130 MM HG: ICD-10-PCS | Mod: CPTII,,, | Performed by: NURSE PRACTITIONER

## 2022-11-02 PROCEDURE — 36415 COLL VENOUS BLD VENIPUNCTURE: CPT | Performed by: NURSE PRACTITIONER

## 2022-11-02 PROCEDURE — 4010F ACE/ARB THERAPY RXD/TAKEN: CPT | Mod: CPTII,,, | Performed by: NURSE PRACTITIONER

## 2022-11-02 PROCEDURE — 3074F SYST BP LT 130 MM HG: CPT | Mod: CPTII,,, | Performed by: NURSE PRACTITIONER

## 2022-11-02 PROCEDURE — 3008F PR BODY MASS INDEX (BMI) DOCUMENTED: ICD-10-PCS | Mod: CPTII,,, | Performed by: NURSE PRACTITIONER

## 2022-11-02 PROCEDURE — 4010F PR ACE/ARB THEARPY RXD/TAKEN: ICD-10-PCS | Mod: CPTII,,, | Performed by: NURSE PRACTITIONER

## 2022-11-02 PROCEDURE — 99214 OFFICE O/P EST MOD 30 MIN: CPT | Mod: PBBFAC | Performed by: NURSE PRACTITIONER

## 2022-11-02 PROCEDURE — 80074 ACUTE HEPATITIS PANEL: CPT | Performed by: NURSE PRACTITIONER

## 2022-11-02 PROCEDURE — 3060F PR POS MICROALBUMINURIA RESULT DOCUMENTED/REVIEW: ICD-10-PCS | Mod: CPTII,,, | Performed by: NURSE PRACTITIONER

## 2022-11-02 PROCEDURE — 2025F 7 FLD RTA PHOTO W/O RTNOPTHY: CPT | Mod: CPTII,,, | Performed by: NURSE PRACTITIONER

## 2022-11-02 PROCEDURE — 87389 HIV-1 AG W/HIV-1&-2 AB AG IA: CPT | Performed by: NURSE PRACTITIONER

## 2022-11-02 PROCEDURE — 3066F PR DOCUMENTATION OF TREATMENT FOR NEPHROPATHY: ICD-10-PCS | Mod: CPTII,,, | Performed by: NURSE PRACTITIONER

## 2022-11-02 PROCEDURE — 99215 PR OFFICE/OUTPT VISIT, EST, LEVL V, 40-54 MIN: ICD-10-PCS | Mod: S$PBB,,, | Performed by: NURSE PRACTITIONER

## 2022-11-02 PROCEDURE — 99215 OFFICE O/P EST HI 40 MIN: CPT | Mod: S$PBB,,, | Performed by: NURSE PRACTITIONER

## 2022-11-02 PROCEDURE — 2025F PR 7 STANDARD FLD STEREO RETINAL PHOTOS W/O EVID OF RETINOPATHY W/INTERPT BY OPHTH/OPT: ICD-10-PCS | Mod: CPTII,,, | Performed by: NURSE PRACTITIONER

## 2022-11-02 PROCEDURE — 3066F NEPHROPATHY DOC TX: CPT | Mod: CPTII,,, | Performed by: NURSE PRACTITIONER

## 2022-11-02 PROCEDURE — 3008F BODY MASS INDEX DOCD: CPT | Mod: CPTII,,, | Performed by: NURSE PRACTITIONER

## 2022-11-02 RX ORDER — AMMONIUM LACTATE 12 G/100G
LOTION TOPICAL
COMMUNITY
Start: 2022-10-07 | End: 2022-12-13

## 2022-11-02 RX ORDER — PEN NEEDLE, DIABETIC 30 GX3/16"
1 NEEDLE, DISPOSABLE MISCELLANEOUS 2 TIMES DAILY WITH MEALS
Qty: 60 EACH | Refills: 11 | Status: SHIPPED | OUTPATIENT
Start: 2022-11-02 | End: 2024-03-25

## 2022-11-02 RX ORDER — LANCETS
EACH MISCELLANEOUS
Qty: 100 EACH | Refills: 3 | Status: SHIPPED | OUTPATIENT
Start: 2022-11-02

## 2022-11-02 NOTE — PROGRESS NOTES
Patient placed in dark room, instructed to close eyes for 5 minutes, explained exam.  Eye exam performed, patient tolerated well.

## 2022-11-02 NOTE — PROGRESS NOTES
Subjective:       Patient ID: Linn Mckeon is a 56 y.o. female.    Chief Complaint: Diabetes    Previous endocrine clinic notes   08/11/2021 telemedicine visit- endocrine Clinic Note: 55-year-old female scheduled today for endocrine clinic follow-up.  Due to recent surge and Covid and patient's comorbid conditions visit was changed to telemedicine visit.  History of uncontrolled type 2 diabetes, hypertension, hyperlipidemia, obstructive sleep apnea, noncompliance due to history of depression.  Uncontrolled type 2 diabetes current A1c improved to 8.8 from previous 9.6 and 10.4.  A1C improving not at goal.  Patient reports checking CBGs occasionally and states most of her CBGs are in the 100s and 200s and she is no longer seeing 300s she denies any hypoglycemia.  Patient was recently changed to Trulicity and denies any side effects.  Discussed with patient ADA diet and making dietary changes along with increasing Trulicity.  Nephropathy urine micro was elevated 12/3/2020 normal on 8/10/2021 patient is currently on ACE with improved A1c. [1]     11/18/2021 endocrine clinic note : 55-year-old female scheduled today for endocrine clinic follow-up.  History of uncontrolled type 2 diabetes, hypertension, hyperlipidemia, obstructive sleep apnea and depression.  Uncontrolled type 2 diabetes current A1C 8.6 Previous A1c 8.3, 8.8, 9.6 and 10.4.  Patient has been improving over the last year.  Patient checks CBGs occasionally ranges 100s and 200s. Patient states she forgets her morning medications about 2 to 3 days/week. When discussed with patient she does not use a pillbox. Patient was given a pillbox today with a.m. and p.m. slots. Patient will work on compliance with morning medications. Instructed patient this will help improve her A1c. Hypertension mild elevation today. Patient did not take her medication prior to her visit. History of nephropathy Urine Micro Creatine/ratio: 12/3/2020 Elevated normal 08/10/2021.  [1]     3/21/2022 endocrine clinic note: 56-year-old female scheduled today for endocrine clinic follow-up.  History of uncontrolled type 2 diabetes, hypertension, hyperlipidemia, obstructive sleep apnea and depression.  Type 2 diabetes current A1C 10.3 Previous A1c 8.6, 8.3, 8.8, 9.6 and 10.4.  On patient's previous visit she was given medication pillbox to help with compliance due to forgetting medications. pt checks CBG's occasionally not recently last CBG log  check Feb 17th no CBG's in 1 month. Pt states she had pneumonia (chart shows Dec 6th) and had a hard time swallowing and was not taking her medications at that time. pt has not been checking CBG's in over a month. Shes denies hypoglycemic symptoms. Pt attended DM education Dec 2020 until March 2021 with medication changes at that time with improved A1C. Nephropathy Urine Micro Creatine/ratio: 12/03/2020 Elevated, normal 08/10/2021 we will recheck urine micro today. HTN at goal.     Endocrine clinic note 09/21/2022:  56 year female scheduled today for endocrine clinic follow-up.  History of uncontrolled type 2 diabetes, hypertension, hyperlipidemia obstructive sleep apnea and depression.  Patient was recently hospitalized with congestive heart failure and returns to clinic today with medication changes for type 2 diabetes.  Uncontrolled type 2 diabetes recent A1c 11.7 previous 10.0 and 8.3.  Patient has a history of nonadherence to medications today her sisters with her and states that the patient has been taking her medications and now has a pillbox.  Patient restarted her metformin, glipizide, Januvia and was started on Lantus at the hospital patient states she is been taking 30 units twice a day hospital instructions were written for once a day but needles were written for twice a day but patient clarified that she is been taking twice a day.  Patient had not restarted her Trulicity yet but states since she started her regimen back in the last 2 days her  blood sugars have been in the 80s in the morning and she is been feeling weak.  Patient restarted her regimen about a week and a half ago and states in the last 2 days with consistently taking medication she is been having hypoglycemia in the morning.  She states yesterday her sister held her 30 units of Lantus at night due to blood sugars being too low.  Instructed patient she did not hold Lantus but will decrease her glipizide and she is return to clinic in 1 month with CBGS to titrate her medications.  She is currently hold Trulicity and when she returns to clinic in 1 month with glucose numbers will restart Trulicity at 0.75 mg and titrate patient's regimen.     Current endocrine clinic note 11/02/2022:  56 year female scheduled today has follow-up to Endocrine Clinic.  Patient was scheduled to have 1 month follow-up for history of uncontrolled type 2 diabetes.  Regimen was adjusted patient was started on a Harper.  Patient returns to clinic today.  Patient was started on a Harper     Harper interpretation 10/20/2022-11/02/2022.  Average glucose 162 GMI indicater 7.2.  6% very high, 19% high, 75% target range, 0% low, 0% very low patient's scans in average of 2-11 times per day patient occasionally has prandial spikes in the evening indicating a high carb meal which is only occasionally, only 2 hypoglycemic episode is noted after lunch at 69 and prior to lunch at 63.  In the last week patient has had increasing hypoglycemic episodes mid day.    Patient states she does not eat breakfast at times will decrease morning insulin patient is to call clinic if any further hypoglycemia occurs.  On patient's Dexcom report patient is 75% at target range and predicted A1c is 7.2.  Nephropathy urine micro elevated 05/06/2022 patient is on On Entresto which contains ACE.  Patient was offered flu vaccine today patient does not take flu vaccine and declined her vaccinations today.    Review of Systems   Constitutional:  Negative  for activity change, appetite change and fatigue.   HENT:  Negative for dental problem, hearing loss, tinnitus, trouble swallowing and goiter.    Eyes:  Negative for photophobia, pain and visual disturbance.   Respiratory:  Negative for cough, chest tightness and wheezing.    Cardiovascular:  Negative for chest pain, palpitations and leg swelling.   Gastrointestinal:  Negative for abdominal pain, constipation, diarrhea, nausea and reflux.   Endocrine: Negative for cold intolerance, heat intolerance, polydipsia and polyphagia.   Genitourinary:  Negative for difficulty urinating, flank pain, hematuria, hot flashes, menstrual irregularity, menstrual problem, nocturia and urgency.   Musculoskeletal:  Negative for back pain, gait problem, joint swelling, leg pain and joint deformity.   Integumentary:  Negative for color change, pallor, rash and breast discharge.   Allergic/Immunologic: Negative for environmental allergies, food allergies and immunocompromised state.   Neurological:  Negative for tremors, seizures, headaches, coordination difficulties, memory loss and coordination difficulties.   Psychiatric/Behavioral:  Negative for agitation, behavioral problems and sleep disturbance. The patient is not nervous/anxious.        Objective:      Physical Exam  Constitutional:       General: She is not in acute distress.     Appearance: Normal appearance. She is not ill-appearing.   HENT:      Head: Normocephalic and atraumatic.      Right Ear: External ear normal.      Left Ear: External ear normal.      Nose: Nose normal. No congestion or rhinorrhea.      Mouth/Throat:      Mouth: Mucous membranes are moist.      Pharynx: Oropharynx is clear. No oropharyngeal exudate.   Eyes:      General:         Right eye: No discharge.         Left eye: No discharge.      Conjunctiva/sclera: Conjunctivae normal.      Pupils: Pupils are equal, round, and reactive to light.   Neck:      Thyroid: No thyroid mass, thyromegaly or thyroid  tenderness.   Cardiovascular:      Rate and Rhythm: Normal rate and regular rhythm.      Pulses:           Dorsalis pedis pulses are 1+ on the right side and 1+ on the left side.        Posterior tibial pulses are 1+ on the right side and 1+ on the left side.      Heart sounds: Normal heart sounds. No murmur heard.  Pulmonary:      Effort: Pulmonary effort is normal. No respiratory distress.      Breath sounds: Normal breath sounds.   Abdominal:      General: Abdomen is flat. Bowel sounds are normal. There is no distension.      Palpations: Abdomen is soft.      Tenderness: There is no abdominal tenderness.   Musculoskeletal:         General: No swelling or tenderness.      Cervical back: Normal range of motion and neck supple. No tenderness.      Right lower leg: No edema.      Left lower leg: No edema.      Right foot: Decreased range of motion.      Left foot: Decreased range of motion.   Feet:      Right foot:      Protective Sensation: 5 sites tested.  4 sites sensed.      Skin integrity: Callus and dry skin present.      Toenail Condition: Right toenails are abnormally thick. Fungal disease present.     Left foot:      Protective Sensation: 5 sites tested.  4 sites sensed.      Skin integrity: Callus and dry skin present.      Toenail Condition: Left toenails are abnormally thick. Fungal disease present.  Lymphadenopathy:      Cervical: No cervical adenopathy.   Skin:     General: Skin is warm and dry.      Coloration: Skin is not jaundiced or pale.   Neurological:      General: No focal deficit present.      Mental Status: She is alert and oriented to person, place, and time. Mental status is at baseline.      Coordination: Coordination normal.      Gait: Gait normal.   Psychiatric:         Mood and Affect: Mood normal.         Behavior: Behavior normal.         Thought Content: Thought content normal.       Assessment:       Problem List Items Addressed This Visit    None  Visit Diagnoses       Uncontrolled  "type 2 diabetes mellitus with hyperglycemia    -  Primary    Relevant Medications    pen needle, diabetic 31 gauge x 5/16" Ndle    Other Relevant Orders    Diabetic Eye Screening Photo    Basic Metabolic Panel    Nephropathy        Need for hepatitis C screening test        Relevant Orders    Hepatitis Panel, Acute    Screening for HIV (human immunodeficiency virus)        Relevant Orders    HIV 1/2 Ag/Ab (4th Gen)    Hypoglycemia        Type 2 diabetes mellitus without complications        Relevant Medications    lancets (ACCU-CHEK FASTCLIX LANCET DRUM) Misc            Plan:       Uncontrolled type 2 diabetes mellitus with hyperglycemia  Recent A1C 11.7 previous 10.0 and 8.3    Urine Micro Creatine/ratio: elevated 05/06/2022  Medications: Metformin 1000 mg twice daily, Jardiance 10mg, Lantus 30 units BID  Changes: Decrease am insulin to 25 units   Home Glucometer Use: Harper   Eye Exam: 09/28/2021  repeat fundus today attempted previous and to obtain  Last Hypoglycemic episode:  Occasional   Follow ADA diet, avoid soda, simple sweets, and limit rice, breads and starches  Maintain healthy weight, exercise 4-5 times a week for 30 minutes  Diet and medication compliance discussed on visit  RTC 4 month  Component Ref Range & Units 1 mo ago   (9/11/22) 6 mo ago   (5/5/22) 1 yr ago   (10/7/21) 1 yr ago   (8/10/21) 2 yr ago   (10/7/20) 2 yr ago   (11/20/19) 3 yr ago   (5/15/19)   Hemoglobin A1c <=7.0 % 11.7 High   10.0 High   8.3 High  R  8.8 High  R  11.0 High  R  8.2 High  R  9.4 High  R    Estimated Average Glucose mg/dL 289.1  240.3  191.5  205.9  269 High  R  189 High  R  223 High  R    -     Diabetic Eye Screening Photo; Future  -     Basic Metabolic Panel; Future; Expected date: 11/02/2022  -     Hemoglobin A1C; Future; Expected date: 11/02/2022    Hypoglycemia  Decrease Lantus to 25 units in the a.m.  Call if further hypoglycemia occurs     Nephropathy  Urine Micro elevated 05/06/2022   On Entresto which contains " ACE     Need for hepatitis C screening test  -     Hepatitis Panel, Acute; Future; Expected date: 11/02/2022    Screening for HIV (human immunodeficiency virus)  -     HIV 1/2 Ag/Ab (4th Gen); Future; Expected date: 11/02/2022    Pt declines Flu Vaccine     I spent a total of 40 minutes on the day of the visit.This includes face to face time and non-face to face time preparing to see the patient (eg, review of tests), obtaining and/or reviewing separately obtained history, documenting clinical information in the electronic or other health record, independently interpreting results and communicating results to the patient/family/caregiver, or care coordinator.

## 2022-11-03 LAB — PATH REV: NORMAL

## 2022-11-15 ENCOUNTER — TELEPHONE (OUTPATIENT)
Dept: INTERNAL MEDICINE | Facility: CLINIC | Age: 57
End: 2022-11-15

## 2022-11-15 NOTE — TELEPHONE ENCOUNTER
Nov 11/18/2022    Pt stated she needs a referral for O2 machine. Pt stated it isn't working. Stated it has been giving her problems since yesterday. [employer] stated she would have to pay. Pt stated she can not afford to pay. She also stated she called her insurance and insurance carrier is requesting a referral.    Please advise

## 2022-11-16 ENCOUNTER — CLINICAL SUPPORT (OUTPATIENT)
Dept: SMOKING CESSATION | Facility: CLINIC | Age: 57
End: 2022-11-16

## 2022-11-16 DIAGNOSIS — F17.200 NICOTINE DEPENDENCE: Primary | ICD-10-CM

## 2022-11-16 DIAGNOSIS — J44.9 CHRONIC OBSTRUCTIVE PULMONARY DISEASE, UNSPECIFIED COPD TYPE: Primary | ICD-10-CM

## 2022-11-16 DIAGNOSIS — I50.9 CONGESTIVE HEART FAILURE, UNSPECIFIED HF CHRONICITY, UNSPECIFIED HEART FAILURE TYPE: ICD-10-CM

## 2022-11-16 PROCEDURE — 99401 PREV MED CNSL INDIV APPRX 15: CPT | Mod: S$GLB,,, | Performed by: INTERNAL MEDICINE

## 2022-11-16 PROCEDURE — 99401 PR PREVENT COUNSEL,INDIV,15 MIN: ICD-10-PCS | Mod: S$GLB,,, | Performed by: INTERNAL MEDICINE

## 2022-11-16 NOTE — PROGRESS NOTES
Individual Follow-Up Form    11/16/2022    Quit Date:     Clinical Status of Patient: Outpatient      Continuing Medication: yes  Wellbutrin       Target Symptoms: Withdrawal and medication side effects. The following were  rated moderate (3) to severe (4) on TCRS:  Moderate (3): none  Severe (4): none    Comments: Spoke with pt via phone regarding smoking cessation progress.  Pt is smoking 4-5 cigarettes per day.  Pt remains on 150 mg Wellbutrin BID.  No adverse effects noted at this time.  Pt stated that her cigarettes are tasting different and she is not smoking the whole cigarette.  Reviewed learned addiction model, personal reasons for quitting, medications, goals, quit date.     Diagnosis: F17.200    Next Visit: 2 weeks

## 2022-11-18 ENCOUNTER — OFFICE VISIT (OUTPATIENT)
Dept: INTERNAL MEDICINE | Facility: CLINIC | Age: 57
End: 2022-11-18
Payer: MEDICAID

## 2022-11-18 VITALS
RESPIRATION RATE: 20 BRPM | HEART RATE: 56 BPM | BODY MASS INDEX: 47.83 KG/M2 | TEMPERATURE: 98 F | HEIGHT: 64 IN | DIASTOLIC BLOOD PRESSURE: 69 MMHG | OXYGEN SATURATION: 99 % | SYSTOLIC BLOOD PRESSURE: 120 MMHG | WEIGHT: 280.19 LBS

## 2022-11-18 DIAGNOSIS — G47.33 OSA ON CPAP: Chronic | ICD-10-CM

## 2022-11-18 DIAGNOSIS — Z12.31 ENCOUNTER FOR SCREENING MAMMOGRAM FOR MALIGNANT NEOPLASM OF BREAST: ICD-10-CM

## 2022-11-18 DIAGNOSIS — I10 PRIMARY HYPERTENSION: Chronic | ICD-10-CM

## 2022-11-18 DIAGNOSIS — Z12.11 ENCOUNTER FOR COLORECTAL CANCER SCREENING: ICD-10-CM

## 2022-11-18 DIAGNOSIS — I50.20 HEART FAILURE WITH REDUCED EJECTION FRACTION: Chronic | ICD-10-CM

## 2022-11-18 DIAGNOSIS — Z72.0 TOBACCO USE: Chronic | ICD-10-CM

## 2022-11-18 DIAGNOSIS — E55.9 VITAMIN D DEFICIENCY: Chronic | ICD-10-CM

## 2022-11-18 DIAGNOSIS — E66.01 CLASS 3 SEVERE OBESITY DUE TO EXCESS CALORIES WITH SERIOUS COMORBIDITY AND BODY MASS INDEX (BMI) OF 45.0 TO 49.9 IN ADULT: Chronic | ICD-10-CM

## 2022-11-18 DIAGNOSIS — Z12.12 ENCOUNTER FOR COLORECTAL CANCER SCREENING: ICD-10-CM

## 2022-11-18 DIAGNOSIS — Z79.4 TYPE 2 DIABETES MELLITUS WITH HYPERGLYCEMIA, WITH LONG-TERM CURRENT USE OF INSULIN: Chronic | ICD-10-CM

## 2022-11-18 DIAGNOSIS — E78.5 HYPERLIPIDEMIA LDL GOAL <70: Chronic | ICD-10-CM

## 2022-11-18 DIAGNOSIS — E11.65 TYPE 2 DIABETES MELLITUS WITH HYPERGLYCEMIA, WITH LONG-TERM CURRENT USE OF INSULIN: Chronic | ICD-10-CM

## 2022-11-18 DIAGNOSIS — J44.9 CHRONIC OBSTRUCTIVE PULMONARY DISEASE, UNSPECIFIED COPD TYPE: Chronic | ICD-10-CM

## 2022-11-18 DIAGNOSIS — Z00.00 WELLNESS EXAMINATION: Primary | ICD-10-CM

## 2022-11-18 PROBLEM — E66.813 CLASS 3 SEVERE OBESITY DUE TO EXCESS CALORIES WITH SERIOUS COMORBIDITY AND BODY MASS INDEX (BMI) OF 45.0 TO 49.9 IN ADULT: Chronic | Status: ACTIVE | Noted: 2022-07-07

## 2022-11-18 PROBLEM — E11.9 TYPE 2 DIABETES MELLITUS WITHOUT COMPLICATION, WITH LONG-TERM CURRENT USE OF INSULIN: Chronic | Status: ACTIVE | Noted: 2022-05-20

## 2022-11-18 PROCEDURE — 3066F NEPHROPATHY DOC TX: CPT | Mod: CPTII,,, | Performed by: NURSE PRACTITIONER

## 2022-11-18 PROCEDURE — 3078F DIAST BP <80 MM HG: CPT | Mod: CPTII,,, | Performed by: NURSE PRACTITIONER

## 2022-11-18 PROCEDURE — 3060F POS MICROALBUMINURIA REV: CPT | Mod: CPTII,,, | Performed by: NURSE PRACTITIONER

## 2022-11-18 PROCEDURE — 1160F PR REVIEW ALL MEDS BY PRESCRIBER/CLIN PHARMACIST DOCUMENTED: ICD-10-PCS | Mod: CPTII,,, | Performed by: NURSE PRACTITIONER

## 2022-11-18 PROCEDURE — 99396 PREV VISIT EST AGE 40-64: CPT | Mod: S$PBB,,, | Performed by: NURSE PRACTITIONER

## 2022-11-18 PROCEDURE — 3074F PR MOST RECENT SYSTOLIC BLOOD PRESSURE < 130 MM HG: ICD-10-PCS | Mod: CPTII,,, | Performed by: NURSE PRACTITIONER

## 2022-11-18 PROCEDURE — 99215 OFFICE O/P EST HI 40 MIN: CPT | Mod: PBBFAC | Performed by: NURSE PRACTITIONER

## 2022-11-18 PROCEDURE — 4010F ACE/ARB THERAPY RXD/TAKEN: CPT | Mod: CPTII,,, | Performed by: NURSE PRACTITIONER

## 2022-11-18 PROCEDURE — 1159F MED LIST DOCD IN RCRD: CPT | Mod: CPTII,,, | Performed by: NURSE PRACTITIONER

## 2022-11-18 PROCEDURE — 4010F PR ACE/ARB THEARPY RXD/TAKEN: ICD-10-PCS | Mod: CPTII,,, | Performed by: NURSE PRACTITIONER

## 2022-11-18 PROCEDURE — 3008F PR BODY MASS INDEX (BMI) DOCUMENTED: ICD-10-PCS | Mod: CPTII,,, | Performed by: NURSE PRACTITIONER

## 2022-11-18 PROCEDURE — 3066F PR DOCUMENTATION OF TREATMENT FOR NEPHROPATHY: ICD-10-PCS | Mod: CPTII,,, | Performed by: NURSE PRACTITIONER

## 2022-11-18 PROCEDURE — 1159F PR MEDICATION LIST DOCUMENTED IN MEDICAL RECORD: ICD-10-PCS | Mod: CPTII,,, | Performed by: NURSE PRACTITIONER

## 2022-11-18 PROCEDURE — 3060F PR POS MICROALBUMINURIA RESULT DOCUMENTED/REVIEW: ICD-10-PCS | Mod: CPTII,,, | Performed by: NURSE PRACTITIONER

## 2022-11-18 PROCEDURE — 3078F PR MOST RECENT DIASTOLIC BLOOD PRESSURE < 80 MM HG: ICD-10-PCS | Mod: CPTII,,, | Performed by: NURSE PRACTITIONER

## 2022-11-18 PROCEDURE — 3008F BODY MASS INDEX DOCD: CPT | Mod: CPTII,,, | Performed by: NURSE PRACTITIONER

## 2022-11-18 PROCEDURE — 99214 PR OFFICE/OUTPT VISIT, EST, LEVL IV, 30-39 MIN: ICD-10-PCS | Mod: S$PBB,25,, | Performed by: NURSE PRACTITIONER

## 2022-11-18 PROCEDURE — 1160F RVW MEDS BY RX/DR IN RCRD: CPT | Mod: CPTII,,, | Performed by: NURSE PRACTITIONER

## 2022-11-18 PROCEDURE — 99396 PR PREVENTIVE VISIT,EST,40-64: ICD-10-PCS | Mod: S$PBB,,, | Performed by: NURSE PRACTITIONER

## 2022-11-18 PROCEDURE — 99214 OFFICE O/P EST MOD 30 MIN: CPT | Mod: S$PBB,25,, | Performed by: NURSE PRACTITIONER

## 2022-11-18 PROCEDURE — 3074F SYST BP LT 130 MM HG: CPT | Mod: CPTII,,, | Performed by: NURSE PRACTITIONER

## 2022-11-18 RX ORDER — ERGOCALCIFEROL 1.25 MG/1
50000 CAPSULE ORAL
Qty: 12 CAPSULE | Refills: 2 | Status: SHIPPED | OUTPATIENT
Start: 2022-11-18 | End: 2023-05-18 | Stop reason: SDUPTHER

## 2022-11-18 RX ORDER — NYSTATIN 100000 U/G
OINTMENT TOPICAL 2 TIMES DAILY
Qty: 15 G | Refills: 1 | Status: SHIPPED | OUTPATIENT
Start: 2022-11-18

## 2022-11-18 RX ORDER — ALBUTEROL SULFATE 0.83 MG/ML
2.5 SOLUTION RESPIRATORY (INHALATION) EVERY 6 HOURS PRN
Qty: 90 ML | Refills: 1 | Status: SHIPPED | OUTPATIENT
Start: 2022-11-18

## 2022-11-18 NOTE — PROGRESS NOTES
Subjective:       Patient ID: Linn Mckeon is a 56 y.o. female.    Chief Complaint: Follow-up (Requesting depression medication)    Patient has diagnosis of Class II HFpEF 50-55%, HTN, HLD, COPD, DM2, DAVION, Obesity. Patient seen in clinic today for needing new home oxygen. Patient states she wears 2L O2 at home but her machine is not working so she is using a portable oxygen tank. Patient last seen in clinic on but if she leaves, she has to go without her oxygen. Patient last seen in clinic on 03/30/2022. Patient states she is feeling betting since being discharged from the hospital in September. Patient denies any other acute complaints today. Patient had PFTs done on 10/12/2022; indicated: no obstruction, mild restriction in part to obesity, severe diffusion defect that corrects to moderate for alveolar volume, clinical benefit to inhaled bronchodilator especially on the small airways. Patient has Pulmonology appointment scheduled 12/20/2022. Patient does have BMI >40 (48.73); PaCO2 was >52 (59) on 09/11/2022. Patient states SOB with exertion, but no SOB at rest which patient states is improved from prior to hospitalization.     Patient followed by Endocrinology Clinic. Last appointment on 11/02/2022. 56 year female scheduled today has follow-up to Endocrine Clinic.  Patient was scheduled to have 1 month follow-up for history of uncontrolled type 2 diabetes.  Regimen was adjusted patient was started on a Harper.  Patient returns to clinic today.  Patient was started on a Harper. Harper interpretation 10/20/2022-11/02/2022.  Average glucose 162 GMI indicater 7.2.  6% very high, 19% high, 75% target range, 0% low, 0% very low patient's scans in average of 2-11 times per day patient occasionally has prandial spikes in the evening indicating a high carb meal which is only occasionally, only 2 hypoglycemic episode is noted after lunch at 69 and prior to lunch at 63.  In the last week patient has had increasing  hypoglycemic episodes mid day. Patient states she does not eat breakfast at times will decrease morning insulin patient is to call clinic if any further hypoglycemia occurs.  On patient's Dexcom report patient is 75% at target range and predicted A1c is 7.2.  Nephropathy urine micro elevated 05/06/2022 patient is on On Entresto which contains ACE.  Patient was offered flu vaccine today patient does not take flu vaccine and declined her vaccinations today. Uncontrolled type 2 diabetes mellitus with hyperglycemia. Recent A1C 11.7 previous 10.0 and 8.3. Urine Micro Creatine/ratio: elevated 5/06/2022. Medications: Metformin 1000 mg twice daily, Jardiance 10mg, Lantus 30 units BID  Changes: Decrease am insulin to 25 units. Home Glucometer Use: Harper. Eye Exam: 09/28/2021  repeat fundus today attempted previous and to obtain. Last Hypoglycemic episode:  Occasional. Follow ADA diet, avoid soda, simple sweets, and limit rice, breads and starches. Maintain healthy weight, exercise 4-5 times a week for 30 minutes. Diet and medication compliance discussed on visit. RTC 4 month. Hypoglycemia: Decrease Lantus to 25 units in the a.m. Call if further hypoglycemia occurs. Nephropathy: Urine Micro elevated 05/06/2022; On Entresto which contains ACE. Patient has follow up appointment scheduled for 05/01/2023.     Patient followed by Wound Care Clinic. Last appointment on 10/11/2022. 57 yo Black female being seen today for follow-up of a new wound to bottom of left foot, after callus was pared in wound clinic last week.  Wound has healed.  Wearing tennis shoes after last week's visit, as encouraged.  Hx includes treatment plan and medication non-compliance, COPD, HFpEF (40%), chronic hypercapnic respiratory failure with oxygen dependency, uncontrolled Type 2 diabetes, HTN, HLD, DAVION on CPAP, morbid obesity (BMI 49.15), and smoking (25 pk/year history).   Does not desire smoking cessation. Smoking, on average, 1/2 ppd.   Followed by [facility]  MARIO FNP for PCP.  Followed by [facility] endocrine clinic.  Followed by [facility] cardiology. Hospitalized 9/11/2022 - 9/12/2022 for acute CHF and COPD exacerbation.  Now oxygen-dependent most of the time.  Has been referred to pulmonology for Trilogy evaluation.  Sister encouraging her to wear tennis shoes since last week, which she wore to today's visit.  Calluses are not bothering her on feet.  Sister relays she will check Ms. Mckeon' calluses on a regular basis, as well as have other family members check calluses/feet/toes.  No reported complications with wound care or current treatment plan.  Denies fevers, chills, wound odor, wound redness, wound pain, or yellow/green drainage.  No new rashes, lesions, or skin breakdown.  Picked up Lac-Hydrin yesterday and sister is applying that, with Vaseline. Patient has follow up appointment scheduled for 01/06/2023.     Patient followed by Cardiology Clinic. Last appointment on 10/04/2022. Linn Mckeon 56 y.o. female with a past medical history of Cor Pulmonale, Diabetes, DAVION on CPAP, HTN, HLD, Depression, COPD on home O2, and Tobacco Use presents for follow up and results of Lexiscan Stress Test and KWADWO testing.  Patient completed Lexiscan stress test on September 7, 2022 which was normal.  See report below.  She also completed KWADWO testing on September 13, 2022 which revealed mild arterial flow reduction in the bilateral lower extremities.  See report below.  She completed an Echocardiogram on July 26, 2022 which revealed a reduced ejection fraction of 40% with Grade 1 diastolic dysfunction.  See report below.  She previously completed an Echocardiogram on May 10, 2021 which revealed mild concentric LVH, EF of approximately 50 to 55%, mild MR, and mild aortic valve sclerosis without stenosis. Patient had a recent hospital admission on September 11, 2022 1st COPD exacerbation and acute CHF exacerbation.  She was treated with IV Lasix, antibiotics, steroids, and neb treatments.   Patient reports significant improvement in her shortness of breath and peripheral edema following her discharge from the hospital.  She denies chest pain, palpitations, or orthopnea.  She reports nightly compliance with her CPAP and feels she is benefitting from use.  Of note, patient states she is currently only smoking 1-2 cigarettes per day and was recently referred to the smoking cessation program with plans to quit altogether.  She continues with home O2 for the COPD. HFrEF - EF 40% per Echo 7.26.22 - NYHA Class II: EF 50-55% per Echo May 10, 2021; Lexiscan Stress Test 9.7.22:  Normal myocardial perfusion; Continue GDMT with Spironolactone, Lasix, Coreg, Entresto; Counseled on the importance of a low salt diet - max 2g daily; Will repeat Echocardiogram in 2 months. HTN: BP at goal - 128/66; Continue Spironolactone, Lasix, Entresto, and Coreg; Counseled on low salt diet and exercise as tolerated. HLD: LDL not at goal - 95; Continue Atorvastatin 80mg daily and Zetia 10mg daily; Counseled on medication compliance; Counseled on low cholesterol diet. DAVION on CPAP: She reports nightly CPAP compliance and feels she is benefitting from use; Recommended continued nightly CPAP use. BLE pain - improved; KWADWO testing 9.13.22: mild arterial flow reduction in the bilateral lower extremities. Echocardiogram in 2 months. FLP in 2 months. Follow-up in cardiology clinic in 3 months. Patient has follow up appointment scheduled for 01/04/2023.     Patient had Post-Wards appointment on 10/03/2022. Linn Mckeon is a 56 y.o. female  with PMH significant for an DM Type II, HTN, HLD, COPD on 2L home O2, DAVION (on CPAP), HFrEF (EF of 40%) who is here for discharge f/u from a brief hospital stay [facility]. She [facility] ED on 09/11/2022 with complaint of increasing shortness of breath, found to have a COPD exacerbation and was treated appropriately with Abx and breathing treatments, as well as a CHF exacerbation. She is here today,  reports no wheezing, no fever, chills, SOB or increased sputum; only stable/chronic GOODMAN and occasionally uses her home O2. CBGs at home have been 100-150 range. Recently seen in Endocrine clinic who stopped glipizide. Will refill rescue inhaler today. COPD (unquantitated), DAVION, OHS:  Started on Trelegy Ellipta 1 puff daily, Combivent inhaler PRN for SOB (using once daily). Completed Levaquin and steroid course with improved symptoms; resolved COPD exacerbation. On 2L NC at home as needed; O2 93% on RA today. PFT's pending 10/12/22; Will need to f/u with PCP once PFT's complete to see if qualifies for Trilogy machine. HFrEF (EF 40%): Pt discharged after short stay with CHF exacerbation, improved with diuresis and restarted on home regimen; Continue home GDMT: Entresto 24/26 mg BID, Coreg 25 mg BID, lasix 40 mg BID, Aldactone 25 mg daily; Would benefit from starting SGLT-2 as now part of GDMT and patient has DM Type II; will start Jardiance 10 mg daily. DM Type II: Most recent A1c 11.7 on 9/11/22;  today; Medications: Lantus 30 units BID, Metformin 1000 mg BID, Januvia 100 mg daily; Will start Jardiance 10 mg daily; Pt instructed to check CBG daily, if low <100 regularly (several days per week) or <60-> instructed to stop Januvia and call Endocrine clinic NP. Post-hernandez orders: CBG completed in clinic: 110; PFT scheduled 10/12/22; Will need to f/u with PCP for Trilogy breathing machine pending PFT's. To be addressed at next follow-up. PFT results will need to be reviewed by PCP and see if eligible for Trilogy breathing machine at night. Follow-up with PCP as needed.     Patient followed by Ophthalmology Clinic. Last appointment on 04/28/2022. DM without retinopathy: Last A1c 03/2021: 10.3%; encouraged good control/regular PCP follow ups; Photos 4/2022; DFE next due 4/2023. Mild POAG OU: No family hx, +AA, thin CCT, upper limit normal IOP. Gonio open 360 OU; C/D: 0.4 OU; Tmax 22 // 21; OCT RNFL full OU; Ganglion  cell layer wnl OU; Latanoprost QHS OU started 9/1/21; IOP good today. Dry eye/MGD: Diffuse, confluent PEE OU; Start ATs 4-6 times per day, ointment QHS; Warm compresses BID. Presbyopia: doing well, monitor. HTN retinopathy: Recommend good bp control. RTC 6 mo IOP. No follow up appointment noted, will message clinic.       Mammogram: 04/07/2022, incomplete, ordered 11/18/2022  PAP: Hysterectomy  FIT: 10/18/2021, negative, Cologuard ordered 11/18/2022  Diabetic Eye Exam: 11/02/2022  Diabetic Foot Exam: 03/30/2022  Low Dose CT Scan Lung: refuses at this time  Medicare Wellness: N/A    Review of Systems   Constitutional: Negative.    HENT: Negative.     Eyes: Negative.    Respiratory: Negative.     Cardiovascular: Negative.    Gastrointestinal: Negative.    Endocrine: Negative.    Genitourinary: Negative.    Musculoskeletal: Negative.    Integumentary:  Negative.   Allergic/Immunologic: Negative.    Neurological: Negative.    Hematological: Negative.    Psychiatric/Behavioral: Negative.         Objective:      Physical Exam  Vitals reviewed.   Constitutional:       Appearance: Normal appearance.   HENT:      Head: Normocephalic and atraumatic.      Mouth/Throat:      Mouth: Mucous membranes are moist.      Pharynx: Oropharynx is clear.   Eyes:      Extraocular Movements: Extraocular movements intact.      Conjunctiva/sclera: Conjunctivae normal.      Pupils: Pupils are equal, round, and reactive to light.   Cardiovascular:      Rate and Rhythm: Normal rate and regular rhythm.      Heart sounds: Normal heart sounds.   Pulmonary:      Effort: Pulmonary effort is normal.      Breath sounds: Normal breath sounds.   Abdominal:      General: Bowel sounds are normal.   Musculoskeletal:         General: Normal range of motion.      Cervical back: Normal range of motion.   Skin:     General: Skin is warm and dry.   Neurological:      Mental Status: She is alert and oriented to person, place, and time.   Psychiatric:         Mood  and Affect: Mood normal.         Behavior: Behavior normal.       Assessment:       Problem List Items Addressed This Visit          Pulmonary    COPD (chronic obstructive pulmonary disease) (Chronic)     PFTs on 10/12/2022  Continue Trelegy Ellipta daily, Combivent prn, Albuterol prn            Cardiac/Vascular    Primary hypertension (Chronic)     B/P: 120/69  UA Creatinine: ordered  UA Microalbumin: ordered  EKG:  Continue Coreg 25 mg bid, Lasix 40 mg bid, Spironolactone 25 mg daily, Entresto 24-26 mg bid  DASH diet  Encouraged home blood pressure monitoring         Relevant Orders    CBC Auto Differential    Comprehensive Metabolic Panel    Urinalysis, Reflex to Urine Culture Urine, Clean Catch    Microalbumin/Creatinine Ratio, Urine    TSH    Hyperlipidemia LDL goal <70 (Chronic)     Continue Atorvastatin 80 mg daily  Weight loss encouraged  Low fat/high fiber diet  Increase physical activity  Tobacco cessation encouraged   Latest Reference Range & Units 05/05/22 12:30   Cholesterol <=200 mg/dL 149   HDL 35 - 60 mg/dL 38   LDL Cholesterol External 50.00 - 140.00 mg/dL 95.00   Total Cholesterol/HDL Ratio 0 - 5  4   Triglycerides 37 - 140 mg/dL 80   Very Low Density Lipoprotein  16          Relevant Orders    Lipid Panel    Heart failure with reduced ejection fraction (Chronic)     Followed by Cardiology Clinic  Continue Coreg 25 mg bid, Lasix 40 mg bid, Spironolactone 25 mg daily, Entresto 24-26 mg bid            Endocrine    Type 2 diabetes mellitus with hyperglycemia, with long-term current use of insulin (Chronic)     Patient followed by Endocrinology Clinic  Continue Jardiance 10 mg daily, Lantus 30 units bid, Metformin 1,000 mg bid, Januvia 100 mg daily  Continue home CBG monitoring.  Hypoglycemic episodes: denies  BMI: 48.73  HgbA1c: 11.7  UA Creatinine: ordered  UA Microalbumin: ordered  On Atorvastatin  Weight Loss Encouraged  ADA Diet         Relevant Orders    CBC Auto Differential    Comprehensive  Metabolic Panel    Urinalysis, Reflex to Urine Culture Urine, Clean Catch    Microalbumin/Creatinine Ratio, Urine    Hemoglobin A1C    Class 3 severe obesity due to excess calories with serious comorbidity and body mass index (BMI) of 45.0 to 49.9 in adult (Chronic)     BMI: 48.73  TSH: ordered  Vitamin D level: 10.9  HgbA1c: 11.7  Sleep Study: on BiPAP  Weight Loss Encouraged  Increase Physical Activity         Vitamin D deficiency (Chronic)     Vitamin D level: 10.9  Start Vitamin D 50,000 units weekly         Relevant Orders    Vitamin D       Other    DAVION on CPAP (Chronic)     Currently on BiPAP   Pulmonology appointment on 12/20/2022 for Trilogy         Tobacco use (Chronic)     Currently enrolled in smoking cessation program  Currently on Wellbutrin  mg bid         Wellness examination - Primary     Health preventives reviewed  Labs ordered          Other Visit Diagnoses       Encounter for colorectal cancer screening        Relevant Orders    Cologuard Screening (Multitarget Stool DNA)    Encounter for screening mammogram for malignant neoplasm of breast        Relevant Orders    Mammo Digital Screening Bilat w/ Damian            Plan:    Patient to follow up in 6 months with labs to be done prior to appointment to reassess COPD, Vitamin D level.

## 2022-11-19 NOTE — ASSESSMENT & PLAN NOTE
Followed by Cardiology Clinic  Continue Coreg 25 mg bid, Lasix 40 mg bid, Spironolactone 25 mg daily, Entresto 24-26 mg bid

## 2022-11-19 NOTE — ASSESSMENT & PLAN NOTE
Continue Atorvastatin 80 mg daily  Weight loss encouraged  Low fat/high fiber diet  Increase physical activity  Tobacco cessation encouraged   Latest Reference Range & Units 05/05/22 12:30   Cholesterol <=200 mg/dL 149   HDL 35 - 60 mg/dL 38   LDL Cholesterol External 50.00 - 140.00 mg/dL 95.00   Total Cholesterol/HDL Ratio 0 - 5  4   Triglycerides 37 - 140 mg/dL 80   Very Low Density Lipoprotein  16

## 2022-11-19 NOTE — ASSESSMENT & PLAN NOTE
Patient followed by Endocrinology Clinic  Continue Jardiance 10 mg daily, Lantus 30 units bid, Metformin 1,000 mg bid, Januvia 100 mg daily  Continue home CBG monitoring.  Hypoglycemic episodes: denies  BMI: 48.73  HgbA1c: 11.7  UA Creatinine: ordered  UA Microalbumin: ordered  On Atorvastatin  Weight Loss Encouraged  ADA Diet

## 2022-11-19 NOTE — ASSESSMENT & PLAN NOTE
B/P: 120/69  UA Creatinine: ordered  UA Microalbumin: ordered  EKG:  Continue Coreg 25 mg bid, Lasix 40 mg bid, Spironolactone 25 mg daily, Entresto 24-26 mg bid  DASH diet  Encouraged home blood pressure monitoring

## 2022-11-19 NOTE — ASSESSMENT & PLAN NOTE
BMI: 48.73  TSH: ordered  Vitamin D level: 10.9  HgbA1c: 11.7  Sleep Study: on BiPAP  Weight Loss Encouraged  Increase Physical Activity

## 2022-11-21 ENCOUNTER — TELEPHONE (OUTPATIENT)
Dept: DIABETES | Facility: CLINIC | Age: 57
End: 2022-11-21
Payer: MEDICAID

## 2022-11-21 ENCOUNTER — TELEPHONE (OUTPATIENT)
Dept: OPHTHALMOLOGY | Facility: CLINIC | Age: 57
End: 2022-11-21

## 2022-11-21 NOTE — TELEPHONE ENCOUNTER
----- Message from NOEMI Watson sent at 11/18/2022  8:14 PM CST -----  Patient last seen in Ophthalmology Clinic on 04/28/2022 with recommended 6 months follow. No follow up appointment noted. Can patient be rescheduled?     Thanks   NOEMI Andrews

## 2022-11-21 NOTE — TELEPHONE ENCOUNTER
Returned pt call and instructed to contact [employer]. Contact # given. Instructed to contact DM ed if no resolution.    ----- Message from Michael Paniagua sent at 11/21/2022 11:19 AM CST -----  Regarding: Patient Called  Patient called wanting to know if the Harper will be mailed to her or dos she pick it up from the pharmacy. [phone]

## 2022-11-21 NOTE — TELEPHONE ENCOUNTER
----- Message from Michael Paniagua sent at 11/21/2022 11:19 AM CST -----  Regarding: Patient Called  Patient called wanting to know if the Harper will be mailed to her or dos she pick it up from the pharmacy. [phone]

## 2022-11-23 ENCOUNTER — CLINICAL SUPPORT (OUTPATIENT)
Dept: SMOKING CESSATION | Facility: CLINIC | Age: 57
End: 2022-11-23

## 2022-11-23 DIAGNOSIS — F17.200 NICOTINE DEPENDENCE: Primary | ICD-10-CM

## 2022-11-23 PROCEDURE — 99401 PREV MED CNSL INDIV APPRX 15: CPT | Mod: S$GLB,,, | Performed by: INTERNAL MEDICINE

## 2022-11-23 PROCEDURE — 99401 PR PREVENT COUNSEL,INDIV,15 MIN: ICD-10-PCS | Mod: S$GLB,,, | Performed by: INTERNAL MEDICINE

## 2022-11-23 RX ORDER — BUPROPION HYDROCHLORIDE 150 MG/1
TABLET, EXTENDED RELEASE ORAL
Qty: 60 TABLET | Refills: 0 | Status: SHIPPED | OUTPATIENT
Start: 2022-11-23 | End: 2022-12-27 | Stop reason: SDUPTHER

## 2022-11-23 NOTE — PROGRESS NOTES
Individual Follow-Up Form    11/23/2022    Quit Date:     Clinical Status of Patient: Outpatient      Continuing Medication: yes  Wellbutrin       Target Symptoms: Withdrawal and medication side effects. The following were  rated moderate (3) to severe (4) on TCRS:  Moderate (3): none  Severe (4): none    Comments: Spoke with pt via phone regarding smoking cessation progress.  Pt is smoking 2-3 cigarettes per day.  Pt remains on smoking cessation medication regimen of 150 mg Wellbutrin BID.  No adverse effects noted at this time.  Pt stated that she is almost out of Wellbutrin.  Will re-order 150 mg Wellbutrin.  Pt doing well with rate reduction and wait times prior to smoking. Pt encouraged to pick a quit day. Pt stated that she will pick a quit day during our next follow up appointment.  Reviewed coping strategies/habitual behavior/relapse prevention with patient.    Diagnosis: F17.200    Next Visit: 2 weeks

## 2022-12-02 ENCOUNTER — NUTRITION (OUTPATIENT)
Dept: DIABETES | Facility: CLINIC | Age: 57
End: 2022-12-02
Payer: MEDICAID

## 2022-12-02 ENCOUNTER — TELEPHONE (OUTPATIENT)
Dept: DIABETES | Facility: CLINIC | Age: 57
End: 2022-12-02

## 2022-12-02 VITALS — BODY MASS INDEX: 49.34 KG/M2 | WEIGHT: 283.69 LBS

## 2022-12-02 DIAGNOSIS — Z79.4 TYPE 2 DIABETES MELLITUS WITHOUT COMPLICATION, WITH LONG-TERM CURRENT USE OF INSULIN: Primary | ICD-10-CM

## 2022-12-02 DIAGNOSIS — E11.9 TYPE 2 DIABETES MELLITUS WITHOUT COMPLICATION, WITH LONG-TERM CURRENT USE OF INSULIN: Primary | ICD-10-CM

## 2022-12-02 PROCEDURE — G0108 DIAB MANAGE TRN  PER INDIV: HCPCS | Mod: PBBFAC | Performed by: DIETITIAN, REGISTERED

## 2022-12-02 NOTE — TELEPHONE ENCOUNTER
Met with pt for DM ed today. Overall, seems to be doing well and feels good. 72% TIR. Is still having some hypoglycemic episodes that she was under and overcorrecting at times. Reviewed proper treatment. Otherwise, has some 300's with high carb/heavy meals. Diet ed provided. Taking meds as directed: Lantus 25 u AM/30 u PM, Metformin 1000mg BID and Jardiance 10 mg. I can contact her with any instructions you may have. She is to see you 5/1/23 and follow-up with me 1/5/23. Thank you for this referral.

## 2022-12-02 NOTE — PROGRESS NOTES
Diabetes Care Specialist Progress Note  Author: Lynda Paige RD  Date: 12/2/2022    Program Intake  Reason for Diabetes Program Visit:: Intervention  Type of Intervention:: Individual  Individual: Education  Education: Self-Management Skill Review  Current diabetes risk level:: high    Lab Results   Component Value Date    HGBA1C 11.7 (H) 09/11/2022       Clinical         Problem Review  Reviewed health maintenance: yes    Clinical Assessment  Have you ever experienced hypoglycemia (low blood sugar)?: yes  In the last month, how often have you experienced low blood sugar?: more than once a week  Are you able to tell when your blood sugar is low?: No  How do you treat hypoglycemia (low blood sugar)?:  (1/2 cup light sugar beverage)  Have you ever experienced hyperglycemia (high blood sugar)?: yes    Medication Information  How many days a week do you miss your medications?: Never  Medication adherence impacting ability to self-manage diabetes?: No         Nutritional Status  Meal Plan 24 Hour Recall: Breakfast, Lunch, Dinner  Meal Plan 24 Hour Recall - Breakfast: 1 1/2 cup cereal, 1 cup milk and 1 large banana  Meal Plan 24 Hour Recall - Lunch: baked meat, 1 cup mashed potatoe and green beans or cabbage  Meal Plan 24 Hour Recall - Dinner: Bird's eye 1 1/2 cup chicken and pasta rachid with brocolli and carrots = 27 gm carb with additional baked chicken. Drinks water  Change in appetite?: No  Current nutritional status an area of need that is impacting patient's ability to self-manage diabetes?: No    Additional Social History    Support  Does anyone support you with your diabetes care?: yes  Who supports you?: family member  Does the current support meet the patient's needs?: Yes  Is Support an area impacting ability to self-manage diabetes?: No    Access to Mass Media & Technology  Does the patient have access to any of the following devices or technologies?: Smart phone  Media or technology needs impacting  ability to self-manage diabetes?: No                          Diabetes Self-Management Skills Assessment         Nutrition/Healthy Eating  Method of carbohydrate measurement:: eyeballing/guessing  Nutrition/Healthy Eating Skills Assessment Completed:: Yes  Assessment indicates:: Instruction Needed  Area of need?: Yes    Physical Activity/Exercise  Patient's daily activity level:: lightly active  Patient formally exercises outside of work.: no  Physical Activity/Exercise Skills Assessment Completed: : No  Deffered due to:: Time    Medications  Patient is able to describe current diabetes management routine.: yes  Patient is able to identify current diabetes medications, dosages, and appropriate timing of medications.: yes (Levemir 25 units in AM and 30 units PM, Metformin 1000mg BID and Jardiance 10 mg daily)  Patient reports problems or concerns with current medication regimen.: no  Medication Skills Assessment Completed:: Yes  Assessment indicates:: Adequate understanding  Area of need?: No    Home Blood Glucose Monitoring  Patient states that blood sugar is checked at home daily.: yes  Monitoring Method:: personal continuous glucose monitor  How often do you check your blood sugar?:  (mostly 6-8 times daily)  When you check what is your typical blood sugar range? :   Blood glucose logs:: yes  Blood glucose logs reviewed today?: yes  Personal CGM type:: Freestyle Harper  Patient is able to use personal CGM appropriately.: yes  CGM Report reviewed?: yes  Home Blood Glucose Monitoring Skills Assessment Completed: : Yes  Assessment indicates:: Instruction Needed  Area of need?: Yes    Acute Complications  Patient is able to identify types of acute complications: Yes  Patient Identified:: Hypoglycemia  Patient is able to state the basic meaning of hypoglycemia?: Yes  Patient can identify general symptoms of hypoglycemia: yes  Patient identified:: shakiness  Able to state proper treatment of hypoglycemia?: no (see  "comments)  Acute Complications Skills Assessment Completed: : Yes  Assessment indicates:: Instruction Needed  Area of need?: Yes    Chronic Complications  Patient is taking statin?: Yes  Chronic Complications Skills Assessment Completed: : No  Deferred due to:: Time           Diabetes Self Support Plan         Assessment Summary and Plan    Based on today's diabetes care assessment, the following areas of need were identified:      Social 12/2/2022   Support No   Access to Mass Media/Tech No   Cognitive/Behavioral Health -   Communication -   Health Literacy -        Clinical 12/2/2022   Medication Adherence No   Lab Compliance -   Nutritional Status No        Diabetes Self-Management Skills 12/2/2022   Diabetes Disease Process/Treatment Options -   Nutrition/Healthy Eating Yes - reviewed high carb foods and portions, label reading for carbs and how high fat meals affect blood glucose   Physical Activity/Exercise Address at follow-up. Not currently active with oxygen   Medication No   Home Blood Glucose Monitoring Yes - Adjusted high alarm on reader and reviewed report with pt   Acute Complications Yes - see care plan   Psychosocial/Coping -          Today's interventions were provided through individual discussion, instruction, and written materials were provided.  Pt brought freestyle reader for download   Freestyle Harper CGM reviewed  Average glucose is 159 mg/dL  BG range: 53 mg/dL to 317 mg/dL  Hypoglycemia frequency 1 % with BG <70  Hyperglycemia frequency 27 % with BG >180  Time in range 72 %  Nocturnal Hypoglycemia: Yes   Recommendations: Treat hypoglycemia with 15 grams carb then a snack with protein once stabilized.      Pt reports drinking light beverage to correct hypoglycemia. Reviewed "Rule of 15" for correction. Pt able to repeat instructions. Diet recall indicates possible heavy meal causing >300. Reviewed effects of high fat meals on glucose. Reports drinking water with meals. Breakfast consists of " high carb with cereal, milk and med-large banana. Offered suggestions to decrease carb such as reduced sugar cereal, low carb milk and eliminating banana. Reviewed label reading for carbs and serving sizes. She is not engaging in regular exercise but does chores around house. Taking meds as directed. CGM active 75% because she was without her harper sensors. She contacted [facility] for her supplies and they will now contact her to determine when she needs her order fulfilled. Reviewed her predicted A1C using GMI from report. She states that she feels good and has more energy.     Patient verbalized understanding of instruction and written materials.  Pt was able to return back demonstration of instructions today. Patient understood key points, needs reinforcement and further instruction.     Diabetes Self-Management Care Plan:    Today's Diabetes Self-Management Care Plan was developed with Linn's input. Linn has agreed to work toward the following goal(s) to improve his/her overall diabetes control.      Care Plan: Diabetes Management   Updates made since 11/2/2022 12:00 AM        Problem: Blood Glucose Self-Monitoring Resolved 12/2/2022        Goal: Patient agrees to scan Harper 1 sensor at leat every 8 hours. Completed 12/2/2022   Start Date: 10/14/2022   This Visit's Progress: Met   Priority: High   Barriers: No Barriers Identified   Note:    Patient is here in clinic today for placement of personal continuous glucose monitoring system (CGMS) Freestyle Harper 2.  Patient will use  to obtain continuous glucose readings. An appropriate site was selected and prepared. Patient inserted sensor into arm. Pt verbalizes understanding to change sensor every 14 days. Pt is also aware that each time a new sensor is placed there is a 1 hour warm up period. Instructed to continue using glucometer during times of any potential supply failure/issues, to verify glucose if symptoms do not match reading and during 1  hour warm up time. Reviewed care of sensor and instructed to wear overpatches. Discussed supplies with patient--company/pharmacy to reorder items and who to call (technical support) if a sensor fails. Advised to keep serial # for replacement, if necessary. A detailed explanation of Freestyle Harper 2 Continuous Glucose Monitoring System was provided. Instructed to avoid excess amounts of vitamin C, xrays/CT/MRI and full-body scanner at airport. Reviewed alarms, arrow indications and when to calibrate  Patient was allowed to ask questions. Patient will notify provider if episode of glucose less than 70 mg/dL presents. Reviewed Rule of 15 for hypoglycemia and to report if reoccuring. Patient verbalizes understanding.         High alert set at 180 mg/dL  Low alert set at 70 mg/dL     12/2/22 - Scanning 6-8 times most days. Reviewed report with pt and adjusted high alert to 350       Problem: Acute Complications         Goal: Patient agrees to identify and manage signs and symptoms of low blood sugar with 15 grams of quick-acting carb    Start Date: 12/2/2022   Priority: High   Barriers: No Barriers Identified        Task: Reviewed proper treatment of hypoglycemia with the rule of 15--patient to eat 15g simple carbohydrate (4 glucose tablets, 1 glucose gel, 5 pieces hard candy, ½ cup fruit juice, ½ can regular soda, etc) and wait 15 minutes and recheck home glucose. Completed 12/2/2022        Task: Reviewed common causes and precautions to help prevent hyper/hypoglycemic events. Completed 12/2/2022        Task: Reviewed signs and symptoms of hyper/hypoglycemia, what range is considered to be hyper/hypoglycemia, and when to seek further medical attention. Completed 12/2/2022          Follow Up Plan     Follow up in about 4 weeks (around 12/30/2022) for Libreview download, acute complications.    Today's care plan and follow up schedule was discussed with patient.  Linn verbalized understanding of the care plan,  goals, and agrees to follow up plan.        The patient was encouraged to communicate with his/her health care provider/physician and care team regarding his/her condition(s) and treatment.  I provided the patient with my contact information today and encouraged to contact me via phone or [facility] Patient Portal as needed.     Length of Visit   Total Time: 60 Minutes

## 2022-12-06 ENCOUNTER — HOSPITAL ENCOUNTER (OUTPATIENT)
Dept: CARDIOLOGY | Facility: HOSPITAL | Age: 57
Discharge: HOME | End: 2022-12-06
Attending: NURSE PRACTITIONER
Payer: MEDICAID

## 2022-12-06 VITALS
DIASTOLIC BLOOD PRESSURE: 82 MMHG | SYSTOLIC BLOOD PRESSURE: 178 MMHG | WEIGHT: 283 LBS | BODY MASS INDEX: 48.32 KG/M2 | HEIGHT: 64 IN

## 2022-12-06 DIAGNOSIS — I50.20 HEART FAILURE WITH REDUCED EJECTION FRACTION: ICD-10-CM

## 2022-12-06 DIAGNOSIS — R06.09 DOE (DYSPNEA ON EXERTION): ICD-10-CM

## 2022-12-06 LAB
AV INDEX (PROSTH): 0.6
AV MEAN GRADIENT: 5 MMHG
AV PEAK GRADIENT: 8 MMHG
AV VALVE AREA: 1.95 CM2
AV VELOCITY RATIO: 0.55
BSA FOR ECHO PROCEDURE: 2.41 M2
CV ECHO LV RWT: 0.52 CM
DOP CALC AO PEAK VEL: 1.4 M/S
DOP CALC AO VTI: 31.4 CM
DOP CALC LVOT AREA: 3.3 CM2
DOP CALC LVOT DIAMETER: 2.04 CM
DOP CALC LVOT PEAK VEL: 0.77 M/S
DOP CALC LVOT STROKE VOLUME: 61.09 CM3
DOP CALC MV VTI: 33.8 CM
DOP CALCLVOT PEAK VEL VTI: 18.7 CM
E WAVE DECELERATION TIME: 228.48 MSEC
E/A RATIO: 0.77
ECHO LV POSTERIOR WALL: 1.36 CM (ref 0.6–1.1)
EJECTION FRACTION: 50 %
FRACTIONAL SHORTENING: 28 % (ref 28–44)
HR MV ECHO: 66 BPM
INTERVENTRICULAR SEPTUM: 1.18 CM (ref 0.6–1.1)
LEFT ATRIUM SIZE: 4.53 CM
LEFT ATRIUM VOLUME INDEX MOD: 14.7 ML/M2
LEFT ATRIUM VOLUME MOD: 33.42 CM3
LEFT INTERNAL DIMENSION IN SYSTOLE: 3.81 CM (ref 2.1–4)
LEFT VENTRICLE DIASTOLIC VOLUME INDEX: 59 ML/M2
LEFT VENTRICLE DIASTOLIC VOLUME: 133.92 ML
LEFT VENTRICLE MASS INDEX: 122 G/M2
LEFT VENTRICLE SYSTOLIC VOLUME INDEX: 27.4 ML/M2
LEFT VENTRICLE SYSTOLIC VOLUME: 62.3 ML
LEFT VENTRICULAR INTERNAL DIMENSION IN DIASTOLE: 5.28 CM (ref 3.5–6)
LEFT VENTRICULAR MASS: 276 G
LV LATERAL E/E' RATIO: 14.6 M/S
LVOT MG: 1.55 MMHG
LVOT MV: 0.59 CM/S
MV MEAN GRADIENT: 2 MMHG
MV PEAK A VEL: 0.95 M/S
MV PEAK E VEL: 0.73 M/S
MV PEAK GRADIENT: 4 MMHG
MV VALVE AREA BY CONTINUITY EQUATION: 1.81 CM2
PISA MRMAX VEL: 5.56 M/S
PISA TR MAX VEL: 2.71 M/S
RA WIDTH: 4.5 CM
TDI LATERAL: 0.05 M/S
TR MAX PG: 29 MMHG
TRICUSPID ANNULAR PLANE SYSTOLIC EXCURSION: 1.69 CM

## 2022-12-06 PROCEDURE — 93306 TTE W/DOPPLER COMPLETE: CPT

## 2022-12-07 ENCOUNTER — TELEPHONE (OUTPATIENT)
Dept: SMOKING CESSATION | Facility: CLINIC | Age: 57
End: 2022-12-07
Payer: MEDICAID

## 2022-12-07 NOTE — TELEPHONE ENCOUNTER
Attempted to contact pt regarding smoking cessation phone follow up appt.  Called pt.  No answer.  Phone just rang.  Unable to leave a voice message.

## 2022-12-14 ENCOUNTER — CLINICAL SUPPORT (OUTPATIENT)
Dept: SMOKING CESSATION | Facility: CLINIC | Age: 57
End: 2022-12-14

## 2022-12-14 DIAGNOSIS — F17.200 NICOTINE DEPENDENCE: Primary | ICD-10-CM

## 2022-12-14 PROCEDURE — 99402 PR PREVENT COUNSEL,INDIV,30 MIN: ICD-10-PCS | Mod: S$GLB,,, | Performed by: INTERNAL MEDICINE

## 2022-12-14 PROCEDURE — 99402 PREV MED CNSL INDIV APPRX 30: CPT | Mod: S$GLB,,, | Performed by: INTERNAL MEDICINE

## 2022-12-14 NOTE — PROGRESS NOTES
Individual Follow-Up Form    12/14/2022    Quit Date:     Clinical Status of Patient: Outpatient      Continuing Medication: yes  Wellbutrin       Target Symptoms: Withdrawal and medication side effects. The following were  rated moderate (3) to severe (4) on TCRS:  Moderate (3): none  Severe (4): none    Comments: Spoke with pt regarding smoking cessation progress.  Pt is smoking 5 cigarettes per day.  Pt remains on tobacco cessation medication of 150 mg Wellbutrin BID.  No adverse effects noted at this time.  Reviewed strategies, cues, and triggers. Introduced the negative impact of tobacco on health, the health advantages of discontinuing the use of tobacco, time line improved health changes after a quit, withdrawal issues to expect from nicotine and habit, and ways to achieve the goal of a quit.  Pt encouraged to pick a quit day.  Pt stated that her quit day will be 1/12/23. Encouraged pt to move cigarettes again.  Pt will work on rate reduction and will decrease by 1-2 cigarettes each week.      Diagnosis: F17.200    Next Visit: 2 weeks

## 2022-12-15 ENCOUNTER — TELEPHONE (OUTPATIENT)
Dept: INTERNAL MEDICINE | Facility: CLINIC | Age: 57
End: 2022-12-15
Payer: MEDICAID

## 2022-12-15 NOTE — TELEPHONE ENCOUNTER
Pt stated that her O2 home unit when out. Pt stated she is out of oxygen. Pt stated that it is urgent      Please advise

## 2022-12-16 ENCOUNTER — TELEPHONE (OUTPATIENT)
Dept: PULMONOLOGY | Facility: CLINIC | Age: 57
End: 2022-12-16
Payer: MEDICAID

## 2022-12-19 NOTE — TELEPHONE ENCOUNTER
Called was informed by Mrs. Mckeon that she called the company and they came picked up the tank Replacement given.

## 2022-12-20 ENCOUNTER — OFFICE VISIT (OUTPATIENT)
Dept: PULMONOLOGY | Facility: CLINIC | Age: 57
End: 2022-12-20
Payer: MEDICAID

## 2022-12-20 VITALS
RESPIRATION RATE: 20 BRPM | WEIGHT: 283.38 LBS | OXYGEN SATURATION: 99 % | HEART RATE: 65 BPM | BODY MASS INDEX: 48.38 KG/M2 | DIASTOLIC BLOOD PRESSURE: 60 MMHG | HEIGHT: 64 IN | SYSTOLIC BLOOD PRESSURE: 93 MMHG | TEMPERATURE: 98 F

## 2022-12-20 DIAGNOSIS — J44.1 COPD EXACERBATION: ICD-10-CM

## 2022-12-20 DIAGNOSIS — J44.9 CHRONIC OBSTRUCTIVE PULMONARY DISEASE, UNSPECIFIED COPD TYPE: Chronic | ICD-10-CM

## 2022-12-20 DIAGNOSIS — G47.33 OSA ON CPAP: Primary | Chronic | ICD-10-CM

## 2022-12-20 LAB — NONINV COLON CA DNA+OCC BLD SCRN STL QL: POSITIVE

## 2022-12-20 PROCEDURE — 3061F PR NEG MICROALBUMINURIA RESULT DOCUMENTED/REVIEW: ICD-10-PCS | Mod: CPTII,,, | Performed by: INTERNAL MEDICINE

## 2022-12-20 PROCEDURE — 3008F BODY MASS INDEX DOCD: CPT | Mod: CPTII,,, | Performed by: INTERNAL MEDICINE

## 2022-12-20 PROCEDURE — 3078F PR MOST RECENT DIASTOLIC BLOOD PRESSURE < 80 MM HG: ICD-10-PCS | Mod: CPTII,,, | Performed by: INTERNAL MEDICINE

## 2022-12-20 PROCEDURE — 3061F NEG MICROALBUMINURIA REV: CPT | Mod: CPTII,,, | Performed by: INTERNAL MEDICINE

## 2022-12-20 PROCEDURE — 99204 PR OFFICE/OUTPT VISIT, NEW, LEVL IV, 45-59 MIN: ICD-10-PCS | Mod: S$PBB,,, | Performed by: INTERNAL MEDICINE

## 2022-12-20 PROCEDURE — 3066F PR DOCUMENTATION OF TREATMENT FOR NEPHROPATHY: ICD-10-PCS | Mod: CPTII,,, | Performed by: INTERNAL MEDICINE

## 2022-12-20 PROCEDURE — 3078F DIAST BP <80 MM HG: CPT | Mod: CPTII,,, | Performed by: INTERNAL MEDICINE

## 2022-12-20 PROCEDURE — 3066F NEPHROPATHY DOC TX: CPT | Mod: CPTII,,, | Performed by: INTERNAL MEDICINE

## 2022-12-20 PROCEDURE — 99204 OFFICE O/P NEW MOD 45 MIN: CPT | Mod: S$PBB,,, | Performed by: INTERNAL MEDICINE

## 2022-12-20 PROCEDURE — 99215 OFFICE O/P EST HI 40 MIN: CPT | Mod: PBBFAC

## 2022-12-20 PROCEDURE — 3008F PR BODY MASS INDEX (BMI) DOCUMENTED: ICD-10-PCS | Mod: CPTII,,, | Performed by: INTERNAL MEDICINE

## 2022-12-20 PROCEDURE — 3074F PR MOST RECENT SYSTOLIC BLOOD PRESSURE < 130 MM HG: ICD-10-PCS | Mod: CPTII,,, | Performed by: INTERNAL MEDICINE

## 2022-12-20 PROCEDURE — 3074F SYST BP LT 130 MM HG: CPT | Mod: CPTII,,, | Performed by: INTERNAL MEDICINE

## 2022-12-20 NOTE — PROGRESS NOTES
Pulmonology Clinic Note      SUBJECTIVE:     Chief Complaint: COPD (New referral)       History of Present Illness:  Linn Mckeon is a 57 y.o. female with PMH of COPD, HFrEF (EF 40%), HTN, HLD, DM, DAVION on CPAP, who presents to Pulmonology clinic today to establish care for presumed COPD. Patient was hospitalized here at [facility] briefly in early September for a COPD exacerbation and was treated with corticosteroids, bronchodilators, and antibiotics. She was discharged with trelegy ellipta as well as home O2, using 2L/min. At the time, patient had no PFTs on file. She has since had PFTs 10/12/2022 which reveal no obstruction, mild restriction, severe diffusion defect that however does correct to moderate for alveolar volume. She did show positive bronchodilator response. FEV1 was 50% with FEV1/FVC ratio of 89%.    Today patient states she is feeling much better than when she was hospitalized. She states she is compliant with her trelegy. She states she uses her rescue inhaler/nebulizer about once daily. She is still using her home O2 that was given to her after hospital discharge. She states she feels a little fatigued and generally run-down but does not note any worsening respiratory symptoms, denying any significant worsening of shortness of breath, cough or sputum production, wheezing, fevers, or chills. She is using her CPAP nightly. No additional concerns or complaints today.    Review of patient's allergies indicates:  No Known Allergies    Past Medical History:   Diagnosis Date    Callus of foot 07/07/2022    CHF (congestive heart failure)     Chronic heart failure with preserved ejection fraction 05/20/2022    COPD (chronic obstructive pulmonary disease)     Diabetes mellitus     GOODMAN (dyspnea on exertion) 05/20/2022    Dystrophic nail 07/07/2022    Hyperlipidemia     Hyperlipidemia LDL goal <70 05/20/2022    Hypertension     Morbid obesity with body mass index (BMI) greater than or equal to 50 07/07/2022     Non compliance w medication regimen 07/07/2022    Noncompliance with treatment plan 07/07/2022    DAVION on CPAP 05/20/2022    Peripheral edema 05/20/2022    Primary hypertension 05/20/2022    Type 2 diabetes mellitus with skin complication 05/20/2022    Xerosis of skin 07/07/2022     Past Surgical History:   Procedure Laterality Date    HYSTERECTOMY       Family History   Problem Relation Age of Onset    Hypertension Mother     Heart disease Mother     Diabetes Mother     Heart attack Father     Diabetes Sister     Hypertension Sister     Diabetes Brother     Hypertension Brother      Social History     Tobacco Use    Smoking status: Every Day     Packs/day: 0.25     Types: Cigarettes    Smokeless tobacco: Never    Tobacco comments:     Smokes 5 cigarettes a day   Substance Use Topics    Alcohol use: Not Currently    Drug use: Never       Current Outpatient Medications   Medication Sig    albuterol (PROVENTIL) 2.5 mg /3 mL (0.083 %) nebulizer solution Take 3 mLs (2.5 mg total) by nebulization every 6 (six) hours as needed for Wheezing. Rescue    ammonium lactate (LAC-HYDRIN) 12 % lotion APPLY TO DRY SKIN AND RUB IN WELL TWICE DAILY. NOTHING BETWEEN TOES    atorvastatin (LIPITOR) 80 MG tablet Take 1 tablet (80 mg total) by mouth once daily.    blood sugar diagnostic Strp To check BG 2 times daily, to use with insurance preferred meter    blood-glucose meter kit To check BG 2 times daily, to use with insurance preferred meter    buPROPion (WELLBUTRIN SR) 150 MG TBSR 12 hr tablet Take 1 tablet (150 mg ) by mouth 2 (two) times daily.    carvediloL (COREG) 25 MG tablet Take 1 tablet (25 mg total) by mouth 2 (two) times daily.    empagliflozin (JARDIANCE) 10 mg tablet Take 1 tablet (10 mg total) by mouth once daily.    ergocalciferol (ERGOCALCIFEROL) 50,000 unit Cap Take 1 capsule (50,000 Units total) by mouth every 7 days.    ezetimibe (ZETIA) 10 mg tablet Take 1 tablet (10 mg total) by mouth once daily.     "fluticasone-umeclidin-vilanter (TRELEGY ELLIPTA) 100-62.5-25 mcg DsDv Inhale 1 puff into the lungs once daily.    furosemide (LASIX) 40 MG tablet Take 1 tablet (40 mg total) by mouth 2 (two) times a day.    insulin glargine (LANTUS U-100 INSULIN) 100 unit/mL injection Inject 30 Units into the skin 2 (two) times daily.    ipratropium-albuteroL (COMBIVENT RESPIMAT)  mcg/actuation inhaler Inhale 2 puffs into the lungs every 6 (six) hours as needed for Wheezing. Rescue    lancets (ACCU-CHEK FASTCLIX LANCET DRUM) [facility] USE ONE DAILY    latanoprost 0.005 % ophthalmic solution Apply 1 drop to eye.    metFORMIN (GLUCOPHAGE) 1000 MG tablet Take 1 tablet (1,000 mg total) by mouth 2 (two) times daily with meals.    nystatin (MYCOSTATIN) ointment Apply topically 2 (two) times daily.    pen needle, diabetic 31 gauge x 5/16" Ndle 1 each by Misc.(Non-Drug; Combo Route) route 2 (two) times daily with meals.    sacubitriL-valsartan (ENTRESTO) 24-26 mg per tablet Take 1 tablet by mouth 2 (two) times daily.    SITagliptin (JANUVIA) 100 MG Tab Take 1 tablet (100 mg total) by mouth once daily.    spironolactone (ALDACTONE) 25 MG tablet Take 1 tablet (25 mg total) by mouth once daily.     No current facility-administered medications for this visit.       Review of Systems   Constitutional:  Positive for malaise/fatigue. Negative for chills and fever.   HENT:  Negative for sore throat.    Respiratory:  Positive for shortness of breath (mild, chronic). Negative for cough and sputum production.    Cardiovascular:  Negative for chest pain and palpitations.   Gastrointestinal:  Negative for diarrhea, nausea and vomiting.   Musculoskeletal:  Negative for falls.   Neurological:  Negative for dizziness and headaches.   Psychiatric/Behavioral:  Negative for depression.       OBJECTIVE:     Vitals:    12/20/22 1405   BP: 93/60   Pulse: 65   Resp: 20   Temp: 97.9 °F (36.6 °C)       Physical Exam  Constitutional:       General: She is not in " acute distress.     Appearance: Normal appearance. She is obese. She is not ill-appearing.   HENT:      Head: Normocephalic and atraumatic.      Right Ear: External ear normal.      Left Ear: External ear normal.      Nose: Nose normal.   Eyes:      Extraocular Movements: Extraocular movements intact.   Cardiovascular:      Rate and Rhythm: Normal rate and regular rhythm.      Heart sounds: No murmur heard.    No friction rub. No gallop.   Pulmonary:      Effort: Pulmonary effort is normal. No respiratory distress.      Breath sounds: No wheezing, rhonchi or rales.   Abdominal:      General: There is no distension.      Palpations: Abdomen is soft.      Tenderness: There is no abdominal tenderness.   Musculoskeletal:      Right lower leg: No edema.      Left lower leg: No edema.   Skin:     General: Skin is warm and dry.   Neurological:      General: No focal deficit present.      Mental Status: She is alert and oriented to person, place, and time.         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 She does not currently qualify for renewing O2 prescription based on ambulatory SPO2 testing  - Encouraged patient to try to lose weight, discussed various methods to help with weight loss. Anticipate with weight loss her shortness of breath will also improve  - Patient currently smoking <5 cigarettes / day and is actively attempting to quit    RTC in 12 months    Edgardo Lyman MD  [facility] Internal Medicine PGY-2

## 2022-12-21 ENCOUNTER — TELEPHONE (OUTPATIENT)
Dept: INTERNAL MEDICINE | Facility: CLINIC | Age: 57
End: 2022-12-21
Payer: MEDICAID

## 2022-12-21 DIAGNOSIS — R19.5 POSITIVE COLORECTAL CANCER SCREENING USING COLOGUARD TEST: Primary | ICD-10-CM

## 2022-12-21 NOTE — TELEPHONE ENCOUNTER
Informed patient her cologuard results were positive and a order for a colonoscopy was sent. Patient verbalized understanding.

## 2022-12-27 ENCOUNTER — CLINICAL SUPPORT (OUTPATIENT)
Dept: SMOKING CESSATION | Facility: CLINIC | Age: 57
End: 2022-12-27

## 2022-12-27 DIAGNOSIS — F17.200 NICOTINE DEPENDENCE: Primary | ICD-10-CM

## 2022-12-27 PROCEDURE — 99402 PR PREVENT COUNSEL,INDIV,30 MIN: ICD-10-PCS | Mod: S$GLB,,, | Performed by: INTERNAL MEDICINE

## 2022-12-27 PROCEDURE — 99402 PREV MED CNSL INDIV APPRX 30: CPT | Mod: S$GLB,,, | Performed by: INTERNAL MEDICINE

## 2022-12-27 RX ORDER — BUPROPION HYDROCHLORIDE 150 MG/1
TABLET, EXTENDED RELEASE ORAL
Qty: 60 TABLET | Refills: 0 | Status: SHIPPED | OUTPATIENT
Start: 2022-12-27 | End: 2023-02-02 | Stop reason: SDUPTHER

## 2022-12-27 NOTE — PROGRESS NOTES
Individual Follow-Up Form    12/27/2022    Quit Date:     Clinical Status of Patient: Outpatient      Continuing Medication: yes  Wellbutrin       Target Symptoms: Withdrawal and medication side effects. The following were  rated moderate (3) to severe (4) on TCRS:  Moderate (3): none  Severe (4): none    Comments: Spoke with pt via phone regarding smoking cessation progress.  Pt is smoking 4 cigarettes per day.  Pt remains on smoking cessation medication of 150 mg Wellbutrin BID.  No adverse effects noted at this time.  Pt stated that she needs a refill on her Wellbutrin.  Will re-order Wellbutrin.  Pt doing well with rate reduction and wait times prior to smoking.  Pt's quit day is 1/12/23.  Reviewed strategies, controlling environment, cues, triggers, new goals set. Introduced high risk situations with preparation interventions, caffeine similarities with withdrawal issues of habit and nicotine, Alcohol, Understanding urges, cravings, stress and relaxation. Open discussion with intervention discussion.     Diagnosis: F17.200    Next Visit: 2 weeks

## 2023-01-04 ENCOUNTER — OFFICE VISIT (OUTPATIENT)
Dept: CARDIOLOGY | Facility: CLINIC | Age: 58
End: 2023-01-04
Payer: MEDICAID

## 2023-01-04 VITALS
BODY MASS INDEX: 48.22 KG/M2 | HEART RATE: 71 BPM | OXYGEN SATURATION: 99 % | DIASTOLIC BLOOD PRESSURE: 68 MMHG | TEMPERATURE: 98 F | SYSTOLIC BLOOD PRESSURE: 135 MMHG | HEIGHT: 64 IN | RESPIRATION RATE: 18 BRPM | WEIGHT: 282.44 LBS

## 2023-01-04 DIAGNOSIS — E11.65 TYPE 2 DIABETES MELLITUS WITH HYPERGLYCEMIA, WITH LONG-TERM CURRENT USE OF INSULIN: Chronic | ICD-10-CM

## 2023-01-04 DIAGNOSIS — E66.01 CLASS 3 SEVERE OBESITY DUE TO EXCESS CALORIES WITH SERIOUS COMORBIDITY AND BODY MASS INDEX (BMI) OF 45.0 TO 49.9 IN ADULT: Chronic | ICD-10-CM

## 2023-01-04 DIAGNOSIS — Z72.0 TOBACCO USE: Chronic | ICD-10-CM

## 2023-01-04 DIAGNOSIS — I10 PRIMARY HYPERTENSION: Primary | Chronic | ICD-10-CM

## 2023-01-04 DIAGNOSIS — Z79.4 TYPE 2 DIABETES MELLITUS WITH HYPERGLYCEMIA, WITH LONG-TERM CURRENT USE OF INSULIN: Chronic | ICD-10-CM

## 2023-01-04 DIAGNOSIS — G47.33 OSA ON CPAP: Chronic | ICD-10-CM

## 2023-01-04 DIAGNOSIS — I50.20 HEART FAILURE WITH REDUCED EJECTION FRACTION: Chronic | ICD-10-CM

## 2023-01-04 DIAGNOSIS — E78.5 HYPERLIPIDEMIA LDL GOAL <70: Chronic | ICD-10-CM

## 2023-01-04 PROCEDURE — 99215 OFFICE O/P EST HI 40 MIN: CPT | Mod: PBBFAC | Performed by: INTERNAL MEDICINE

## 2023-01-04 RX ORDER — IBUPROFEN 200 MG
1 TABLET ORAL DAILY
Qty: 28 PATCH | Refills: 2 | Status: SHIPPED | OUTPATIENT
Start: 2023-01-04 | End: 2023-02-28

## 2023-01-04 RX ORDER — ASPIRIN/CALCIUM CARB/MAGNESIUM 325 MG
4 TABLET ORAL
Qty: 108 LOZENGE | Refills: 1 | Status: SHIPPED | OUTPATIENT
Start: 2023-01-04

## 2023-01-04 NOTE — PROGRESS NOTES
CHIEF COMPLAINT:   Chief Complaint   Patient presents with    3mt f/u with echo,labs                     Review of patient's allergies indicates:  No Known Allergies                                    HPI:  Linn Mckeon 57 y.o. female with Diabetes, DAVION on BiPAP, HTN, HLD, Depression, COPD on home O2, and Tobacco Use presents for follow up. Patient completed Lexiscan stress test on September 7, 2022 which was normal.  See report below.  She also completed KWADWO testing on September 13, 2022 which revealed mild arterial flow reduction in the bilateral lower extremities.  See report below.  She completed an Echocardiogram on July 26, 2022 which revealed a reduced ejection fraction of 40% with Grade 1 diastolic dysfunction.  See report below.    She previously completed an Echocardiogram on May 10, 2021 which revealed mild concentric LVH, EF of approximately 50 to 55%, mild MR, and mild aortic valve sclerosis without stenosis.     Patient had a hospital admission on September 11, 2022 for COPD exacerbation and acute CHF exacerbation.  She was treated with IV Lasix, antibiotics, steroids, and neb treatments.      Today, pt reports feeling better. She reports significant improvement in her shortness of breath, fatigue and and peripheral edema recently. She denies orthopnea, PND, palpitations, chest pain. She has been watching her diet and was started on jardiance and her A1c has improved from 11.7 to 8.9%. She is followed by the smoking cessation program and continues to smoke 4-5 cigarettes a day.      Results for orders placed during the hospital encounter of 12/06/22    Echo    Interpretation Summary  · The left ventricle is normal in size with mild concentric hypertrophy and low normal systolic function.  · Normal right ventricular size with low normal right ventricular systolic function.  · The estimated ejection fraction is 50%.  · Grade I left ventricular diastolic dysfunction.  · Mild to moderate pulmonic  regurgitation.      KWADWO Testing 9.13.22:  The right lower extremity demonstrated multiphasic waveforms at all levels.   The KWADWO on the right was mildly abnormal.  The right lower extremity demonstrated mild arterial flow reduction.  The left lower extremity demonstrated multiphasic waveforms at all levels.   The KWADWO on the left was mildly abnormal.  The left lower extremity demonstrated mild arterial flow reduction.                                                                                                                                                                                   Lexiscan Stress Test 9.7.22:  Normal myocardial perfusion scan. There is no evidence of myocardial ischemia or infarction.  The gated perfusion images showed an ejection fraction of 55% at rest. The gated perfusion images showed an ejection fraction of 51% post stress.  The EKG portion of this study is abnormal but not diagnostic.  The patient reported no chest pain during the stress test.  There were no arrhythmias during stress.                                                                                                                                                                                                                                                                                                    CARDIAC TESTING:  Results for orders placed during the hospital encounter of 07/26/22    Echo    Interpretation Summary  · Concentric hypertrophy and mildly decreased systolic function.  · The estimated ejection fraction is 40%.  · Grade I left ventricular diastolic dysfunction.  · Severe right ventricular enlargement.  · Mild left atrial enlargement.  · Moderate right atrial enlargement.  · Mild mitral regurgitation.  · Mild tricuspid regurgitation.  · Elevated central venous pressure (15 mmHg).  · The estimated PA systolic pressure is 56 mmHg.  · There is pulmonary hypertension.  · IVC is dilated and collapses<50%  with inspiration.  · Mild to moderate pulmonic regurgitation.           Patient Active Problem List   Diagnosis    GOODMAN (dyspnea on exertion)    Chronic heart failure with preserved ejection fraction    Primary hypertension    Hyperlipidemia LDL goal <70    Type 2 diabetes mellitus with hyperglycemia, with long-term current use of insulin    Peripheral edema    DAVION on CPAP    Class 3 severe obesity due to excess calories with serious comorbidity and body mass index (BMI) of 45.0 to 49.9 in adult    Noncompliance with treatment plan    Xerosis of skin    Callus of foot    Dystrophic nail    Non compliance w medication regimen    Heart failure with reduced ejection fraction    Tobacco use    COPD (chronic obstructive pulmonary disease)    Vitamin D deficiency    Wellness examination     Past Surgical History:   Procedure Laterality Date    HYSTERECTOMY       Social History     Socioeconomic History    Marital status:    Tobacco Use    Smoking status: Every Day     Packs/day: 0.25     Types: Cigarettes    Smokeless tobacco: Never    Tobacco comments:     Smokes 5 cigarettes a day   Substance and Sexual Activity    Alcohol use: Not Currently    Drug use: Never    Sexual activity: Not Currently     Social Determinants of Health     Financial Resource Strain: Low Risk     Difficulty of Paying Living Expenses: Not very hard   Food Insecurity: No Food Insecurity    Worried About Running Out of Food in the Last Year: Never true    Ran Out of Food in the Last Year: Never true   Transportation Needs: No Transportation Needs    Lack of Transportation (Medical): No    Lack of Transportation (Non-Medical): No   Physical Activity: Inactive    Days of Exercise per Week: 0 days    Minutes of Exercise per Session: 0 min   Stress: No Stress Concern Present    Feeling of Stress : Only a little   Social Connections: Moderately Isolated    Frequency of Communication with Friends and Family: More than three times a week     Frequency of Social Gatherings with Friends and Family: More than three times a week    Attends Mandaen Services: More than 4 times per year    Active Member of Clubs or Organizations: No    Attends Club or Organization Meetings: Never    Marital Status:    Housing Stability: Low Risk     Unable to Pay for Housing in the Last Year: No    Number of Places Lived in the Last Year: 1    Unstable Housing in the Last Year: No        Family History   Problem Relation Age of Onset    Hypertension Mother     Heart disease Mother     Diabetes Mother     Heart attack Father     Diabetes Sister     Hypertension Sister     Diabetes Brother     Hypertension Brother          Current Outpatient Medications:     albuterol (PROVENTIL) 2.5 mg /3 mL (0.083 %) nebulizer solution, Take 3 mLs (2.5 mg total) by nebulization every 6 (six) hours as needed for Wheezing. Rescue, Disp: 90 mL, Rfl: 1    ammonium lactate (LAC-HYDRIN) 12 % lotion, APPLY TO DRY SKIN AND RUB IN WELL TWICE DAILY. NOTHING BETWEEN TOES, Disp: 226 g, Rfl: 11    atorvastatin (LIPITOR) 80 MG tablet, Take 1 tablet (80 mg total) by mouth once daily., Disp: 90 tablet, Rfl: 3    buPROPion (WELLBUTRIN SR) 150 MG TBSR 12 hr tablet, Take 1 tablet (150 mg ) by mouth 2 (two) times daily., Disp: 60 tablet, Rfl: 0    carvediloL (COREG) 25 MG tablet, Take 1 tablet (25 mg total) by mouth 2 (two) times daily., Disp: 180 tablet, Rfl: 3    empagliflozin (JARDIANCE) 10 mg tablet, Take 1 tablet (10 mg total) by mouth once daily., Disp: 30 tablet, Rfl: 6    ergocalciferol (ERGOCALCIFEROL) 50,000 unit Cap, Take 1 capsule (50,000 Units total) by mouth every 7 days., Disp: 12 capsule, Rfl: 2    ezetimibe (ZETIA) 10 mg tablet, Take 1 tablet (10 mg total) by mouth once daily., Disp: 90 tablet, Rfl: 3    fluticasone-umeclidin-vilanter (TRELEGY ELLIPTA) 100-62.5-25 mcg DsDv, Inhale 1 puff into the lungs once daily., Disp: 28 each, Rfl: 3    furosemide (LASIX) 40 MG tablet, Take 1 tablet  "(40 mg total) by mouth 2 (two) times a day., Disp: 180 tablet, Rfl: 3    insulin glargine (LANTUS U-100 INSULIN) 100 unit/mL injection, Inject 30 Units into the skin 2 (two) times daily., Disp: 30 mL, Rfl: 11    ipratropium-albuteroL (COMBIVENT RESPIMAT)  mcg/actuation inhaler, Inhale 2 puffs into the lungs every 6 (six) hours as needed for Wheezing. Rescue, Disp: 4 g, Rfl: 1    latanoprost 0.005 % ophthalmic solution, Apply 1 drop to eye., Disp: , Rfl:     metFORMIN (GLUCOPHAGE) 1000 MG tablet, Take 1 tablet (1,000 mg total) by mouth 2 (two) times daily with meals., Disp: 60 tablet, Rfl: 11    nystatin (MYCOSTATIN) ointment, Apply topically 2 (two) times daily., Disp: 15 g, Rfl: 1    sacubitriL-valsartan (ENTRESTO) 24-26 mg per tablet, Take 1 tablet by mouth 2 (two) times daily., Disp: 60 tablet, Rfl: 11    SITagliptin (JANUVIA) 100 MG Tab, Take 1 tablet (100 mg total) by mouth once daily., Disp: 30 tablet, Rfl: 11    spironolactone (ALDACTONE) 25 MG tablet, Take 1 tablet (25 mg total) by mouth once daily., Disp: 90 tablet, Rfl: 3    blood sugar diagnostic Strp, To check BG 2 times daily, to use with insurance preferred meter, Disp: 200 strip, Rfl: 0    blood-glucose meter kit, To check BG 2 times daily, to use with insurance preferred meter, Disp: 1 each, Rfl: 0    lancets (ACCU-CHEK FASTCLIX LANCET DRUM) Misc, USE ONE DAILY, Disp: 100 each, Rfl: 3    pen needle, diabetic 31 gauge x 5/16" Ndle, 1 each by Misc.(Non-Drug; Combo Route) route 2 (two) times daily with meals., Disp: 60 each, Rfl: 11     ROS:                                                                                                                                                                             As per HPI    Blood pressure 135/68, pulse 71, temperature 98.1 °F (36.7 °C), resp. rate 18, height 5' 4" (1.626 m), weight 128.1 kg (282 lb 6.6 oz), SpO2 99 %.   PE:  General: alert and oriented/no acute distress  Eye: EOMI/normal " conjunctiva/no xanthelasma  HENT: normocephalic/moist oral mucosa  Neck: supple/nontender/no carotid bruit  Respiratory: lungs CTA/nonlabored respirations/BS equal/symmetrical expansion/no  chest wall tenderness  Cardiovascular: normal rate/normal rhythm/no murmur/normal peripheral perfusion/no  edema/no JVD  Gastrointestinal: soft/nontender  Musculoskeletal: normal ROM  Integumentary: warm/dry/pink/intact  Neurologic: alert/oriented/normal sensory/no focal deficits  Psychiatric: cooperative/appropriate mood and affect/normal judgment        ASSESSMENT/PLAN:  HFrEF - EF 40% per Echo 7.26.22 - NYHA Class II  Repeat echo in 12/2022 shows improved EF to 60%  EF 50-55% per Echo May 10, 2021  Lexiscan Stress Test 9.7.22:  Normal myocardial perfusion  Continue GDMT with Spironolactone, Coreg, Entresto, jardiance 10mg  Continue lasix 40mg bid  Counseled on the importance of a low salt diet - max 2g daily      HTN  BP at goal - 135/68  Continue Spironolactone, Entresto, and Coreg  Counseled on low salt diet and exercise as tolerated    HLD  LDL goal <70  LDL 56 on repeat labs today 1/4/2023  Continue Atorvastatin 80mg daily and Zetia 10mg daily  Counseled on medication compliance  Counseled on low cholesterol diet     DM  A1c improved to 8.9% after addition of jardiance  Counseled on ADA diet and exercise as tolerated    Tobacco Abuse  Discussed deleterious effects of smoking and how smoking decreases lifespan.  She continues to smoke  4-5 cigarettes per day -  recently referred to the smoking cessation program and is following with them  She is interested in NRT and will send patches and lozenges.    DAVION on CPAP  She reports nightly CPAP compliance and feels she is benefitting from use  Recommended continued nightly CPAP use    BLE pain - improved  KWADWO testing 9.13.22: mild arterial flow reduction in the bilateral lower extremities.     NRT  Follow-up in cardiology clinic in 4 months  Follow-up with PCP as  directed

## 2023-01-05 ENCOUNTER — NUTRITION (OUTPATIENT)
Dept: DIABETES | Facility: CLINIC | Age: 58
End: 2023-01-05
Payer: MEDICAID

## 2023-01-05 VITALS — BODY MASS INDEX: 48.7 KG/M2 | WEIGHT: 283.69 LBS

## 2023-01-05 DIAGNOSIS — Z79.4 TYPE 2 DIABETES MELLITUS WITHOUT COMPLICATION, WITH LONG-TERM CURRENT USE OF INSULIN: Primary | ICD-10-CM

## 2023-01-05 DIAGNOSIS — E11.9 TYPE 2 DIABETES MELLITUS WITHOUT COMPLICATION, WITH LONG-TERM CURRENT USE OF INSULIN: Primary | ICD-10-CM

## 2023-01-05 PROCEDURE — 99212 OFFICE O/P EST SF 10 MIN: CPT | Mod: PBBFAC | Performed by: DIETITIAN, REGISTERED

## 2023-01-05 PROCEDURE — G0108 DIAB MANAGE TRN  PER INDIV: HCPCS | Mod: PBBFAC | Performed by: DIETITIAN, REGISTERED

## 2023-01-05 NOTE — PROGRESS NOTES
Diabetes Care Specialist Progress Note  Author: Lynda Paige RD  Date: 1/5/2023    Program Intake  Reason for Diabetes Program Visit:: Intervention  Type of Intervention:: Individual  Individual: Education  Education: Self-Management Skill Review  Current diabetes risk level:: moderate    Lab Results   Component Value Date    HGBA1C 8.9 (H) 01/04/2023       Clinical              Clinical Assessment  Have you ever experienced hypoglycemia (low blood sugar)?: yes  Are you able to tell when your blood sugar is low?: Yes  What symptoms do you experience?: Dizzy/Light-headed  How do you treat hypoglycemia (low blood sugar)?: 5-6 pieces of hard candy  Have you ever experienced hyperglycemia (high blood sugar)?: yes  Are you able to tell when your blood sugar is high?: Yes  What are your symptoms?: thirst    Medication Information  How many days a week do you miss your medications?: Never  Do you use a pill box or medication chart to help you manage your medications?: Pill box  Do you sometimes have difficulty refilling your medications?: No  Medication adherence impacting ability to self-manage diabetes?: No    Labs  Do you have regular lab work to monitor your medications?: Yes  Type of Regular Lab Work: A1c  Where do you get your labs drawn?: [facility]  Lab Compliance Barriers: No    Nutritional Status  Meal Plan 24 Hour Recall - Breakfast: leftovers - rice dressing due to holidays  Meal Plan 24 Hour Recall - Lunch: same as breakfast  Meal Plan 24 Hour Recall - Dinner: sandwich. Drinks water or Coke Zero  Change in appetite?: No  Current nutritional status an area of need that is impacting patient's ability to self-manage diabetes?: No    Additional Social History    Support  Does anyone support you with your diabetes care?: yes  Who supports you?: family member, son/daughter  Who takes you to your medical appointments?: son/daughter  Does the current support meet the patient's needs?: Yes                                Diabetes Self-Management Skills Assessment         Nutrition/Healthy Eating  Challenges to healthy eating::  (Holiday eating)  Method of carbohydrate measurement:: no method  Patient can identify foods that impact blood sugar.: yes  Patient-identified foods:: starches (bread, pasta, rice, cereal), soda, fruit/fruit juice, sweets  Nutrition/Healthy Eating Skills Assessment Completed:: Yes  Assessment indicates:: Instruction Needed  Area of need?: Yes    Physical Activity/Exercise  Patient's daily activity level:: lightly active  Patient formally exercises outside of work.: no  Patient can identify forms of physical activity.: yes  Stated forms of physical activity:: any movement performed by muscles that uses energy  Physical Activity/Exercise Skills Assessment Completed: : Yes  Assessment indicates:: Adequate understanding  Area of need?: No    Medications  Patient is able to describe current diabetes management routine.: yes  Patient is able to identify current diabetes medications, dosages, and appropriate timing of medications.: yes (Lantus 25 units in AM/30 units PM, Metformin 1000mg BID and [id number] mg daily.)  Patient understands the purpose of the medications taken for diabetes.: yes  Patient reports problems or concerns with current medication regimen.: no  Medication Skills Assessment Completed:: Yes  Assessment indicates:: Adequate understanding  Area of need?: No    Home Blood Glucose Monitoring  Patient states that blood sugar is checked at home daily.: yes  Monitoring Method:: home glucometer, personal continuous glucose monitor  Home glucometer meter type:: accucheck  How often do you check your blood sugar?: Once a day  When do you check your blood sugar?: Before breakfast  When you check what is your typical blood sugar range? :   Blood glucose logs:: yes  Blood glucose logs reviewed today?: yes      Personal CGM type:: is without sensors and reader due to sensor failure and   to replace sensors/reader  CGM Report reviewed?: no  Unable to download personal CGM: pt had to return reader to   Home Blood Glucose Monitoring Skills Assessment Completed: : Yes  Assessment indicates:: Instruction Needed  Area of need?: Yes    Acute Complications  Patient is able to identify types of acute complications: Yes  Patient Identified:: Hypoglycemia  Patient is able to state the basic meaning of hypoglycemia?: Yes  Patient can identify general symptoms of hypoglycemia: yes  Patient identified:: dizziness  Able to state proper treatment of hypoglycemia?: yes  Patient identified:: 5-6 pieces of hard candy  Acute Complications Skills Assessment Completed: : Yes  Assessment indicates:: Adequate understanding  Area of need?: No    Chronic Complications  Patient is taking statin?: Yes  Chronic Complications Skills Assessment Completed: : Yes  Assessment indicates:: Adequate understanding  Area of need?: No           Diabetes Self Support Plan         Assessment Summary and Plan    Based on today's diabetes care assessment, the following areas of need were identified:      Social 12/2/2022   Support No   Access to Mass Media/Tech No   Cognitive/Behavioral Health -   Communication -   Health Literacy -        Clinical 1/5/2023   Medication Adherence No   Lab Compliance No   Nutritional Status No        Diabetes Self-Management Skills 1/5/2023   Diabetes Disease Process/Treatment Options -   Nutrition/Healthy Eating Yes - pt states that she has relapsed on her healthy eating plan due to holidays with goal to improve. Reviewed carb sources and encouraged increased non-starchy vegetables.   Physical Activity/Exercise No   Medication No   Home Blood Glucose Monitoring Yes - see care plan   Acute Complications No   Chronic Complications No   Psychosocial/Coping -          Today's interventions were provided through individual discussion, instruction, and written materials were provided.   Reviewed glucose log with pt and noted an increase in FBS x 1 week. Pt states that she stopped monitoring her diet during holidays and has been eating things such as rice dressing for breakfast and lunch. She ate burger and fries last night with  today. She intends to improve nutrition. She will try 1 slice of toast with peanut butter for breakfast with coffee (sweet-n-low). She has been without her sensors since before Christmas and resumed BGM. States that she had a sensor failure and reached out to the  for help who recommended she send in her sensor and reader. She is waiting for her new sensors and reader. She has been attending smoking cessation classes and has been snacking on grapes to reduce nicotine cravings. Reviewed some low carb options such as sugar-free jello/popsicles. C/o thirst. Reviewed symptoms of hyperglycemia. No c/o hypoglycemia since November. A1C improved to 8.9. Encouraged pt to resume her healthy eating plan to maintain. Reports that she stopped drinking juice completely. States that she feels good and has been more active around house. Issued Diabetes Mgmt Guide.    Patient verbalized understanding of instruction and written materials.  Pt was able to return back demonstration of instructions today. Patient understood key points, needs reinforcement and further instruction.     Diabetes Self-Management Care Plan:    Today's Diabetes Self-Management Care Plan was developed with Linn's input. Linn has agreed to work toward the following goal(s) to improve his/her overall diabetes control.      Care Plan: Diabetes Management   Updates made since 12/6/2022 12:00 AM        Problem: Acute Complications Resolved 1/5/2023        Goal: Patient agrees to identify and manage signs and symptoms of low blood sugar with 15 grams of quick-acting carb Completed 1/5/2023   Start Date: 12/2/2022   This Visit's Progress: Met   Priority: High   Barriers: No Barriers Identified    Note:    1/5/23 - pt able to accurately state hypoglycemia treatment and has not had hypoglycemic episode since Nov       Problem: Blood Glucose Self-Monitoring         Goal: Pt will use Freestyle Harper once received and return to clinic for report review    Start Date: 1/5/2023   Priority: High   Barriers: Lack of Supplies        Task: Reviewed the importance of self-monitoring blood glucose and keeping logs. Completed 1/5/2023        Task: Instructed on how to self-monitor blood glucose using a home glucometer, how to properly dispose of used strips and lancets after use, and how to appropriately store meter and supplies. Completed 1/5/2023          Follow Up Plan     Follow up in about 6 weeks (around 2/16/2023) for health maintenance, monitoring, activity. Review foot care.    Today's care plan and follow up schedule was discussed with patient.  Linn verbalized understanding of the care plan, goals, and agrees to follow up plan.        The patient was encouraged to communicate with his/her health care provider/physician and care team regarding his/her condition(s) and treatment.  I provided the patient with my contact information today and encouraged to contact me via phone or [facility]'s Patient Portal as needed.     Length of Visit   Total Time: 60 Minutes

## 2023-01-06 ENCOUNTER — HOSPITAL ENCOUNTER (OUTPATIENT)
Dept: WOUND CARE | Facility: HOSPITAL | Age: 58
Discharge: HOME | End: 2023-01-06
Attending: NURSE PRACTITIONER
Payer: MEDICAID

## 2023-01-06 ENCOUNTER — HOSPITAL ENCOUNTER (OUTPATIENT)
Dept: RADIOLOGY | Facility: HOSPITAL | Age: 58
Discharge: HOME | End: 2023-01-06
Attending: NURSE PRACTITIONER
Payer: MEDICAID

## 2023-01-06 VITALS
RESPIRATION RATE: 20 BRPM | DIASTOLIC BLOOD PRESSURE: 77 MMHG | HEART RATE: 80 BPM | SYSTOLIC BLOOD PRESSURE: 119 MMHG | TEMPERATURE: 98 F

## 2023-01-06 DIAGNOSIS — B35.1 MYCOTIC TOENAILS: Primary | Chronic | ICD-10-CM

## 2023-01-06 DIAGNOSIS — Z91.199 NONCOMPLIANCE WITH TREATMENT PLAN: ICD-10-CM

## 2023-01-06 DIAGNOSIS — Z91.148 NON COMPLIANCE W MEDICATION REGIMEN: ICD-10-CM

## 2023-01-06 DIAGNOSIS — L60.3 DYSTROPHIC NAIL: ICD-10-CM

## 2023-01-06 DIAGNOSIS — R22.41 FOOT MASS, RIGHT: ICD-10-CM

## 2023-01-06 DIAGNOSIS — Z79.4 TYPE 2 DIABETES MELLITUS WITH OTHER SKIN COMPLICATION, WITH LONG-TERM CURRENT USE OF INSULIN: ICD-10-CM

## 2023-01-06 DIAGNOSIS — L84 CALLUS OF FOOT: ICD-10-CM

## 2023-01-06 DIAGNOSIS — E11.628 TYPE 2 DIABETES MELLITUS WITH OTHER SKIN COMPLICATION, WITH LONG-TERM CURRENT USE OF INSULIN: ICD-10-CM

## 2023-01-06 DIAGNOSIS — L60.2 OVERGROWN NAIL: ICD-10-CM

## 2023-01-06 PROCEDURE — 11057 PARNG/CUTG B9 HYPRKR LES >4: CPT | Mod: ,,, | Performed by: NURSE PRACTITIONER

## 2023-01-06 PROCEDURE — 11721 PR DEBRIDEMENT OF NAILS, 6 OR MORE: ICD-10-PCS | Mod: 59,,, | Performed by: NURSE PRACTITIONER

## 2023-01-06 PROCEDURE — 99212 OFFICE O/P EST SF 10 MIN: CPT | Mod: 25,,, | Performed by: NURSE PRACTITIONER

## 2023-01-06 PROCEDURE — 73630 X-RAY EXAM OF FOOT: CPT | Mod: TC,RT

## 2023-01-06 PROCEDURE — 99211 OFF/OP EST MAY X REQ PHY/QHP: CPT | Mod: 25

## 2023-01-06 PROCEDURE — 11721 DEBRIDE NAIL 6 OR MORE: CPT | Mod: 59,,, | Performed by: NURSE PRACTITIONER

## 2023-01-06 PROCEDURE — 99212 PR OFFICE/OUTPT VISIT, EST, LEVL II, 10-19 MIN: ICD-10-PCS | Mod: 25,,, | Performed by: NURSE PRACTITIONER

## 2023-01-06 PROCEDURE — 11057 PARNG/CUTG B9 HYPRKR LES >4: CPT

## 2023-01-06 PROCEDURE — 11721 DEBRIDE NAIL 6 OR MORE: CPT

## 2023-01-06 PROCEDURE — 11057 PR TRIM BENIGN HYPERKERATOTIC SKIN LESION,>4: ICD-10-PCS | Mod: ,,, | Performed by: NURSE PRACTITIONER

## 2023-01-06 NOTE — PROGRESS NOTES
TODAY'S VISIT NOTE WAS IMPORTED FROM LAST WOUND CLINIC VISIT OF 10/11/2022.  I ATTEST THAT I REVIEWED THE HPI, ROS, LABS, WOUND-RELATED IMAGING, PHYSICAL EXAMINATION, EVALUATION AND PLAN SECTIONS OF IMPORTED NOTE AND REVISED TODAY'S VISIT NOTE TO REFLECT TODAY'S NEW ASSESSMENT FINDINGS AND TODAY'S UPDATES TO WOUND TREATMENT PLAN.          Chief Complaint:  Routine diabetic foot care; new lump on top of right foot      History of Present Illness:  55 yo Black female being seen today for routine diabetic foot care, as well as for evaluation of a new lump on top of right foot.  Lump on foot has been present for approximately two weeks.  Lump is painless, hard, and without redness/drainage.  Has never had a similar lump on feet.   Hx includes treatment plan and medication non-compliance, COPD, HFpEF (40%), chronic hypercapnic respiratory failure with oxygen dependency, uncontrolled Type 2 diabetes, HTN, HLD, DAVION on CPAP, morbid obesity (BMI 48.85), and smoking (25 pk/year history).   Does not desire smoking cessation. Smoking, on average, 1/2 ppd.   Followed by NOEMI Andrews, [facility] IM for PCP.  Last visit with Ms. Gabriel was on 2022.  Followed by [facility] endocrine clinic.  Followed by [facility] cardiology.      Hospitalized 2022 - 2022 for acute CHF and COPD exacerbation.  Now oxygen-dependent most of the time.  Has been referred to pulmonology for Trilogy evaluation.      Review of Most Recent Labs:  2023:  CBC unremarkable.  BUN 31.2.  Cr 1.38.  eGFR 44.   Chol 105.  HDL 39.  LDL 56.  Trig 51.   HgbA1C 8.9.   2022:  CR 0.97.  eGFR >60.  Vit D 10.9.    2022:  eGFR >60, with CR 0.8.  Chol 149, HDL 38, LDL 95, Trig 80.  HgbA1C 10.0.      Imagin2022 arterial NIVA:    The right lower extremity demonstrated multiphasic waveforms at all levels.  The KWADWO on the right was mildly abnormal. The right lower extremity demonstrated mild arterial flow reduction.     The left lower extremity demonstrated  multiphasic waveforms at all levels.   The KWADWO on the left was mildly abnormal.  The left lower extremity demonstrated mild arterial flow reduction.      Today 1/6/2023:  Trying to cut back on smoking.  Smoking < 1/2 ppd, per self-report.  Using Lac-Hydrin and Vaseline on feet, as instructed.  Having some mild discomfort over corns on bottoms of feet with walking.   No new rashes, lesions, or skin breakdown.   Does report numbness and tingling in feet and toes.  Feet feel cold most days.  Does experience pain in calves with prolonged walking and laying down, at times.      10/11/2022:  Sister encouraging her to wear tennis shoes since last week, which she wore to today's visit.  Calluses are not bothering her on feet.  Sister relays she will check Ms. Mckeon' calluses on a regular basis, as well as have other family members check calluses/feet/toes.  No reported complications with wound care or current treatment plan.  Denies fevers, chills, wound odor, wound redness, wound pain, or yellow/green drainage.  No new rashes, lesions, or skin breakdown.  Picked up Lac-Hydrin yesterday and sister is applying that, with Vaseline.      10/6/2022:  Denies numbness, tingling, and pain in feet/toes.  Denies pain in thighs/legs with walking or laying down.  No pain in feet from severe calluses.  Not aware that she had deep cracks in right heel.  Sister sees her every day and is willing to remind Ms. Mckeon to apply Lac-Hydrin and Vaseline to feet.      7/7/2022:  Says she is wearing her CPAP at night, as prescribed.  Still not wanting to stop smoking.  Did not take morning medications before coming to visit.  Wearing sandals again today.  Says she remembers she has Lac-Hydrin cream ordered for feet, however, forgets to use it.  No pain at numerous calluses over bottoms of both feet.  Denies numbness, tingling, and pain in feet/toes.   No new rashes, lesions, or skin breakdown.     4/5/2022:  Denies numbness, tingling, and  pain in feet/toes. Says multiple recurrent calluses over bilateral feet and ends of big toes are not bothering her. Did not take her morning medications this morning before coming to clinic. Did not check CBG last night or this morning. Does not want to stop smoking. Still wearing sandals. States she has Lac-Hydrin in home.    1/31/2022:  Denies numbness, tingling, and pain in feet/toes. Continues wearing open toed sandals. Calluses not bothering her too much on bottoms of feet. She is not sure why she continues to get recurrent calluses to her big toes. No new skin breakdown, rashes, or other skin issues.    12/17/2021:  Does not desire smoking cessation. Not able to feel any pain over bottoms of her feet from significant calluses. Continues wearing sandals due to comfort. No rashes, skin lesions, or skin breakdown. Prescribed oral antibiotics Monday of last week for mild case of pneumonia; still finishing those out. Feeling better.     10/29/2021:  Callus on bottom of left foot is hurting again with walking. Still wearing sandals. Denies numbness, tingling, and pain in feet/toes. Denies rashes, lesions, or skin breakdown.     9/8/2021:  Calluses over feet and big toes have recurred; have been having increased tenderness in those places with walking. No rashes, lesions, or skin breakdown over feet. Continues wearing sandals much of the day. Denies wearing shoes that are too tight against big toes.     7/28/2021:  Other than calluses on both feet, big toes, and long toenails denies any new rashes, lesions, or skin breakdown today. States calluses are tender for a couple of days after trimming, not having any pain with walking with significant calluses. Wears slippers at all times when she is at home.       Review of Systems:   Except as stated in HPI, all other 10 body systems normal      Physical Exam Vitals & Measurements:    Vitals:    01/06/23 1310   BP: 119/77   Pulse: 80   Resp: 20   Temp: 98.1 °F (36.7 °C)          General:  VSS; afebrile. Morbidly obese; in no acute distress.  Wearing tennis shoes today.  Sister with her.  Respiratory:   Dyspneac on moderate exertion; no coughing.  Wearing oxygen via nasal cannula today.   Cardiovascular:    Mild woody non-pitting edema to BLE.  Generalized non-pitting edema around bilateral medial and lateral ankles.  No hemosiderin staining or varicosities. No hair distribution over BLE.  Skin is shiny and taut over anterior tibial areas. Feet and toes warm to touch. No temperature differences between BLE.      Musculoskeletal:  Significantly decreased range of motion, and strength, of all extremities.   Loss of anatomical sites from knees distally secondary to body habitus.   Pes planus bilaterally.  No bunions or hammertoes.   Mildly decreased dorsiflexion and flexion in bilateral feet and hallux toes.  Absent intrinsic muscle ROM in left foot; mildly decreased in right foot.    Neurologic: A&O X 3; cranial nerves intact.   Normal monofilament testing in bilateral feet; no LOPS noted.    Psychiatric: Calm, cooperative. Mood and affect normal. Responses appropriate  Integumentary:     Significant improvement in generalized xerosis over bilateral plantar feet.  Skin is well-hydrated, without cracks and crevices.    Total of 9 thickened and deep corns/calluses over bilateral feet.  Most problematic corn/callus noted to left 3rd plantar metatarsal head.  None of the calluses had underlying ulcers with today's paring.      10 mycotic toenails, which were overgrown and grossly dystrophic, with mild odor with debridement.      Hard, non-tender, and immobile mass to right dorsal 1st met head.  No erythema, bogginess, induration, or fluctuance.  No drainage.                                    Assessment/Plan:    Morbid obesity, BMI 48.85:  This is a co-factor to extensive, deep, and multiple calluses/corns over bilateral feet/toes. Ms. Mckeon is at extreme risk of non-healing diabetic  foot wounds, given her diabetes, treatment plan non-compliance, medication non-compliance, and ongoing smoking.      Non-Compliance with Treatment Plan and Medications:  Improving AEB her choice of shoes, improved BP, improved HgbA1C, and improvement in xerosis over bilateral feet.    1/4/2023:  HgbA1C 8.9.   5/5/2022:  HgbA1C 10.0.   Not checking CBGs.  Does not want to stop smoking.    Type 2 diabetes:  Improving; however, still poorly-controlled, per last HgbA1C.  Normal monofilament testing today.  DP and PT pulses dopplered.    1/4/2023:  CBC unremarkable.  BUN 31.2.  Cr 1.38.  eGFR 44.   Chol 105.  HDL 39.  LDL 56.  Trig 51.   HgbA1C 8.9.   5/5/2022:  eGFR >60, with CR 0.8.  Chol 149, HDL 38, LDL 95, Trig 80.  HgbA1C 10.0.    Being followed by [facility] endocrine.   Teaching reinforced on diabetic foot care principles, and when to notify medical provider of toenail, foot, or nail changes.  Handouts have been given during prior visits on diabetic foot care.     Mycotic Toenails X 10:  Teaching provided on underlying cause of condition, s/s of condition, complications, and treatment options of condition.    RX:  CMPD Voricon 2.67%/Vanc 6.67% nail suspension in DSMO.  Disp 60 mL with 5 refills.  May disp 15 mls per pt request.  Apply up to 2 mls to affect nails twice/day.    Teaching provided on new medication, expected outcomes of medication, how to administer medication, and possible s/e of medications.    RX sent to [facility]     Routine Diabetic Foot Care:  Last visit with PCP, NOEMI Andrews:  11/18/2022  Procedure Today:  debridement of 10 toenails.  Rationale:  To remove fungal, and possibly bacterial, infection from toenails.  Debulking of toenail, will enable topical antibacterial/antifungals to reach nailbed for complete tx of nail infection.    Informed verbal consent obtained.  Using standard podiatry clippers, Dremmel, and [place] boards, I excised infected toenail, and then used Dremmel  to sand down remaining nail to be more flush with surrounding skin.  No bleeding or discomfort during procedure.  Patient tolerated procedure without complications.      Class B Findings:  3 of the following:  Abnormal hair growth  Thickened nails  Skin texture (thin, shiny)    Class C Findings:  Claudication  Cold Feet  Edema  Burning in Feet  Paresthesias (abnormal spontaneous sensations in feet  Q9 - modifier:  One Class B and two Class C findings     Calluses of Bilateral Plantar Feet:  Procedure Today: paring and sanding down of 9 plantar feet calluses  Rationale: Calluses can lead to pre-ulcerative calluses with resultant diabetic foot ulcers.  Also, Ms. Mckeon is reporting mild discomfort in calluses with ambulation.   Informed verbal consent obtained.    Using #4 dermal curette, I pared 9 calluses/corns to healthy soft tissue. No bleeding or discomfort with procedure.     Xerosis of bilateral heels:  Moderate improvement today.  Positive reinforcement provided.   Prescribed Lac-Hydrin followed by Vaseline BID.  CPM  Reinforced teaching to not apply lotions/creams between toes, with rationale.      New right dorsal foot lesion:  No s/s of infection.  Lesion is hard; texture similar to bone.   O:  xray ordered today.      Smoking:  Does not desire cessation.  Smoking, on average, of 1/2 ppd.            RTC in three months for routine DFC and extensive callus care.  60 minutes blocked off for visit, as calluses are extensive and take quite a bit of time to address.

## 2023-01-07 ENCOUNTER — HOSPITAL ENCOUNTER (EMERGENCY)
Facility: HOSPITAL | Age: 58
Discharge: HOME | End: 2023-01-07
Attending: FAMILY MEDICINE
Payer: MEDICAID

## 2023-01-07 VITALS
TEMPERATURE: 98 F | RESPIRATION RATE: 17 BRPM | OXYGEN SATURATION: 97 % | WEIGHT: 282.63 LBS | HEIGHT: 64 IN | DIASTOLIC BLOOD PRESSURE: 73 MMHG | HEART RATE: 67 BPM | BODY MASS INDEX: 48.25 KG/M2 | SYSTOLIC BLOOD PRESSURE: 114 MMHG

## 2023-01-07 DIAGNOSIS — J18.9 PNEUMONIA OF RIGHT LOWER LOBE DUE TO INFECTIOUS ORGANISM: Primary | ICD-10-CM

## 2023-01-07 DIAGNOSIS — R06.00 DYSPNEA: ICD-10-CM

## 2023-01-07 LAB
ALBUMIN SERPL-MCNC: 3.2 G/DL (ref 3.5–5)
ALBUMIN/GLOB SERPL: 0.8 RATIO (ref 1.1–2)
ALP SERPL-CCNC: 154 UNIT/L (ref 40–150)
ALT SERPL-CCNC: 14 UNIT/L (ref 0–55)
AST SERPL-CCNC: 14 UNIT/L (ref 5–34)
BASOPHILS # BLD AUTO: 0.08 X10(3)/MCL (ref 0–0.2)
BASOPHILS NFR BLD AUTO: 0.8 %
BILIRUBIN DIRECT+TOT PNL SERPL-MCNC: 0.9 MG/DL
BNP BLD-MCNC: 42 PG/ML
BUN SERPL-MCNC: 28.1 MG/DL (ref 9.8–20.1)
CALCIUM SERPL-MCNC: 9.9 MG/DL (ref 8.4–10.2)
CHLORIDE SERPL-SCNC: 104 MMOL/L (ref 98–107)
CO2 SERPL-SCNC: 26 MMOL/L (ref 22–29)
CREAT SERPL-MCNC: 1.12 MG/DL (ref 0.55–1.02)
EOSINOPHIL # BLD AUTO: 0.25 X10(3)/MCL (ref 0–0.9)
EOSINOPHIL NFR BLD AUTO: 2.5 %
ERYTHROCYTE [DISTWIDTH] IN BLOOD BY AUTOMATED COUNT: 18.8 % (ref 11–14.5)
FLUAV AG UPPER RESP QL IA.RAPID: NOT DETECTED
FLUBV AG UPPER RESP QL IA.RAPID: NOT DETECTED
GFR SERPLBLD CREATININE-BSD FMLA CKD-EPI: 57 MLS/MIN/1.73/M2
GLOBULIN SER-MCNC: 4.1 GM/DL (ref 2.4–3.5)
GLUCOSE SERPL-MCNC: 164 MG/DL (ref 74–100)
HCT VFR BLD AUTO: 41 % (ref 37–47)
HGB BLD-MCNC: 13 GM/DL (ref 12–16)
IMM GRANULOCYTES # BLD AUTO: 0.12 X10(3)/MCL (ref 0–0.04)
IMM GRANULOCYTES NFR BLD AUTO: 1.2 %
LYMPHOCYTES # BLD AUTO: 1.77 X10(3)/MCL (ref 0.6–4.6)
LYMPHOCYTES NFR BLD AUTO: 17.5 %
MCH RBC QN AUTO: 26.8 PG
MCHC RBC AUTO-ENTMCNC: 31.7 MG/DL (ref 33–36)
MCV RBC AUTO: 84.5 FL (ref 80–94)
MONOCYTES # BLD AUTO: 0.74 X10(3)/MCL (ref 0.1–1.3)
MONOCYTES NFR BLD AUTO: 7.3 %
NEUTROPHILS # BLD AUTO: 7.18 X10(3)/MCL (ref 2.1–9.2)
NEUTROPHILS NFR BLD AUTO: 70.7 %
NRBC BLD AUTO-RTO: 0 % (ref 0–1)
PLATELET # BLD AUTO: 261 X10(3)/MCL (ref 140–371)
PMV BLD AUTO: 10.5 FL (ref 9.4–12.4)
POTASSIUM SERPL-SCNC: 4.5 MMOL/L (ref 3.5–5.1)
PROT SERPL-MCNC: 7.3 GM/DL (ref 6.4–8.3)
RBC # BLD AUTO: 4.85 X10(6)/MCL (ref 4.2–5.4)
SARS-COV-2 RNA RESP QL NAA+PROBE: NOT DETECTED
SODIUM SERPL-SCNC: 141 MMOL/L (ref 136–145)
TROPONIN I SERPL-MCNC: <0.01 NG/ML (ref 0–0.04)
WBC # SPEC AUTO: 10.1 X10(3)/MCL (ref 4.5–11.5)

## 2023-01-07 PROCEDURE — 93005 ELECTROCARDIOGRAM TRACING: CPT

## 2023-01-07 PROCEDURE — 80053 COMPREHEN METABOLIC PANEL: CPT | Performed by: FAMILY MEDICINE

## 2023-01-07 PROCEDURE — 27000221 HC OXYGEN, UP TO 24 HOURS

## 2023-01-07 PROCEDURE — 85025 COMPLETE CBC W/AUTO DIFF WBC: CPT | Performed by: FAMILY MEDICINE

## 2023-01-07 PROCEDURE — 83880 ASSAY OF NATRIURETIC PEPTIDE: CPT | Performed by: FAMILY MEDICINE

## 2023-01-07 PROCEDURE — 99285 EMERGENCY DEPT VISIT HI MDM: CPT | Mod: 25

## 2023-01-07 PROCEDURE — 0240U COVID/FLU A&B PCR: CPT | Performed by: FAMILY MEDICINE

## 2023-01-07 PROCEDURE — 94761 N-INVAS EAR/PLS OXIMETRY MLT: CPT

## 2023-01-07 PROCEDURE — 84484 ASSAY OF TROPONIN QUANT: CPT | Performed by: FAMILY MEDICINE

## 2023-01-07 RX ORDER — BENZONATATE 100 MG/1
100 CAPSULE ORAL 3 TIMES DAILY PRN
Qty: 20 CAPSULE | Refills: 0 | Status: SHIPPED | OUTPATIENT
Start: 2023-01-07 | End: 2023-01-28

## 2023-01-07 RX ORDER — GUAIFENESIN 600 MG/1
600 TABLET, EXTENDED RELEASE ORAL 2 TIMES DAILY
Qty: 20 TABLET | Refills: 0 | Status: SHIPPED | OUTPATIENT
Start: 2023-01-07 | End: 2023-01-17

## 2023-01-07 RX ORDER — LEVOFLOXACIN 750 MG/1
750 TABLET ORAL DAILY
Qty: 7 TABLET | Refills: 0 | Status: SHIPPED | OUTPATIENT
Start: 2023-01-07 | End: 2023-01-14

## 2023-01-07 NOTE — ED PROVIDER NOTES
"Encounter Date: 1/7/2023       History     Chief Complaint   Patient presents with    Cough    Shortness of Breath    Sore Throat     States cough, SOB and sore throat x 2 days.  On home O2 "for years" due to Heart Disease and COPD.  States cough more frequent and productive.       57-year-old female presents emergency room complaints of shortness of breath, sore throat, and cough for the last 2 days.  Denies chest pain.  Denies abdominal pain.  Denies nausea or vomiting.  When symptoms not improve, she came to the emergency room for evaluation.  Symptoms currently moderate, worse with exertion and coughing, nothing makes better    The history is provided by the patient.   Review of patient's allergies indicates:  No Known Allergies  Past Medical History:   Diagnosis Date    Callus of foot 07/07/2022    CHF (congestive heart failure)     Chronic heart failure with preserved ejection fraction 05/20/2022    COPD (chronic obstructive pulmonary disease)     Diabetes mellitus     GOODMAN (dyspnea on exertion) 05/20/2022    Dystrophic nail 07/07/2022    Hyperlipidemia     Hyperlipidemia LDL goal <70 05/20/2022    Hypertension     Morbid obesity with body mass index (BMI) greater than or equal to 50 07/07/2022    Non compliance w medication regimen 07/07/2022    Noncompliance with treatment plan 07/07/2022    DAVION on CPAP 05/20/2022    Peripheral edema 05/20/2022    Primary hypertension 05/20/2022    Type 2 diabetes mellitus with skin complication 05/20/2022    Xerosis of skin 07/07/2022     Past Surgical History:   Procedure Laterality Date    HYSTERECTOMY       Family History   Problem Relation Age of Onset    Hypertension Mother     Heart disease Mother     Diabetes Mother     Heart attack Father     Diabetes Sister     Hypertension Sister     Diabetes Brother     Hypertension Brother      Social History     Tobacco Use    Smoking status: Every Day     Packs/day: 0.25     Types: Cigarettes    Smokeless tobacco: Never    " Tobacco comments:     Smokes 5 cigarettes a day   Substance Use Topics    Alcohol use: Not Currently    Drug use: Never     Review of Systems   Constitutional:  Negative for chills, fatigue and fever.   HENT:  Negative for ear pain, rhinorrhea and sore throat.    Eyes:  Negative for photophobia and pain.   Respiratory:  Positive for cough and shortness of breath. Negative for wheezing.    Cardiovascular:  Negative for chest pain.   Gastrointestinal:  Negative for abdominal pain, diarrhea, nausea and vomiting.   Genitourinary:  Negative for dysuria.   Neurological:  Negative for dizziness, weakness and headaches.   All other systems reviewed and are negative.    Physical Exam     Initial Vitals [01/07/23 0324]   BP Pulse Resp Temp SpO2   (!) 142/82 74 19 98.4 °F (36.9 °C) 99 %      MAP       --         Physical Exam    Nursing note and vitals reviewed.  Constitutional: She appears well-developed and well-nourished. No distress.   HENT:   Head: Normocephalic and atraumatic.   Eyes: Conjunctivae and EOM are normal. Pupils are equal, round, and reactive to light.   Neck: Neck supple.   Normal range of motion.  Cardiovascular:  Normal rate, regular rhythm and normal heart sounds.           Pulmonary/Chest: Breath sounds normal. No respiratory distress. She has no wheezes. She has no rhonchi. She has no rales.   Abdominal: Abdomen is soft. Bowel sounds are normal. She exhibits no distension. There is no abdominal tenderness. There is no rebound and no guarding.   Musculoskeletal:         General: Normal range of motion.      Cervical back: Normal range of motion and neck supple.     Neurological: She is alert and oriented to person, place, and time.   Skin: Skin is warm and dry. Capillary refill takes less than 2 seconds. No erythema.   Psychiatric: She has a normal mood and affect. Her behavior is normal. Judgment and thought content normal.       ED Course   Procedures  Labs Reviewed   COMPREHENSIVE METABOLIC PANEL -  Abnormal; Notable for the following components:       Result Value    Glucose Level 164 (*)     Blood Urea Nitrogen 28.1 (*)     Creatinine 1.12 (*)     Albumin Level 3.2 (*)     Globulin 4.1 (*)     Albumin/Globulin Ratio 0.8 (*)     Alkaline Phosphatase 154 (*)     All other components within normal limits   CBC WITH DIFFERENTIAL - Abnormal; Notable for the following components:    MCHC 31.7 (*)     RDW 18.8 (*)     IG# 0.12 (*)     All other components within normal limits   TROPONIN I - Normal   COVID/FLU A&B PCR - Normal    Narrative:     The Xpert Xpress SARS-CoV-2/FLU/RSV plus is a rapid, multiplexed real-time PCR test intended for the simultaneous qualitative detection and differentiation of SARS-CoV-2, Influenza A, Influenza B, and respiratory syncytial virus (RSV) viral RNA in either nasopharyngeal swab or nasal swab specimens.         B-TYPE NATRIURETIC PEPTIDE - Normal   CBC W/ AUTO DIFFERENTIAL    Narrative:     The following orders were created for panel order CBC Auto Differential.  Procedure                               Abnormality         Status                     ---------                               -----------         ------                     CBC with Differential[[id number]]        Abnormal            Final result                 Please view results for these tests on the individual orders.     EKG Readings: (Independently Interpreted)   01/07/2023 4:35 AM  Rate: 65 bpm  Rhythm: Sinus  Axis: Normal  Intervals: Normal  ST Changes: Nonspecific t-wave changes.  Impression: Normal sinus rhythm with nonspecific t-wave changes.         Imaging Results              X-Ray Chest 1 View (Preliminary result)  Result time 01/07/23 07:15:13      ED Interpretation by Alexis Quinn MD (01/07/23 07:15:13, [facility], Emergency Medicine)    Right lower lobe pneumonia                                     Medications - No data to display              ED Course as of 01/07/23  0718   Sat Jan 07, 2023   0627 Sodium: 141 [MW]   0627 Potassium: 4.5 [MW]   0627 Chloride: 104 [MW]   0627 CO2: 26 [MW]   0627 Glucose(!): 164 [MW]   0627 BUN(!): 28.1 [MW]   0627 Creatinine(!): 1.12 [MW]   0627 WBC: 10.1 [MW]   0627 RBC: 4.85 [MW]   0627 Hemoglobin: 13.0 [MW]   0627 Hematocrit: 41.0 [MW]   0627 Platelets: 261 [MW]   0627 Influenza A, Molecular: Not Detected [MW]   0627 Influenza B, Molecular: Not Detected [MW]   0627 SARS-CoV2 (COVID-19) Qualitative PCR: Not Detected [MW]   0627 BNP: 42.0 [MW]      ED Course User Index  [MW] Alexis Quinn MD                 Clinical Impression:   Final diagnoses:  [R06.00] Dyspnea  [J18.9] Pneumonia of right lower lobe due to infectious organism (Primary)        ED Disposition Condition    Discharge Stable          ED Prescriptions       Medication Sig Dispense Start Date End Date Auth. Provider    levoFLOXacin (LEVAQUIN) 750 MG tablet Take 1 tablet (750 mg total) by mouth once daily. for 7 days 7 tablet 1/7/2023 1/14/2023 Alexis Quinn MD    guaiFENesin (MUCINEX) 600 mg 12 hr tablet Take 1 tablet (600 mg total) by mouth 2 (two) times daily. for 10 days 20 tablet 1/7/2023 1/17/2023 Alexis Quinn MD    benzonatate (TESSALON) 100 MG capsule Take 1 capsule (100 mg total) by mouth 3 (three) times daily as needed for Cough. 20 capsule 1/7/2023 1/28/2023 Alexis Quinn MD          Follow-up Information       Follow up With Specialties Details Why Contact Info    [facility] Emergency Medicine  As needed, If symptoms worsen [address] [facility] [id number] [phone]    Primary Care Physician  In 5 days               Alexis Quinn MD  01/07/23 0718

## 2023-01-09 ENCOUNTER — TELEPHONE (OUTPATIENT)
Dept: WOUND CARE | Facility: HOSPITAL | Age: 58
End: 2023-01-09
Payer: MEDICAID

## 2023-01-09 DIAGNOSIS — Z79.4 TYPE 2 DIABETES MELLITUS WITH HYPERGLYCEMIA, WITH LONG-TERM CURRENT USE OF INSULIN: Chronic | ICD-10-CM

## 2023-01-09 DIAGNOSIS — E11.65 TYPE 2 DIABETES MELLITUS WITH HYPERGLYCEMIA, WITH LONG-TERM CURRENT USE OF INSULIN: Chronic | ICD-10-CM

## 2023-01-09 DIAGNOSIS — E55.9 VITAMIN D DEFICIENCY: Chronic | ICD-10-CM

## 2023-01-09 DIAGNOSIS — I10 PRIMARY HYPERTENSION: Primary | Chronic | ICD-10-CM

## 2023-01-09 DIAGNOSIS — E78.5 HYPERLIPIDEMIA LDL GOAL <70: Chronic | ICD-10-CM

## 2023-01-09 PROBLEM — B35.1 MYCOTIC TOENAILS: Chronic | Status: ACTIVE | Noted: 2023-01-09

## 2023-01-09 NOTE — TELEPHONE ENCOUNTER
I attempted to reach patient this morning to discuss results of Friday's xray of lump on right foot.  No answer; unable to leave a message.

## 2023-01-10 ENCOUNTER — TELEPHONE (OUTPATIENT)
Dept: WOUND CARE | Facility: HOSPITAL | Age: 58
End: 2023-01-10
Payer: MEDICAID

## 2023-01-10 NOTE — TELEPHONE ENCOUNTER
RX sent to [facility] for:  Itraconazole/Ibuprofen/Vanc 3/2/2% W/V Nail polish suspension.

## 2023-01-12 ENCOUNTER — CLINICAL SUPPORT (OUTPATIENT)
Dept: SMOKING CESSATION | Facility: CLINIC | Age: 58
End: 2023-01-12

## 2023-01-12 DIAGNOSIS — F17.200 NICOTINE DEPENDENCE: Primary | ICD-10-CM

## 2023-01-12 PROCEDURE — 99401 PREV MED CNSL INDIV APPRX 15: CPT | Mod: S$GLB,,,

## 2023-01-12 PROCEDURE — 99401 PR PREVENT COUNSEL,INDIV,15 MIN: ICD-10-PCS | Mod: S$GLB,,,

## 2023-01-12 NOTE — PROGRESS NOTES
Individual Follow-Up Form    1/12/2023    Quit Date:     Clinical Status of Patient: Outpatient      Continuing Medication: yes  Wellbutrin       Target Symptoms: Withdrawal and medication side effects. The following were  rated moderate (3) to severe (4) on TCRS:  Moderate (3): none  Severe (4): none    Comments: Spoke with pt via phone regarding smoking cessation progress.  Pt is smoking 4-5 cigarettes per day.  Pt remains on tobacco cessation medication of 150 mg Wellbutrin BID.  No adverse effects noted at this time.  Pt's quit day was today but pt stated that she has been unable to quit.  Pt stated that she was recently diagnosed with pneumonia and knows that she needs to quit.  Pt struggles the most in the morning while drinking her coffee.  Discussed waiting 15 minutes after she finishes her cup of coffee before lighting a cigarette.  Reviewed coping strategies/habitual behavior/relapse prevention with patient.    Diagnosis: F17.200    Next Visit: 2 weeks

## 2023-02-02 ENCOUNTER — CLINICAL SUPPORT (OUTPATIENT)
Dept: SMOKING CESSATION | Facility: CLINIC | Age: 58
End: 2023-02-02

## 2023-02-02 DIAGNOSIS — F17.200 NICOTINE DEPENDENCE: Primary | ICD-10-CM

## 2023-02-02 PROCEDURE — 99401 PREV MED CNSL INDIV APPRX 15: CPT | Mod: S$GLB,,,

## 2023-02-02 PROCEDURE — 99401 PR PREVENT COUNSEL,INDIV,15 MIN: ICD-10-PCS | Mod: S$GLB,,,

## 2023-02-02 RX ORDER — BUPROPION HYDROCHLORIDE 150 MG/1
TABLET, EXTENDED RELEASE ORAL
Qty: 60 TABLET | Refills: 0 | Status: SHIPPED | OUTPATIENT
Start: 2023-02-02

## 2023-02-02 NOTE — PROGRESS NOTES
Individual Follow-Up Form    2/2/2023    Quit Date:     Clinical Status of Patient: Outpatient    Continuing Medication: yes  Wellbutrin    Other Medications: Nicotine patches and lozenges     Target Symptoms: Withdrawal and medication side effects. The following were  rated moderate (3) to severe (4) on TCRS:  Moderate (3): none  Severe (4): none    Comments: Spoke with pt via phone regarding smoking cessation progress.  Pt is smoking 3-4 cigarettes per day.  Pt remains on 150 mg Wellbutrin BID.  No adverse effects noted at this time.  Pt stated that her physician told her that she had to quit and ordered her some 14 mg nicotine patches and 4 mg nicotine lozenges.  Pt has not started using those yet.  Pt stated that she was going to start on Monday.  Reviewed coping strategies/habitual behavior/relapse prevention with patient.  Pt stated that she hopes to be tobacco free by the end of the month.    Diagnosis: F17.200    Next Visit: 2 weeks

## 2023-02-09 ENCOUNTER — HOSPITAL ENCOUNTER (EMERGENCY)
Facility: HOSPITAL | Age: 58
Discharge: HOME | End: 2023-02-09
Attending: FAMILY MEDICINE
Payer: MEDICAID

## 2023-02-09 VITALS
SYSTOLIC BLOOD PRESSURE: 142 MMHG | WEIGHT: 285.94 LBS | BODY MASS INDEX: 48.82 KG/M2 | HEIGHT: 64 IN | DIASTOLIC BLOOD PRESSURE: 78 MMHG | OXYGEN SATURATION: 95 % | TEMPERATURE: 98 F | RESPIRATION RATE: 18 BRPM | HEART RATE: 75 BPM

## 2023-02-09 DIAGNOSIS — S80.822A BLISTER OF LEFT LOWER EXTREMITY, INITIAL ENCOUNTER: Primary | ICD-10-CM

## 2023-02-09 DIAGNOSIS — L03.90 CELLULITIS: ICD-10-CM

## 2023-02-09 LAB
ALBUMIN SERPL-MCNC: 3.3 G/DL (ref 3.5–5)
ALBUMIN/GLOB SERPL: 0.8 RATIO (ref 1.1–2)
ALP SERPL-CCNC: 152 UNIT/L (ref 40–150)
ALT SERPL-CCNC: 21 UNIT/L (ref 0–55)
AST SERPL-CCNC: 17 UNIT/L (ref 5–34)
BASOPHILS # BLD AUTO: 0.1 X10(3)/MCL (ref 0–0.2)
BASOPHILS NFR BLD AUTO: 1.2 %
BILIRUBIN DIRECT+TOT PNL SERPL-MCNC: 1 MG/DL
BUN SERPL-MCNC: 27.3 MG/DL (ref 9.8–20.1)
CALCIUM SERPL-MCNC: 9.9 MG/DL (ref 8.4–10.2)
CHLORIDE SERPL-SCNC: 102 MMOL/L (ref 98–107)
CO2 SERPL-SCNC: 30 MMOL/L (ref 22–29)
CREAT SERPL-MCNC: 1.11 MG/DL (ref 0.55–1.02)
EOSINOPHIL # BLD AUTO: 0.2 X10(3)/MCL (ref 0–0.9)
EOSINOPHIL NFR BLD AUTO: 2.4 %
ERYTHROCYTE [DISTWIDTH] IN BLOOD BY AUTOMATED COUNT: 15.4 % (ref 11.5–17)
GFR SERPLBLD CREATININE-BSD FMLA CKD-EPI: 58 MLS/MIN/1.73/M2
GLOBULIN SER-MCNC: 4.2 GM/DL (ref 2.4–3.5)
GLUCOSE SERPL-MCNC: 189 MG/DL (ref 74–100)
HCT VFR BLD AUTO: 43.2 % (ref 37–47)
HGB BLD-MCNC: 13.5 GM/DL (ref 12–16)
IMM GRANULOCYTES # BLD AUTO: 0.03 X10(3)/MCL (ref 0–0.04)
IMM GRANULOCYTES NFR BLD AUTO: 0.4 %
LYMPHOCYTES # BLD AUTO: 1.25 X10(3)/MCL (ref 0.6–4.6)
LYMPHOCYTES NFR BLD AUTO: 15.1 %
MCH RBC QN AUTO: 27.7 PG
MCHC RBC AUTO-ENTMCNC: 31.3 MG/DL (ref 33–36)
MCV RBC AUTO: 88.7 FL (ref 80–94)
MONOCYTES # BLD AUTO: 0.74 X10(3)/MCL (ref 0.1–1.3)
MONOCYTES NFR BLD AUTO: 8.9 %
NEUTROPHILS # BLD AUTO: 5.97 X10(3)/MCL (ref 2.1–9.2)
NEUTROPHILS NFR BLD AUTO: 72 %
NRBC BLD AUTO-RTO: 0 %
PLATELET # BLD AUTO: 270 X10(3)/MCL (ref 130–400)
PMV BLD AUTO: 10.1 FL (ref 7.4–10.4)
POTASSIUM SERPL-SCNC: 5.1 MMOL/L (ref 3.5–5.1)
PROT SERPL-MCNC: 7.5 GM/DL (ref 6.4–8.3)
RBC # BLD AUTO: 4.87 X10(6)/MCL (ref 4.2–5.4)
SODIUM SERPL-SCNC: 137 MMOL/L (ref 136–145)
WBC # SPEC AUTO: 8.3 X10(3)/MCL (ref 4.5–11.5)

## 2023-02-09 PROCEDURE — 85025 COMPLETE CBC W/AUTO DIFF WBC: CPT | Performed by: NURSE PRACTITIONER

## 2023-02-09 PROCEDURE — 99284 EMERGENCY DEPT VISIT MOD MDM: CPT

## 2023-02-09 PROCEDURE — 80053 COMPREHEN METABOLIC PANEL: CPT | Performed by: NURSE PRACTITIONER

## 2023-02-09 RX ORDER — CLINDAMYCIN HYDROCHLORIDE 150 MG/1
300 CAPSULE ORAL EVERY 8 HOURS
Qty: 60 CAPSULE | Refills: 0 | Status: SHIPPED | OUTPATIENT
Start: 2023-02-09 | End: 2023-02-19

## 2023-02-09 NOTE — ED PROVIDER NOTES
Encounter Date: 2/9/2023       History     Chief Complaint   Patient presents with    Leg Injury     Large blister on left leg     The patient presents with blister and redness to left lower leg. The onset was 3 days ago.  The course/duration of symptoms is constant. Type of injury: none and none known. Location: left lower anterior leg. The character of symptoms is blister, redness, and pain.  The degree at present is minimal. There are exacerbating factors including movement, weight bearing and walking.  The relieving factor is rest. Risk factors consist of DM, CHF, HTN, obesity, COPD on home O2. Prior episodes: none. Therapy today: none. Associated symptoms: none, denies fever, denies chills.       Review of patient's allergies indicates:  No Known Allergies  Past Medical History:   Diagnosis Date    Callus of foot 07/07/2022    CHF (congestive heart failure)     Chronic heart failure with preserved ejection fraction 05/20/2022    COPD (chronic obstructive pulmonary disease)     Diabetes mellitus     GOODMAN (dyspnea on exertion) 05/20/2022    Dystrophic nail 07/07/2022    Hyperlipidemia     Hyperlipidemia LDL goal <70 05/20/2022    Hypertension     Morbid obesity with body mass index (BMI) greater than or equal to 50 07/07/2022    Non compliance w medication regimen 07/07/2022    Noncompliance with treatment plan 07/07/2022    DAVION on CPAP 05/20/2022    Peripheral edema 05/20/2022    Primary hypertension 05/20/2022    Type 2 diabetes mellitus with skin complication 05/20/2022    Xerosis of skin 07/07/2022     Past Surgical History:   Procedure Laterality Date    HYSTERECTOMY       Family History   Problem Relation Age of Onset    Hypertension Mother     Heart disease Mother     Diabetes Mother     Heart attack Father     Diabetes Sister     Hypertension Sister     Diabetes Brother     Hypertension Brother      Social History     Tobacco Use    Smoking status: Every Day     Packs/day: 0.25     Types: Cigarettes     Smokeless tobacco: Never    Tobacco comments:     Smokes 5 cigarettes a day   Substance Use Topics    Alcohol use: Not Currently    Drug use: Never     Review of Systems   Constitutional:  Negative for fever.   HENT:  Negative for sore throat.    Respiratory:  Negative for shortness of breath.    Cardiovascular:  Negative for chest pain.   Gastrointestinal:  Negative for nausea.   Genitourinary:  Negative for dysuria.   Musculoskeletal:  Negative for back pain.   Skin:  Positive for wound. Negative for rash.   Neurological:  Negative for weakness.   Hematological:  Does not bruise/bleed easily.   All other systems reviewed and are negative.    Physical Exam     Initial Vitals [02/09/23 0757]   BP Pulse Resp Temp SpO2   (!) 159/78 79 18 97.7 °F (36.5 °C) (!) 94 %      MAP       --         Physical Exam    Nursing note and vitals reviewed.  Constitutional: She appears well-developed and well-nourished.   HENT:   Head: Normocephalic and atraumatic.   Neck: Neck supple.   Normal range of motion.  Cardiovascular:  Normal rate, regular rhythm, normal heart sounds and intact distal pulses.           Pulmonary/Chest: Breath sounds normal.   Abdominal: Abdomen is soft. Bowel sounds are normal.   Musculoskeletal:         General: Normal range of motion.      Cervical back: Normal range of motion and neck supple.     Neurological: She is alert. She has normal strength.   Skin: Skin is warm and dry.   Approximately 3.5x2cm intact blister to left shin with surrounding erythema, no drainage, good distal pulses   Psychiatric: She has a normal mood and affect.       ED Course   Procedures  Labs Reviewed   COMPREHENSIVE METABOLIC PANEL - Abnormal; Notable for the following components:       Result Value    Carbon Dioxide 30 (*)     Glucose Level 189 (*)     Blood Urea Nitrogen 27.3 (*)     Creatinine 1.11 (*)     Albumin Level 3.3 (*)     Globulin 4.2 (*)     Albumin/Globulin Ratio 0.8 (*)     Alkaline Phosphatase 152 (*)     All  other components within normal limits   CBC WITH DIFFERENTIAL - Abnormal; Notable for the following components:    MCHC 31.3 (*)     All other components within normal limits   CBC W/ AUTO DIFFERENTIAL    Narrative:     The following orders were created for panel order CBC auto differential.  Procedure                               Abnormality         Status                     ---------                               -----------         ------                     CBC with Differential[[id number]]        Abnormal            Final result                 Please view results for these tests on the individual orders.          Imaging Results              X-Ray Tibia Fibula 2 View Left (Final result)  Result time 02/09/23 09:07:40      Final result by Rip Lovett MD (02/09/23 09:07:40)                   Impression:      No acute osseous process appreciated.      Electronically signed by: Rip Lovett  Date:    02/09/2023  Time:    09:07               Narrative:    EXAMINATION:  XR TIBIA FIBULA 2 VIEW LEFT    CLINICAL HISTORY:  Cellulitis, unspecified    TECHNIQUE:  AP and lateral views of the left tibia and fibula.    COMPARISON:  No relevant comparison studies available.    FINDINGS:  No acute fracture or dislocation.  4 cm area of soft tissue swelling along the anterior mid to lower calf.  No tracking subcutaneous gas.  No focal areas of osteolysis.                                       Medications - No data to display  Medical Decision Making:   History:   Old Records Summarized: records from clinic visits and records from previous admission(s).  Clinical Tests:   Lab Tests: Ordered and Reviewed  Radiological Study: Ordered and Reviewed  10:04 AM DISPOSITION: The patient is resting comfortably in no acute distress.  She is hemodynamically stable and is without objective evidence for acute process requiring urgent intervention or hospitalization. I provided counseling to patient with regard to condition, the  treatment plan, specific conditions for return, and the importance of follow up. Detailed written and verbal instructions provided to patient and she expressed a verbal understanding of the discharge instructions and overall management plan. Reiterated the importance of medication administration and safety and advised patient to follow up with primary care provider in 3-5 days or sooner if needed.  Answered questions at this time. The patient is stable for discharge.                           Clinical Impression:   Final diagnoses:  [L03.90] Cellulitis  [S80.822A] Blister of left lower extremity, initial encounter (Primary)        ED Disposition Condition    Discharge Stable          ED Prescriptions       Medication Sig Dispense Start Date End Date Auth. Provider    clindamycin (CLEOCIN) 150 MG capsule Take 2 capsules (300 mg total) by mouth every 8 (eight) hours. for 10 days 60 capsule 2/9/2023 2/19/2023 VALENCIA Garibay          Follow-up Information       Follow up With Specialties Details Why Contact Info    [facility] In 3 days  [address] [facility] [address]  [phone]      [facility] Emergency Medicine In 3 days For wound re-check [address] [facility] [id number] [phone]             VALENCIA Garibay  02/09/23 1004

## 2023-02-13 ENCOUNTER — HOSPITAL ENCOUNTER (EMERGENCY)
Facility: HOSPITAL | Age: 58
Discharge: HOME | End: 2023-02-13
Attending: INTERNAL MEDICINE
Payer: MEDICAID

## 2023-02-13 VITALS
HEIGHT: 64 IN | TEMPERATURE: 98 F | OXYGEN SATURATION: 94 % | SYSTOLIC BLOOD PRESSURE: 167 MMHG | DIASTOLIC BLOOD PRESSURE: 97 MMHG | WEIGHT: 284.81 LBS | RESPIRATION RATE: 24 BRPM | BODY MASS INDEX: 48.62 KG/M2 | HEART RATE: 79 BPM

## 2023-02-13 DIAGNOSIS — Z51.89 VISIT FOR WOUND CHECK: ICD-10-CM

## 2023-02-13 DIAGNOSIS — S80.822D BLISTER OF LEFT LOWER EXTREMITY, SUBSEQUENT ENCOUNTER: Primary | ICD-10-CM

## 2023-02-13 PROCEDURE — 99282 EMERGENCY DEPT VISIT SF MDM: CPT

## 2023-02-13 NOTE — DISCHARGE INSTRUCTIONS
Take all medications as prescribed.     Do not rupture (pop) the blister, allow it to heal on its own.     Return to ER if no improvement in 3 more days and sooner if anything worsens.     Return to ER for any changes or worsening of symptoms.

## 2023-02-13 NOTE — ED PROVIDER NOTES
Encounter Date: 2/13/2023       History     Chief Complaint   Patient presents with    Wound Check     Pt to ed requesting left leg wound check     58 YO AAF in ER for wound recheck of her left lower leg. States she formed a blister several days ago and was seen in the ER on 2/9/23. She has been taking clindamycin as prescribed every 8 hour and feels as though it has improved. Denies fever, chills, chest pain, SOB, abdominal pain, N/V/D, HA or dizziness. No other complaints.     The history is provided by the patient.   Review of patient's allergies indicates:  No Known Allergies  Past Medical History:   Diagnosis Date    Callus of foot 07/07/2022    CHF (congestive heart failure)     Chronic heart failure with preserved ejection fraction 05/20/2022    COPD (chronic obstructive pulmonary disease)     Diabetes mellitus     GOODMAN (dyspnea on exertion) 05/20/2022    Dystrophic nail 07/07/2022    Hyperlipidemia     Hyperlipidemia LDL goal <70 05/20/2022    Hypertension     Morbid obesity with body mass index (BMI) greater than or equal to 50 07/07/2022    Non compliance w medication regimen 07/07/2022    Noncompliance with treatment plan 07/07/2022    DAVION on CPAP 05/20/2022    Peripheral edema 05/20/2022    Primary hypertension 05/20/2022    Type 2 diabetes mellitus with skin complication 05/20/2022    Xerosis of skin 07/07/2022     Past Surgical History:   Procedure Laterality Date    HYSTERECTOMY       Family History   Problem Relation Age of Onset    Hypertension Mother     Heart disease Mother     Diabetes Mother     Heart attack Father     Diabetes Sister     Hypertension Sister     Diabetes Brother     Hypertension Brother      Social History     Tobacco Use    Smoking status: Every Day     Packs/day: 0.25     Types: Cigarettes    Smokeless tobacco: Never    Tobacco comments:     Smokes 5 cigarettes a day   Substance Use Topics    Alcohol use: Not Currently    Drug use: Never     Review of Systems   Constitutional:   Negative for chills and fever.   HENT:  Negative for congestion and sore throat.    Respiratory:  Negative for shortness of breath.    Cardiovascular:  Negative for chest pain.   Gastrointestinal:  Negative for abdominal pain, diarrhea, nausea and vomiting.   Genitourinary:  Negative for dysuria.   Musculoskeletal:  Negative for back pain.   Skin:  Positive for wound. Negative for rash.   Neurological:  Negative for dizziness, weakness, light-headedness and headaches.   Hematological:  Does not bruise/bleed easily.   All other systems reviewed and are negative.    Physical Exam     Initial Vitals [02/13/23 0939]   BP Pulse Resp Temp SpO2   (!) 167/97 79 (!) 24 97.7 °F (36.5 °C) (!) 94 %      MAP       --         Physical Exam    Nursing note and vitals reviewed.  Constitutional: She appears well-developed and well-nourished. She is not diaphoretic. No distress.   HENT:   Head: Normocephalic and atraumatic.   Nose: Nose normal.   Eyes: Conjunctivae are normal.   Cardiovascular:  Normal rate, regular rhythm and intact distal pulses.           Pulmonary/Chest: No respiratory distress. She has no wheezes.   Musculoskeletal:         General: No tenderness or edema. Normal range of motion.     Neurological: She is alert and oriented to person, place, and time.   Skin: Skin is warm and dry.        Approximately 4 cm X 3 cm intact blister to left lower leg anteriorly with mild surrounding erythema, no pain, no drainage and no warmth, no swelling or induration noted (see photo in chart)   Psychiatric: She has a normal mood and affect.         ED Course   Procedures  Labs Reviewed - No data to display       Imaging Results    None          Medications - No data to display  Medical Decision Making:   History:   Old Medical Records: I decided to obtain old medical records.  Old Records Summarized: other records.       <> Summary of Records: ER visit from 2/9/23 reviewed and noted pt was prescribed clindamycin and to return to  ER for recheck  Clinical Tests:   Radiological Study: Reviewed                        Clinical Impression:   Final diagnoses:  [Z51.89] Visit for wound check  [L74.926S] Blister of left lower extremity, subsequent encounter (Primary)        ED Disposition Condition    Discharge Stable          ED Prescriptions    None       Follow-up Information       Follow up With Specialties Details Why Contact Info    [facility] Schedule an appointment as soon as possible for a visit in 3 days  [address] [facility] [address]  [phone]      [facility] Emergency Medicine In 3 days As needed, If symptoms worsen [address] [facility] [address] [phone]             GLENNA Pacheco  02/13/23 0956       GLENNA Pacheco  02/13/23 0958

## 2023-02-16 ENCOUNTER — HOSPITAL ENCOUNTER (EMERGENCY)
Facility: HOSPITAL | Age: 58
Discharge: HOME | End: 2023-02-16
Attending: STUDENT IN AN ORGANIZED HEALTH CARE EDUCATION/TRAINING PROGRAM
Payer: MEDICAID

## 2023-02-16 VITALS
HEIGHT: 64 IN | RESPIRATION RATE: 16 BRPM | WEIGHT: 284.38 LBS | DIASTOLIC BLOOD PRESSURE: 97 MMHG | SYSTOLIC BLOOD PRESSURE: 145 MMHG | TEMPERATURE: 98 F | OXYGEN SATURATION: 99 % | HEART RATE: 67 BPM | BODY MASS INDEX: 48.55 KG/M2

## 2023-02-16 DIAGNOSIS — S80.822D BLISTER OF LEFT LOWER EXTREMITY, SUBSEQUENT ENCOUNTER: Primary | ICD-10-CM

## 2023-02-16 DIAGNOSIS — Z51.89 ENCOUNTER FOR WOUND RE-CHECK: ICD-10-CM

## 2023-02-16 PROCEDURE — 99282 EMERGENCY DEPT VISIT SF MDM: CPT

## 2023-02-16 NOTE — ED NOTES
ER follow up appointment scheduled with PCP Leslie FRANKLIN 3/3/23 at 7:45.  Appointment provided to patient prior to discharge from ER

## 2023-02-16 NOTE — ED PROVIDER NOTES
Encounter Date: 2/16/2023       History     Chief Complaint   Patient presents with    Wound Check     WOUND CHECK TO BLISTER ON LT LOWER LEG, SOME IMPROVEMENT W RX MEDS.      Linn Mckeon is a 57 y.o. female with a history of CHF, COPD, DM, HTN, HLD who presents to the ED for evaluation of left lower leg wound. Patient reports forming a blister several days ago and was seen in the ER on 2/9/23. She has been taking clindamycin as prescribed every 8 hours. Patient feels as though her wound has improved, but wanted it evaluated just to be sure. Denies fever, chills, chest pain, SOB, abdominal pain, N/V/D, HA or dizziness. No other complaints.     The history is provided by the patient. No  was used.   Review of patient's allergies indicates:  No Known Allergies  Past Medical History:   Diagnosis Date    Callus of foot 07/07/2022    CHF (congestive heart failure)     Chronic heart failure with preserved ejection fraction 05/20/2022    COPD (chronic obstructive pulmonary disease)     Diabetes mellitus     GOODMAN (dyspnea on exertion) 05/20/2022    Dystrophic nail 07/07/2022    Hyperlipidemia     Hyperlipidemia LDL goal <70 05/20/2022    Hypertension     Morbid obesity with body mass index (BMI) greater than or equal to 50 07/07/2022    Non compliance w medication regimen 07/07/2022    Noncompliance with treatment plan 07/07/2022    DAVION on CPAP 05/20/2022    Peripheral edema 05/20/2022    Primary hypertension 05/20/2022    Type 2 diabetes mellitus with skin complication 05/20/2022    Xerosis of skin 07/07/2022     Past Surgical History:   Procedure Laterality Date    HYSTERECTOMY       Family History   Problem Relation Age of Onset    Hypertension Mother     Heart disease Mother     Diabetes Mother     Heart attack Father     Diabetes Sister     Hypertension Sister     Diabetes Brother     Hypertension Brother      Social History     Tobacco Use    Smoking status: Every Day     Packs/day: 0.25      Types: Cigarettes    Smokeless tobacco: Never    Tobacco comments:     Smokes 5 cigarettes a day   Substance Use Topics    Alcohol use: Not Currently    Drug use: Never     Review of Systems   Constitutional:  Negative for chills and fever.   HENT:  Negative for congestion and sore throat.    Eyes:  Negative for pain and redness.   Respiratory:  Negative for cough and shortness of breath.    Cardiovascular:  Negative for chest pain and leg swelling.   Gastrointestinal:  Negative for abdominal pain and nausea.   Genitourinary:  Negative for dysuria and flank pain.   Musculoskeletal:  Negative for arthralgias and back pain.   Skin:  Positive for wound. Negative for rash.   Neurological:  Negative for dizziness and weakness.   Hematological:  Does not bruise/bleed easily.   Psychiatric/Behavioral:  Negative for agitation and confusion.      Physical Exam     Initial Vitals [02/16/23 0958]   BP Pulse Resp Temp SpO2   (!) 145/97 67 16 97.9 °F (36.6 °C) 99 %      MAP       --         Physical Exam    Nursing note and vitals reviewed.  Constitutional: She appears well-developed and well-nourished. No distress.   HENT:   Head: Normocephalic and atraumatic.   Mouth/Throat: No oropharyngeal exudate.   Eyes: EOM are normal. No scleral icterus.   Neck: Neck supple.   Normal range of motion.  Cardiovascular:  Normal rate and regular rhythm.           No murmur heard.  Pulmonary/Chest: Breath sounds normal. No respiratory distress. She has no wheezes.   Abdominal: Abdomen is soft. Bowel sounds are normal. She exhibits no distension. There is no abdominal tenderness.   Musculoskeletal:         General: No tenderness. Normal range of motion.      Cervical back: Normal range of motion and neck supple.     Neurological: She is alert and oriented to person, place, and time. No cranial nerve deficit.   Skin: Skin is warm and dry. Capillary refill takes less than 2 seconds. No erythema.   Approx 4 cm X 3 cm intact blister to left  anterior lower leg. There is some surrounding erythema that appears improved in comparison to previous images. No pain, no drainage and no warmth, no swelling or induration noted. See image below   Psychiatric: She has a normal mood and affect. Her behavior is normal. Judgment and thought content normal.         ED Course   Procedures  Labs Reviewed - No data to display       Imaging Results    None          Medications - No data to display  Medical Decision Making:   ED Management:  Blister is well healing. Appears improved in comparison to prior images. Pt still has 2 days of clindamycin, encouraged her to continue. Apt scheduled with her PCP for follow up in 2 weeks. ED return precautions for any acute worsening given. Patient verbalized understanding. All questions answered.      APC / Resident Notes:   I was not physically present during the history, exam or disposition of this patient. I was available at all times for consultation. (Tanner)                   Clinical Impression:   Final diagnoses:  [Z51.89] Encounter for wound re-check  [S80.822D] Blister of left lower extremity, subsequent encounter (Primary)        ED Disposition Condition    Discharge Stable          ED Prescriptions    None       Follow-up Information       Follow up With Specialties Details Why Contact Info    [facility] Emergency Medicine  If symptoms worsen [address] [facility] [id number] [phone]    [facility] On 3/3/2023  [address] [facility] [address]  [phone]               Ines Alcocer PA-C  02/16/23 1155       Rigoberto Hart MD  02/17/23 0030

## 2023-02-20 PROBLEM — Z00.00 WELLNESS EXAMINATION: Status: RESOLVED | Noted: 2022-11-18 | Resolved: 2023-02-20

## 2023-02-24 ENCOUNTER — NUTRITION (OUTPATIENT)
Dept: DIABETES | Facility: CLINIC | Age: 58
End: 2023-02-24
Payer: MEDICAID

## 2023-02-24 ENCOUNTER — TELEPHONE (OUTPATIENT)
Dept: DIABETES | Facility: CLINIC | Age: 58
End: 2023-02-24

## 2023-02-24 VITALS — WEIGHT: 287.63 LBS | BODY MASS INDEX: 49.37 KG/M2

## 2023-02-24 DIAGNOSIS — Z79.4 TYPE 2 DIABETES MELLITUS WITHOUT COMPLICATION, WITH LONG-TERM CURRENT USE OF INSULIN: Primary | ICD-10-CM

## 2023-02-24 DIAGNOSIS — E11.9 TYPE 2 DIABETES MELLITUS WITHOUT COMPLICATION, WITH LONG-TERM CURRENT USE OF INSULIN: Primary | ICD-10-CM

## 2023-02-24 PROCEDURE — 99212 OFFICE O/P EST SF 10 MIN: CPT | Mod: PBBFAC | Performed by: DIETITIAN, REGISTERED

## 2023-02-24 PROCEDURE — G0108 DIAB MANAGE TRN  PER INDIV: HCPCS | Mod: PBBFAC | Performed by: DIETITIAN, REGISTERED

## 2023-02-24 RX ORDER — FLASH GLUCOSE SCANNING READER
EACH MISCELLANEOUS
Qty: 1 EACH | Refills: 0 | Status: SHIPPED | OUTPATIENT
Start: 2023-02-24

## 2023-02-24 NOTE — TELEPHONE ENCOUNTER
I saw Ms Esteves today. She needs a script for her FreeStyle Harper 2 reader sent to [facility] pharmacy. Her previous reader malfunctioned. I contacted Charo Perez who was able to arrange for [employer] to send a  voucher for a free reader. Let me know if you have any questions. Thank you!

## 2023-02-24 NOTE — PROGRESS NOTES
Diabetes Care Specialist Progress Note  Author: Lynda Paige RD  Date: 2/24/2023    Program Intake  Reason for Diabetes Program Visit:: Intervention  Type of Intervention:: Individual  Individual: Education  Education: Self-Management Skill Review  Current diabetes risk level:: moderate  Permission to speak with others about care:: yes    Lab Results   Component Value Date    HGBA1C 8.9 (H) 01/04/2023       Clinical              Clinical Assessment  Are you able to tell when your blood sugar is low?: Yes  How do you treat hypoglycemia (low blood sugar)?: 1/2 can soda/fruit juice, 5-6 pieces of hard candy              Nutritional Status  Meal Plan 24 Hour Recall - Dinner: sausage, rice and mac and cheese. Drinking mostly water and zero beverages  Change in appetite?: Yes (c/o hunger)  Recent Changes in Weight: Weight Gain  Current nutritional status an area of need that is impacting patient's ability to self-manage diabetes?: No    Additional Social History                                    Diabetes Self-Management Skills Assessment         Nutrition/Healthy Eating  Method of carbohydrate measurement:: no method  Patient can identify foods that impact blood sugar.: yes  Patient-identified foods:: starches (bread, pasta, rice, cereal), sweets, soda, fruit/fruit juice  Nutrition/Healthy Eating Skills Assessment Completed:: Yes  Assessment indicates:: Instruction Needed  Area of need?: Yes    Physical Activity/Exercise  Patient's daily activity level:: lightly active  Patient can identify forms of physical activity.: yes  Stated forms of physical activity:: housework  Physical Activity/Exercise Skills Assessment Completed: : Yes  Assessment indicates:: Adequate understanding    Medications  Diabetes management routine:: insulin, oral medications  Patient is able to identify current diabetes medications, dosages, and appropriate timing of medications.: no (Lantus 30units in AM, stopped taking PM dose due to fear of  hypoglycemia. Taking Metformin 1000mg BID and Jardiance 10 mg daily.)  Patient understands the purpose of the medications taken for diabetes.: no  Patient reports problems or concerns with current medication regimen.: yes  Medication regimen problems/concerns::  (concerned about hypoglycemia)  Medication Skills Assessment Completed:: Yes  Assessment indicates:: Instruction Needed  Area of need?: Yes    Home Blood Glucose Monitoring  Patient states that blood sugar is checked at home daily.: yes  Monitoring Method:: home glucometer  Home glucometer meter type:: accucheck  How often do you check your blood sugar?: Once a day  When do you check your blood sugar?: Before breakfast  When you check what is your typical blood sugar range? : 124-282  Blood glucose logs:: yes, encouraged to bring logs to provider visits  Blood glucose logs reviewed today?: yes      Personal CGM type:: [employer] has not returned her reader  Patient is able to use personal CGM appropriately.: yes  Unable to download personal CGM: reader has not been returned from [employer]  Home Blood Glucose Monitoring Skills Assessment Completed: : Yes  Assessment indicates:: Instruction Needed  Area of need?: Yes    Acute Complications  Patient is able to identify types of acute complications: Yes  Patient Identified:: Hypoglycemia  Patient is able to state the basic meaning of hypoglycemia?: Yes  Able to state the blood sugar range for hypoglycemia?: no (see comments)  Patient can identify general symptoms of hypoglycemia: yes  Patient identified:: dizziness  Able to state proper treatment of hypoglycemia?: yes  Patient identified:: 5-6 pieces of hard candy, 1/2 can soda/fruit juice  Acute Complications Skills Assessment Completed: : Yes  Assessment indicates:: Adequate understanding  Area of need?: No    Chronic Complications  Chronic Complications Skills Assessment Completed: : No  Deferred due to:: Time           Diabetes Self Support Plan         Assessment  Summary and Plan    Based on today's diabetes care assessment, the following areas of need were identified:      Social 12/2/2022   Support No   Access to Mass Media/Tech No   Cognitive/Behavioral Health -   Communication -   Health Literacy -        Clinical 2/24/2023   Medication Adherence -   Lab Compliance -   Nutritional Status No        Diabetes Self-Management Skills 2/24/2023   Diabetes Disease Process/Treatment Options -   Nutrition/Healthy Eating Yes - reviewed label reading, increasing non-starchy vegetables, carb portions and recipe web sites   Physical Activity/Exercise -   Medication Yes - resume evening Lantus at 30 units. Monitor for symptoms of hypoglycemia. Reviewed tx of hypoglycemia and when to contact clinic.   Home Blood Glucose Monitoring Yes - see care plan   Acute Complications No   Chronic Complications -   Psychosocial/Coping -          Today's interventions were provided through individual discussion, instruction, and written materials were provided. Pt continues to use BGM for FBS. Denies hypoglycemia. Noted FBS >200 most days and more so ever last 1-2 weeks. Reports being treated for leg wound and recent pneumonia. Diet recall indicates high carb intake at meals. Reviewed portions and limiting carbs to <=45 gm per meal. Drinking sugar-free drinks. Reports that she stopped evening Lantus due to fear of hypoglycemia. Instructed to resume evening Lantus and to contact clinic for glucose <90. Pt agrees to begin evening Lantus. See careplan for Freestyle Harper reader resolution. Will follow-up with result and contact provider should Mg be unable to assist with new reader.     Patient verbalized understanding of instruction and written materials.  Pt was able to return back demonstration of instructions today. Patient understood key points, needs reinforcement and further instruction.     Diabetes Self-Management Care Plan:    Today's Diabetes Self-Management Care Plan was developed with  Linn's input. Linn has agreed to work toward the following goal(s) to improve his/her overall diabetes control.      Care Plan: Diabetes Management   Updates made since 1/25/2023 12:00 AM        Problem: Blood Glucose Self-Monitoring         Goal: Pt will use Freestyle Harper once received and return to clinic for report review    Start Date: 1/5/2023   This Visit's Progress: Not met   Priority: High   Barriers: Lack of Supplies   Note:    2/24/23 - Pt was told by [employer] that she needs a new Rx from her provider for an additional reader and [employer] is unable to return her reader. However, her insurance provided her with a reader within the last year. Contacted Charo Perez, Diabetes Care Specialist, from [employer] for assistance. She asks that pt contact her and she will discuss with [employer] on three-way call. Contacted pt to provide Charo's phone # and instructions to contact her.          Follow Up Plan     Follow up in about 2 months (around 4/24/2023) for Harper report review; , nutrition, activity, health maintenance, chronic complications.    Today's care plan and follow up schedule was discussed with patient.  Linn verbalized understanding of the care plan, goals, and agrees to follow up plan.        The patient was encouraged to communicate with his/her health care provider/physician and care team regarding his/her condition(s) and treatment.  I provided the patient with my contact information today and encouraged to contact me via phone or [facility] Patient Portal as needed.     Length of Visit   Total Time: 60 Minutes

## 2023-02-28 ENCOUNTER — CLINICAL SUPPORT (OUTPATIENT)
Dept: SMOKING CESSATION | Facility: CLINIC | Age: 58
End: 2023-02-28

## 2023-02-28 DIAGNOSIS — F17.200 NICOTINE DEPENDENCE: Primary | ICD-10-CM

## 2023-02-28 PROCEDURE — 99403 PREV MED CNSL INDIV APPRX 45: CPT | Mod: S$GLB,,,

## 2023-02-28 PROCEDURE — 99403 PR PREVENT COUNSEL,INDIV,45 MIN: ICD-10-PCS | Mod: S$GLB,,,

## 2023-02-28 RX ORDER — IBUPROFEN 200 MG
1 TABLET ORAL DAILY
Qty: 28 PATCH | Refills: 0 | Status: SHIPPED | OUTPATIENT
Start: 2023-02-28

## 2023-02-28 NOTE — PROGRESS NOTES
Individual Follow-Up Form    2/28/2023    Quit Date:     Clinical Status of Patient: Outpatient    Continuing Medication: yes  Patches or Nicotine Lozenges     Target Symptoms: Withdrawal and medication side effects. The following were  rated moderate (3) to severe (4) on TCRS:  Moderate (3): none  Severe (4): none    Comments: Patient presents for follow up smoking 5 cigarettes per day. Pt remains on tobacco cessation medication of 14 mg nicotine patch QD and 4 mg nicotine lozenge PRN which was prescribed by her physician.  No adverse effects noted at this time. Pt stated that she stopped taking the Wellbutrin because she felt that it was not working anymore.  Pt thinks that the 14 mg patches are not strong enough.  Will order 21 mg nicotine patches.  Also discussed trying Chantix.  Pt wants to try the 21 mg patch first and if she has not been able to cut down then she may try Chantix.  Reviewed strategies, habitual behavior, stress, and high risk situations. Introduced stress with addition interventions, SOLVE, relaxation with interventions, nutrition, exercise, weight gain, and the importance of rewarding oneself for accomplishments toward becoming tobacco free. Encouraged pt to choose a quit day.  Pt stated that she will have one chosen by our next appointment.  Pt will continue to work on rate reduction.    Diagnosis: F17.200    Next Visit: 2 weeks

## 2023-03-03 ENCOUNTER — OFFICE VISIT (OUTPATIENT)
Dept: INTERNAL MEDICINE | Facility: CLINIC | Age: 58
End: 2023-03-03
Payer: MEDICAID

## 2023-03-03 VITALS
RESPIRATION RATE: 20 BRPM | SYSTOLIC BLOOD PRESSURE: 128 MMHG | HEIGHT: 64 IN | HEART RATE: 72 BPM | TEMPERATURE: 99 F | OXYGEN SATURATION: 93 % | WEIGHT: 285 LBS | DIASTOLIC BLOOD PRESSURE: 61 MMHG | BODY MASS INDEX: 48.65 KG/M2

## 2023-03-03 DIAGNOSIS — Z79.4 TYPE 2 DIABETES MELLITUS WITH HYPERGLYCEMIA, WITH LONG-TERM CURRENT USE OF INSULIN: Chronic | ICD-10-CM

## 2023-03-03 DIAGNOSIS — S81.802D WOUND OF LEFT LOWER EXTREMITY, SUBSEQUENT ENCOUNTER: Primary | ICD-10-CM

## 2023-03-03 DIAGNOSIS — E11.65 TYPE 2 DIABETES MELLITUS WITH HYPERGLYCEMIA, WITH LONG-TERM CURRENT USE OF INSULIN: Chronic | ICD-10-CM

## 2023-03-03 PROCEDURE — 1159F PR MEDICATION LIST DOCUMENTED IN MEDICAL RECORD: ICD-10-PCS | Mod: CPTII,,, | Performed by: NURSE PRACTITIONER

## 2023-03-03 PROCEDURE — 3078F DIAST BP <80 MM HG: CPT | Mod: CPTII,,, | Performed by: NURSE PRACTITIONER

## 2023-03-03 PROCEDURE — 1160F PR REVIEW ALL MEDS BY PRESCRIBER/CLIN PHARMACIST DOCUMENTED: ICD-10-PCS | Mod: CPTII,,, | Performed by: NURSE PRACTITIONER

## 2023-03-03 PROCEDURE — 4010F PR ACE/ARB THEARPY RXD/TAKEN: ICD-10-PCS | Mod: CPTII,,, | Performed by: NURSE PRACTITIONER

## 2023-03-03 PROCEDURE — 99213 OFFICE O/P EST LOW 20 MIN: CPT | Mod: S$PBB,,, | Performed by: NURSE PRACTITIONER

## 2023-03-03 PROCEDURE — 1159F MED LIST DOCD IN RCRD: CPT | Mod: CPTII,,, | Performed by: NURSE PRACTITIONER

## 2023-03-03 PROCEDURE — 99213 PR OFFICE/OUTPT VISIT, EST, LEVL III, 20-29 MIN: ICD-10-PCS | Mod: S$PBB,,, | Performed by: NURSE PRACTITIONER

## 2023-03-03 PROCEDURE — 3061F PR NEG MICROALBUMINURIA RESULT DOCUMENTED/REVIEW: ICD-10-PCS | Mod: CPTII,,, | Performed by: NURSE PRACTITIONER

## 2023-03-03 PROCEDURE — 3066F NEPHROPATHY DOC TX: CPT | Mod: CPTII,,, | Performed by: NURSE PRACTITIONER

## 2023-03-03 PROCEDURE — 3074F SYST BP LT 130 MM HG: CPT | Mod: CPTII,,, | Performed by: NURSE PRACTITIONER

## 2023-03-03 PROCEDURE — 3008F PR BODY MASS INDEX (BMI) DOCUMENTED: ICD-10-PCS | Mod: CPTII,,, | Performed by: NURSE PRACTITIONER

## 2023-03-03 PROCEDURE — 4010F ACE/ARB THERAPY RXD/TAKEN: CPT | Mod: CPTII,,, | Performed by: NURSE PRACTITIONER

## 2023-03-03 PROCEDURE — 3074F PR MOST RECENT SYSTOLIC BLOOD PRESSURE < 130 MM HG: ICD-10-PCS | Mod: CPTII,,, | Performed by: NURSE PRACTITIONER

## 2023-03-03 PROCEDURE — 1160F RVW MEDS BY RX/DR IN RCRD: CPT | Mod: CPTII,,, | Performed by: NURSE PRACTITIONER

## 2023-03-03 PROCEDURE — 3061F NEG MICROALBUMINURIA REV: CPT | Mod: CPTII,,, | Performed by: NURSE PRACTITIONER

## 2023-03-03 PROCEDURE — 3078F PR MOST RECENT DIASTOLIC BLOOD PRESSURE < 80 MM HG: ICD-10-PCS | Mod: CPTII,,, | Performed by: NURSE PRACTITIONER

## 2023-03-03 PROCEDURE — 3008F BODY MASS INDEX DOCD: CPT | Mod: CPTII,,, | Performed by: NURSE PRACTITIONER

## 2023-03-03 PROCEDURE — 3066F PR DOCUMENTATION OF TREATMENT FOR NEPHROPATHY: ICD-10-PCS | Mod: CPTII,,, | Performed by: NURSE PRACTITIONER

## 2023-03-03 PROCEDURE — 99215 OFFICE O/P EST HI 40 MIN: CPT | Mod: PBBFAC | Performed by: NURSE PRACTITIONER

## 2023-03-03 RX ORDER — MUPIROCIN 20 MG/G
OINTMENT TOPICAL 2 TIMES DAILY
Qty: 22 G | Refills: 1 | Status: SHIPPED | OUTPATIENT
Start: 2023-03-03 | End: 2023-05-18 | Stop reason: SDUPTHER

## 2023-03-03 NOTE — PROGRESS NOTES
Patient ID: [id number]     Chief Complaint: Follow-up (ED follow up left leg.)    HPI:     Linn Mckeon is a 57 y.o. female with diagnosis of HTN, HLD, Class II HFpEF 50-55%, DM2, COPD, DAVION, Tobacco Use, Obesity.  Patient seen in clinic today for ED follow up. Patient last seen in clinic on 11/18/2022.   Patient seen in ED on 02/16/2023 for LLE wound. Patient unsure of how wound occurred. Patient prescribed Clindamycin and discharged home. Patient states she completed antibiotic and has noticed improvement in wound. Patient denies fever, redness, drainage, pain.     Patient followed by Cardiology Clinic. Last appointment on 10/04/2022. Linn Mckeon 57 y.o. female with Diabetes, DAVION on BiPAP, HTN, HLD, Depression, COPD on home O2, and Tobacco Use presents for follow up. Patient completed Lexiscan stress test on September 7, 2022 which was normal. She also completed KWADWO testing on September 13, 2022 which revealed mild arterial flow reduction in the bilateral lower extremities. She completed an Echocardiogram on July 26, 2022 which revealed a reduced ejection fraction of 40% with Grade 1 diastolic dysfunction. She previously completed an Echocardiogram on May 10, 2021 which revealed mild concentric LVH, EF of approximately 50 to 55%, mild MR, and mild aortic valve sclerosis without stenosis. Patient had a hospital admission on September 11, 2022 for COPD exacerbation and acute CHF exacerbation. She was treated with IV Lasix, antibiotics, steroids, and neb treatments. Today, pt reports feeling better. She reports significant improvement in her shortness of breath, fatigue and and peripheral edema recently. She denies orthopnea, PND, palpitations, chest pain. She has been watching her diet and was started on jardiance and her A1c has improved from 11.7 to 8.9%. She is followed by the smoking cessation program and continues to smoke 4-5 cigarettes a day. HFrEF - EF 40% per Echo 7.26.22 - NYHA Class II: Repeat  echo in 12/2022 shows improved EF to 60%; EF 50-55% per Echo May 10, 2021; Lexiscan Stress Test 9.7.22:  Normal myocardial perfusion; Continue GDMT with Spironolactone, Coreg, Entresto, jardiance 10mg; Continue lasix 40mg bid; Counseled on the importance of a low salt diet - max 2g daily. HTN: BP at goal - 135/68; Continue Spironolactone, Entresto, and Coreg; Counseled on low salt diet and exercise as tolerated. HLD: LDL goal <70; LDL 56 on repeat labs today 1/4/2023; Continue Atorvastatin 80mg daily and Zetia 10mg daily; Counseled on medication compliance; Counseled on low cholesterol diet. DM: A1c improved to 8.9% after addition of jardiance; Counseled on ADA diet and exercise as tolerated. Tobacco Abuse: Discussed deleterious effects of smoking and how smoking decreases lifespan; She continues to smoke 4-5 cigarettes per - recently referred to the smoking cessation program and is following with them. She is interested in NRT and will send patches and lozenges. DAVION on CPAP: She reports nightly CPAP compliance and feels she is benefitting from use; Recommended continued nightly CPAP use. BLE pain - improved: KWADWO testing 9.13.22: mild arterial flow reduction in the bilateral lower extremities. NRT: Follow-up in cardiology clinic in 4 months; Follow-up with PCP as directed.     Patient followed by Pulmonology Clinic. Last appointment on 12/20/2022. Linn Mckeon is a 57 y.o. female with PMH of COPD, HFrEF (EF 40%), HTN, HLD, DM, DAVION on CPAP, who presents to Pulmonology clinic today to establish care for presumed COPD. Patient was hospitalized here at [facility] briefly in early September for a COPD exacerbation and was treated with corticosteroids, bronchodilators, and antibiotics. She was discharged with trelegy ellipta as well as home O2, using 2L/min. At the time, patient had no PFTs on file. She has since had PFTs 10/12/2022 which reveal no obstruction, mild restriction, severe diffusion defect that however does  correct to moderate for alveolar volume. She did show positive bronchodilator response. FEV1 was 50% with FEV1/FVC ratio of 89%. Today patient states she is feeling much better than when she was hospitalized. She states she is compliant with her trelegy. She states she uses her rescue inhaler/nebulizer about once daily. She is still using her home O2 that was given to her after hospital discharge. She states she feels a little fatigued and generally run-down but does not note any worsening respiratory symptoms, denying any significant worsening of shortness of breath, cough or sputum production, wheezing, fevers, or chills. She is using her CPAP nightly. No additional concerns or complaints today. 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; COPD; Likely weight-related restrictive lung disease: PFTs in 10/2022 revealed FEV1 50%, FEV1/FVC ratio 89%, positive bronchodilator response, decreased DLCO; Continue trelegy ellipta 100 mcg 1 puff daily, albuterol nebulizer/combivent PRN; Walked patient without O2 in clinic today, lowest SPO2 while ambulating was 94%; She does not currently qualify for renewing O2 prescription based on ambulatory SPO2 testing; Encouraged patient to try to lose weight, discussed various methods to help with weight loss; Anticipate with weight loss her shortness of breath will also improve; Patient currently smoking <5 cigarettes / day and is actively attempting to quit. RTC in 12 months. Patient has follow up appointment scheduled for 12/26/2023.     Patient followed by Endocrinology Clinic. Last appointment on 11/02/2022. 56 year female scheduled today has follow-up to Endocrine Clinic. Patient was scheduled to have 1 month follow-up for history of uncontrolled type 2 diabetes. Regimen was adjusted patient was started on a Harper. Patient returns to clinic today. Patient was started on a Harper. Harper interpretation 10/20/2022 - 11/02/2022. Average glucose 162 GMI indicater 7.2. 6% very  high, 19% high, 75% target range, 0% low, 0% very low patient's scans in average of 2-11 times per day patient occasionally has prandial spikes in the evening indicating a high carb meal which is only occasionally, only 2 hypoglycemic episode is noted after lunch at 69 and prior to lunch at 63.  In the last week patient has had increasing hypoglycemic episodes mid day. Patient states she does not eat breakfast at times will decrease morning insulin patient is to call clinic if any further hypoglycemia occurs.  On patient's Dexcom report patient is 75% at target range and predicted A1c is 7.2.  Nephropathy urine micro elevated 05/06/2022 patient is on On Entresto which contains ACE.  Patient was offered flu vaccine today patient does not take flu vaccine and declined her vaccinations today. Uncontrolled type 2 diabetes mellitus with hyperglycemia. Recent A1C 11.7 previous 10.0 and 8.3. Urine Micro Creatine/ratio: elevated 5/06/2022. Medications: Metformin 1000 mg twice daily, Jardiance 10mg, Lantus 30 units BID  Changes: Decrease am insulin to 25 units. Home Glucometer Use: Harper. Eye Exam: 09/28/2021  repeat fundus today attempted previous and to obtain. Last Hypoglycemic episode:  Occasional. Follow ADA diet, avoid soda, simple sweets, and limit rice, breads and starches. Maintain healthy weight, exercise 4-5 times a week for 30 minutes. Diet and medication compliance discussed on visit. RTC 4 month. Hypoglycemia: Decrease Lantus to 25 units in the a.m. Call if further hypoglycemia occurs. Nephropathy: Urine Micro elevated 05/06/2022; On Entresto which contains ACE. Patient has follow up appointment scheduled for 05/01/2023.      Patient followed by Ophthalmology Clinic. Last appointment on 04/28/2022. DM without retinopathy: Last A1c 03/2021: 10.3%; encouraged good control/regular PCP follow ups; Photos 4/2022; DFE next due 4/2023. Mild POAG OU: No family hx, +AA, thin CCT, upper limit normal IOP. Gonio open 360  OU; C/D: 0.4 OU; Tmax ; OCT RNFL full OU; Ganglion cell layer wnl OU; Latanoprost QHS OU started 21; IOP good today. Dry eye/MGD: Diffuse, confluent PEE OU; Start ATs 4-6 times per day, ointment QHS; Warm compresses BID. Presbyopia: doing well, monitor. HTN retinopathy: Recommend good bp control. RTC 6 mo IOP. No follow up appointment noted, will message clinic.     Review of patient's allergies indicates:  No Known Allergies    Breast Cancer Screenin2022, incomplete, ordered 2022  Cervical Cancer Screening: Hysterectomy  Colorectal Cancer Screening: 10/18/2021, negative, Cologuard ordered 2022  Diabetic Eye Exam: 2022  Diabetic Foot Exam: 2022  Lung Cancer Screening: refuses at this time  Prostate Cancer Screening: N/A  AAA Screening: N/A  Osteoporosis Screening: deferred due to age  Medicare Wellness: N/A  Immunizations:   Immunization History   Administered Date(s) Administered    COVID-19, MRNA, LN-S, PF (Pfizer) (Purple Cap) 2021, 2021     Past Surgical History:   Procedure Laterality Date    HYSTERECTOMY       family history includes Diabetes in her brother, mother, and sister; Heart attack in her father; Heart disease in her mother; Hypertension in her brother, mother, and sister.    Social History     Socioeconomic History    Marital status:    Tobacco Use    Smoking status: Every Day     Packs/day: 0.25     Types: Cigarettes    Smokeless tobacco: Never    Tobacco comments:     Smokes 5 cigarettes a day   Substance and Sexual Activity    Alcohol use: Not Currently    Drug use: Never    Sexual activity: Not Currently     Social Determinants of Health     Financial Resource Strain: Low Risk     Difficulty of Paying Living Expenses: Not very hard   Food Insecurity: No Food Insecurity    Worried About Running Out of Food in the Last Year: Never true    Ran Out of Food in the Last Year: Never true   Transportation Needs: No Transportation Needs     Lack of Transportation (Medical): No    Lack of Transportation (Non-Medical): No   Physical Activity: Inactive    Days of Exercise per Week: 0 days    Minutes of Exercise per Session: 0 min   Stress: No Stress Concern Present    Feeling of Stress : Only a little   Social Connections: Moderately Isolated    Frequency of Communication with Friends and Family: More than three times a week    Frequency of Social Gatherings with Friends and Family: More than three times a week    Attends Restoration Services: More than 4 times per year    Active Member of Clubs or Organizations: No    Attends Club or Organization Meetings: Never    Marital Status:    Housing Stability: Low Risk     Unable to Pay for Housing in the Last Year: No    Number of Places Lived in the Last Year: 1    Unstable Housing in the Last Year: No     Current Outpatient Medications   Medication Instructions    albuterol (PROVENTIL) 2.5 mg, Nebulization, Every 6 hours PRN, Rescue    ammonium lactate (LAC-HYDRIN) 12 % lotion APPLY TO DRY SKIN AND RUB IN WELL TWICE DAILY. NOTHING BETWEEN TOES    atorvastatin (LIPITOR) 80 mg, Oral, Daily    blood sugar diagnostic Strp To check BG 2 times daily, to use with insurance preferred meter    blood-glucose meter kit To check BG 2 times daily, to use with insurance preferred meter    buPROPion (WELLBUTRIN SR) 150 MG TBSR 12 hr tablet Take 1 tablet (150 mg ) by mouth 2 (two) times daily.    carvediloL (COREG) 25 mg, Oral, 2 times daily    CICLOPIROX-ITRACONAZOLE-UREA TOP CLEAN NAILS, SHAKE BOTTLE WELL, APPLY TWICE DAILY TO ALL AFFECTED NAILS USING APPLICATOR. (UP TO 2 MLS/DAY)    empagliflozin (JARDIANCE) 10 mg, Oral, Daily    ergocalciferol (ERGOCALCIFEROL) 50,000 Units, Oral, Every 7 days    ezetimibe (ZETIA) 10 mg, Oral, Daily    flash glucose scanning reader (FREESTYLE JOSE ALFREDO 2 READER) [facility] Freestyle Jose Alfredo 2 reader dispense 1. Use as directed    fluticasone-umeclidin-vilanter (TRELEGY ELLIPTA) 100-62.5-25 mcg  "DsDv 1 puff, Inhalation, Daily    furosemide (LASIX) 40 mg, Oral, 2 times daily    insulin glargine (LANTUS U-100 INSULIN) 30 Units, Subcutaneous, 2 times daily    ipratropium-albuteroL (COMBIVENT RESPIMAT)  mcg/actuation inhaler 2 puffs, Inhalation, Every 6 hours PRN, Rescue    lancets (ACCU-CHEK FASTCLIX LANCET DRUM) Misc USE ONE DAILY    latanoprost 0.005 % ophthalmic solution 1 drop, Ophthalmic    metFORMIN (GLUCOPHAGE) 1,000 mg, Oral, 2 times daily with meals    mupirocin (BACTROBAN) 2 % ointment Topical (Top), 2 times daily    nicotine (NICODERM CQ) 21 mg/24 hr 1 patch, Transdermal, Daily    nicotine polacrilex 4 mg, Oral, As needed (PRN)    nystatin (MYCOSTATIN) ointment Topical (Top), 2 times daily    pen needle, diabetic 31 gauge x 5/16" Ndle 1 each, Misc.(Non-Drug; Combo Route), 2 times daily with meals    sacubitriL-valsartan (ENTRESTO) 24-26 mg per tablet 1 tablet, Oral, 2 times daily    SITagliptin phosphate (JANUVIA) 100 mg, Oral, Daily    spironolactone (ALDACTONE) 25 mg, Oral, Daily       Subjective:     Review of Systems   Constitutional: Negative.    HENT: Negative.     Eyes: Negative.    Respiratory: Negative.     Cardiovascular: Negative.    Gastrointestinal: Negative.    Endocrine: Negative.    Genitourinary: Negative.    Musculoskeletal: Negative.    Skin: Negative.    Allergic/Immunologic: Negative.    Neurological: Negative.    Hematological: Negative.    Psychiatric/Behavioral: Negative.       Objective:     Visit Vitals  /61 (BP Location: Left arm, Patient Position: Sitting, BP Method: Large (Automatic))   Pulse 72   Temp 98.7 °F (37.1 °C) (Oral)   Resp 20   Ht 5' 4.02" (1.626 m)   Wt 129.3 kg (285 lb)   SpO2 (!) 93%   BMI 48.90 kg/m²       Physical Exam  Vitals reviewed.   Constitutional:       Appearance: Normal appearance.   HENT:      Head: Normocephalic and atraumatic.      Mouth/Throat:      Mouth: Mucous membranes are moist.      Pharynx: Oropharynx is clear.   Eyes:      " Extraocular Movements: Extraocular movements intact.      Conjunctiva/sclera: Conjunctivae normal.      Pupils: Pupils are equal, round, and reactive to light.   Cardiovascular:      Rate and Rhythm: Normal rate and regular rhythm.      Heart sounds: Normal heart sounds.   Pulmonary:      Effort: Pulmonary effort is normal.      Breath sounds: Normal breath sounds.   Abdominal:      General: Bowel sounds are normal.   Musculoskeletal:         General: Normal range of motion.      Cervical back: Normal range of motion.   Skin:     General: Skin is warm and dry.   Neurological:      Mental Status: She is alert and oriented to person, place, and time.   Psychiatric:         Mood and Affect: Mood normal.         Behavior: Behavior normal.         Labs Reviewed:     Hematology:  Lab Results   Component Value Date    WBC 8.3 02/09/2023    HGB 13.5 02/09/2023    HCT 43.2 02/09/2023     02/09/2023     Chemistry:  Lab Results   Component Value Date     02/09/2023    K 5.1 02/09/2023    CHLORIDE 102 02/09/2023    BUN 27.3 (H) 02/09/2023    CREATININE 1.11 (H) 02/09/2023    EGFRNORACEVR 58 02/09/2023    GLUCOSE 189 (H) 02/09/2023    CALCIUM 9.9 02/09/2023    ALKPHOS 152 (H) 02/09/2023    LABPROT 7.5 02/09/2023    ALBUMIN 3.3 (L) 02/09/2023    BILIDIR 0.2 12/06/2021    IBILI 0.30 12/06/2021    AST 17 02/09/2023    ALT 21 02/09/2023    MG 2.20 09/12/2022    PHOS 4.4 09/11/2022    [id number] 24.3 (L) 01/04/2023     Lab Results   Component Value Date    HGBA1C 8.9 (H) 01/04/2023     Lipid Panel:  Lab Results   Component Value Date    CHOL 105 01/04/2023    HDL 39 01/04/2023    LDL 56.00 01/04/2023    TRIG 51 01/04/2023    TOTALCHOLEST 3 01/04/2023     Thyroid:  Lab Results   Component Value Date    TSH 1.121 01/04/2023     Urine:  Lab Results   Component Value Date    COLORUA Light-Yellow 01/04/2023    APPEARANCEUA Clear 01/04/2023    SGUA 1.025 01/04/2023    PHUA 6.0 01/04/2023    PROTEINUA Negative 01/04/2023     GLUCOSEUA 2+ (A) 01/04/2023    KETONESUA Negative 01/04/2023    BLOODUA Negative 01/04/2023    NITRITESUA Negative 01/04/2023    LEUKOCYTESUR 250 (A) 01/04/2023    RBCUA 0-5 01/04/2023    WBCUA 6-10 (A) 01/04/2023    BACTERIA None Seen 01/04/2023    SQEPUA Trace (A) 01/04/2023    HYALINECASTS None Seen 01/04/2023    CREATRANDUR 91.3 01/04/2023        Assessment:       ICD-10-CM ICD-9-CM   1. Wound of left lower extremity, subsequent encounter  S81.802D V58.89     894.0   2. Type 2 diabetes mellitus with hyperglycemia, with long-term current use of insulin  E11.65 250.00    Z79.4 790.29     V58.67        Plan:     1. Wound of left lower extremity, subsequent encounter  Start Bactroban oint bid x2 weeks  Notify clinic if no improvement or worsening symptoms    2. Type 2 diabetes mellitus with hyperglycemia, with long-term current use of insulin  Patient followed by Endocrinology Clinic  Continue Jardiance 10 mg daily, Lantus 30 units bid, Metformin 1,000 mg bid, Januvia 100 mg daily  Encouraged home CBG monitoring.  Hypoglycemic episodes: denies  Body mass index is 48.9 kg/m².  Hemoglobin A1c   Date Value Ref Range Status   01/04/2023 8.9 (H) <=7.0 % Final     Urine Creatinine   Date Value Ref Range Status   01/04/2023 91.3 47.0 - 110.0 mg/dL Final   Urine Microalbumin: 8.9  On Atorvastatin  Weight Loss Encouraged  ADA Diet      Follow up if symptoms worsen or fail to improve. In addition to their scheduled follow up, the patient has also been instructed to follow up on as needed basis.     NOEMI Watson

## 2023-03-20 ENCOUNTER — CLINICAL SUPPORT (OUTPATIENT)
Dept: SMOKING CESSATION | Facility: CLINIC | Age: 58
End: 2023-03-20

## 2023-03-20 DIAGNOSIS — F17.200 NICOTINE DEPENDENCE: Primary | ICD-10-CM

## 2023-03-20 PROCEDURE — 99402 PR PREVENT COUNSEL,INDIV,30 MIN: ICD-10-PCS | Mod: S$GLB,,,

## 2023-03-20 PROCEDURE — 99402 PREV MED CNSL INDIV APPRX 30: CPT | Mod: S$GLB,,,

## 2023-03-20 NOTE — PROGRESS NOTES
Individual Follow-Up Form    3/20/2023    Quit Date:     Clinical Status of Patient: Outpatient    Continuing Medication: yes  Patches or Nicotine Lozenges     Target Symptoms: Withdrawal and medication side effects. The following were  rated moderate (3) to severe (4) on TCRS:  Moderate (3): none  Severe (4): none    Comments: Spoke with pt via phone regarding smoking cessation progress.  Pt is smoking 5 cigarettes per day.  Pt remains on tobacco cessation regimen of 21 mg nicotine patch QD and 4 mg nicotine lozenge PRN.  No adverse effects noted at this time.  Pt encouraged to pick a quit day.  Pt's quit day is April 30, 2023.  Discussed possibly trying Chantix.  Pt would like to remain on the nicotine patches and lozenges.  Pt stated that she smokes due to stress.  Discussed ways to deal with stress rather than smoking a cigarette; deep breathing exercises, meditation. Reviewed coping strategies/habitual behavior/relapse prevention with patient.  Pt will continue to work on rate reduction and will decrease by 1 cigarette each week.      Diagnosis: F17.200    Next Visit: 2 weeks

## 2023-04-10 ENCOUNTER — HOSPITAL ENCOUNTER (OUTPATIENT)
Dept: RADIOLOGY | Facility: HOSPITAL | Age: 58
Discharge: HOME | End: 2023-04-10
Attending: NURSE PRACTITIONER
Payer: MEDICAID

## 2023-04-10 DIAGNOSIS — Z12.31 ENCOUNTER FOR SCREENING MAMMOGRAM FOR MALIGNANT NEOPLASM OF BREAST: ICD-10-CM

## 2023-04-10 PROCEDURE — 77067 SCR MAMMO BI INCL CAD: CPT | Mod: 26,,, | Performed by: RADIOLOGY

## 2023-04-10 PROCEDURE — 77067 MAMMO DIGITAL SCREENING BILAT WITH TOMO: ICD-10-PCS | Mod: 26,,, | Performed by: RADIOLOGY

## 2023-04-10 PROCEDURE — 77067 SCR MAMMO BI INCL CAD: CPT | Mod: TC

## 2023-04-10 PROCEDURE — 77063 BREAST TOMOSYNTHESIS BI: CPT | Mod: 26,,, | Performed by: RADIOLOGY

## 2023-04-10 PROCEDURE — 77063 MAMMO DIGITAL SCREENING BILAT WITH TOMO: ICD-10-PCS | Mod: 26,,, | Performed by: RADIOLOGY

## 2023-04-12 ENCOUNTER — HOSPITAL ENCOUNTER (OUTPATIENT)
Dept: WOUND CARE | Facility: HOSPITAL | Age: 58
Discharge: HOME | End: 2023-04-12
Attending: NURSE PRACTITIONER
Payer: MEDICAID

## 2023-04-12 VITALS
DIASTOLIC BLOOD PRESSURE: 70 MMHG | HEART RATE: 88 BPM | RESPIRATION RATE: 20 BRPM | TEMPERATURE: 98 F | SYSTOLIC BLOOD PRESSURE: 113 MMHG

## 2023-04-12 DIAGNOSIS — R26.9 IMPAIRED GAIT: ICD-10-CM

## 2023-04-12 DIAGNOSIS — E11.628 TYPE 2 DIABETES MELLITUS WITH OTHER SKIN COMPLICATION, WITH LONG-TERM CURRENT USE OF INSULIN: ICD-10-CM

## 2023-04-12 DIAGNOSIS — L85.3 XEROSIS OF SKIN: ICD-10-CM

## 2023-04-12 DIAGNOSIS — E11.9 ENCOUNTER FOR COMPREHENSIVE DIABETIC FOOT EXAMINATION, TYPE 2 DIABETES MELLITUS: Primary | ICD-10-CM

## 2023-04-12 DIAGNOSIS — Z99.81 OXYGEN DEPENDENT: ICD-10-CM

## 2023-04-12 DIAGNOSIS — B35.1 MYCOTIC TOENAILS: ICD-10-CM

## 2023-04-12 DIAGNOSIS — M79.671 RIGHT FOOT PAIN: ICD-10-CM

## 2023-04-12 DIAGNOSIS — J43.1 PANLOBULAR EMPHYSEMA: Chronic | ICD-10-CM

## 2023-04-12 DIAGNOSIS — M79.672 LEFT FOOT PAIN: ICD-10-CM

## 2023-04-12 DIAGNOSIS — Z79.4 TYPE 2 DIABETES MELLITUS WITH OTHER SKIN COMPLICATION, WITH LONG-TERM CURRENT USE OF INSULIN: ICD-10-CM

## 2023-04-12 PROCEDURE — 11056 PARNG/CUTG B9 HYPRKR LES 2-4: CPT | Mod: ,,, | Performed by: NURSE PRACTITIONER

## 2023-04-12 PROCEDURE — 11056 PARNG/CUTG B9 HYPRKR LES 2-4: CPT

## 2023-04-12 PROCEDURE — 11721 DEBRIDE NAIL 6 OR MORE: CPT

## 2023-04-12 PROCEDURE — 11721 PR DEBRIDEMENT OF NAILS, 6 OR MORE: ICD-10-PCS | Mod: 59,,, | Performed by: NURSE PRACTITIONER

## 2023-04-12 PROCEDURE — 11056 PR TRIM BENIGN HYPERKERATOTIC SKIN LESION,2-4: ICD-10-PCS | Mod: ,,, | Performed by: NURSE PRACTITIONER

## 2023-04-12 PROCEDURE — 11721 DEBRIDE NAIL 6 OR MORE: CPT | Mod: 59,,, | Performed by: NURSE PRACTITIONER

## 2023-04-12 PROCEDURE — 99499 UNLISTED E&M SERVICE: CPT | Mod: ,,, | Performed by: NURSE PRACTITIONER

## 2023-04-12 PROCEDURE — 99499 NO LOS: ICD-10-PCS | Mod: ,,, | Performed by: NURSE PRACTITIONER

## 2023-04-12 NOTE — PROGRESS NOTES
TODAY'S VISIT NOTE WAS IMPORTED FROM LAST WOUND CLINIC VISIT OF 23.  I ATTEST THAT I REVIEWED THE HPI, ROS, LABS, WOUND-RELATED IMAGING, PHYSICAL EXAMINATION, EVALUATION AND PLAN SECTIONS OF IMPORTED NOTE AND REVISED TODAY'S VISIT NOTE TO REFLECT TODAY'S NEW ASSESSMENT FINDINGS AND TODAY'S UPDATES TO WOUND TREATMENT PLAN.          Chief Complaint:  Routine diabetic foot care, with bilateral foot pain      History of Present Illness:  58 yo Black female being seen today for routine diabetic foot care, and bilateral foot pain from recurrent calluses, which is affecting her ability to walk.   Seen in ER 23 for LLE wound.  No recalled injury.  Discharged home with script for Clindamycin.  That wound is healed.  Hx includes treatment plan and medication non-compliance, COPD, HFpEF (40%), chronic hypercapnic respiratory failure with oxygen dependency, uncontrolled Type 2 diabetes, HTN, HLD, DAVION on CPAP, morbid obesity (BMI 48.85), and smoking (25 pk/year history).  Enrolled in [facility] smoking cessation program.  Smoking 4-5 cigarettes per day.    Followed by NOEMI Andrews, [facility] for PCP.  Last visit with Ms. Gabriel was on 3/3/23.  Followed by [facility] endocrine, pulmonology, and cardiology clinics.      Hospitalized 2022 - 2022 for acute CHF and COPD exacerbation.  Now oxygen-dependent most of the time.  Has been referred to pulmonology for Trilogy evaluation.      Review of Most Recent Labs:  23:  CBC unremarkable.  CR 1.11.  eGFR 58.  Alk Phos 152.   2023:  CBC unremarkable.  BUN 31.2.  Cr 1.38.  eGFR 44.   Chol 105.  HDL 39.  LDL 56.  Trig 51.   HgbA1C 8.9.   2022:  CR 0.97.  eGFR >60.  Vit D 10.9.    2022:  eGFR >60, with CR 0.8.  Chol 149, HDL 38, LDL 95, Trig 80.  HgbA1C 10.0.      Imagin23 xray of right foot:  no acute osseus abnormality.  Plantar calcaneal enthesophyte.  Dorsal calcaneal enthesophyte.    2022 arterial NIVA:    The right lower extremity demonstrated  multiphasic waveforms at all levels.  The KWADWO on the right was mildly abnormal. The right lower extremity demonstrated mild arterial flow reduction.     The left lower extremity demonstrated multiphasic waveforms at all levels.   The KWADWO on the left was mildly abnormal.  The left lower extremity demonstrated mild arterial flow reduction.      Today 4/12/23:  Continues to have numbness and burning pain in bilateral feet and toes, on a daily basis, as well as pain in calves with walking distances and laying down.  Feet always feel cold.  Having pain in bottoms of both feet, where she has recurrent corns/calluses.  Did get toenail polish antibiotics and has been applying that every 2 to 3 days.  No new s/e with that.  Wearing slip-on sandals today for me to evaluate feet.  Normally wears protective shoes at home.      1/6/2023:  Trying to cut back on smoking.  Smoking < 1/2 ppd, per self-report.  Using Lac-Hydrin and Vaseline on feet, as instructed.  Having some mild discomfort over corns on bottoms of feet with walking.   No new rashes, lesions, or skin breakdown.   Does report numbness and tingling in feet and toes.  Feet feel cold most days.  Does experience pain in calves with prolonged walking and laying down, at times.      10/11/2022:  Sister encouraging her to wear tennis shoes since last week, which she wore to today's visit.  Calluses are not bothering her on feet.  Sister relays she will check Ms. Mckeon' calluses on a regular basis, as well as have other family members check calluses/feet/toes.  No reported complications with wound care or current treatment plan.  Denies fevers, chills, wound odor, wound redness, wound pain, or yellow/green drainage.  No new rashes, lesions, or skin breakdown.  Picked up Lac-Hydrin yesterday and sister is applying that, with Vaseline.      10/6/2022:  Denies numbness, tingling, and pain in feet/toes.  Denies pain in thighs/legs with walking or laying down.  No pain in  feet from severe calluses.  Not aware that she had deep cracks in right heel.  Sister sees her every day and is willing to remind Ms. Mckeon to apply Lac-Hydrin and Vaseline to feet.      7/7/2022:  Says she is wearing her CPAP at night, as prescribed.  Still not wanting to stop smoking.  Did not take morning medications before coming to visit.  Wearing sandals again today.  Says she remembers she has Lac-Hydrin cream ordered for feet, however, forgets to use it.  No pain at numerous calluses over bottoms of both feet.  Denies numbness, tingling, and pain in feet/toes.   No new rashes, lesions, or skin breakdown.     4/5/2022:  Denies numbness, tingling, and pain in feet/toes. Says multiple recurrent calluses over bilateral feet and ends of big toes are not bothering her. Did not take her morning medications this morning before coming to clinic. Did not check CBG last night or this morning. Does not want to stop smoking. Still wearing sandals. States she has Lac-Hydrin in home.    1/31/2022:  Denies numbness, tingling, and pain in feet/toes. Continues wearing open toed sandals. Calluses not bothering her too much on bottoms of feet. She is not sure why she continues to get recurrent calluses to her big toes. No new skin breakdown, rashes, or other skin issues.    12/17/2021:  Does not desire smoking cessation. Not able to feel any pain over bottoms of her feet from significant calluses. Continues wearing sandals due to comfort. No rashes, skin lesions, or skin breakdown. Prescribed oral antibiotics Monday of last week for mild case of pneumonia; still finishing those out. Feeling better.     10/29/2021:  Callus on bottom of left foot is hurting again with walking. Still wearing sandals. Denies numbness, tingling, and pain in feet/toes. Denies rashes, lesions, or skin breakdown.     9/8/2021:  Calluses over feet and big toes have recurred; have been having increased tenderness in those places with walking. No  rashes, lesions, or skin breakdown over feet. Continues wearing sandals much of the day. Denies wearing shoes that are too tight against big toes.     7/28/2021:  Other than calluses on both feet, big toes, and long toenails denies any new rashes, lesions, or skin breakdown today. States calluses are tender for a couple of days after trimming, not having any pain with walking with significant calluses. Wears slippers at all times when she is at home.       Review of Systems:   Except as stated in HPI, all other 10 body systems normal      Physical Exam Vitals & Measurements:    Vitals:    04/12/23 1442   BP: 113/70   Pulse: 88   Resp: 20   Temp: 97.9 °F (36.6 °C)           General:  VSS; afebrile. Morbidly obese; in no acute distress.  Wearing slip-on sandals today.  Respiratory:   Dyspneac on moderate exertion; no coughing.  Wearing oxygen via nasal cannula.   Cardiovascular:    Mild woody non-pitting edema to BLE.  Generalized non-pitting edema around bilateral medial and lateral ankles.  No hemosiderin staining or varicosities. No hair distribution over BLE.  Skin is shiny and taut over anterior tibial areas. Feet and toes cool to touch. No temperature differences between BLE.      Musculoskeletal:  Significantly decreased range of motion, and strength, of all extremities.   Loss of anatomical sites from knees distally secondary to body habitus.   Pes planus bilaterally.  No bunions or hammertoes.   Mildly decreased dorsiflexion and flexion in bilateral feet and hallux toes.  Decreased intrinsic muscle ROM in left foot; normal in right foot.  Lesion to right medial dorsal foot hard, immovable, and non-tender.   Neurologic: A&O X 3; cranial nerves intact.   Normal monofilament testing in bilateral feet; no LOPS noted.    Psychiatric: Calm, cooperative. Mood and affect normal. Responses appropriate  Integumentary:     Skin over bilateral feet and BLE is well-moisturized, without cracks and crevices.    Large  bilateral plantar corns/calluses with significant tenderness with palpation.  Corns quite deep in both lesions.  No underlying skin breakdown with paring.      Bilateral calluses to distal hallux toes, with tenderness with palpation.  No underlying skin breakdown with paring.      10 mycotic toenails, which were overgrown and grossly dystrophic, with mild odor with debridement.      Hard, non-tender, and immobile mass to right dorsal 1st met head.   Skin intact.  No erythema, bogginess, induration, or fluctuance.  No drainage.                    Assessment/Plan:    Encounter for diabetic foot care:  Last visit with PCP, NOEMI Arora, on 3/3/23.    Diabetic teaching reinforced, with focused attention towards slip-on sandals, which she is wearing today.  Multiple handouts on diabetes foot care, diabetes in general, and infections have been provided at previous visits.  Ms. Mckeon indicates she has read and does not require additional handouts today.     Morbid obesity, BMI 48.85:  This is a co-factor to extensive, deep, and multiple calluses/corns over bilateral feet/toes. Ms. Mckeon is at extreme risk of non-healing diabetic foot wounds, given her diabetes, treatment plan non-compliance, medication non-compliance, and ongoing smoking.      Type 2 diabetes:  Improving; however, still poorly-controlled, per last HgbA1C.  Normal monofilament testing today.  DP and PT pulses dopplered.    2/9/23:  CBC unremarkable.  CR 1.11.  eGFR 58.  Alk Phos 152.   1/4/2023:  CBC unremarkable.  BUN 31.2.  Cr 1.38.  eGFR 44.   Chol 105.  HDL 39.  LDL 56.  Trig 51.   HgbA1C 8.9.   5/5/2022:  eGFR >60, with CR 0.8.  Chol 149, HDL 38, LDL 95, Trig 80.  HgbA1C 10.0.    Being followed by [facility] endocrine.     Mycotic Toenails X 10:  Teaching reinforced on underlying cause of condition, s/s of condition, complications, and treatment options of condition.  She has not been using medication twice/day, as prescribed.  Instructed to  apply twice/day for best results, with rationale.   Prescribed CMPD Voricon 2.67%/Vanc 6.67% nail suspension in DSMO.                                          PROCEDURE NOTE    Procedure Today:  debridement of 10 toenails.  Rationale:  To remove fungal, and possibly bacterial, infection from toenails.  Debulking of toenail, will enable topical antibacterial/antifungals to reach nailbed for complete tx of nail infection.    Informed verbal consent obtained.  Using standard podiatry clippers, Dremmel, and fahad boards, I excised infected toenail, and then used Dremmel to sand down remaining nail to be more flush with surrounding skin.  No bleeding or discomfort during procedure.  Patient tolerated procedure without complications.      Bilateral foot pain, with impaired gait:  Etiology:  Large and tender corns/calluses to plantar forefeet.   Pain from plantar corns/calluses impeding ability to walk without pain.     Calluses of Bilateral Plantar Feet, as well as to bilateral hallux toes:                                         PROCEDURE NOTE    Procedure Today: paring and sanding down of 4 corns/calluses   Rationale: Calluses/corns can lead to diabetic foot ulcers.  Also, Ms. Mckeon is reporting discomfort in corns/calluses with ambulation.   Informed verbal consent obtained.    Using #4 dermal curette, Dremmel, and fahad boards, I pared 4 calluses/corns to healthy soft tissue. No bleeding or discomfort with procedure.     Xerosis of bilateral heels:  Resolved.   Positive reinforcement provided.   Prescribed Lac-Hydrin followed by Vaseline BID.  CPM  Reinforced teaching to not apply lotions/creams between toes, with rationale.      Non-Compliance with Treatment Plan and Medications:  Improving AEB improved BP, improved HgbA1C, and improvement in xerosis over bilateral feet.   Has not been applying toenail anti-infective polish twice/day, as prescribed.  Clarification/teaching provided, with rationale for adherence.     1/4/2023:  HgbA1C 8.9.   5/5/2022:  HgbA1C 10.0.   Not checking CBGs.  Enrolled in [facility] smoking cessation program now.      Right medial dorsal foot lesion:  1/6/23 xray of right foot:  no acute osseus abnormality.  Plantar calcaneal enthesophyte.  Dorsal calcaneal enthesophyte.    Smoking:  Enrolled in [facility] smoking cessation program now.  Smoking 4 to 5 cigarettes/day, which is much better from the 1/2 ppd she was smoking last visit.  Positive reinforcement given.              RTC in three months for routine DFC and callus care.   Teaching provided on s/s to call wound clinic for promptly.

## 2023-04-13 PROBLEM — M79.672 LEFT FOOT PAIN: Status: ACTIVE | Noted: 2023-04-13

## 2023-04-13 PROBLEM — E11.628 TYPE 2 DIABETES MELLITUS WITH SKIN COMPLICATION, WITH LONG-TERM CURRENT USE OF INSULIN: Status: ACTIVE | Noted: 2023-04-13

## 2023-04-13 PROBLEM — Z79.4 TYPE 2 DIABETES MELLITUS WITH SKIN COMPLICATION, WITH LONG-TERM CURRENT USE OF INSULIN: Status: ACTIVE | Noted: 2023-04-13

## 2023-04-13 PROBLEM — R26.9 IMPAIRED GAIT: Status: ACTIVE | Noted: 2023-04-13

## 2023-04-13 PROBLEM — M79.671 RIGHT FOOT PAIN: Status: ACTIVE | Noted: 2023-04-13

## 2023-04-13 PROBLEM — E11.9 ENCOUNTER FOR COMPREHENSIVE DIABETIC FOOT EXAMINATION, TYPE 2 DIABETES MELLITUS: Status: ACTIVE | Noted: 2023-04-13

## 2023-04-17 ENCOUNTER — CLINICAL SUPPORT (OUTPATIENT)
Dept: SMOKING CESSATION | Facility: CLINIC | Age: 58
End: 2023-04-17

## 2023-04-17 DIAGNOSIS — F17.200 NICOTINE DEPENDENCE: Primary | ICD-10-CM

## 2023-04-17 PROCEDURE — 90853 GROUP PSYCHOTHERAPY: CPT | Mod: S$GLB,,,

## 2023-04-17 PROCEDURE — 90853 PR GROUP PSYCHOTHERAPY: ICD-10-PCS | Mod: S$GLB,,,

## 2023-04-17 RX ORDER — VARENICLINE TARTRATE 1 MG/1
TABLET, FILM COATED ORAL
Qty: 52 TABLET | Refills: 0 | Status: SHIPPED | OUTPATIENT
Start: 2023-04-17 | End: 2023-09-18

## 2023-04-17 NOTE — PROGRESS NOTES
Individual Follow-Up Form    4/17/2023    Quit Date:     Clinical Status of Patient: Outpatient    Continuing Medication: yes  Nicotine Lozenges    Other Medications: Chantix     Target Symptoms: Withdrawal and medication side effects. The following were  rated moderate (3) to severe (4) on TCRS:  Moderate (3): none  Severe (4): none    Comments: Patient presents for follow up smoking 8-10 cigarettes per day. Pt remains on tobacco cessation medication of 21 mg nicotine patch QD and 2 mg nicotine lozenge PRN (1-2 per hour in place of cigarettes). No adverse effects noted at this time.  Pt stated that she has increased in her smoking and would like to try the Chantix.  Pt feels like the patches and lozenges are not working.  Discussed Chantix, how to take it and side effects.  Will order 1 mg Chantix starter dose.  Pt thinks that she is smoking out of boredom.  Discussed finding a hobby and things that she can do to keep herself busy.  Pt stated that she is still smoking while drinking her cup of coffee.  Pt will work on waiting to smoke 15 minutes after finishing her cup of coffee.  Reviewed strategies, habitual behavior, high risks situations, understanding urges and cravings, stress and relaxation.  Pt stated that she would like to change her quit day since she is starting new medication.  Pt's new quit day will be May 22, 2023.  Encouraged pt to move her cigarettes and work on rate reduction.    Diagnosis: F17.200    Next Visit: 2 weeks

## 2023-04-25 ENCOUNTER — TELEPHONE (OUTPATIENT)
Dept: SMOKING CESSATION | Facility: CLINIC | Age: 58
End: 2023-04-25
Payer: MEDICAID

## 2023-04-28 ENCOUNTER — NUTRITION (OUTPATIENT)
Dept: DIABETES | Facility: CLINIC | Age: 58
End: 2023-04-28
Payer: MEDICAID

## 2023-04-28 DIAGNOSIS — Z79.4 TYPE 2 DIABETES MELLITUS WITHOUT COMPLICATION, WITH LONG-TERM CURRENT USE OF INSULIN: Primary | ICD-10-CM

## 2023-04-28 DIAGNOSIS — E11.9 TYPE 2 DIABETES MELLITUS WITHOUT COMPLICATION, WITH LONG-TERM CURRENT USE OF INSULIN: Primary | ICD-10-CM

## 2023-04-28 PROCEDURE — G0108 DIAB MANAGE TRN  PER INDIV: HCPCS | Mod: PBBFAC | Performed by: DIETITIAN, REGISTERED

## 2023-04-28 PROCEDURE — 99211 OFF/OP EST MAY X REQ PHY/QHP: CPT | Mod: PBBFAC | Performed by: DIETITIAN, REGISTERED

## 2023-05-02 VITALS — WEIGHT: 283.38 LBS | BODY MASS INDEX: 48.62 KG/M2

## 2023-05-02 NOTE — PROGRESS NOTES
Diabetes Care Specialist Progress Note  Author: Lynda Paige RD  Date: 5/2/2023    Program Intake  Reason for Diabetes Program Visit:: Intervention  Type of Intervention:: Individual  Individual: Education  Education: Self-Management Skill Review  Current diabetes risk level:: moderate    Lab Results   Component Value Date    HGBA1C 8.9 (H) 01/04/2023       Clinical         Problem Review  Reviewed Problem List with Patient: yes                        Additional Social History                                    Diabetes Self-Management Skills Assessment                   Medications  Patient is able to describe current diabetes management routine.: yes  Diabetes management routine:: insulin, oral medications  Patient is able to identify current diabetes medications, dosages, and appropriate timing of medications.: yes (Lantus 25 units AM and 30 units PM, Metformin 1000mg BID and Jardiance 10 mg.)  Patient understands the purpose of the medications taken for diabetes.: yes  Patient reports problems or concerns with current medication regimen.: no  Medication Skills Assessment Completed:: Yes  Assessment indicates:: Adequate understanding  Area of need?: No    Home Blood Glucose Monitoring  Patient states that blood sugar is checked at home daily.: yes  Monitoring Method:: personal continuous glucose monitor  Blood glucose logs reviewed today?: yes  Personal CGM type:: Freestyle Harper 2  Patient is able to use personal CGM appropriately.: yes  CGM Report reviewed?: yes      Home Blood Glucose Monitoring Skills Assessment Completed: : Yes  Assessment indicates:: Instruction Needed  Area of need?: Yes    Acute Complications  Patient is able to identify types of acute complications: Yes  Patient Identified:: Hypoglycemia  Patient is able to state the basic meaning of hypoglycemia?: Yes  Patient can identify general symptoms of hypoglycemia: yes  Patient identified:: dizziness  Patient identified:: 5-6 pieces of hard  "candy, 1/2 can soda/fruit juice  Acute Complications Skills Assessment Completed: : Yes  Assessment indicates:: Adequate understanding  Area of need?: No                Diabetes Self Support Plan         Assessment Summary and Plan    Based on today's diabetes care assessment, the following areas of need were identified:      Social 12/2/2022   Support No   Access to Mass Media/Tech No   Cognitive/Behavioral Health -   Communication -   Health Literacy -        Clinical 2/24/2023   Medication Adherence -   Lab Compliance -   Nutritional Status No        Diabetes Self-Management Skills 4/28/2023   Diabetes Disease Process/Treatment Options -   Nutrition/Healthy Eating -   Physical Activity/Exercise -   Medication No   Home Blood Glucose Monitoring Yes - see care plan   Acute Complications No   Chronic Complications -   Psychosocial/Coping -          Today's interventions were provided through individual discussion, instruction, and written materials were provided.  Received new reader and scanning 3-4 times daily lately. TIR decreased to 56% with Low and Very Low improved to 0%. High - 29% and very High - 15%. GMI - 7.8% and avg glucose 189. Time CGM active 63%. Had several days without sensors. Noted prandial spikes in early evening attributed to evening meal. Reviewed reducing carb intake Started taking her evening Lantus again after stopping due to fear of hypoglycemia. One hypoglycemic episode noted of 65 at 2 am. Reviewed "rule of 15" and pt able to recall correct method of treating hypoglycemia. Taking Metformin 1000mg BID and Jardiance 10 mg. Pt states that she is feeling good and trying to move more. Encouraged activity after dinner to lower post dinner hyperglycemia. No new A1C. She will follow-up with endo in June and diabetes ed in August. Instructed to contact clinic if hypoglycemia returns.     Patient verbalized understanding of instruction and written materials.  Pt was able to return back " demonstration of instructions today. Patient understood key points, needs reinforcement and further instruction.     Diabetes Self-Management Care Plan:    Today's Diabetes Self-Management Care Plan was developed with Linn's input. Linn has agreed to work toward the following goal(s) to improve his/her overall diabetes control.      Care Plan: Diabetes Management   Updates made since 4/2/2023 12:00 AM        Problem: Blood Glucose Self-Monitoring         Goal: Pt will use Freestyle Harper reader to scan blood glucose at least 4-6 times daily and report hypoglycemia to provider    Start Date: 1/5/2023   Recent Progress: Not met   Priority: High   Barriers: Lack of Supplies   Note:    2/24/23 - Pt was told by [employer] that she needs a new Rx from her provider for an additional reader and [employer] is unable to return her reader. However, her insurance provided her with a reader within the last year. Contacted Charo Perez, Diabetes Care Specialist, from [employer] for assistance. She asks that pt contact her and she will discuss with [employer] on three-way call. Contacted pt to provide Charo's phone # and instructions to contact her.     5/2/23 - New reader received. Noted scanning less often last week. 1 episode of hypoglycemia noted (65) for Libreview report 4/15-4/28/23.          Follow Up Plan     Follow up in about 4 months (around 8/28/2023) for glucose log review.    Today's care plan and follow up schedule was discussed with patient.  Linn verbalized understanding of the care plan, goals, and agrees to follow up plan.        The patient was encouraged to communicate with his/her health care provider/physician and care team regarding his/her condition(s) and treatment.  I provided the patient with my contact information today and encouraged to contact me via phone or [facility]'s Patient Portal as needed.     Length of Visit   Total Time: 60 Minutes

## 2023-05-10 ENCOUNTER — OFFICE VISIT (OUTPATIENT)
Dept: CARDIOLOGY | Facility: CLINIC | Age: 58
End: 2023-05-10
Payer: MEDICAID

## 2023-05-10 VITALS
BODY MASS INDEX: 47.87 KG/M2 | TEMPERATURE: 99 F | WEIGHT: 280.38 LBS | DIASTOLIC BLOOD PRESSURE: 63 MMHG | SYSTOLIC BLOOD PRESSURE: 116 MMHG | HEIGHT: 64 IN | OXYGEN SATURATION: 97 % | HEART RATE: 70 BPM | RESPIRATION RATE: 18 BRPM

## 2023-05-10 DIAGNOSIS — I50.32 CHRONIC HEART FAILURE WITH PRESERVED EJECTION FRACTION: ICD-10-CM

## 2023-05-10 DIAGNOSIS — E66.01 CLASS 3 SEVERE OBESITY DUE TO EXCESS CALORIES WITH SERIOUS COMORBIDITY AND BODY MASS INDEX (BMI) OF 45.0 TO 49.9 IN ADULT: Chronic | ICD-10-CM

## 2023-05-10 DIAGNOSIS — I50.20 HEART FAILURE WITH REDUCED EJECTION FRACTION: Chronic | ICD-10-CM

## 2023-05-10 DIAGNOSIS — E11.65 TYPE 2 DIABETES MELLITUS WITH HYPERGLYCEMIA, WITH LONG-TERM CURRENT USE OF INSULIN: Chronic | ICD-10-CM

## 2023-05-10 DIAGNOSIS — I10 PRIMARY HYPERTENSION: Primary | Chronic | ICD-10-CM

## 2023-05-10 DIAGNOSIS — R06.09 DOE (DYSPNEA ON EXERTION): ICD-10-CM

## 2023-05-10 DIAGNOSIS — E78.5 HYPERLIPIDEMIA LDL GOAL <70: Chronic | ICD-10-CM

## 2023-05-10 DIAGNOSIS — Z79.4 TYPE 2 DIABETES MELLITUS WITH HYPERGLYCEMIA, WITH LONG-TERM CURRENT USE OF INSULIN: Chronic | ICD-10-CM

## 2023-05-10 PROCEDURE — 99213 OFFICE O/P EST LOW 20 MIN: CPT | Mod: PBBFAC | Performed by: STUDENT IN AN ORGANIZED HEALTH CARE EDUCATION/TRAINING PROGRAM

## 2023-05-10 RX ORDER — METOPROLOL SUCCINATE 100 MG/1
100 TABLET, EXTENDED RELEASE ORAL DAILY
Qty: 30 TABLET | Refills: 11 | Status: SHIPPED | OUTPATIENT
Start: 2023-05-10 | End: 2024-05-09

## 2023-05-10 RX ORDER — INSULIN GLARGINE 100 [IU]/ML
INJECTION, SOLUTION SUBCUTANEOUS
COMMUNITY
Start: 2023-04-24 | End: 2023-09-18 | Stop reason: SDUPTHER

## 2023-05-10 RX ORDER — ATORVASTATIN CALCIUM 80 MG/1
80 TABLET, FILM COATED ORAL DAILY
Qty: 90 TABLET | Refills: 3 | Status: SHIPPED | OUTPATIENT
Start: 2023-05-10 | End: 2024-05-09

## 2023-05-10 RX ORDER — FUROSEMIDE 40 MG/1
40 TABLET ORAL 2 TIMES DAILY
Qty: 180 TABLET | Refills: 3 | Status: SHIPPED | OUTPATIENT
Start: 2023-05-10 | End: 2024-05-09

## 2023-05-10 RX ORDER — PEN NEEDLE, DIABETIC 31 GX5/16"
NEEDLE, DISPOSABLE MISCELLANEOUS 2 TIMES DAILY
COMMUNITY
Start: 2023-03-27

## 2023-05-10 RX ORDER — EZETIMIBE 10 MG/1
10 TABLET ORAL DAILY
Qty: 90 TABLET | Refills: 3 | Status: SHIPPED | OUTPATIENT
Start: 2023-05-10 | End: 2024-05-09

## 2023-05-10 RX ORDER — SPIRONOLACTONE 25 MG/1
25 TABLET ORAL DAILY
Qty: 90 TABLET | Refills: 3 | Status: SHIPPED | OUTPATIENT
Start: 2023-05-10 | End: 2024-05-09

## 2023-05-10 RX ORDER — VARENICLINE TARTRATE 0.5 (11)-1
KIT ORAL
COMMUNITY
Start: 2023-04-17 | End: 2023-12-12

## 2023-05-10 RX ORDER — SACUBITRIL AND VALSARTAN 24; 26 MG/1; MG/1
1 TABLET, FILM COATED ORAL 2 TIMES DAILY
Qty: 60 TABLET | Refills: 11 | Status: SHIPPED | OUTPATIENT
Start: 2023-05-10 | End: 2023-05-26

## 2023-05-10 NOTE — PROGRESS NOTES
[facility]   Cardiology Clinic - Follow Up     Date of Visit: 5/10/2023  Reason for Visit/Chief Complaint:   Chief Complaint    Follow-up          I have explained to Ms. Linn Mckeon that I am not a cardiologist. She understands that I am an internal medicine physician seeing patients in the cardiology clinic. Ms. Linn Mckeon has expressed understanding of this fact and is willing to proceed with this visit.     History of Present Illness:      Linn Mckeon is a 57 y.o. female with a PMH significant for Diabetes, DAVION on BiPAP, HTN, HLD, Depression, COPD on home O2, and Tobacco Use  who presents to cardiology clinic for follow up. She presents today for results of most recent echo. She reports her SOB is much improved from previous visits. She is able to perform house work without getting SOB. We discussed making changes to her GDMT and she is willing to proceed.     Historical Information: Patient completed Lexiscan stress test on September 7, 2022 which was normal.  See report below.  She also completed KWADWO testing on September 13, 2022 which revealed mild arterial flow reduction in the bilateral lower extremities.  See report below.  She completed an Echocardiogram on July 26, 2022 which revealed a reduced ejection fraction of 40% with Grade 1 diastolic dysfunction.  See report below.    She previously completed an Echocardiogram on May 10, 2021 which revealed mild concentric LVH, EF of approximately 50 to 55%, mild MR, and mild aortic valve sclerosis without stenosis.    Patient had a hospital admission on September 11, 2022 for COPD exacerbation and acute CHF exacerbation.  She was treated with IV Lasix, antibiotics, steroids, and neb treatments.     CP:  The patient has no chest discomfort.      SOB:  The patient has shortness of breath. Has GOODMAN    EDEMA:  The patient denies edema.      ORTHOPNEA:  The patient denies orthopnea.  No PND.      SYNCOPE:  The patient  denies near syncope.  No syncope.   No dizziness.    PALPITATIONS:  The patient has no palpitations.    LEVEL OF EXERTION:  The patient does house work and does not have symptoms with this level of exertion.  The patient's level of exertion is adequate.    Past Medical History:        Past Medical History:   Diagnosis Date    Callus of foot 07/07/2022    CHF (congestive heart failure)     Chronic heart failure with preserved ejection fraction 05/20/2022    COPD (chronic obstructive pulmonary disease)     Diabetes mellitus     GOODMAN (dyspnea on exertion) 05/20/2022    Dystrophic nail 07/07/2022    Hyperlipidemia     Hyperlipidemia LDL goal <70 05/20/2022    Hypertension     Morbid obesity with body mass index (BMI) greater than or equal to 50 07/07/2022    Non compliance w medication regimen 07/07/2022    Noncompliance with treatment plan 07/07/2022    DAVION on CPAP 05/20/2022    Peripheral edema 05/20/2022    Primary hypertension 05/20/2022    Type 2 diabetes mellitus with skin complication 05/20/2022    Xerosis of skin 07/07/2022       Surgical History:        Past Surgical History:   Procedure Laterality Date    HYSTERECTOMY         Family History:        Family History   Problem Relation Age of Onset    Hypertension Mother     Heart disease Mother     Diabetes Mother     Heart attack Father     Diabetes Sister     Hypertension Sister     Diabetes Brother     Hypertension Brother        Social History:        Social History     Tobacco Use    Smoking status: Every Day     Packs/day: 0.25     Types: Cigarettes    Smokeless tobacco: Never    Tobacco comments:     Smokes 5 cigarettes a day   Substance Use Topics    Alcohol use: Not Currently    Drug use: Never       Allergies:       Review of patient's allergies indicates:  No Known Allergies    Medications:        Current Outpatient Medications   Medication Sig Dispense Refill    albuterol (PROVENTIL) 2.5 mg /3 mL (0.083 %) nebulizer solution Take 3 mLs (2.5 mg total)  "by nebulization every 6 (six) hours as needed for Wheezing. Rescue 90 mL 1    ammonium lactate (LAC-HYDRIN) 12 % lotion APPLY TO DRY SKIN AND RUB IN WELL TWICE DAILY. NOTHING BETWEEN TOES 226 g 11    atorvastatin (LIPITOR) 80 MG tablet Take 1 tablet (80 mg total) by mouth once daily. 90 tablet 3    BD ULTRA-FINE JORDON PEN NEEDLE 32 gauge x 5/32" Ndle 2 (two) times daily.      blood sugar diagnostic Strp To check BG 2 times daily, to use with insurance preferred meter 200 strip 0    blood-glucose meter kit To check BG 2 times daily, to use with insurance preferred meter 1 each 0    buPROPion (WELLBUTRIN SR) 150 MG TBSR 12 hr tablet Take 1 tablet (150 mg ) by mouth 2 (two) times daily. 60 tablet 0    carvediloL (COREG) 25 MG tablet Take 1 tablet (25 mg total) by mouth 2 (two) times daily. 180 tablet 3    CICLOPIROX-ITRACONAZOLE-UREA TOP CLEAN NAILS, SHAKE BOTTLE WELL, APPLY TWICE DAILY TO ALL AFFECTED NAILS USING APPLICATOR. (UP TO 2 MLS/DAY)      empagliflozin (JARDIANCE) 10 mg tablet Take 1 tablet (10 mg total) by mouth once daily. 30 tablet 6    ergocalciferol (ERGOCALCIFEROL) 50,000 unit Cap Take 1 capsule (50,000 Units total) by mouth every 7 days. 12 capsule 2    ezetimibe (ZETIA) 10 mg tablet Take 1 tablet (10 mg total) by mouth once daily. 90 tablet 3    flash glucose scanning reader (FREESTYLE JOSE ALFREDO 2 READER) [facility] Freestyle Jose Alfredo 2 reader dispense 1. Use as directed 1 each 0    fluticasone-umeclidin-vilanter (TRELEGY ELLIPTA) 100-62.5-25 mcg DsDv Inhale 1 puff into the lungs once daily. 28 each 3    furosemide (LASIX) 40 MG tablet Take 1 tablet (40 mg total) by mouth 2 (two) times a day. 180 tablet 3    insulin glargine (LANTUS U-100 INSULIN) 100 unit/mL injection Inject 30 Units into the skin 2 (two) times daily. 30 mL 11    ipratropium-albuteroL (COMBIVENT RESPIMAT)  mcg/actuation inhaler Inhale 2 puffs into the lungs every 6 (six) hours as needed for Wheezing. Rescue 4 g 1    lancets (ACCU-CHEK " "FASTCLIX LANCET DRUM) [facility] USE ONE DAILY 100 each 3    LANTUS SOLOSTAR U-100 INSULIN glargine 100 units/mL SubQ pen SMARTSI Unit(s) SUB-Q Every Evening      latanoprost 0.005 % ophthalmic solution Apply 1 drop to eye.      metFORMIN (GLUCOPHAGE) 1000 MG tablet Take 1 tablet (1,000 mg total) by mouth 2 (two) times daily with meals. 60 tablet 11    mupirocin (BACTROBAN) 2 % ointment Apply topically 2 (two) times daily. 22 g 1    nicotine (NICODERM CQ) 21 mg/24 hr Place 1 patch onto the skin once daily. 28 patch 0    nicotine polacrilex 4 MG Lozg Take 1 lozenge (4 mg total) by mouth as needed. 108 lozenge 1    nystatin (MYCOSTATIN) ointment Apply topically 2 (two) times daily. 15 g 1    pen needle, diabetic 31 gauge x 5/16" Ndle 1 each by Misc.(Non-Drug; Combo Route) route 2 (two) times daily with meals. 60 each 11    sacubitriL-valsartan (ENTRESTO) 24-26 mg per tablet Take 1 tablet by mouth 2 (two) times daily. 60 tablet 11    SITagliptin (JANUVIA) 100 MG Tab Take 1 tablet (100 mg total) by mouth once daily. 30 tablet 11    spironolactone (ALDACTONE) 25 MG tablet Take 1 tablet (25 mg total) by mouth once daily. 90 tablet 3    varenicline (CHANTIX KAREN) 0.5 mg (11)- 1 mg (42) tablet Take by mouth.      varenicline (CHANTIX) 1 mg Tab Take 1/2 tablet once a day for 3 days, then increase to 1/2 tablet twice a day for 4 days. Beginning on Day 8, take 1 tablet twice daily until complete 52 tablet 0     No current facility-administered medications for this visit.       I have reviewed and updated the patient's medications, allergies, past medical history, surgical history, social history and family history as needed.    Review of Systems:      Review of Systems   Constitutional:  Negative for chills, diaphoresis and fever.   HENT:  Negative for hearing loss and nosebleeds.    Eyes:  Positive for blurred vision.   Respiratory:  Positive for shortness of breath.    Cardiovascular:  Negative for chest pain, palpitations, " "orthopnea, claudication and PND.   Gastrointestinal:  Negative for nausea and vomiting.   Genitourinary:  Negative for dysuria.   Musculoskeletal:  Negative for myalgias.   Skin:  Negative for rash.   Neurological:  Negative for dizziness and headaches.     Objective:        Vitals:    05/10/23 1059   BP: 116/63   Pulse: 70   Resp: 18   Temp: 98.6 °F (37 °C)     Wt Readings from Last 3 Encounters:   05/10/23 127.2 kg (280 lb 6.4 oz)   05/02/23 128.5 kg (283 lb 6.4 oz)   03/03/23 129.3 kg (285 lb)     Temp Readings from Last 3 Encounters:   05/10/23 98.6 °F (37 °C)   04/12/23 97.9 °F (36.6 °C)   03/03/23 98.7 °F (37.1 °C) (Oral)     BP Readings from Last 3 Encounters:   05/10/23 116/63   04/12/23 113/70   03/03/23 128/61     Pulse Readings from Last 3 Encounters:   05/10/23 70   04/12/23 88   03/03/23 72       Vitals:    05/10/23 1059   BP: 116/63   BP Location: Left arm   Patient Position: Sitting   BP Method: Large (Automatic)   Pulse: 70   Resp: 18   Temp: 98.6 °F (37 °C)   SpO2: 97%   Weight: 127.2 kg (280 lb 6.4 oz)   Height: 5' 4" (1.626 m)     Body mass index is 48.13 kg/m².    Physical Exam  Constitutional:       Appearance: She is well-developed.   HENT:      Head: Normocephalic and atraumatic.   Eyes:      Conjunctiva/sclera: Conjunctivae normal.   Neck:      Vascular: No carotid bruit or JVD.   Cardiovascular:      Rate and Rhythm: Normal rate and regular rhythm.      Chest Wall: PMI is not displaced.      Pulses: Normal pulses and intact distal pulses.      Heart sounds: No midsystolic click. Murmur heard.   Midsystolic murmur is present with a grade of 3/6.     No friction rub. No gallop.   Pulmonary:      Effort: Pulmonary effort is normal.      Breath sounds: Normal breath sounds.   Abdominal:      General: Abdomen is flat. Bowel sounds are normal.   Musculoskeletal:      Right lower leg: No edema.      Left lower leg: No edema.   Skin:     General: Skin is warm and dry.   Neurological:      Mental " Status: She is alert. Mental status is at baseline.   Psychiatric:         Mood and Affect: Mood normal.         Behavior: Behavior normal. Behavior is cooperative.         Judgment: Judgment normal.        Labs:      I have reviewed the following labs below:      CBC:  Lab Results   Component Value Date    WBC 8.3 02/09/2023    HGB 13.5 02/09/2023    HCT 43.2 02/09/2023     02/09/2023    MCV 88.7 02/09/2023    RDW 15.4 02/09/2023     BMP:  Lab Results   Component Value Date     02/09/2023    K 5.1 02/09/2023    CO2 30 (H) 02/09/2023    BUN 27.3 (H) 02/09/2023    CALCIUM 9.9 02/09/2023    MG 2.20 09/12/2022    PHOS 4.4 09/11/2022     LFTs:  Lab Results   Component Value Date    ALBUMIN 3.3 (L) 02/09/2023    BILITOT 1.0 02/09/2023    AST 17 02/09/2023    ALKPHOS 152 (H) 02/09/2023    ALT 21 02/09/2023     FLP:  Cholesterol Total   Date Value Ref Range Status   01/04/2023 105 <=200 mg/dL Final     HDL Cholesterol   Date Value Ref Range Status   01/04/2023 39 35 - 60 mg/dL Final     LDL Cholesterol   Date Value Ref Range Status   01/04/2023 56.00 50.00 - 140.00 mg/dL Final     Triglyceride   Date Value Ref Range Status   01/04/2023 51 37 - 140 mg/dL Final     DM:  Lab Results   Component Value Date    HGBA1C 8.9 (H) 01/04/2023    HGBA1C 11.7 (H) 09/11/2022    HGBA1C 10.0 (H) 05/05/2022    CREATININE 1.11 (H) 02/09/2023     Thyroid:  Lab Results   Component Value Date    TSH 1.121 01/04/2023     Coags:  Lab Results   Component Value Date    INR 1.12 05/15/2019    PROTIME 14.3 05/15/2019      Cardiac:  Lab Results   Component Value Date    TROPONINI <0.010 01/07/2023    TROPONINI 0.054 (H) 09/11/2022    TROPONINI 0.073 (H) 09/11/2022    TROPONINI 0.014 12/06/2021    TROPONINI 0.100 (H) 05/15/2019    BNP 42.0 01/07/2023    .3 (H) 09/11/2022    BNP 87.0 12/06/2021       Cardiac Studies/Imaging:        I have reviewed the following studies below:      Echocardiogram  Results for orders placed during the  hospital encounter of 12/06/22  Echo  Interpretation Summary  · The left ventricle is normal in size with mild concentric hypertrophy and low normal systolic function.  · Normal right ventricular size with low normal right ventricular systolic function.  · The estimated ejection fraction is 50%.  · Grade I left ventricular diastolic dysfunction.  · Mild to moderate pulmonic regurgitation.      Echo - 7/26/2022   Interpretation Summary  · Concentric hypertrophy and mildly decreased systolic function.  · The estimated ejection fraction is 40%.  · Grade I left ventricular diastolic dysfunction.  · Severe right ventricular enlargement.  · Mild left atrial enlargement.  · Moderate right atrial enlargement.  · Mild mitral regurgitation.  · Mild tricuspid regurgitation.  · Elevated central venous pressure (15 mmHg).  · The estimated PA systolic pressure is 56 mmHg.  · There is pulmonary hypertension.  · IVC is dilated and collapses<50% with inspiration.  · Mild to moderate pulmonic regurgitation.    Stress Test  Results for orders placed during the hospital encounter of 08/30/22  Nuclear Stress - Cardiology Interpreted  Interpretation Summary    Normal myocardial perfusion scan. There is no evidence of myocardial ischemia or infarction.    The gated perfusion images showed an ejection fraction of 55% at rest. The gated perfusion images showed an ejection fraction of 51% post stress.    The EKG portion of this study is abnormal but not diagnostic.    The patient reported no chest pain during the stress test.    There were no arrhythmias during stress.  The patient has a low risk nuclear stress alex.  Nuclear stress test indicates no ischemia.     Lexiscan Stress Test 9.7.22:  Normal myocardial perfusion scan. There is no evidence of myocardial ischemia or infarction.  The gated perfusion images showed an ejection fraction of 55% at rest. The gated perfusion images showed an ejection fraction of 51% post stress.  The EKG  portion of this study is abnormal but not diagnostic.  The patient reported no chest pain during the stress test.  There were no arrhythmias during stress.      KWADWO Testing 9.13.22:  The right lower extremity demonstrated multiphasic waveforms at all levels.   The KWADWO on the right was mildly abnormal.  The right lower extremity demonstrated mild arterial flow reduction.  The left lower extremity demonstrated multiphasic waveforms at all levels.   The KWADWO on the left was mildly abnormal.  The left lower extremity demonstrated mild arterial flow reduction.          Assessment & Plan:      57 y.o. female with the following medical problems:    Heart Failure with Improved Ejection Fraction   - Etiology: NiCM  - HF Status: NYHA Class II, ACC/AHA Stage C  - LVEF: 50% (TTE on 12/6/2023)   - Current GDMT: Entresto 24mg/26mg, Carvedilol 25mg, Spironolactone 25, Jardiance 10mg   - will STOP Coreg and initiate Metoprolol Succinate 100mg   - patient to complete BP/HR logs x2-3 weeks and return to clinic for evaluation    - if possible, will increase Entresto to moderate dose when patient returns for NV  - Diuresis: Lasix 40mg BID  - Continue low Na Diet   - Strict Is/Os and daily weights    HTN  BP at goal - 116/63  Continue Spironolactone, Entresto, and and Lasix  Will stop Coreg and initiate metoprolol   Patient to return in 2-3 weeks for NV and BP eval - will attempt to increase Entresto at that time   Counseled on low salt diet and exercise as tolerated    HLD  LDL goal <70  LDL 56 on repeat labs today 1/4/2023  Continue Atorvastatin 80mg daily and Zetia 10mg daily  Counseled on medication compliance    DM  A1c improved to 8.9% after addition of jardiance  Management per PCP    Tobacco Abuse  Counseled on smoking cessation today   Patient is trying to quit    DAVION on CPAP  She reports nightly CPAP compliance and feels she is benefitting from use  Recommended continued nightly CPAP use     BLE pain - improved  KWADWO testing  9.13.22: mild arterial flow reduction in the bilateral lower extremities.     Return to clinic in 4 months.      Future Appointments   Date Time Provider Department Center   5/15/2023  5:30 PM VERNON AlcantarS [facility] [place]   5/18/2023  8:45 AM NOEMI Watson [facility] INTMED [place]   6/21/2023  1:00 PM Alejandra Junior, NOEMI [facility] OPWTATIANA [place]    6/29/2023  2:15 PM Jacqueline Urbina, EDWIN [facility] ENDOCR [place]   8/25/2023  9:00 AM Lynda Paige RD [facility] DIAMGT [place]   12/26/2023  1:30 PM PROVIDER, [facility] PULMONOLOGY [facility] PULSHARRON [place]         Rowan Murphy DO  Internal Medicine Physician   Department of Medicine   [facility]    05/10/2023 11:18 AM

## 2023-05-15 ENCOUNTER — TELEPHONE (OUTPATIENT)
Dept: SMOKING CESSATION | Facility: CLINIC | Age: 58
End: 2023-05-15
Payer: MEDICAID

## 2023-05-15 NOTE — TELEPHONE ENCOUNTER
Attempted to contact pt regarding phone follow up appt.  Called pt.  No answer.  Unable to leave a message.

## 2023-05-17 ENCOUNTER — CLINICAL SUPPORT (OUTPATIENT)
Dept: SMOKING CESSATION | Facility: CLINIC | Age: 58
End: 2023-05-17

## 2023-05-17 DIAGNOSIS — F17.200 NICOTINE DEPENDENCE: Primary | ICD-10-CM

## 2023-05-17 PROCEDURE — 90853 PR GROUP PSYCHOTHERAPY: ICD-10-PCS | Mod: S$GLB,,,

## 2023-05-17 PROCEDURE — 90853 GROUP PSYCHOTHERAPY: CPT | Mod: S$GLB,,,

## 2023-05-17 NOTE — PROGRESS NOTES
Individual Follow-Up Form    5/17/2023    Quit Date:     Clinical Status of Patient: Outpatient    Continuing Medication: yes  Chantix     Target Symptoms: Withdrawal and medication side effects. The following were  rated moderate (3) to severe (4) on TCRS:  Moderate (3): none  Severe (4): none    Comments: Spoke with pt regarding smoking cessation progress.  Pt is smoking 7-10 cigarettes per day.  Pt remains on tobacco cessation medication of 1 mg Chantix BID.  No adverse effects noted at this time.  Pt stated that she has only been taking 1 pill per day and forgets to take it some days.  Encouraged pt to take Chantix twice per day everyday to aid in smoking cessation.  Pt stated that when she is not home, she does not even think about smoking but as soon as she gets home that is the first thing she thinks about.  Discussed finding a hobby or something to do to keep her busy/occupied, like reading, playing a game on her phone, or watching a movie. Pt's quit day is May 22, 2023.  Encouraged pt to make a plan; keep herself busy, get rid of any cigarettes, lighters and ashtrays.  Pt stated that she still has 21 mg nicotine patches.  Pt will take the Chantix twice per day and she will begin using the nicotine patches and lozenges again.  Encouraged pt to find that willpower to quit.      Diagnosis: F17.200    Next Visit: 2 weeks

## 2023-05-18 ENCOUNTER — OFFICE VISIT (OUTPATIENT)
Dept: INTERNAL MEDICINE | Facility: CLINIC | Age: 58
End: 2023-05-18
Payer: MEDICAID

## 2023-05-18 VITALS
HEART RATE: 61 BPM | HEIGHT: 64 IN | SYSTOLIC BLOOD PRESSURE: 120 MMHG | OXYGEN SATURATION: 99 % | RESPIRATION RATE: 18 BRPM | BODY MASS INDEX: 49.28 KG/M2 | WEIGHT: 288.63 LBS | DIASTOLIC BLOOD PRESSURE: 77 MMHG | TEMPERATURE: 98 F

## 2023-05-18 DIAGNOSIS — E66.01 CLASS 3 SEVERE OBESITY DUE TO EXCESS CALORIES WITH SERIOUS COMORBIDITY AND BODY MASS INDEX (BMI) OF 45.0 TO 49.9 IN ADULT: Chronic | ICD-10-CM

## 2023-05-18 DIAGNOSIS — I10 PRIMARY HYPERTENSION: Chronic | ICD-10-CM

## 2023-05-18 DIAGNOSIS — G47.33 OSA ON CPAP: Chronic | ICD-10-CM

## 2023-05-18 DIAGNOSIS — Z79.4 TYPE 2 DIABETES MELLITUS WITH HYPERGLYCEMIA, WITH LONG-TERM CURRENT USE OF INSULIN: Chronic | ICD-10-CM

## 2023-05-18 DIAGNOSIS — J43.1 PANLOBULAR EMPHYSEMA: Primary | Chronic | ICD-10-CM

## 2023-05-18 DIAGNOSIS — I50.20 HEART FAILURE WITH REDUCED EJECTION FRACTION: Chronic | ICD-10-CM

## 2023-05-18 DIAGNOSIS — E78.5 HYPERLIPIDEMIA LDL GOAL <70: Chronic | ICD-10-CM

## 2023-05-18 DIAGNOSIS — E11.65 TYPE 2 DIABETES MELLITUS WITH HYPERGLYCEMIA, WITH LONG-TERM CURRENT USE OF INSULIN: Chronic | ICD-10-CM

## 2023-05-18 DIAGNOSIS — S81.802A WOUND OF LEFT LOWER EXTREMITY, INITIAL ENCOUNTER: ICD-10-CM

## 2023-05-18 DIAGNOSIS — E11.65 UNCONTROLLED TYPE 2 DIABETES MELLITUS WITH HYPERGLYCEMIA: ICD-10-CM

## 2023-05-18 DIAGNOSIS — F17.210 CIGARETTE NICOTINE DEPENDENCE WITHOUT COMPLICATION: Chronic | ICD-10-CM

## 2023-05-18 PROCEDURE — 1159F PR MEDICATION LIST DOCUMENTED IN MEDICAL RECORD: ICD-10-PCS | Mod: CPTII,,, | Performed by: NURSE PRACTITIONER

## 2023-05-18 PROCEDURE — 3008F PR BODY MASS INDEX (BMI) DOCUMENTED: ICD-10-PCS | Mod: CPTII,,, | Performed by: NURSE PRACTITIONER

## 2023-05-18 PROCEDURE — 3074F PR MOST RECENT SYSTOLIC BLOOD PRESSURE < 130 MM HG: ICD-10-PCS | Mod: CPTII,,, | Performed by: NURSE PRACTITIONER

## 2023-05-18 PROCEDURE — 99214 OFFICE O/P EST MOD 30 MIN: CPT | Mod: S$PBB,,, | Performed by: NURSE PRACTITIONER

## 2023-05-18 PROCEDURE — 3066F PR DOCUMENTATION OF TREATMENT FOR NEPHROPATHY: ICD-10-PCS | Mod: CPTII,,, | Performed by: NURSE PRACTITIONER

## 2023-05-18 PROCEDURE — 1159F MED LIST DOCD IN RCRD: CPT | Mod: CPTII,,, | Performed by: NURSE PRACTITIONER

## 2023-05-18 PROCEDURE — 3061F NEG MICROALBUMINURIA REV: CPT | Mod: CPTII,,, | Performed by: NURSE PRACTITIONER

## 2023-05-18 PROCEDURE — 4010F ACE/ARB THERAPY RXD/TAKEN: CPT | Mod: CPTII,,, | Performed by: NURSE PRACTITIONER

## 2023-05-18 PROCEDURE — 4010F PR ACE/ARB THEARPY RXD/TAKEN: ICD-10-PCS | Mod: CPTII,,, | Performed by: NURSE PRACTITIONER

## 2023-05-18 PROCEDURE — 3066F NEPHROPATHY DOC TX: CPT | Mod: CPTII,,, | Performed by: NURSE PRACTITIONER

## 2023-05-18 PROCEDURE — 99214 PR OFFICE/OUTPT VISIT, EST, LEVL IV, 30-39 MIN: ICD-10-PCS | Mod: S$PBB,,, | Performed by: NURSE PRACTITIONER

## 2023-05-18 PROCEDURE — 3061F PR NEG MICROALBUMINURIA RESULT DOCUMENTED/REVIEW: ICD-10-PCS | Mod: CPTII,,, | Performed by: NURSE PRACTITIONER

## 2023-05-18 PROCEDURE — 99215 OFFICE O/P EST HI 40 MIN: CPT | Mod: PBBFAC | Performed by: NURSE PRACTITIONER

## 2023-05-18 PROCEDURE — 1160F RVW MEDS BY RX/DR IN RCRD: CPT | Mod: CPTII,,, | Performed by: NURSE PRACTITIONER

## 2023-05-18 PROCEDURE — 3078F DIAST BP <80 MM HG: CPT | Mod: CPTII,,, | Performed by: NURSE PRACTITIONER

## 2023-05-18 PROCEDURE — 3008F BODY MASS INDEX DOCD: CPT | Mod: CPTII,,, | Performed by: NURSE PRACTITIONER

## 2023-05-18 PROCEDURE — 1160F PR REVIEW ALL MEDS BY PRESCRIBER/CLIN PHARMACIST DOCUMENTED: ICD-10-PCS | Mod: CPTII,,, | Performed by: NURSE PRACTITIONER

## 2023-05-18 PROCEDURE — 3078F PR MOST RECENT DIASTOLIC BLOOD PRESSURE < 80 MM HG: ICD-10-PCS | Mod: CPTII,,, | Performed by: NURSE PRACTITIONER

## 2023-05-18 PROCEDURE — 3074F SYST BP LT 130 MM HG: CPT | Mod: CPTII,,, | Performed by: NURSE PRACTITIONER

## 2023-05-18 RX ORDER — DOXYCYCLINE 100 MG/1
100 CAPSULE ORAL 2 TIMES DAILY
Qty: 14 CAPSULE | Refills: 0 | Status: SHIPPED | OUTPATIENT
Start: 2023-05-18 | End: 2023-09-18 | Stop reason: ALTCHOICE

## 2023-05-18 RX ORDER — PROPYLENE GLYCOL 0.06 MG/ML
1 EMULSION OPHTHALMIC 2 TIMES DAILY
Qty: 15 ML | Refills: 1 | Status: SHIPPED | OUTPATIENT
Start: 2023-05-18

## 2023-05-18 RX ORDER — MUPIROCIN 20 MG/G
OINTMENT TOPICAL 2 TIMES DAILY
Qty: 22 G | Refills: 1 | Status: SHIPPED | OUTPATIENT
Start: 2023-05-18

## 2023-05-18 RX ORDER — ERGOCALCIFEROL 1.25 MG/1
50000 CAPSULE ORAL
Qty: 12 CAPSULE | Refills: 2 | Status: SHIPPED | OUTPATIENT
Start: 2023-05-18 | End: 2023-09-18 | Stop reason: SDUPTHER

## 2023-05-18 NOTE — PROGRESS NOTES
Patient ID: [id number]     Chief Complaint: Follow-up and Leg Pain (Has blister to left lower leg started Saturday. Leg swelling.)    HPI:     Linn Mckeon is a 57 y.o. female with diagnosis of HTN, HLD, Class II HFpEF 50-55%, DM2, COPD with Oxygen Dependence, DAVION, Tobacco Use, Obesity. Patient seen in clinic today for LLE wound. Patient last seen in clinic on 03/03/2023.  Patient previously treated with Clindamycin PO and Antibiotic oint for LLE wound, Patient referred to Wound Care Clinic. Patient states LLE wound improved but has a new wound underneath previous wound. Patient denies injury. Patient has Wound Care appt scheduled for 06/21/2023.   A1c elevated at 10.2. Patient followed by Endocrinology Clinic. Patient prescribed Jardiance 10 mg daily, Lantus 30 units bid, Metformin 1,000 mg bid, Januvia 100 mg daily. Has history of medication noncompliance. Medication Dispense History reviewed, Januvia last dispensed on 09/21/2022. Patient seen by Diabetes Education on 04/28/2023 and has follow up scheduled for 08/25/2023.   Patient currently enrolled in smoking cessation program.   Patient denies any other acute complaints.      Patient followed by Cardiology Clinic. Last appointment on 05/10/2023.  Heart Failure with Improved Ejection Fraction: Etiology: NiCM. HF Status: NYHA Class II, ACC/AHA Stage C. LVEF: 50% (TTE on 12/6/2023). Current GDMT: Entresto 24mg/26mg, Carvedilol 25mg, Spironolactone 25, Jardiance 10mg. will STOP Coreg and initiate Metoprolol Succinate 100mg. patient to complete BP/HR logs x2-3 weeks and return to clinic for evaluation. if possible, will increase Entresto to moderate dose when patient returns for NV. Diuresis: Lasix 40mg BID. Continue low Na Diet. Strict Is/Os and daily weights. HTN: BP at goal - 116/63. Continue Spironolactone, Entresto, and and Lasix. Will stop Coreg and initiate metoprolol. Patient to return in 2-3 weeks for NV and BP eval - will attempt to increase Entresto  at that time. Counseled on low salt diet and exercise as tolerated. HLD: LDL goal <70. LDL 56 on repeat labs today 1/4/2023. Continue Atorvastatin 80mg daily and Zetia 10mg daily. Counseled on medication compliance. DAVION on CPAP: She reports nightly CPAP compliance and feels she is benefitting from use. Recommended continued nightly CPAP use. BLE pain - improved: KWADWO testing 9.13.22: mild arterial flow reduction in the bilateral lower extremities. Patient has follow up appointment scheduled for 09/18/2023.     Patient followed by Wound Care Clinic. Last appointment on 04/12/2023. Patient has follow up appointment scheduled for 06/21/2023.      Patient followed by Pulmonology Clinic. Last appointment on 12/20/2022. 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; COPD; Likely weight-related restrictive lung disease: PFTs in 10/2022 revealed FEV1 50%, FEV1/FVC ratio 89%, positive bronchodilator response, decreased DLCO; Continue trelegy ellipta 100 mcg 1 puff daily, albuterol nebulizer/combivent PRN; Walked patient without O2 in clinic today, lowest SPO2 while ambulating was 94%; She does not currently qualify for renewing O2 prescription based on ambulatory SPO2 testing; Encouraged patient to try to lose weight, discussed various methods to help with weight loss; Anticipate with weight loss her shortness of breath will also improve; Patient currently smoking <5 cigarettes / day and is actively attempting to quit. RTC in 12 months. Patient has follow up appointment scheduled for 12/26/2023.      Patient followed by Endocrinology Clinic. Last appointment on 11/02/2022. 56 year female scheduled today has follow-up to Endocrine Clinic. Patient was scheduled to have 1 month follow-up for history of uncontrolled type 2 diabetes. Regimen was adjusted patient was started on a Harper. Patient returns to clinic today. Patient was started on a Harper. Harper interpretation 10/20/2022 - 11/02/2022. Average glucose 162 GMI  indicater 7.2. 6% very high, 19% high, 75% target range, 0% low, 0% very low patient's scans in average of 2-11 times per day patient occasionally has prandial spikes in the evening indicating a high carb meal which is only occasionally, only 2 hypoglycemic episode is noted after lunch at 69 and prior to lunch at 63. In the last week patient has had increasing hypoglycemic episodes mid day. Patient states she does not eat breakfast at times will decrease morning insulin patient is to call clinic if any further hypoglycemia occurs. On patient's Dexcom report patient is 75% at target range and predicted A1c is 7.2. Nephropathy urine micro elevated 05/06/2022 patient is on On Entresto which contains ACE. Patient was offered flu vaccine today patient does not take flu vaccine and declined her vaccinations today. Uncontrolled type 2 diabetes mellitus with hyperglycemia. Recent A1C 11.7 previous 10.0 and 8.3. Urine Micro Creatine/ratio: elevated 5/06/2022. Medications: Metformin 1000 mg twice daily, Jardiance 10mg, Lantus 30 units BID  Changes: Decrease am insulin to 25 units. Home Glucometer Use: Harper. Eye Exam: 09/28/2021 repeat fundus today attempted previous and to obtain. Last Hypoglycemic episode: Occasional. Follow ADA diet, avoid soda, simple sweets, and limit rice, breads and starches. Maintain healthy weight, exercise 4-5 times a week for 30 minutes. Diet and medication compliance discussed on visit. RTC 4 month. Hypoglycemia: Decrease Lantus to 25 units in the a.m. Call if further hypoglycemia occurs. Nephropathy: Urine Micro elevated 05/06/2022; On Entresto which contains ACE. Patient has follow up appointment scheduled for 05/01/2023.      Patient followed by Ophthalmology Clinic. Last appointment on 04/28/2022. DM without retinopathy: Last A1c 03/2021: 10.3%; encouraged good control/regular PCP follow ups; Photos 4/2022; DFE next due 4/2023. Mild POAG OU: No family hx, +AA, thin CCT, upper limit normal IOP.  Gonio open 360 OU; C/D: 0.4 OU; Tmax 22 // 21; OCT RNFL full OU; Ganglion cell layer wnl OU; Latanoprost QHS OU started 9/1/21; IOP good today. Dry eye/MGD: Diffuse, confluent PEE OU; Start ATs 4-6 times per day, ointment QHS; Warm compresses BID. Presbyopia: doing well, monitor. HTN retinopathy: Recommend good bp control. RTC 6 mo IOP. No follow up appointment noted, will message clinic.      Review of patient's allergies indicates:  No Known Allergies    Breast Cancer Screening: MMG benign on 04/10/2023  Cervical Cancer Screening: Hysterectomy  Colorectal Cancer Screening: Cologuard positive on 12/13/2022; Patient referred to GI clinic on 12/21/2022, no appt noted  Diabetic Eye Exam: 11/04/2022  Diabetic Foot Exam: 11/02/2022  Lung Cancer Screening: Refuses at this time  Prostate Cancer Screening: N/A  AAA Screening: N/A  Osteoporosis Screening: deferred due to age  Medicare Wellness: N/A  Immunizations:   Immunization History   Administered Date(s) Administered    COVID-19, MRNA, LN-S, PF (Pfizer) (Purple Cap) 07/28/2021, 08/18/2021     Past Surgical History:   Procedure Laterality Date    HYSTERECTOMY       family history includes Diabetes in her brother, mother, and sister; Heart attack in her father; Heart disease in her mother; Hypertension in her brother, mother, and sister.    Social History     Socioeconomic History    Marital status:    Tobacco Use    Smoking status: Every Day     Packs/day: 0.25     Types: Cigarettes    Smokeless tobacco: Never    Tobacco comments:     Smokes 6 cigarettes a day   Substance and Sexual Activity    Alcohol use: Not Currently    Drug use: Never    Sexual activity: Not Currently     Social Determinants of Health     Financial Resource Strain: Low Risk     Difficulty of Paying Living Expenses: Not very hard   Food Insecurity: No Food Insecurity    Worried About Running Out of Food in the Last Year: Never true    Ran Out of Food in the Last Year: Never true  "  Transportation Needs: No Transportation Needs    Lack of Transportation (Medical): No    Lack of Transportation (Non-Medical): No   Physical Activity: Inactive    Days of Exercise per Week: 0 days    Minutes of Exercise per Session: 0 min   Stress: No Stress Concern Present    Feeling of Stress : Only a little   Social Connections: Moderately Isolated    Frequency of Communication with Friends and Family: More than three times a week    Frequency of Social Gatherings with Friends and Family: More than three times a week    Attends Muslim Services: More than 4 times per year    Active Member of Clubs or Organizations: No    Attends Club or Organization Meetings: Never    Marital Status:    Housing Stability: Low Risk     Unable to Pay for Housing in the Last Year: No    Number of Places Lived in the Last Year: 1    Unstable Housing in the Last Year: No     Current Outpatient Medications   Medication Instructions    albuterol (PROVENTIL) 2.5 mg, Nebulization, Every 6 hours PRN, Rescue    ammonium lactate (LAC-HYDRIN) 12 % lotion APPLY TO DRY SKIN AND RUB IN WELL TWICE DAILY. NOTHING BETWEEN TOES    atorvastatin (LIPITOR) 80 mg, Oral, Daily    BD ULTRA-FINE JORDON PEN NEEDLE 32 gauge x 5/32" Ndle 2 times daily    blood sugar diagnostic Strp To check BG 2 times daily, to use with insurance preferred meter    blood-glucose meter kit To check BG 2 times daily, to use with insurance preferred meter    buPROPion (WELLBUTRIN SR) 150 MG TBSR 12 hr tablet Take 1 tablet (150 mg ) by mouth 2 (two) times daily.    CICLOPIROX-ITRACONAZOLE-UREA TOP CLEAN NAILS, SHAKE BOTTLE WELL, APPLY TWICE DAILY TO ALL AFFECTED NAILS USING APPLICATOR. (UP TO 2 MLS/DAY)    doxycycline (VIBRAMYCIN) 100 mg, Oral, 2 times daily    empagliflozin (JARDIANCE) 25 mg, Oral, Daily    ergocalciferol (ERGOCALCIFEROL) 50,000 Units, Oral, Every 7 days    ezetimibe (ZETIA) 10 mg, Oral, Daily    flash glucose scanning reader (Tango JOSE ALFREDO 2 READER) " "Misc Freestyle Harper 2 reader dispense 1. Use as directed    fluticasone-umeclidin-vilanter (TRELEGY ELLIPTA) 100-62.5-25 mcg DsDv 1 puff, Inhalation, Daily    furosemide (LASIX) 40 mg, Oral, 2 times daily    insulin glargine (LANTUS U-100 INSULIN) 30 Units, Subcutaneous, 2 times daily    ipratropium-albuteroL (COMBIVENT RESPIMAT)  mcg/actuation inhaler 2 puffs, Inhalation, Every 6 hours PRN, Rescue    lancets (ACCU-CHEK FASTCLIX LANCET DRUM) [facility] USE ONE DAILY    LANTUS SOLOSTAR U-100 INSULIN glargine 100 units/mL SubQ pen SMARTSI Unit(s) SUB-Q Every Evening    latanoprost 0.005 % ophthalmic solution 1 drop, Ophthalmic    metFORMIN (GLUCOPHAGE) 1,000 mg, Oral, 2 times daily with meals    metoprolol succinate (TOPROL-XL) 100 mg, Oral, Daily    mupirocin (BACTROBAN) 2 % ointment Topical (Top), 2 times daily    nicotine (NICODERM CQ) 21 mg/24 hr 1 patch, Transdermal, Daily    nicotine polacrilex 4 mg, Oral, As needed (PRN)    nystatin (MYCOSTATIN) ointment Topical (Top), 2 times daily    pen needle, diabetic 31 gauge x 5/16" Ndle 1 each, Misc.(Non-Drug; Combo Route), 2 times daily with meals    propylene glycoL (SYSTANE BALANCE) 0.6 % Drop 1 drop, Ophthalmic, 2 times daily    sacubitriL-valsartan (ENTRESTO) 24-26 mg per tablet 1 tablet, Oral, 2 times daily    SITagliptin phosphate (JANUVIA) 100 mg, Oral, Daily    spironolactone (ALDACTONE) 25 mg, Oral, Daily    varenicline (CHANTIX KAREN) 0.5 mg (11)- 1 mg (42) tablet Oral    varenicline (CHANTIX) 1 mg Tab Take 1/2 tablet once a day for 3 days, then increase to 1/2 tablet twice a day for 4 days. Beginning on Day 8, take 1 tablet twice daily until complete       Subjective:     Review of Systems   Constitutional: Negative.    HENT: Negative.     Eyes: Negative.    Respiratory: Negative.     Cardiovascular: Negative.    Gastrointestinal: Negative.    Endocrine: Negative.    Genitourinary: Negative.    Musculoskeletal: Negative.    Skin:  Positive for wound. " "  Allergic/Immunologic: Negative.    Neurological: Negative.    Hematological: Negative.    Psychiatric/Behavioral: Negative.       Objective:     Visit Vitals  /77 (BP Location: Left arm, Patient Position: Sitting, BP Method: Large (Automatic))   Pulse 61   Temp 97.9 °F (36.6 °C) (Oral)   Resp 18   Ht 5' 4.02" (1.626 m)   Wt 130.9 kg (288 lb 9.6 oz)   SpO2 99%   BMI 49.51 kg/m²       Physical Exam  Vitals reviewed.   Constitutional:       Appearance: Normal appearance.   HENT:      Head: Normocephalic and atraumatic.      Mouth/Throat:      Mouth: Mucous membranes are moist.      Pharynx: Oropharynx is clear.   Eyes:      Extraocular Movements: Extraocular movements intact.      Conjunctiva/sclera: Conjunctivae normal.      Pupils: Pupils are equal, round, and reactive to light.   Cardiovascular:      Rate and Rhythm: Normal rate and regular rhythm.      Heart sounds: Normal heart sounds.   Pulmonary:      Effort: Pulmonary effort is normal.      Breath sounds: Normal breath sounds.   Abdominal:      General: Bowel sounds are normal.   Musculoskeletal:         General: Normal range of motion.      Cervical back: Normal range of motion.   Skin:     General: Skin is warm and dry.      Findings: Wound present.          Neurological:      Mental Status: She is alert and oriented to person, place, and time.   Psychiatric:         Mood and Affect: Mood normal.         Behavior: Behavior normal.       Labs Reviewed:     Hematology:  Lab Results   Component Value Date    WBC 10.45 05/18/2023    HGB 13.9 05/18/2023    HCT 42.9 05/18/2023     05/18/2023     Chemistry:  Lab Results   Component Value Date     05/18/2023    K 4.6 05/18/2023    CHLORIDE 104 05/18/2023    BUN 18.9 05/18/2023    CREATININE 0.98 05/18/2023    EGFRNORACEVR >60 05/18/2023    GLUCOSE 192 (H) 05/18/2023    CALCIUM 9.6 05/18/2023    ALKPHOS 119 05/18/2023    LABPROT 7.4 05/18/2023    ALBUMIN 3.2 (L) 05/18/2023    BILIDIR 0.2 " 12/06/2021    IBILI 0.30 12/06/2021    AST 13 05/18/2023    ALT 16 05/18/2023    MG 2.20 09/12/2022    PHOS 4.4 09/11/2022    [id number] 20.9 (L) 05/18/2023     Lab Results   Component Value Date    HGBA1C 10.2 (H) 05/18/2023     Lipid Panel:  Lab Results   Component Value Date    CHOL 94 05/18/2023    HDL 30 (L) 05/18/2023    LDL 54.00 05/18/2023    TRIG 51 05/18/2023    TOTALCHOLEST 3 05/18/2023     Thyroid:  Lab Results   Component Value Date    TSH 1.121 01/04/2023     Urine:  Lab Results   Component Value Date    COLORUA Yellow 05/18/2023    APPEARANCEUA Turbid (A) 05/18/2023    SGUA 1.019 05/18/2023    PHUA 6.0 05/18/2023    PROTEINUA Trace (A) 05/18/2023    GLUCOSEUA 2+ (A) 05/18/2023    KETONESUA Negative 05/18/2023    BLOODUA 1+ (A) 05/18/2023    NITRITESUA Negative 05/18/2023    LEUKOCYTESUR 500 (A) 05/18/2023    RBCUA 21-50 (A) 05/18/2023    WBCUA 21-50 (A) 05/18/2023    BACTERIA Few (A) 05/18/2023    SQEPUA Moderate (A) 05/18/2023    HYALINECASTS None Seen 05/18/2023    CREATRANDUR 103.8 05/18/2023        Assessment:       ICD-10-CM ICD-9-CM   1. Panlobular emphysema  J43.1 492.8   2. Wound of left lower extremity, initial encounter  S81.802A 894.0   3. Type 2 diabetes mellitus with hyperglycemia, with long-term current use of insulin  E11.65 250.00    Z79.4 790.29     V58.67   4. Hyperlipidemia LDL goal <70  E78.5 272.4   5. Primary hypertension  I10 401.9   6. Heart failure with reduced ejection fraction  I50.20 428.20   7. Class 3 severe obesity due to excess calories with serious comorbidity and body mass index (BMI) of 45.0 to 49.9 in adult  E66.01 278.01    Z68.42 V85.42   8. DAVION on CPAP  G47.33 327.23    Z99.89 V46.8   9. Cigarette nicotine dependence without complication  F17.210 305.1   10. Uncontrolled type 2 diabetes mellitus with hyperglycemia  E11.65 250.02        Plan:     1. Wound of left lower extremity, initial encounter  Patient has appt scheduled with Wound Care on 06/21/2023  Start  Doxycycline, Bactroban    2. Type 2 diabetes mellitus with hyperglycemia, with long-term current use of insulin  Continue Lantus 30 units bid, Metformin 1,000 mg bid, Januvia 100 mg daily  Increase Jardiance to 25 mg daily  Encouraged home CBG monitoring.  Hypoglycemic episodes: denies  Body mass index is 49.51 kg/m².  Hemoglobin A1c   Date Value Ref Range Status   05/18/2023 10.2 (H) <=7.0 % Final     Urine Creatinine   Date Value Ref Range Status   05/18/2023 103.8 47.0 - 110.0 mg/dL Final   Urine Microalbumin: 19.5  On Atorvastatin and Zetia  On Entresto (contains Valsartan)  Weight Loss Encouraged  ADA Diet  - CBC Auto Differential; Future  - Comprehensive Metabolic Panel; Future  - Hemoglobin A1C; Future    3. Hyperlipidemia LDL goal <70  Continue On Atorvastatin and Zetia  Weight loss encouraged  Low fat/high fiber diet  Increase physical activity  Tobacco cessation encouraged  Cholesterol Total   Date Value Ref Range Status   05/18/2023 94 <=200 mg/dL Final     HDL Cholesterol   Date Value Ref Range Status   05/18/2023 30 (L) 35 - 60 mg/dL Final     Triglyceride   Date Value Ref Range Status   05/18/2023 51 37 - 140 mg/dL Final     LDL Cholesterol   Date Value Ref Range Status   05/18/2023 54.00 50.00 - 140.00 mg/dL Final     4. Primary hypertension  Vitals:    05/18/23 1012   BP: 120/77   Pulse: 61   Resp: 18   Temp: 97.9 °F (36.6 °C)      Urine Creatinine   Date Value Ref Range Status   05/18/2023 103.8 47.0 - 110.0 mg/dL Final   Urine Microalbumin: 19.5  Results for orders placed or performed during the hospital encounter of 01/07/23   EKG 12-lead    Collection Time: 01/07/23  4:35 AM    Narrative    Test Reason : R06.00,    Vent. Rate : 065 BPM     Atrial Rate : 065 BPM     P-R Int : 210 ms          QRS Dur : 094 ms      QT Int : 438 ms       P-R-T Axes : 057 -24 -33 degrees     QTc Int : 455 ms    Sinus rhythm with 1st degree A-V block  ST and T wave abnormality, consider anterior ischemia  Abnormal  ECG  When compared with ECG of 11-SEP-2022 03:58,  Inverted T waves are now Present  Confirmed by Reynold Eaton MD ([id number]) on 1/9/2023 3:54:00 PM    Referred By: KRISTAERR   SELF           Confirmed By:Reynold Eaton MD   Continue Coreg 25 mg bid, Lasix 40 mg bid, Spironolactone 25 mg daily, Entresto 24-26 mg bid  DASH diet  Encouraged home blood pressure monitoring    5. Heart failure with reduced ejection fraction  Followed by Cardiology Clinic  Continue Coreg 25 mg bid, Lasix 40 mg bid, Spironolactone 25 mg daily, Entresto 24-26 mg bid    6. Panlobular emphysema  Followed by Pulmonology Clinic  Continue O2  PFTs on 10/12/2022  Continue Trelegy Ellipta daily, Combivent prn, Albuterol prn    7. Class 3 severe obesity due to excess calories with serious comorbidity and body mass index (BMI) of 45.0 to 49.9 in adult  Body mass index is 49.51 kg/m².  Thyroid Stimulating Hormone   Date Value Ref Range Status   01/04/2023 1.121 0.350 - 4.940 uIU/mL Final     Vit D 25 OH   Date Value Ref Range Status   05/18/2023 20.9 (L) 30.0 - 80.0 ng/mL Final     Hemoglobin A1c   Date Value Ref Range Status   05/18/2023 10.2 (H) <=7.0 % Final   Sleep Study: DAVION with BiPAP  Weight Loss Encouraged  Increase Physical Activity    8. DAVION on CPAP  Currently on BiPAP   Pulmonology appointment on 12/20/2022 for Trilogy    9. Cigarette nicotine dependence without complication  Patient currently enrolled in smoking cessation program      Follow up in about 4 months (around 9/18/2023) for Labs. In addition to their scheduled follow up, the patient has also been instructed to follow up on as needed basis.     NOEMI Watson

## 2023-05-26 ENCOUNTER — TELEPHONE (OUTPATIENT)
Dept: INTERNAL MEDICINE | Facility: CLINIC | Age: 58
End: 2023-05-26
Payer: MEDICAID

## 2023-05-26 ENCOUNTER — CLINICAL SUPPORT (OUTPATIENT)
Dept: CARDIOLOGY | Facility: CLINIC | Age: 58
End: 2023-05-26
Payer: MEDICAID

## 2023-05-26 VITALS
WEIGHT: 283.38 LBS | HEIGHT: 64 IN | HEART RATE: 59 BPM | BODY MASS INDEX: 48.38 KG/M2 | DIASTOLIC BLOOD PRESSURE: 78 MMHG | OXYGEN SATURATION: 97 % | RESPIRATION RATE: 17 BRPM | SYSTOLIC BLOOD PRESSURE: 119 MMHG

## 2023-05-26 DIAGNOSIS — E78.5 HYPERLIPIDEMIA LDL GOAL <70: ICD-10-CM

## 2023-05-26 DIAGNOSIS — E11.65 TYPE 2 DIABETES MELLITUS WITH HYPERGLYCEMIA, WITH LONG-TERM CURRENT USE OF INSULIN: Primary | ICD-10-CM

## 2023-05-26 DIAGNOSIS — R31.9 HEMATURIA, UNSPECIFIED TYPE: Primary | ICD-10-CM

## 2023-05-26 DIAGNOSIS — Z79.4 TYPE 2 DIABETES MELLITUS WITH HYPERGLYCEMIA, WITH LONG-TERM CURRENT USE OF INSULIN: Primary | ICD-10-CM

## 2023-05-26 PROCEDURE — 99215 OFFICE O/P EST HI 40 MIN: CPT | Mod: PBBFAC

## 2023-05-26 NOTE — PROGRESS NOTES
Pt presents to cardio clinic for BP check. Pt reports compliance with all meds, no issues to report. She denies CP, palpitations, swelling. She does have shortness of breath with exertion, she has oxygen with her today. Home BP log reveals mostly normal readings. BP today 119/78 HR 59. Case presented to NOEMI Odonnell, will increase Entresto to 49/51mg. Pt to track BP at home, return to clinic in 3 weeks for NV with BMP. Pt given written and verbal instructions, she verbalizes understanding.

## 2023-05-26 NOTE — TELEPHONE ENCOUNTER
Notify patient that UA abnormal. I did order a repeat that patient can go to lab and have completed.

## 2023-05-30 ENCOUNTER — TELEPHONE (OUTPATIENT)
Dept: INTERNAL MEDICINE | Facility: CLINIC | Age: 58
End: 2023-05-30
Payer: MEDICAID

## 2023-05-31 DIAGNOSIS — Z12.12 ENCOUNTER FOR COLORECTAL CANCER SCREENING: Primary | ICD-10-CM

## 2023-05-31 DIAGNOSIS — Z12.11 ENCOUNTER FOR COLORECTAL CANCER SCREENING: Primary | ICD-10-CM

## 2023-06-21 ENCOUNTER — CLINICAL SUPPORT (OUTPATIENT)
Dept: SMOKING CESSATION | Facility: CLINIC | Age: 58
End: 2023-06-21

## 2023-06-21 DIAGNOSIS — F17.200 NICOTINE DEPENDENCE: Primary | ICD-10-CM

## 2023-06-21 PROCEDURE — 90853 PR GROUP PSYCHOTHERAPY: ICD-10-PCS | Mod: S$GLB,,,

## 2023-06-21 PROCEDURE — 90853 GROUP PSYCHOTHERAPY: CPT | Mod: S$GLB,,,

## 2023-06-21 NOTE — PROGRESS NOTES
Individual Follow-Up Form    6/21/2023    Quit Date:     Clinical Status of Patient: Outpatient    Continuing Medication: no     Target Symptoms: Withdrawal and medication side effects. The following were  rated moderate (3) to severe (4) on TCRS:  Moderate (3): none  Severe (4): none    Comments: Spoke with pt via phone regarding smoking cessation progress.  Pt is smoking 8 cigarettes per day.  Pt stated that she stopped taking the Chantix and using the 21 mg nicotine patches.  Pt stated that none of the smoking cessation medication worked.  Discussed how medication alone may not work, she has to work on breaking the habit as well. Pt stated that she feels like she lacks willpower.  Reviewed strategies, cues, triggers, high risk situations, stress, relaxation, habitual behavior, and life style changes. Discussed benefit end date.  Pt will continue to work on her quit.  Encouraged pt to move her cigarettes, smoke outdoors only and find things to do to distract her from smoking.  Commended pt on her progress.  Pt has my contact information if she needs any additional support or decides to rejoin the program.    Diagnosis: F17.200    Next Visit: 2 weeks

## 2023-06-29 ENCOUNTER — OFFICE VISIT (OUTPATIENT)
Dept: ENDOCRINOLOGY | Facility: CLINIC | Age: 58
End: 2023-06-29
Payer: MEDICAID

## 2023-06-29 VITALS
RESPIRATION RATE: 16 BRPM | SYSTOLIC BLOOD PRESSURE: 107 MMHG | OXYGEN SATURATION: 96 % | DIASTOLIC BLOOD PRESSURE: 70 MMHG | BODY MASS INDEX: 48.32 KG/M2 | TEMPERATURE: 98 F | WEIGHT: 283 LBS | HEIGHT: 64 IN | HEART RATE: 90 BPM

## 2023-06-29 DIAGNOSIS — N28.9 NEPHROPATHY: ICD-10-CM

## 2023-06-29 DIAGNOSIS — E11.65 UNCONTROLLED TYPE 2 DIABETES MELLITUS WITH HYPERGLYCEMIA: Primary | ICD-10-CM

## 2023-06-29 PROCEDURE — 1160F RVW MEDS BY RX/DR IN RCRD: CPT | Mod: CPTII,,, | Performed by: NURSE PRACTITIONER

## 2023-06-29 PROCEDURE — 3078F PR MOST RECENT DIASTOLIC BLOOD PRESSURE < 80 MM HG: ICD-10-PCS | Mod: CPTII,,, | Performed by: NURSE PRACTITIONER

## 2023-06-29 PROCEDURE — 3008F BODY MASS INDEX DOCD: CPT | Mod: CPTII,,, | Performed by: NURSE PRACTITIONER

## 2023-06-29 PROCEDURE — 99215 OFFICE O/P EST HI 40 MIN: CPT | Mod: PBBFAC | Performed by: NURSE PRACTITIONER

## 2023-06-29 PROCEDURE — 4010F ACE/ARB THERAPY RXD/TAKEN: CPT | Mod: CPTII,,, | Performed by: NURSE PRACTITIONER

## 2023-06-29 PROCEDURE — 3061F PR NEG MICROALBUMINURIA RESULT DOCUMENTED/REVIEW: ICD-10-PCS | Mod: CPTII,,, | Performed by: NURSE PRACTITIONER

## 2023-06-29 PROCEDURE — 1159F PR MEDICATION LIST DOCUMENTED IN MEDICAL RECORD: ICD-10-PCS | Mod: CPTII,,, | Performed by: NURSE PRACTITIONER

## 2023-06-29 PROCEDURE — 99214 PR OFFICE/OUTPT VISIT, EST, LEVL IV, 30-39 MIN: ICD-10-PCS | Mod: S$PBB,,, | Performed by: NURSE PRACTITIONER

## 2023-06-29 PROCEDURE — 3066F NEPHROPATHY DOC TX: CPT | Mod: CPTII,,, | Performed by: NURSE PRACTITIONER

## 2023-06-29 PROCEDURE — 95251 PR GLUCOSE MONITOR, 72 HOUR, PHYS INTERP: ICD-10-PCS | Mod: ,,, | Performed by: NURSE PRACTITIONER

## 2023-06-29 PROCEDURE — 1160F PR REVIEW ALL MEDS BY PRESCRIBER/CLIN PHARMACIST DOCUMENTED: ICD-10-PCS | Mod: CPTII,,, | Performed by: NURSE PRACTITIONER

## 2023-06-29 PROCEDURE — 99214 OFFICE O/P EST MOD 30 MIN: CPT | Mod: S$PBB,,, | Performed by: NURSE PRACTITIONER

## 2023-06-29 PROCEDURE — 3074F SYST BP LT 130 MM HG: CPT | Mod: CPTII,,, | Performed by: NURSE PRACTITIONER

## 2023-06-29 PROCEDURE — 1159F MED LIST DOCD IN RCRD: CPT | Mod: CPTII,,, | Performed by: NURSE PRACTITIONER

## 2023-06-29 PROCEDURE — 3008F PR BODY MASS INDEX (BMI) DOCUMENTED: ICD-10-PCS | Mod: CPTII,,, | Performed by: NURSE PRACTITIONER

## 2023-06-29 PROCEDURE — 3074F PR MOST RECENT SYSTOLIC BLOOD PRESSURE < 130 MM HG: ICD-10-PCS | Mod: CPTII,,, | Performed by: NURSE PRACTITIONER

## 2023-06-29 PROCEDURE — 4010F PR ACE/ARB THEARPY RXD/TAKEN: ICD-10-PCS | Mod: CPTII,,, | Performed by: NURSE PRACTITIONER

## 2023-06-29 PROCEDURE — 3078F DIAST BP <80 MM HG: CPT | Mod: CPTII,,, | Performed by: NURSE PRACTITIONER

## 2023-06-29 PROCEDURE — 95251 CONT GLUC MNTR ANALYSIS I&R: CPT | Mod: ,,, | Performed by: NURSE PRACTITIONER

## 2023-06-29 PROCEDURE — 3066F PR DOCUMENTATION OF TREATMENT FOR NEPHROPATHY: ICD-10-PCS | Mod: CPTII,,, | Performed by: NURSE PRACTITIONER

## 2023-06-29 PROCEDURE — 3061F NEG MICROALBUMINURIA REV: CPT | Mod: CPTII,,, | Performed by: NURSE PRACTITIONER

## 2023-06-29 NOTE — PROGRESS NOTES
Subjective     Patient ID: Linn Mckeon is a 57 y.o. female.    Chief Complaint: Diabetes    Previous endocrine clinic notes   08/11/2021 telemedicine visit- endocrine Clinic Note: 55-year-old female scheduled today for endocrine clinic follow-up.  Due to recent surge and Covid and patient's comorbid conditions visit was changed to telemedicine visit.  History of uncontrolled type 2 diabetes, hypertension, hyperlipidemia, obstructive sleep apnea, noncompliance due to history of depression.  Uncontrolled type 2 diabetes current A1c improved to 8.8 from previous 9.6 and 10.4.  A1C improving not at goal.  Patient reports checking CBGs occasionally and states most of her CBGs are in the 100s and 200s and she is no longer seeing 300s she denies any hypoglycemia.  Patient was recently changed to Trulicity and denies any side effects.  Discussed with patient ADA diet and making dietary changes along with increasing Trulicity.  Nephropathy urine micro was elevated 12/3/2020 normal on 8/10/2021 patient is currently on ACE with improved A1c. [1]     11/18/2021 endocrine clinic note : 55-year-old female scheduled today for endocrine clinic follow-up.  History of uncontrolled type 2 diabetes, hypertension, hyperlipidemia, obstructive sleep apnea and depression.  Uncontrolled type 2 diabetes current A1C 8.6 Previous A1c 8.3, 8.8, 9.6 and 10.4.  Patient has been improving over the last year.  Patient checks CBGs occasionally ranges 100s and 200s. Patient states she forgets her morning medications about 2 to 3 days/week. When discussed with patient she does not use a pillbox. Patient was given a pillbox today with a.m. and p.m. slots. Patient will work on compliance with morning medications. Instructed patient this will help improve her A1c. Hypertension mild elevation today. Patient did not take her medication prior to her visit. History of nephropathy Urine Micro Creatine/ratio: 12/3/2020 Elevated normal 08/10/2021. [1]      3/21/2022 endocrine clinic note: 56-year-old female scheduled today for endocrine clinic follow-up.  History of uncontrolled type 2 diabetes, hypertension, hyperlipidemia, obstructive sleep apnea and depression.  Type 2 diabetes current A1C 10.3 Previous A1c 8.6, 8.3, 8.8, 9.6 and 10.4.  On patient's previous visit she was given medication pillbox to help with compliance due to forgetting medications. pt checks CBG's occasionally not recently last CBG log  check Feb 17th no CBG's in 1 month. Pt states she had pneumonia (chart shows Dec 6th) and had a hard time swallowing and was not taking her medications at that time. pt has not been checking CBG's in over a month. Shes denies hypoglycemic symptoms. Pt attended DM education Dec 2020 until March 2021 with medication changes at that time with improved A1C. Nephropathy Urine Micro Creatine/ratio: 12/03/2020 Elevated, normal 08/10/2021 we will recheck urine micro today. HTN at goal.     Endocrine clinic note 09/21/2022:  56 year female scheduled today for endocrine clinic follow-up.  History of uncontrolled type 2 diabetes, hypertension, hyperlipidemia obstructive sleep apnea and depression.  Patient was recently hospitalized with congestive heart failure and returns to clinic today with medication changes for type 2 diabetes.  Uncontrolled type 2 diabetes recent A1c 11.7 previous 10.0 and 8.3.  Patient has a history of nonadherence to medications today her sisters with her and states that the patient has been taking her medications and now has a pillbox.  Patient restarted her metformin, glipizide, Januvia and was started on Lantus at the hospital patient states she is been taking 30 units twice a day hospital instructions were written for once a day but needles were written for twice a day but patient clarified that she is been taking twice a day.  Patient had not restarted her Trulicity yet but states since she started her regimen back in the last 2 days her blood  sugars have been in the 80s in the morning and she is been feeling weak.  Patient restarted her regimen about a week and a half ago and states in the last 2 days with consistently taking medication she is been having hypoglycemia in the morning.  She states yesterday her sister held her 30 units of Lantus at night due to blood sugars being too low.  Instructed patient she did not hold Lantus but will decrease her glipizide and she is return to clinic in 1 month with CBGS to titrate her medications.  She is currently hold Trulicity and when she returns to clinic in 1 month with glucose numbers will restart Trulicity at 0.75 mg and titrate patient's regimen.      Endocrine clinic note 11/02/2022:  56 year female scheduled today has follow-up to Endocrine Clinic.  Patient was scheduled to have 1 month follow-up for history of uncontrolled type 2 diabetes.  Regimen was adjusted patient was started on a Harper.  Patient returns to clinic today.  Patient was started on a Harper. Harper interpretation 10/20/2022-11/02/2022.  Average glucose 162 GMI indicater 7.2.  6% very high, 19% high, 75% target range, 0% low, 0% very low patient's scans in average of 2-11 times per day patient occasionally has prandial spikes in the evening indicating a high carb meal which is only occasionally, only 2 hypoglycemic episode is noted after lunch at 69 and prior to lunch at 63.  In the last week patient has had increasing hypoglycemic episodes mid day. Patient states she does not eat breakfast at times will decrease morning insulin patient is to call clinic if any further hypoglycemia occurs.  On patient's Dexcom report patient is 75% at target range and predicted A1c is 7.2.  Nephropathy urine micro elevated 05/06/2022 patient is on On Entresto which contains ACE.  Patient was offered flu vaccine today patient does not take flu vaccine and declined her vaccinations today.    Endocrine clinic note 06/29/2023:  57 year female scheduled today  has follow-up to Endocrine Clinic.  History of uncontrolled type 2 diabetes, hypertension, hyperlipidemia obstructive sleep apnea and depression. Uncontrolled type 2 diabetes recent A1c increased to 10.2 from previous 8.9.    Patient has a Harper interpretation 06/16/2023-06/29/2023.  Time CGM active 47%.  Average glucose 185.  GM I 7.7.  10% very high, 36% high, 54% target range, 0% low, 0% very low.  Patient's scans an average of 4-7 times per day patient has a majority of her blood glucoses in the 200s occasionally patient will decrease down to the 140s at other times patient has spikes in the evening indicating she is eating a high high sugary meal.   Patient's GMI averaging 7.7 patient's PCP one-month ago restarted her Januvia 100 mg and increased Jardiance to 25 mg.  Patient has no significant hypoglycemia.        Review of Systems   Constitutional:  Negative for activity change, appetite change and fatigue.   HENT:  Negative for dental problem, hearing loss, tinnitus, trouble swallowing and goiter.    Eyes:  Negative for photophobia, pain and visual disturbance.   Respiratory:  Negative for cough, chest tightness and wheezing.    Cardiovascular:  Negative for chest pain, palpitations and leg swelling.   Gastrointestinal:  Negative for abdominal pain, constipation, diarrhea, nausea and reflux.   Endocrine: Negative for cold intolerance, heat intolerance, polydipsia and polyphagia.   Genitourinary:  Negative for difficulty urinating, flank pain, hematuria, hot flashes, menstrual irregularity, menstrual problem, nocturia and urgency.   Musculoskeletal:  Negative for back pain, gait problem, joint swelling, leg pain and joint deformity.   Integumentary:  Negative for color change, pallor, rash and breast discharge.   Allergic/Immunologic: Negative for environmental allergies, food allergies and immunocompromised state.   Neurological:  Negative for tremors, seizures, headaches, coordination difficulties, memory  loss and coordination difficulties.   Psychiatric/Behavioral:  Negative for agitation, behavioral problems and sleep disturbance. The patient is not nervous/anxious.         Objective     Physical Exam  Constitutional:       General: She is not in acute distress.     Appearance: Normal appearance. She is not ill-appearing.   HENT:      Head: Normocephalic and atraumatic.      Right Ear: External ear normal.      Left Ear: External ear normal.      Nose: Nose normal. No congestion or rhinorrhea.      Mouth/Throat:      Mouth: Mucous membranes are moist.      Pharynx: Oropharynx is clear. No oropharyngeal exudate.   Eyes:      General:         Right eye: No discharge.         Left eye: No discharge.      Conjunctiva/sclera: Conjunctivae normal.      Pupils: Pupils are equal, round, and reactive to light.   Neck:      Thyroid: No thyroid mass, thyromegaly or thyroid tenderness.   Cardiovascular:      Rate and Rhythm: Normal rate and regular rhythm.      Pulses: Normal pulses.      Heart sounds: Normal heart sounds. No murmur heard.  Pulmonary:      Effort: Pulmonary effort is normal. No respiratory distress.      Breath sounds: Normal breath sounds.   Abdominal:      General: Abdomen is flat. Bowel sounds are normal. There is no distension.      Palpations: Abdomen is soft.      Tenderness: There is no abdominal tenderness.   Musculoskeletal:         General: No swelling or tenderness. Normal range of motion.      Cervical back: Normal range of motion and neck supple. No tenderness.      Right lower leg: No edema.      Left lower leg: No edema.   Feet:      Right foot:      Skin integrity: Skin integrity normal.      Left foot:      Skin integrity: Skin integrity normal.   Lymphadenopathy:      Cervical: No cervical adenopathy.   Skin:     General: Skin is warm and dry.      Coloration: Skin is not jaundiced or pale.   Neurological:      General: No focal deficit present.      Mental Status: She is alert and oriented  to person, place, and time. Mental status is at baseline.      Coordination: Coordination normal.      Gait: Gait normal.   Psychiatric:         Mood and Affect: Mood normal.         Behavior: Behavior normal.         Thought Content: Thought content normal.          Assessment and Plan     1. Uncontrolled type 2 diabetes mellitus with hyperglycemia  Recent A1C 10.2    Medications: Metformin 1000 mg twice daily, Januvia 100 mg, Jardiance 10mg, Lantus 30 units BID  Changes:  Jardiance increased to 25 mg Januvia restarted on 05/18/2023  Home Glucometer Use: Harper   Eye Exam: 09/28/2021  repeat fundus today attempted previous and to obtain  Last Hypoglycemic episode:  Occasional   Follow ADA diet, avoid soda, simple sweets, and limit rice, breads and starches  Maintain healthy weight, exercise 4-5 times a week for 30 minutes  Diet and medication compliance discussed on visit  RTC 6 months  Component Ref Range & Units 1 mo ago  (5/18/23) 5 mo ago  (1/4/23) 9 mo ago  (9/11/22) 1 yr ago  (5/5/22) 1 yr ago  (10/7/21) 1 yr ago  (8/10/21) 2 yr ago  (10/7/20)   Hemoglobin A1c <=7.0 % 10.2 High   8.9 High   11.7 High   10.0 High   8.3 High  R  8.8 High  R  11.0 High  R      sitagliptin phosphate     Dispensed Days Supply Quantity Provider Pharmacy   Januvia 100 mg tablet 05/18/2023 30 30 each [facility] an...   Januvia 100 mg tablet 09/21/2022 30 30 each Jacqueline Urbina NP      2. Nephropathy   Urine Micro elevated 05/06/2022 normal 05/18/2023  On Entresto which contains ACE   Component Ref Range & Units 1 mo ago  (5/18/23) 5 mo ago  (1/4/23) 1 yr ago  (5/6/22) 1 yr ago  (8/10/21) 2 yr ago  (5/5/21) 2 yr ago  (12/3/20)   Urine Microalbumin <=30.0 ug/ml 19.5  8.9  63.4 High   13.0 R  27.3 R  15.6 R    Urine Creatinine 47.0 - 110.0 mg/dL 103.8  91.3  70.3  105.2 R  72.3 R  72.1 R    Microalbumin Creatinine Ratio 0.0 - 30.0 mg/gm Cr 18.8  9.7  90.2 High   12.4  37.8 High   216.4 High       Follow up  in about 6 months (around 12/29/2023) for Type 2 diabetes.      I spent a total of 30 minutes on the day of the visit.  This includes face to face time and non-face to face time preparing to see the patient (eg, review of tests), obtaining and/or reviewing separately obtained history, documenting clinical information in the electronic or other health record, independently interpreting results and communicating results to the patient/family/caregiver, or care coordinator.

## 2023-06-30 ENCOUNTER — HOSPITAL ENCOUNTER (OUTPATIENT)
Dept: WOUND CARE | Facility: HOSPITAL | Age: 58
Discharge: HOME | End: 2023-06-30
Attending: NURSE PRACTITIONER
Payer: MEDICAID

## 2023-06-30 VITALS
TEMPERATURE: 99 F | HEART RATE: 71 BPM | SYSTOLIC BLOOD PRESSURE: 138 MMHG | DIASTOLIC BLOOD PRESSURE: 83 MMHG | RESPIRATION RATE: 20 BRPM

## 2023-06-30 DIAGNOSIS — Z91.199 NONCOMPLIANCE WITH TREATMENT PLAN: ICD-10-CM

## 2023-06-30 DIAGNOSIS — B35.1 MYCOTIC TOENAILS: ICD-10-CM

## 2023-06-30 DIAGNOSIS — E11.628 TYPE 2 DIABETES MELLITUS WITH OTHER SKIN COMPLICATION, WITH LONG-TERM CURRENT USE OF INSULIN: ICD-10-CM

## 2023-06-30 DIAGNOSIS — E11.9 ENCOUNTER FOR COMPREHENSIVE DIABETIC FOOT EXAMINATION, TYPE 2 DIABETES MELLITUS: Primary | ICD-10-CM

## 2023-06-30 DIAGNOSIS — M79.672 LEFT FOOT PAIN: ICD-10-CM

## 2023-06-30 DIAGNOSIS — L85.3 XEROSIS OF SKIN: ICD-10-CM

## 2023-06-30 DIAGNOSIS — M79.671 RIGHT FOOT PAIN: ICD-10-CM

## 2023-06-30 DIAGNOSIS — Z79.4 TYPE 2 DIABETES MELLITUS WITH OTHER SKIN COMPLICATION, WITH LONG-TERM CURRENT USE OF INSULIN: ICD-10-CM

## 2023-06-30 DIAGNOSIS — L84 CALLUS OF FOOT: ICD-10-CM

## 2023-06-30 DIAGNOSIS — R26.9 IMPAIRED GAIT: ICD-10-CM

## 2023-06-30 PROCEDURE — 11721 PR DEBRIDEMENT OF NAILS, 6 OR MORE: ICD-10-PCS | Mod: 59,,, | Performed by: NURSE PRACTITIONER

## 2023-06-30 PROCEDURE — 11057 PARNG/CUTG B9 HYPRKR LES >4: CPT

## 2023-06-30 PROCEDURE — 99211 OFF/OP EST MAY X REQ PHY/QHP: CPT | Mod: 25

## 2023-06-30 PROCEDURE — 11721 DEBRIDE NAIL 6 OR MORE: CPT | Mod: 59,,, | Performed by: NURSE PRACTITIONER

## 2023-06-30 PROCEDURE — 11721 DEBRIDE NAIL 6 OR MORE: CPT

## 2023-06-30 PROCEDURE — 11056 PR TRIM BENIGN HYPERKERATOTIC SKIN LESION,2-4: ICD-10-PCS | Mod: ,,, | Performed by: NURSE PRACTITIONER

## 2023-06-30 PROCEDURE — 99499 UNLISTED E&M SERVICE: CPT | Mod: ,,, | Performed by: NURSE PRACTITIONER

## 2023-06-30 PROCEDURE — 11056 PARNG/CUTG B9 HYPRKR LES 2-4: CPT | Mod: ,,, | Performed by: NURSE PRACTITIONER

## 2023-06-30 PROCEDURE — 99499 NO LOS: ICD-10-PCS | Mod: ,,, | Performed by: NURSE PRACTITIONER

## 2023-06-30 PROCEDURE — 27000999 HC MEDICAL RECORD PHOTO DOCUMENTATION

## 2023-06-30 NOTE — PROGRESS NOTES
TODAY'S VISIT NOTE WAS IMPORTED FROM LAST WOUND CLINIC VISIT OF 23.  I ATTEST THAT I REVIEWED THE HPI, ROS, LABS, WOUND-RELATED IMAGING, PHYSICAL EXAMINATION, EVALUATION AND PLAN SECTIONS OF IMPORTED NOTE AND REVISED TODAY'S VISIT NOTE TO REFLECT TODAY'S NEW ASSESSMENT FINDINGS AND TODAY'S UPDATES TO WOUND TREATMENT PLAN.          Chief Complaint:  Routine diabetic foot care, with bilateral foot pain      History of Present Illness:  58 yo Black female being seen today for routine diabetic foot care, and bilateral foot pain from recurrent calluses, which is affecting her ability to walk.   Hx includes treatment plan and medication non-compliance, COPD, HFpEF (40%), chronic hypercapnic respiratory failure with oxygen dependency, uncontrolled Type 2 diabetes, HTN, HLD, DAVION on CPAP, morbid obesity (BMI 48.58), and smoking (25 pk/year history).  Enrolled in [facility] smoking cessation program.  Smoking 4-5 cigarettes per day.    Followed by NOEMI Andrews, [facility] IM for PCP.  Last visit with Ms. Gabriel was on 23.  Followed by [facility] endocrine, pulmonology, and cardiology clinics.      Hospitalized 2022 - 2022 for acute CHF and COPD exacerbation.  Now oxygen-dependent most of the time.  Has been referred to pulmonology for Trilogy evaluation.      Review of Most Recent Labs:  23:  CBC unremarkable.  CR 0.98.  Alb 3.2.  Chol 94.  HDL 30.  LDL 54.  Trig 51.  HgbA1C 10.2.    23:  CBC unremarkable.  CR 1.11.  eGFR 58.  Alk Phos 152.   2023:  CBC unremarkable.  BUN 31.2.  Cr 1.38.  eGFR 44.   Chol 105.  HDL 39.  LDL 56.  Trig 51.   HgbA1C 8.9.   2022:  CR 0.97.  eGFR >60.  Vit D 10.9.    2022:  eGFR >60, with CR 0.8.  Chol 149, HDL 38, LDL 95, Trig 80.  HgbA1C 10.0.      Imagin23 xray of right foot:  no acute osseus abnormality.  Plantar calcaneal enthesophyte.  Dorsal calcaneal enthesophyte.    2022 arterial NIVA:    The right lower extremity demonstrated multiphasic waveforms at  "all levels.  The KWADWO on the right was mildly abnormal. The right lower extremity demonstrated mild arterial flow reduction.     The left lower extremity demonstrated multiphasic waveforms at all levels.   The KWADWO on the left was mildly abnormal.  The left lower extremity demonstrated mild arterial flow reduction.      Today 6/30/23:  Wearing sandals today because they are more comfortable and easier to put on that regular shoes.  Remembers me speaking to her each visit about how shoes protect diabetic feet.  Corns and calluses are worse on feet and toes than last visit.  Has Lac-Hydrin and Vaseline at home.  Not able to see bottoms of feet.  Remembers being encouraged to get a mirror to use for foot checks.  Smoking "about the same" as she was last visit.  Has to wait 3 months before she can be re-enrolled in smoking program.  Smoking 3-5 cigarettes/day.  Wears Trilogy at night.      4/12/23:  Continues to have numbness and burning pain in bilateral feet and toes, on a daily basis, as well as pain in calves with walking distances and laying down.  Feet always feel cold.  Having pain in bottoms of both feet, where she has recurrent corns/calluses.  Did get toenail polish antibiotics and has been applying that every 2 to 3 days.  No new s/e with that.  Wearing slip-on sandals today for me to evaluate feet.  Normally wears protective shoes at home.      1/6/2023:  Trying to cut back on smoking.  Smoking < 1/2 ppd, per self-report.  Using Lac-Hydrin and Vaseline on feet, as instructed.  Having some mild discomfort over corns on bottoms of feet with walking.   No new rashes, lesions, or skin breakdown.   Does report numbness and tingling in feet and toes.  Feet feel cold most days.  Does experience pain in calves with prolonged walking and laying down, at times.      10/11/2022:  Sister encouraging her to wear tennis shoes since last week, which she wore to today's visit.  Calluses are not bothering her on feet.  Sister " relays she will check Ms. Mckeon' calluses on a regular basis, as well as have other family members check calluses/feet/toes.  No reported complications with wound care or current treatment plan.  Denies fevers, chills, wound odor, wound redness, wound pain, or yellow/green drainage.  No new rashes, lesions, or skin breakdown.  Picked up Lac-Hydrin yesterday and sister is applying that, with Vaseline.      10/6/2022:  Denies numbness, tingling, and pain in feet/toes.  Denies pain in thighs/legs with walking or laying down.  No pain in feet from severe calluses.  Not aware that she had deep cracks in right heel.  Sister sees her every day and is willing to remind Ms. Mckeon to apply Lac-Hydrin and Vaseline to feet.      7/7/2022:  Says she is wearing her CPAP at night, as prescribed.  Still not wanting to stop smoking.  Did not take morning medications before coming to visit.  Wearing sandals again today.  Says she remembers she has Lac-Hydrin cream ordered for feet, however, forgets to use it.  No pain at numerous calluses over bottoms of both feet.  Denies numbness, tingling, and pain in feet/toes.   No new rashes, lesions, or skin breakdown.     4/5/2022:  Denies numbness, tingling, and pain in feet/toes. Says multiple recurrent calluses over bilateral feet and ends of big toes are not bothering her. Did not take her morning medications this morning before coming to clinic. Did not check CBG last night or this morning. Does not want to stop smoking. Still wearing sandals. States she has Lac-Hydrin in home.    1/31/2022:  Denies numbness, tingling, and pain in feet/toes. Continues wearing open toed sandals. Calluses not bothering her too much on bottoms of feet. She is not sure why she continues to get recurrent calluses to her big toes. No new skin breakdown, rashes, or other skin issues.    12/17/2021:  Does not desire smoking cessation. Not able to feel any pain over bottoms of her feet from significant  calluses. Continues wearing sandals due to comfort. No rashes, skin lesions, or skin breakdown. Prescribed oral antibiotics Monday of last week for mild case of pneumonia; still finishing those out. Feeling better.     10/29/2021:  Callus on bottom of left foot is hurting again with walking. Still wearing sandals. Denies numbness, tingling, and pain in feet/toes. Denies rashes, lesions, or skin breakdown.     9/8/2021:  Calluses over feet and big toes have recurred; have been having increased tenderness in those places with walking. No rashes, lesions, or skin breakdown over feet. Continues wearing sandals much of the day. Denies wearing shoes that are too tight against big toes.     7/28/2021:  Other than calluses on both feet, big toes, and long toenails denies any new rashes, lesions, or skin breakdown today. States calluses are tender for a couple of days after trimming, not having any pain with walking with significant calluses. Wears slippers at all times when she is at home.       Review of Systems:   Except as stated in HPI, all other 10 body systems normal      Physical Exam Vitals & Measurements:    Vitals:    06/30/23 0844   BP: 138/83   Pulse: 71   Resp: 20   Temp: 98.6 °F (37 °C)           General:  Hypertensive with diabetes, asymptomatic with; afebrile. Morbidly obese; in no acute distress.  Wearing slip-on sandals today.  Respiratory:   Dyspneac on moderate exertion; no coughing.  Wearing oxygen via nasal cannula.   Cardiovascular:    Mild woody non-pitting edema to BLE.  Generalized non-pitting edema around bilateral medial and lateral ankles.  No hemosiderin staining or varicosities. No hair distribution over BLE.  Skin is shiny and taut over anterior tibial areas. Feet and toes cool to touch. No temperature differences between BLE.   DP pulses palpable.  PT pulses dopplered.    Musculoskeletal:  Significantly decreased range of motion, and strength, of all extremities.   Loss of anatomical sites  from knees distally secondary to body habitus.   Pes planus bilaterally.  No bunions or hammertoes.   Mildly decreased dorsiflexion and flexion in bilateral feet and hallux toes.  Decreased intrinsic muscle ROM in left foot; normal in right foot.    Neurologic: A&O X 3; cranial nerves intact.   Normal monofilament testing in bilateral feet; no LOPS noted.    Psychiatric: Calm, cooperative. Mood and affect normal. Responses appropriate  Integumentary:     Severe generalized xerosis over bilateral plantar feet.     Two large bilateral plantar corns/calluses with significant tenderness with palpation.  Corns quite deep in both lesions.  No underlying skin breakdown with paring.      Linear calluses to posterior 3-5 distal toes.  No underlying skin breakdown with paring.      One small corn to medial right plantar foot.  No underlying skin breakdown with paring.      Thickened calluses to distal hallux toes:  No underlying skin breakdown with paring.      10 mycotic toenails, which were overgrown and grossly dystrophic, with mild odor with debridement.                                    Assessment/Plan:    Encounter for diabetic foot care:  Last visit with PCP, NOEMI Arora, on 5/26/23.    Diabetic teaching reinforced, with focused attention towards slip-on sandals, which she is wearing again today.  Multiple handouts on diabetes foot care, diabetes in general, and infections have been provided at previous visits.    Wound clinic and UCC precautions reinforced for changes in feet/toes, with rationale.      Morbid obesity, BMI 48.58:  This is a co-factor to extensive, deep, and multiple calluses/corns over bilateral feet/toes. Ms. Mckeon is at extreme risk of non-healing diabetic foot wounds, given her diabetes, treatment plan non-compliance, medication non-compliance, and ongoing smoking.      Type 2 diabetes:  Poorly-controlled.  Increased risk of DFU, osteo, and lower limb amputations, which I counseled Ms.  Mike on today.    Normal monofilament testing today.  DP and PT pulses present.    5/18/23:  CBC unremarkable.  CR 0.98.  Alb 3.2.  Chol 94.  HDL 30.  LDL 54.  Trig 51.  HgbA1C 10.2.    2/9/23:  CBC unremarkable.  CR 1.11.  eGFR 58.  Alk Phos 152.   1/4/2023:  CBC unremarkable.  BUN 31.2.  Cr 1.38.  eGFR 44.   Chol 105.  HDL 39.  LDL 56.  Trig 51.   HgbA1C 8.9.   5/5/2022:  eGFR >60, with CR 0.8.  Chol 149, HDL 38, LDL 95, Trig 80.  HgbA1C 10.0.    Being followed by [facility] endocrine.     Mycotic Toenails X 10:  Teaching reinforced on underlying cause of condition, s/s of condition, complications, and treatment options of condition.  She has not been using medication twice/day, as prescribed.  Instructed to apply twice/day for best results, with rationale.   Prescribed CMPD Voricon 2.67%/Vanc 6.67% nail suspension in DSMO.   Compliance questionable.                                            PROCEDURE NOTE    Procedure Today:  debridement of 10 toenails.  Rationale:  To remove fungal, and possibly bacterial, infection from toenails.  Debulking of toenail, will enable topical antibacterial/antifungals to reach nailbed for complete tx of nail infection.    Informed verbal consent obtained.  Using standard podiatry clippers, Dremmel, and Thornton boards, I excised infected toenail, and then used Dremmel to sand down remaining nail to be more flush with surrounding skin.  No bleeding or discomfort during procedure.  Patient tolerated procedure without complications.      Bilateral foot pain, with impaired gait:  Etiology:  Large and tender corns/calluses to plantar forefeet.   Pain from plantar corns/calluses impeding ability to walk without pain.     Calluses of Bilateral Plantar Feet, as well as to bilateral hallux toes:                                           PROCEDURE NOTE    Procedure Today: paring and sanding down of 8 corns/calluses   Rationale: Calluses/corns can lead to diabetic foot ulcers.  Also, Ms. Mckeon  is reporting discomfort in corns/calluses with ambulation.   Informed verbal consent obtained.    Using #4 dermal curette, Preet, and fahad boards, I pared 8 calluses/corns to healthy soft tissue. No bleeding or discomfort with procedure.     Xerosis of bilateral heels:  Poorly-controlled.  Compliance with tx plan questionable.    Prescribed Lac-Hydrin followed by Vaseline BID.  CPM  Reinforced teaching to not apply lotions/creams between toes, with rationale.      Non-Compliance with Treatment Plan and Medications:  This is an identified co-factor towards increased risk of DFU, osteo, and lower limb amputations.     Smoking:  Previously enrolled in [facility] smoking cessation program.  Smoking 4 to 5 cigarettes/day.  Plans on re-enrolling in program after 3 months.  Positive reinforcement given.              RTC in three months for routine DFC and callus care.   Teaching reinforced on s/s to call wound clinic for promptly.

## 2023-08-25 ENCOUNTER — NUTRITION (OUTPATIENT)
Dept: DIABETES | Facility: CLINIC | Age: 58
End: 2023-08-25
Payer: MEDICAID

## 2023-08-25 ENCOUNTER — TELEPHONE (OUTPATIENT)
Dept: ENDOCRINOLOGY | Facility: CLINIC | Age: 58
End: 2023-08-25
Payer: MEDICAID

## 2023-08-25 VITALS — WEIGHT: 278.31 LBS | BODY MASS INDEX: 47.77 KG/M2

## 2023-08-25 DIAGNOSIS — Z79.4 TYPE 2 DIABETES MELLITUS WITHOUT COMPLICATION, WITH LONG-TERM CURRENT USE OF INSULIN: Primary | ICD-10-CM

## 2023-08-25 DIAGNOSIS — E11.9 TYPE 2 DIABETES MELLITUS WITHOUT COMPLICATION, WITH LONG-TERM CURRENT USE OF INSULIN: Primary | ICD-10-CM

## 2023-08-25 PROCEDURE — 99212 OFFICE O/P EST SF 10 MIN: CPT | Mod: PBBFAC | Performed by: DIETITIAN, REGISTERED

## 2023-08-25 PROCEDURE — G0108 DIAB MANAGE TRN  PER INDIV: HCPCS | Mod: PBBFAC | Performed by: DIETITIAN, REGISTERED

## 2023-08-25 NOTE — PROGRESS NOTES
Diabetes Care Specialist Progress Note  Author: Lynda Paige RD  Date: 8/25/2023    Program Intake  Reason for Diabetes Program Visit:: Intervention  Type of Intervention:: Individual  Individual: Education  Education: Self-Management Skill Review  Current diabetes risk level:: moderate    Lab Results   Component Value Date    HGBA1C 10.2 (H) 05/18/2023       Clinical              Clinical Assessment  Current Diabetes Treatment: Insulin, Oral Medication (Metformin 1000mg BID, Lantus 30 u BID, increased Jardiance to 25 mg and restarted Januvia 100mg)    Medication Information  How many days a week do you miss your medications?: Never  Do you sometimes have difficulty refilling your medications?: No  Medication adherence impacting ability to self-manage diabetes?: No              Additional Social History                        Communication  Language preference: English    Health Literacy  Preferred Learning Method: Face to Face  How often do you need to have someone help you read instructions, pamphlets, or written material from your doctor or pharmacy?: Rarely  Health literacy needs impacting ability to self-manage diabetes?: No      Diabetes Self-Management Skills Assessment         Nutrition/Healthy Eating  Challenges to healthy eating:: other (see comments) (heaviest meal at night and snacking on popscicles)         Medications  Patient is able to identify current diabetes medications, dosages, and appropriate timing of medications.: yes  Patient understands the purpose of the medications taken for diabetes.: yes  Patient reports problems or concerns with current medication regimen.: no  Medication Skills Assessment Completed:: Yes  Assessment indicates:: Adequate understanding  Area of need?: No    Home Blood Glucose Monitoring  Patient states that blood sugar is checked at home daily.: yes  Monitoring Method:: personal continuous glucose monitor  When you check what is your typical blood sugar range? :  124-276  Personal CGM type:: Freestyle Harper 2  Patient is able to use personal CGM appropriately.: yes  CGM Report reviewed?: yes   Freestyle Harper 2 CGM reviewed  Average glucose is 168 mg/dL  BG range: 124 mg/dL to 276 mg/dL  Hypoglycemia frequency 0 % with BG <70  Hyperglycemia frequency 33 % with BG >180  Time in range 67 %  Nocturnal Hypoglycemia: no   Recommendations: scanning 4-6 times daily. Reducing fat/carb for dinner and switch to sugar-free popsicles.     Home Blood Glucose Monitoring Skills Assessment Completed: : Yes  Assessment indicates:: Instruction Needed  Area of need?: Yes              Psychosocial/Coping  Patient can identify ways of coping with chronic disease.: no (see comments) (she is trying to find ways of coping)  Psychosocial/Coping Skills Assessment Completed: : Yes  Assessment indicates:: Instruction Needed  Area of need?: Yes      Diabetes Self Support Plan         Assessment Summary and Plan    Based on today's diabetes care assessment, the following areas of need were identified:          8/25/2023    12:01 AM   Social   Health Literacy No            8/25/2023    12:01 AM   Clinical   Medication Adherence No            8/25/2023    12:01 AM   Diabetes Self-Management Skills   Medication No   Home Blood Glucose Monitoring Yes - see care plan   Psychosocial/Coping Yes - Recommended ways to reduce stress such as: reading a book or bible, draw or color, dance in chair and listen to music, prayer. Recommend to continue to seek smoking cessation services.           Today's interventions were provided through individual discussion, instruction, and written materials were provided.      Patient verbalized understanding of instruction and written materials.  Pt was able to return back demonstration of instructions today. Patient understood key points, needs reinforcement and further instruction.     Diabetes Self-Management Care Plan:    Today's Diabetes Self-Management Care Plan was  developed with Linn's input. Linn has agreed to work toward the following goal(s) to improve his/her overall diabetes control.      Care Plan: Diabetes Management   Updates made since 7/26/2023 12:00 AM        Problem: Blood Glucose Self-Monitoring         Goal: Pt will use Freestyle Harper reader to scan blood glucose at least 4-6 times daily and report hypoglycemia to provider    Start Date: 1/5/2023   Expected End Date: 12/29/2023   This Visit's Progress: On track   Recent Progress: Not met   Priority: High   Barriers: Lack of Supplies   Note:    2/24/23 - Pt was told by [facility] that she needs a new Rx from her provider for an additional reader and [facility] is unable to return her reader. However, her insurance provided her with a reader within the last year. Contacted Charo Perez, Diabetes Care Specialist, from [employer] for assistance. She asks that pt contact her and she will discuss with [employer] on three-way call. Contacted pt to provide Charo's phone # and instructions to contact her.     5/2/23 - New reader received. Noted scanning less often last week. 1 episode of hypoglycemia noted (65) for Libreview report 4/15-4/28/23.     8/25/23 - Scanning has become less frequent over last few days but overall 4-6 times daily. Encouraged to continue.         Follow Up Plan     Follow up in about 4 months (around 12/25/2023) for Psychosocial/Coping, health maintenance, glucose log review.    Today's care plan and follow up schedule was discussed with patient.  Linn verbalized understanding of the care plan, goals, and agrees to follow up plan.        The patient was encouraged to communicate with his/her health care provider/physician and care team regarding his/her condition(s) and treatment.  I provided the patient with my contact information today and encouraged to contact me via phone or [facility]'s Patient Portal as needed.     Length of Visit   Total Time: 30 Minutes

## 2023-08-30 ENCOUNTER — TELEPHONE (OUTPATIENT)
Dept: DIABETES | Facility: CLINIC | Age: 58
End: 2023-08-30
Payer: MEDICAID

## 2023-09-13 NOTE — PROGRESS NOTES
CHIEF COMPLAINT:   Chief Complaint   Patient presents with    Follow-up     Dnies cardiac targets                                                  HPI:  Linn Mckeon 57 y.o. female  with a PMH significant for Diabetes, DAVION on BiPAP, HTN, HLD, Depression, COPD on home O2, and Tobacco Use  who presents to cardiology clinic for follow up.  At her last appointment she stated that her shortness of breath was greatly improved from prior.  Her GDM T was also maximized at her last office visit.    Today the patient states that she is feeling pretty well overall.  She denies any chest pain, palpitations, PND, orthopnea, claudication symptoms, lightheadedness, dizziness, or syncope.  She does endorse occasional shortness of breath that is her baseline, and states that she uses supplemental O2 as needed, typically when going out in public places.  She states that she is able to complete her ADLs without any issues or ischemic symptoms.  She reports compliance with all medications.  She continues to smoke approximately 8 cigarettes per day but is trying to quit on her own. She continues to use CPAP nightly in his benefitting greatly from use.     Historical Information: Patient completed Lexiscan stress test on September 7, 2022 which was normal.  See report below.  She also completed KWADWO testing on September 13, 2022 which revealed mild arterial flow reduction in the bilateral lower extremities.  See report below.  She completed an Echocardiogram on July 26, 2022 which revealed a reduced ejection fraction of 40% with Grade 1 diastolic dysfunction.  See report below.    She previously completed an Echocardiogram on May 10, 2021 which revealed mild concentric LVH, EF of approximately 50 to 55%, mild MR, and mild aortic valve sclerosis without stenosis.    Patient had a hospital admission on September 11, 2022 for COPD exacerbation and acute CHF exacerbation.  She was treated with IV Lasix, antibiotics, steroids, and neb  treatments.                                                                                                                                                                                                                                                                                                                                                                                                                                                                                       CARDIAC TESTING:  Echocardiogram  Results for orders placed during the hospital encounter of 12/06/22  Echo  Interpretation Summary  · The left ventricle is normal in size with mild concentric hypertrophy and low normal systolic function.  · Normal right ventricular size with low normal right ventricular systolic function.  · The estimated ejection fraction is 50%.  · Grade I left ventricular diastolic dysfunction.  · Mild to moderate pulmonic regurgitation.        Echo - 7/26/2022   Interpretation Summary  · Concentric hypertrophy and mildly decreased systolic function.  · The estimated ejection fraction is 40%.  · Grade I left ventricular diastolic dysfunction.  · Severe right ventricular enlargement.  · Mild left atrial enlargement.  · Moderate right atrial enlargement.  · Mild mitral regurgitation.  · Mild tricuspid regurgitation.  · Elevated central venous pressure (15 mmHg).  · The estimated PA systolic pressure is 56 mmHg.  · There is pulmonary hypertension.  · IVC is dilated and collapses<50% with inspiration.  · Mild to moderate pulmonic regurgitation.     Stress Test  Results for orders placed during the hospital encounter of 08/30/22  Nuclear Stress - Cardiology Interpreted  Interpretation Summary    Normal myocardial perfusion scan. There is no evidence of myocardial ischemia or infarction.    The gated perfusion images showed an ejection fraction of 55% at rest. The gated perfusion images showed an ejection fraction of 51% post  stress.    The EKG portion of this study is abnormal but not diagnostic.    The patient reported no chest pain during the stress test.    There were no arrhythmias during stress.  The patient has a low risk nuclear stress alex.  Nuclear stress test indicates no ischemia.     Lexiscan Stress Test 9.7.22:  Normal myocardial perfusion scan. There is no evidence of myocardial ischemia or infarction.  The gated perfusion images showed an ejection fraction of 55% at rest. The gated perfusion images showed an ejection fraction of 51% post stress.  The EKG portion of this study is abnormal but not diagnostic.  The patient reported no chest pain during the stress test.  There were no arrhythmias during stress.        KWADWO Testing 9.13.22:  The right lower extremity demonstrated multiphasic waveforms at all levels.   The KWADWO on the right was mildly abnormal.  The right lower extremity demonstrated mild arterial flow reduction.  The left lower extremity demonstrated multiphasic waveforms at all levels.   The KWADWO on the left was mildly abnormal.  The left lower extremity demonstrated mild arterial flow reduction.          Patient Active Problem List   Diagnosis    GOODMAN (dyspnea on exertion)    Chronic heart failure with preserved ejection fraction    Primary hypertension    Hyperlipidemia LDL goal <70    Type 2 diabetes mellitus with hyperglycemia, with long-term current use of insulin    Peripheral edema    DAVION on CPAP    Class 3 severe obesity due to excess calories with serious comorbidity and body mass index (BMI) of 45.0 to 49.9 in adult    Noncompliance with treatment plan    Xerosis of skin    Callus of foot    Dystrophic nail    Non compliance w medication regimen    Heart failure with reduced ejection fraction    Cigarette nicotine dependence without complication    COPD (chronic obstructive pulmonary disease)    Vitamin D deficiency    Mycotic toenails    Encounter for comprehensive diabetic foot examination, type 2  diabetes mellitus    BMI 45.0-49.9, adult    Type 2 diabetes mellitus with skin complication, with long-term current use of insulin    Right foot pain    Left foot pain    Impaired gait     Past Surgical History:   Procedure Laterality Date    HYSTERECTOMY       Social History     Socioeconomic History    Marital status:    Tobacco Use    Smoking status: Every Day     Current packs/day: 0.25     Types: Cigarettes    Smokeless tobacco: Never    Tobacco comments:     Smokes 6 cigarettes a day   Substance and Sexual Activity    Alcohol use: Not Currently    Drug use: Never    Sexual activity: Not Currently     Social Determinants of Health     Financial Resource Strain: Low Risk  (10/14/2022)    Overall Financial Resource Strain (CARDIA)     Difficulty of Paying Living Expenses: Not very hard   Food Insecurity: No Food Insecurity (2/24/2023)    Hunger Vital Sign     Worried About Running Out of Food in the Last Year: Never true     Ran Out of Food in the Last Year: Never true   Transportation Needs: No Transportation Needs (2/24/2023)    PRAPARE - Transportation     Lack of Transportation (Medical): No     Lack of Transportation (Non-Medical): No   Physical Activity: Inactive (8/25/2023)    Exercise Vital Sign     Days of Exercise per Week: 0 days     Minutes of Exercise per Session: 0 min   Stress: Stress Concern Present (8/25/2023)    [employer] - Occupational Stress Questionnaire     Feeling of Stress : Rather much   Social Connections: Unknown (10/14/2022)    Social Connection and Isolation Panel [NHANES]     Frequency of Communication with Friends and Family: More than three times a week     Frequency of Social Gatherings with Friends and Family: More than three times a week     Attends Tenriism Services: More than 4 times per year   Recent Concern: Social Connections - Moderately Isolated (10/14/2022)    Social Connection and Isolation Panel [NHANES]     Frequency of  "Communication with Friends and Family: More than three times a week     Frequency of Social Gatherings with Friends and Family: More than three times a week     Attends Zoroastrian Services: More than 4 times per year     Active Member of Clubs or Organizations: No     Attends Club or Organization Meetings: Never     Marital Status:    Housing Stability: Unknown (2/24/2023)    Housing Stability Vital Sign     Unable to Pay for Housing in the Last Year: No     Unstable Housing in the Last Year: No        Family History   Problem Relation Age of Onset    Hypertension Mother     Heart disease Mother     Diabetes Mother     Heart attack Father     Diabetes Sister     Hypertension Sister     Diabetes Brother     Hypertension Brother      Review of patient's allergies indicates:  No Known Allergies      ROS:  Review of Systems   Constitutional: Negative.    HENT: Negative.     Eyes: Negative.    Respiratory: Negative.  Negative for shortness of breath (Occasionally).    Cardiovascular: Negative.  Negative for chest pain, palpitations, orthopnea, claudication, leg swelling and PND.   Gastrointestinal: Negative.    Genitourinary: Negative.    Musculoskeletal: Negative.    Skin: Negative.    Neurological: Negative.    Endo/Heme/Allergies: Negative.    Psychiatric/Behavioral: Negative.                                                                                                                                                                                  Negative except as stated in the history of present illness. See HPI for details.    PHYSICAL EXAM:  Visit Vitals  /71 (BP Location: Right arm, Patient Position: Sitting, BP Method: Medium (Automatic))   Pulse (!) 58   Temp 97.7 °F (36.5 °C)   Resp 18   Ht 5' 4" (1.626 m)   Wt 127 kg (279 lb 15.8 oz)   SpO2 95%   BMI 48.06 kg/m²       Physical Exam  HENT:      Head: Normocephalic.      Nose: Nose normal.   Eyes:      Pupils: Pupils are equal, round, and " "reactive to light.   Cardiovascular:      Rate and Rhythm: Normal rate and regular rhythm.      Pulses: Normal pulses.      Heart sounds: No murmur heard.  Pulmonary:      Effort: Pulmonary effort is normal. No respiratory distress.   Abdominal:      General: There is no distension.   Musculoskeletal:         General: Normal range of motion.      Cervical back: Normal range of motion.      Right lower leg: No edema.      Left lower leg: No edema.   Skin:     General: Skin is warm.   Neurological:      General: No focal deficit present.      Mental Status: She is alert and oriented to person, place, and time.   Psychiatric:         Mood and Affect: Mood normal.         Current Outpatient Medications   Medication Instructions    albuterol (PROVENTIL) 2.5 mg, Nebulization, Every 6 hours PRN, Rescue    ammonium lactate (LAC-HYDRIN) 12 % lotion APPLY TO DRY SKIN AND RUB IN WELL TWICE DAILY. NOTHING BETWEEN TOES    atorvastatin (LIPITOR) 80 mg, Oral, Daily    BD ULTRA-FINE JORDON PEN NEEDLE 32 gauge x 5/32" Ndle 2 times daily    BD ULTRA-FINE JORDON PEN NEEDLE 32 gauge x 5/32" Ndle No dose, route, or frequency recorded.    blood-glucose meter kit To check BG 2 times daily, to use with insurance preferred meter    buPROPion (WELLBUTRIN SR) 150 MG TBSR 12 hr tablet Take 1 tablet (150 mg ) by mouth 2 (two) times daily.    CICLOPIROX-ITRACONAZOLE-UREA TOP CLEAN NAILS, SHAKE BOTTLE WELL, APPLY TWICE DAILY TO ALL AFFECTED NAILS USING APPLICATOR. (UP TO 2 MLS/DAY)    empagliflozin (JARDIANCE) 25 mg, Oral, Daily    ergocalciferol (ERGOCALCIFEROL) 50,000 Units, Oral, Every 7 days    ezetimibe (ZETIA) 10 mg, Oral, Daily    flash glucose scanning reader (FREESTYLE JOSE ALFREDO 2 READER) [facility] Freestyle Jose Alfredo 2 reader dispense 1. Use as directed    fluticasone-umeclidin-vilanter (TRELEGY ELLIPTA) 100-62.5-25 mcg DsDv 1 puff, Inhalation, Daily    furosemide (LASIX) 40 mg, Oral, 2 times daily    insulin glargine (LANTUS U-100 INSULIN) 30 Units, " "Subcutaneous, 2 times daily    ipratropium-albuteroL (COMBIVENT RESPIMAT)  mcg/actuation inhaler 2 puffs, Inhalation, Every 6 hours PRN, Rescue    lancets (ACCU-CHEK FASTCLIX LANCET DRUM) Misc USE ONE DAILY    LANCETS MISC No dose, route, or frequency recorded.    latanoprost 0.005 % ophthalmic solution 1 drop, Ophthalmic    metFORMIN (GLUCOPHAGE) 1,000 mg, Oral, 2 times daily with meals    metoprolol succinate (TOPROL-XL) 100 mg, Oral, Daily    mupirocin (BACTROBAN) 2 % ointment Topical (Top), 2 times daily    nicotine (NICODERM CQ) 21 mg/24 hr 1 patch, Transdermal, Daily    nicotine polacrilex 4 mg, Oral, As needed (PRN)    nystatin (MYCOSTATIN) ointment Topical (Top), 2 times daily    pen needle, diabetic 31 gauge x 5/16" Ndle 1 each, Misc.(Non-Drug; Combo Route), 2 times daily with meals    propylene glycoL (SYSTANE BALANCE) 0.6 % Drop 1 drop, Ophthalmic, 2 times daily    sacubitriL-valsartan (ENTRESTO) 49-51 mg per tablet 1 tablet, Oral, 2 times daily    SITagliptin phosphate (JANUVIA) 100 mg, Oral, Daily    spironolactone (ALDACTONE) 25 mg, Oral, Daily    varenicline (CHANTIX KAREN) 0.5 mg (11)- 1 mg (42) tablet Oral        All medications, laboratory studies, cardiac diagnostic imaging reviewed.     Lab Results   Component Value Date    LDL 54.00 05/18/2023    LDL 56.00 01/04/2023    TRIG 51 05/18/2023    TRIG 51 01/04/2023    CREATININE 1.03 (H) 09/18/2023    MG 2.20 09/12/2022    K 4.6 09/18/2023        ASSESSMENT/PLAN:  Heart Failure with Improved Ejection Fraction   - Denies any chest pain or palpitations.  Reports occasional shortness of breath but states that overall it has greatly improved  - Etiology: NiCM  - HF Status: NYHA Class II, ACC/AHA Stage C  - LVEF: 50% (TTE on 12/6/2023)   - Current GDMT: Entresto 49mg/51mg, Metoprolol Succinate 100 MG, Spironolactone 25, Jardiance 10mg  - Diuresis: Lasix 40mg BID  - Continue low Na Diet   - Strict Is/Os and daily weights  - Will repeat echo prior next " office visit since maximize on GDMT    HTN  BP at goal - 121/71  Continue Spironolactone, Entresto, Metoprolol and and Lasix  Counseled on low salt diet and exercise as tolerated    HLD  LDL goal <70  LDL 54 per labs 5.18.23  Continue Atorvastatin 80mg daily and Zetia 10mg daily  Counseled on medication compliance    DM  A1c improved to 8.2% after addition of jardiance  Management per PCP    Tobacco Abuse  Smokes approximately 8 cigarettes per day   Counseled on smoking cessation today   Patient is trying to quit    DAVION on CPAP  She reports nightly CPAP compliance and feels she is benefitting from use  Recommended continued nightly CPAP use     BLE pain - improved  KWADWO testing 9.13.22: mild arterial flow reduction in the bilateral lower extremities.        Complete Echo prior next office visit   Follow up in cardiology clinic in 4 months or sooner if needed   Follow up with PCP as directed

## 2023-09-18 ENCOUNTER — OFFICE VISIT (OUTPATIENT)
Dept: CARDIOLOGY | Facility: CLINIC | Age: 58
End: 2023-09-18
Payer: MEDICAID

## 2023-09-18 ENCOUNTER — CLINICAL SUPPORT (OUTPATIENT)
Dept: INTERNAL MEDICINE | Facility: CLINIC | Age: 58
End: 2023-09-18
Attending: NURSE PRACTITIONER
Payer: MEDICAID

## 2023-09-18 ENCOUNTER — OFFICE VISIT (OUTPATIENT)
Dept: INTERNAL MEDICINE | Facility: CLINIC | Age: 58
End: 2023-09-18
Payer: MEDICAID

## 2023-09-18 VITALS
SYSTOLIC BLOOD PRESSURE: 130 MMHG | OXYGEN SATURATION: 97 % | TEMPERATURE: 98 F | HEIGHT: 64 IN | RESPIRATION RATE: 18 BRPM | WEIGHT: 282.81 LBS | BODY MASS INDEX: 48.28 KG/M2 | HEART RATE: 59 BPM | DIASTOLIC BLOOD PRESSURE: 71 MMHG

## 2023-09-18 VITALS
SYSTOLIC BLOOD PRESSURE: 121 MMHG | OXYGEN SATURATION: 95 % | DIASTOLIC BLOOD PRESSURE: 71 MMHG | TEMPERATURE: 98 F | HEART RATE: 58 BPM | WEIGHT: 280 LBS | HEIGHT: 64 IN | RESPIRATION RATE: 18 BRPM | BODY MASS INDEX: 47.8 KG/M2

## 2023-09-18 DIAGNOSIS — E55.9 VITAMIN D DEFICIENCY: Chronic | ICD-10-CM

## 2023-09-18 DIAGNOSIS — E11.65 TYPE 2 DIABETES MELLITUS WITH HYPERGLYCEMIA, WITH LONG-TERM CURRENT USE OF INSULIN: Chronic | ICD-10-CM

## 2023-09-18 DIAGNOSIS — Z79.4 TYPE 2 DIABETES MELLITUS WITH HYPERGLYCEMIA, WITH LONG-TERM CURRENT USE OF INSULIN: Chronic | ICD-10-CM

## 2023-09-18 DIAGNOSIS — J96.10 CHRONIC RESPIRATORY FAILURE, UNSPECIFIED WHETHER WITH HYPOXIA OR HYPERCAPNIA: ICD-10-CM

## 2023-09-18 DIAGNOSIS — E11.65 UNCONTROLLED TYPE 2 DIABETES MELLITUS WITH HYPERGLYCEMIA: ICD-10-CM

## 2023-09-18 DIAGNOSIS — Z23 NEED FOR DIPHTHERIA-TETANUS-PERTUSSIS (TDAP) VACCINE: ICD-10-CM

## 2023-09-18 DIAGNOSIS — Z13.5 DIABETIC RETINOPATHY SCREENING: ICD-10-CM

## 2023-09-18 DIAGNOSIS — I10 PRIMARY HYPERTENSION: Primary | Chronic | ICD-10-CM

## 2023-09-18 DIAGNOSIS — J43.1 PANLOBULAR EMPHYSEMA: Chronic | ICD-10-CM

## 2023-09-18 DIAGNOSIS — I50.32 CHRONIC HEART FAILURE WITH PRESERVED EJECTION FRACTION: ICD-10-CM

## 2023-09-18 DIAGNOSIS — E78.5 HYPERLIPIDEMIA LDL GOAL <70: Chronic | ICD-10-CM

## 2023-09-18 DIAGNOSIS — I50.20 HEART FAILURE WITH REDUCED EJECTION FRACTION: Primary | ICD-10-CM

## 2023-09-18 DIAGNOSIS — F17.210 CIGARETTE NICOTINE DEPENDENCE WITHOUT COMPLICATION: Chronic | ICD-10-CM

## 2023-09-18 DIAGNOSIS — I50.20 HEART FAILURE WITH REDUCED EJECTION FRACTION: Chronic | ICD-10-CM

## 2023-09-18 DIAGNOSIS — Z13.5 DIABETIC RETINOPATHY SCREENING: Primary | ICD-10-CM

## 2023-09-18 DIAGNOSIS — E66.01 CLASS 3 SEVERE OBESITY DUE TO EXCESS CALORIES WITH SERIOUS COMORBIDITY AND BODY MASS INDEX (BMI) OF 45.0 TO 49.9 IN ADULT: Chronic | ICD-10-CM

## 2023-09-18 DIAGNOSIS — G47.33 OSA ON CPAP: Chronic | ICD-10-CM

## 2023-09-18 DIAGNOSIS — R06.09 DOE (DYSPNEA ON EXERTION): ICD-10-CM

## 2023-09-18 DIAGNOSIS — I10 PRIMARY HYPERTENSION: Chronic | ICD-10-CM

## 2023-09-18 LAB
LEFT EYE DM RETINOPATHY: NEGATIVE
RIGHT EYE DM RETINOPATHY: NEGATIVE

## 2023-09-18 PROCEDURE — 3075F SYST BP GE 130 - 139MM HG: CPT | Mod: CPTII,,, | Performed by: NURSE PRACTITIONER

## 2023-09-18 PROCEDURE — 3078F PR MOST RECENT DIASTOLIC BLOOD PRESSURE < 80 MM HG: ICD-10-PCS | Mod: CPTII,,, | Performed by: NURSE PRACTITIONER

## 2023-09-18 PROCEDURE — 3075F PR MOST RECENT SYSTOLIC BLOOD PRESS GE 130-139MM HG: ICD-10-PCS | Mod: CPTII,,, | Performed by: NURSE PRACTITIONER

## 2023-09-18 PROCEDURE — 4010F PR ACE/ARB THEARPY RXD/TAKEN: ICD-10-PCS | Mod: CPTII,,, | Performed by: NURSE PRACTITIONER

## 2023-09-18 PROCEDURE — 3066F NEPHROPATHY DOC TX: CPT | Mod: CPTII,,, | Performed by: NURSE PRACTITIONER

## 2023-09-18 PROCEDURE — 99214 OFFICE O/P EST MOD 30 MIN: CPT | Mod: 25,PBBFAC | Performed by: NURSE PRACTITIONER

## 2023-09-18 PROCEDURE — 3052F HG A1C>EQUAL 8.0%<EQUAL 9.0%: CPT | Mod: CPTII,,, | Performed by: NURSE PRACTITIONER

## 2023-09-18 PROCEDURE — 3061F PR NEG MICROALBUMINURIA RESULT DOCUMENTED/REVIEW: ICD-10-PCS | Mod: CPTII,,, | Performed by: NURSE PRACTITIONER

## 2023-09-18 PROCEDURE — 4010F ACE/ARB THERAPY RXD/TAKEN: CPT | Mod: CPTII,,, | Performed by: NURSE PRACTITIONER

## 2023-09-18 PROCEDURE — 99214 OFFICE O/P EST MOD 30 MIN: CPT | Mod: 25,S$PBB,, | Performed by: NURSE PRACTITIONER

## 2023-09-18 PROCEDURE — 3008F BODY MASS INDEX DOCD: CPT | Mod: CPTII,,, | Performed by: NURSE PRACTITIONER

## 2023-09-18 PROCEDURE — 3061F NEG MICROALBUMINURIA REV: CPT | Mod: CPTII,,, | Performed by: NURSE PRACTITIONER

## 2023-09-18 PROCEDURE — 99214 PR OFFICE/OUTPT VISIT, EST, LEVL IV, 30-39 MIN: ICD-10-PCS | Mod: 25,S$PBB,, | Performed by: NURSE PRACTITIONER

## 2023-09-18 PROCEDURE — 90715 TDAP VACCINE 7 YRS/> IM: CPT | Mod: PBBFAC

## 2023-09-18 PROCEDURE — 3008F PR BODY MASS INDEX (BMI) DOCUMENTED: ICD-10-PCS | Mod: CPTII,,, | Performed by: NURSE PRACTITIONER

## 2023-09-18 PROCEDURE — 3052F PR MOST RECENT HEMOGLOBIN A1C LEVEL 8.0 - < 9.0%: ICD-10-PCS | Mod: CPTII,,, | Performed by: NURSE PRACTITIONER

## 2023-09-18 PROCEDURE — 3078F DIAST BP <80 MM HG: CPT | Mod: CPTII,,, | Performed by: NURSE PRACTITIONER

## 2023-09-18 PROCEDURE — 99215 OFFICE O/P EST HI 40 MIN: CPT | Mod: PBBFAC,27

## 2023-09-18 PROCEDURE — 90471 IMMUNIZATION ADMIN: CPT | Mod: PBBFAC

## 2023-09-18 PROCEDURE — 3066F PR DOCUMENTATION OF TREATMENT FOR NEPHROPATHY: ICD-10-PCS | Mod: CPTII,,, | Performed by: NURSE PRACTITIONER

## 2023-09-18 RX ORDER — PEN NEEDLE, DIABETIC 31 GX5/16"
NEEDLE, DISPOSABLE MISCELLANEOUS
COMMUNITY
Start: 2023-07-17

## 2023-09-18 RX ORDER — ERGOCALCIFEROL 1.25 MG/1
50000 CAPSULE ORAL
Qty: 12 CAPSULE | Refills: 2 | Status: SHIPPED | OUTPATIENT
Start: 2023-09-18 | End: 2024-03-21 | Stop reason: SDUPTHER

## 2023-09-18 NOTE — PROGRESS NOTES
Patient ID: [id number]     Chief Complaint: Diabetes, Results, and Follow-up    HPI:     Linn Mckeon is a 57 y.o. female with diagnosis of HTN, HLD, Class II HFpEF 50-55%, DM2, COPD with Oxygen Dependence, DAVION, Tobacco Use, Obesity. Patient seen in clinic today for LLE wound. Patient last seen in clinic on 03/03/2023.  A1c elevated at 8.2. Patient followed by Endocrinology Clinic. Patient prescribed Jardiance 25 mg daily, Lantus 30 units bid, Metformin 1,000 mg bid, Januvia 100 mg daily. Patient followed by Diabetes Education. Patient states she checks her CBGs at home, denies hypoglycemia.   Patient currently followed by Pulmonology for COPD and Cardiology for Heart Failure. Patient currently prescribed Trilogy for chronic respiratory failure s/t COPD. Patient states she has been compliant and benefiting from her home noninvasive volume ventilator. I recommend that the patient continues her home noninvasive volume ventilator during hours of sleep and as needed during the day when symptoms arise. A home CPAP/BiPAP is not appropriate for her ventilatory requirements.   Patient had a positive Cologuard, Colonoscopy scheduled for 01/25/2024.   Patient denies any other acute complaints.     Patient followed by Wound Care. Last appointment on 06/30/2023. debridement of 10 toenails performed. paring and sanding down of 8 corns/calluses. Patient has follow up appointment scheduled for 12/26/2023.    Patient followed by Endocrinology Clinic. Last appointment on 06/29/2023. Uncontrolled type 2 diabetes mellitus with hyperglycemia: Recent A1C 10.2. Medications: Metformin 1000 mg twice daily, Januvia 100 mg, Jardiance 10mg, Lantus 30 units BID. Changes:  Jardiance increased to 25 mg Januvia restarted on 05/18/2023. Home Glucometer Use: Harper. Eye Exam: 09/28/2021 repeat fundus today attempted previous and to obtain. Last Hypoglycemic episode: Occasional. Follow ADA diet, avoid soda, simple sweets, and limit rice, breads  and starches. Maintain healthy weight, exercise 4-5 times a week for 30 minutes. Diet and medication compliance discussed on visit. Patient has follow up appointment scheduled for 12/29/2023.      Patient followed by Cardiology Clinic. Last appointment on 05/10/2023.  Heart Failure with Improved Ejection Fraction: Etiology: NiCM. HF Status: NYHA Class II, ACC/AHA Stage C. LVEF: 50% (TTE on 12/6/2023). Current GDMT: Entresto 24mg/26mg, Carvedilol 25mg, Spironolactone 25, Jardiance 10mg. will STOP Coreg and initiate Metoprolol Succinate 100mg. patient to complete BP/HR logs x2-3 weeks and return to clinic for evaluation. if possible, will increase Entresto to moderate dose when patient returns for NV. Diuresis: Lasix 40mg BID. Continue low Na Diet. Strict Is/Os and daily weights. HTN: BP at goal - 116/63. Continue Spironolactone, Entresto, and and Lasix. Will stop Coreg and initiate metoprolol. Patient to return in 2-3 weeks for NV and BP eval - will attempt to increase Entresto at that time. Counseled on low salt diet and exercise as tolerated. HLD: LDL goal <70. LDL 56 on repeat labs today 1/4/2023. Continue Atorvastatin 80mg daily and Zetia 10mg daily. Counseled on medication compliance. DAVION on CPAP: She reports nightly CPAP compliance and feels she is benefitting from use. Recommended continued nightly CPAP use. BLE pain - improved: KWADWO testing 9.13.22: mild arterial flow reduction in the bilateral lower extremities. Patient has follow up appointment scheduled for 09/18/2023.      Patient followed by Pulmonology Clinic. Last appointment on 12/20/2022. 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; COPD; Likely weight-related restrictive lung disease: PFTs in 10/2022 revealed FEV1 50%, FEV1/FVC ratio 89%, positive bronchodilator response, decreased DLCO; Continue trelegy ellipta 100 mcg 1 puff daily, albuterol nebulizer/combivent PRN; Walked patient without O2 in clinic today, lowest SPO2 while ambulating was  94%; She does not currently qualify for renewing O2 prescription based on ambulatory SPO2 testing; Encouraged patient to try to lose weight, discussed various methods to help with weight loss; Anticipate with weight loss her shortness of breath will also improve; Patient currently smoking <5 cigarettes / day and is actively attempting to quit. RTC in 12 months. Patient has follow up appointment scheduled for 12/26/2023.      Patient followed by Ophthalmology Clinic. Last appointment on 04/28/2022. DM without retinopathy: Last A1c 03/2021: 10.3%; encouraged good control/regular PCP follow ups; Photos 4/2022; DFE next due 4/2023. Mild POAG OU: No family hx, +AA, thin CCT, upper limit normal IOP. Gonio open 360 OU; C/D: 0.4 OU; Tmax 22 // 21; OCT RNFL full OU; Ganglion cell layer wnl OU; Latanoprost QHS OU started 9/1/21; IOP good today. Dry eye/MGD: Diffuse, confluent PEE OU; Start ATs 4-6 times per day, ointment QHS; Warm compresses BID. Presbyopia: doing well, monitor. HTN retinopathy: Recommend good bp control. RTC 6 mo IOP. No follow up appointment noted, will message clinic.      Review of patient's allergies indicates:  No Known Allergies    Breast Cancer Screening: MMG benign on 04/10/2023  Cervical Cancer Screening: Hysterectomy  Colorectal Cancer Screening: Cologuard positive on 12/13/2022; Patient referred to GI clinic on 12/21/2022, Colonoscopy scheduled for 01/25/2024  Diabetic Eye Exam: 11/04/2022  Diabetic Foot Exam: 11/02/2022  Lung Cancer Screening: Refuses at this time  Prostate Cancer Screening: N/A  AAA Screening: N/A  Osteoporosis Screening: deferred due to age  Medicare Wellness: N/A  Immunizations:   Immunization History   Administered Date(s) Administered    COVID-19, MRNA, LN-S, PF (Pfizer) (Purple Cap) 07/28/2021, 08/18/2021    Tdap 09/18/2023     Past Surgical History:   Procedure Laterality Date    HYSTERECTOMY       family history includes Diabetes in her brother, mother, and sister; Heart  attack in her father; Heart disease in her mother; Hypertension in her brother, mother, and sister.    Social History     Socioeconomic History    Marital status:    Tobacco Use    Smoking status: Every Day     Current packs/day: 0.25     Types: Cigarettes    Smokeless tobacco: Never    Tobacco comments:     Smokes 6 cigarettes a day   Substance and Sexual Activity    Alcohol use: Not Currently    Drug use: Never    Sexual activity: Not Currently     Social Determinants of Health     Financial Resource Strain: Low Risk  (10/14/2022)    Overall Financial Resource Strain (CARDIA)     Difficulty of Paying Living Expenses: Not very hard   Food Insecurity: No Food Insecurity (2/24/2023)    Hunger Vital Sign     Worried About Running Out of Food in the Last Year: Never true     Ran Out of Food in the Last Year: Never true   Transportation Needs: No Transportation Needs (2/24/2023)    PRAPARE - Transportation     Lack of Transportation (Medical): No     Lack of Transportation (Non-Medical): No   Physical Activity: Inactive (8/25/2023)    Exercise Vital Sign     Days of Exercise per Week: 0 days     Minutes of Exercise per Session: 0 min   Stress: Stress Concern Present (8/25/2023)    [employer] - Occupational Stress Questionnaire     Feeling of Stress : Rather much   Social Connections: Unknown (10/14/2022)    Social Connection and Isolation Panel [NHANES]     Frequency of Communication with Friends and Family: More than three times a week     Frequency of Social Gatherings with Friends and Family: More than three times a week     Attends Alevism Services: More than 4 times per year   Recent Concern: Social Connections - Moderately Isolated (10/14/2022)    Social Connection and Isolation Panel [NHANES]     Frequency of Communication with Friends and Family: More than three times a week     Frequency of Social Gatherings with Friends and Family: More than three times a week      "Attends Druze Services: More than 4 times per year     Active Member of Clubs or Organizations: No     Attends Club or Organization Meetings: Never     Marital Status:    Housing Stability: Unknown (2/24/2023)    Housing Stability Vital Sign     Unable to Pay for Housing in the Last Year: No     Unstable Housing in the Last Year: No     Current Outpatient Medications   Medication Instructions    albuterol (PROVENTIL) 2.5 mg, Nebulization, Every 6 hours PRN, Rescue    ammonium lactate (LAC-HYDRIN) 12 % lotion APPLY TO DRY SKIN AND RUB IN WELL TWICE DAILY. NOTHING BETWEEN TOES    atorvastatin (LIPITOR) 80 mg, Oral, Daily    BD ULTRA-FINE JORDON PEN NEEDLE 32 gauge x 5/32" Ndle 2 times daily    BD ULTRA-FINE JORDON PEN NEEDLE 32 gauge x 5/32" Ndle No dose, route, or frequency recorded.    blood-glucose meter kit To check BG 2 times daily, to use with insurance preferred meter    buPROPion (WELLBUTRIN SR) 150 MG TBSR 12 hr tablet Take 1 tablet (150 mg ) by mouth 2 (two) times daily.    CICLOPIROX-ITRACONAZOLE-UREA TOP CLEAN NAILS, SHAKE BOTTLE WELL, APPLY TWICE DAILY TO ALL AFFECTED NAILS USING APPLICATOR. (UP TO 2 MLS/DAY)    empagliflozin (JARDIANCE) 25 mg, Oral, Daily    ergocalciferol (ERGOCALCIFEROL) 50,000 Units, Oral, Every 7 days    ezetimibe (ZETIA) 10 mg, Oral, Daily    flash glucose scanning reader (FREESTYLE JOSE ALFREDO 2 READER) [facility] Freestyle Jose Alfredo 2 reader dispense 1. Use as directed    fluticasone-umeclidin-vilanter (TRELEGY ELLIPTA) 100-62.5-25 mcg DsDv 1 puff, Inhalation, Daily    furosemide (LASIX) 40 mg, Oral, 2 times daily    insulin glargine (LANTUS U-100 INSULIN) 30 Units, Subcutaneous, 2 times daily    ipratropium-albuteroL (COMBIVENT RESPIMAT)  mcg/actuation inhaler 2 puffs, Inhalation, Every 6 hours PRN, Rescue    lancets (ACCU-CHEK FASTCLIX LANCET DRUM) Mis USE ONE DAILY    LANCETS MISC No dose, route, or frequency recorded.    latanoprost 0.005 % ophthalmic solution 1 drop, " "Ophthalmic    metFORMIN (GLUCOPHAGE) 1,000 mg, Oral, 2 times daily with meals    metoprolol succinate (TOPROL-XL) 100 mg, Oral, Daily    mupirocin (BACTROBAN) 2 % ointment Topical (Top), 2 times daily    nicotine (NICODERM CQ) 21 mg/24 hr 1 patch, Transdermal, Daily    nicotine polacrilex 4 mg, Oral, As needed (PRN)    nystatin (MYCOSTATIN) ointment Topical (Top), 2 times daily    pen needle, diabetic 31 gauge x 5/16" Ndle 1 each, Misc.(Non-Drug; Combo Route), 2 times daily with meals    propylene glycoL (SYSTANE BALANCE) 0.6 % Drop 1 drop, Ophthalmic, 2 times daily    sacubitriL-valsartan (ENTRESTO) 49-51 mg per tablet 1 tablet, Oral, 2 times daily    SITagliptin phosphate (JANUVIA) 100 mg, Oral, Daily    spironolactone (ALDACTONE) 25 mg, Oral, Daily    varenicline (CHANTIX KAREN) 0.5 mg (11)- 1 mg (42) tablet Oral       Subjective:     Review of Systems   Constitutional: Negative.    HENT: Negative.     Eyes: Negative.    Respiratory: Negative.     Cardiovascular: Negative.    Gastrointestinal: Negative.    Endocrine: Negative.    Genitourinary: Negative.    Musculoskeletal: Negative.    Skin: Negative.    Allergic/Immunologic: Negative.    Neurological: Negative.    Hematological: Negative.    Psychiatric/Behavioral: Negative.         Objective:     Visit Vitals  /71 (BP Location: Left forearm, Patient Position: Sitting, BP Method: Large (Automatic))   Pulse (!) 59   Temp 97.7 °F (36.5 °C) (Oral)   Resp 18   Ht 5' 4.02" (1.626 m)   Wt 128.3 kg (282 lb 12.8 oz)   SpO2 97%   BMI 48.52 kg/m²       Physical Exam  Vitals reviewed.   Constitutional:       Appearance: Normal appearance. She is obese.   HENT:      Head: Normocephalic and atraumatic.      Mouth/Throat:      Mouth: Mucous membranes are moist.      Pharynx: Oropharynx is clear.   Eyes:      Extraocular Movements: Extraocular movements intact.      Conjunctiva/sclera: Conjunctivae normal.      Pupils: Pupils are equal, round, and reactive to light. "   Cardiovascular:      Rate and Rhythm: Normal rate and regular rhythm.      Heart sounds: Normal heart sounds.   Pulmonary:      Effort: Pulmonary effort is normal.      Breath sounds: Normal breath sounds.   Abdominal:      General: Bowel sounds are normal.   Musculoskeletal:         General: Normal range of motion.      Cervical back: Normal range of motion.   Skin:     General: Skin is warm and dry.   Neurological:      Mental Status: She is alert and oriented to person, place, and time.   Psychiatric:         Mood and Affect: Mood normal.         Behavior: Behavior normal.     Protective Sensation (w/ 10 gram monofilament):  Right: Intact  Left: Intact    Visual Inspection:  Callus -  Bilateral and Dry Skin -  Bilateral    Pedal Pulses:   Right: Present  Left: Present    Posterior Tibialis Pulses:   Right:Present  Left: Present      Labs Reviewed:     Hematology:  Lab Results   Component Value Date    WBC 10.40 09/18/2023    HGB 14.9 09/18/2023    HCT 47.1 (H) 09/18/2023     09/18/2023     Chemistry:  Lab Results   Component Value Date     09/18/2023    K 4.6 09/18/2023    CHLORIDE 107 09/18/2023    BUN 18.0 09/18/2023    CREATININE 1.03 (H) 09/18/2023    EGFRNORACEVR >60 09/18/2023    GLUCOSE 173 (H) 09/18/2023    CALCIUM 10.0 09/18/2023    ALKPHOS 124 09/18/2023    LABPROT 7.8 09/18/2023    ALBUMIN 3.2 (L) 09/18/2023    BILIDIR 0.2 12/06/2021    IBILI 0.30 12/06/2021    AST 14 09/18/2023    ALT 13 09/18/2023    MG 2.20 09/12/2022    PHOS 4.4 09/11/2022    [id number] 20.9 (L) 05/18/2023     Lab Results   Component Value Date    HGBA1C 8.2 (H) 09/18/2023     Lipid Panel:  Lab Results   Component Value Date    CHOL 94 05/18/2023    HDL 30 (L) 05/18/2023    LDL 54.00 05/18/2023    TRIG 51 05/18/2023    TOTALCHOLEST 3 05/18/2023     Thyroid:  Lab Results   Component Value Date    TSH 1.121 01/04/2023     Urine:  Lab Results   Component Value Date    COLORUA Yellow 05/18/2023    APPEARANCEUA Turbid  (A) 05/18/2023    SGUA 1.019 05/18/2023    PHUA 6.0 05/18/2023    PROTEINUA Trace (A) 05/18/2023    GLUCOSEUA 2+ (A) 05/18/2023    KETONESUA Negative 05/18/2023    BLOODUA 1+ (A) 05/18/2023    NITRITESUA Negative 05/18/2023    LEUKOCYTESUR 500 (A) 05/18/2023    RBCUA 21-50 (A) 05/18/2023    WBCUA 21-50 (A) 05/18/2023    BACTERIA Few (A) 05/18/2023    SQEPUA Moderate (A) 05/18/2023    HYALINECASTS None Seen 05/18/2023    CREATRANDUR 103.8 05/18/2023        Assessment:       ICD-10-CM ICD-9-CM   1. Primary hypertension  I10 401.9   2. Hyperlipidemia LDL goal <70  E78.5 272.4   3. Type 2 diabetes mellitus with hyperglycemia, with long-term current use of insulin  E11.65 250.00    Z79.4 790.29     V58.67   4. Heart failure with reduced ejection fraction  I50.20 428.20   5. Panlobular emphysema  J43.1 492.8   6. Vitamin D deficiency  E55.9 268.9   7. DAVION on CPAP  G47.33 327.23    Z99.89 V46.8   8. Class 3 severe obesity due to excess calories with serious comorbidity and body mass index (BMI) of 45.0 to 49.9 in adult  E66.01 278.01    Z68.42 V85.42   9. Cigarette nicotine dependence without complication  F17.210 305.1   10. Diabetic retinopathy screening  Z13.5 V80.2   11. Need for diphtheria-tetanus-pertussis (Tdap) vaccine  Z23 V06.1   12. Uncontrolled type 2 diabetes mellitus with hyperglycemia  E11.65 250.02   13. Chronic respiratory failure, unspecified whether with hypoxia or hypercapnia  J96.10 518.83       Plan:     1. Primary hypertension  Vitals:    09/18/23 0916   BP: 130/71   Pulse: (!) 59   Resp: 18   Temp: 97.7 °F (36.5 °C)      Results for orders placed or performed in visit on 05/18/23   Microalbumin/Creatinine Ratio, Urine   Result Value Ref Range    Urine Microalbumin 19.5 <=30.0 ug/ml    Urine Creatinine 103.8 47.0 - 110.0 mg/dL    Microalbumin Creatinine Ratio 18.8 0.0 - 30.0 mg/gm Cr     Results for orders placed or performed during the hospital encounter of 01/07/23   EKG 12-lead    Collection Time:  01/07/23  4:35 AM    Narrative    Test Reason : R06.00,    Vent. Rate : 065 BPM     Atrial Rate : 065 BPM     P-R Int : 210 ms          QRS Dur : 094 ms      QT Int : 438 ms       P-R-T Axes : 057 -24 -33 degrees     QTc Int : 455 ms    Sinus rhythm with 1st degree A-V block  ST and T wave abnormality, consider anterior ischemia  Abnormal ECG  When compared with ECG of 11-SEP-2022 03:58,  Inverted T waves are now Present  Confirmed by Reynold Eaton MD ([id number]) on 1/9/2023 3:54:00 PM    Referred By: [email or web address]   SELF           Confirmed By:Reynold Eaton MD   Continue Coreg 25 mg bid, Lasix 40 mg bid, Spironolactone 25 mg daily, Entresto 24-26 mg bid  DASH diet  Encouraged home blood pressure monitoring    2. Hyperlipidemia LDL goal <70  Continue Atorvastatin 80 mg daily and Zetia 10 mg daily  Weight loss encouraged  Low fat/high fiber diet  Increase physical activity  Tobacco cessation encouraged  Cholesterol Total   Date Value Ref Range Status   05/18/2023 94 <=200 mg/dL Final     HDL Cholesterol   Date Value Ref Range Status   05/18/2023 30 (L) 35 - 60 mg/dL Final     Triglyceride   Date Value Ref Range Status   05/18/2023 51 37 - 140 mg/dL Final     LDL Cholesterol   Date Value Ref Range Status   05/18/2023 54.00 50.00 - 140.00 mg/dL Final     3. Type 2 diabetes mellitus with hyperglycemia, with long-term current use of insulin  Continue Lantus 30 units bid, Metformin 1,000 mg bid, Januvia 100 mg daily, Jardiance to 25 mg daily  Encouraged home CBG monitoring.  Hypoglycemic episodes: denies  Body mass index is 48.52 kg/m².  Hemoglobin A1c   Date Value Ref Range Status   09/18/2023 8.2 (H) <=7.0 % Final     Results for orders placed or performed in visit on 05/18/23   Microalbumin/Creatinine Ratio, Urine   Result Value Ref Range    Urine Microalbumin 19.5 <=30.0 ug/ml    Urine Creatinine 103.8 47.0 - 110.0 mg/dL    Microalbumin Creatinine Ratio 18.8 0.0 - 30.0 mg/gm Cr   On Atorvastatin and Zetia  On  Entresto  Weight Loss Encouraged  ADA Diet    4. Heart failure with reduced ejection fraction  Followed by Cardiology Clinic  Continue Coreg 25 mg bid, Lasix 40 mg bid, Spironolactone 25 mg daily, Entresto 24-26 mg bid  - empagliflozin (JARDIANCE) 25 mg tablet; Take 1 tablet (25 mg total) by mouth once daily.  Dispense: 90 tablet; Refill: 2    5. Panlobular emphysema  Followed by Pulmonology Clinic  Continue O2  PFTs on 10/12/2022  Continue Trelegy Ellipta daily, Combivent prn, Albuterol prn    6. Vitamin D deficiency  Vitamin D level: 20.9  Continue Vitamin D 50,000 units weekly    7. DAVION on CPAP  Continue BiPAP as prescribed/nightly    8. Class 3 severe obesity due to excess calories with serious comorbidity and body mass index (BMI) of 45.0 to 49.9 in adult  Body mass index is 48.52 kg/m².  Thyroid Stimulating Hormone   Date Value Ref Range Status   01/04/2023 1.121 0.350 - 4.940 uIU/mL Final     Vit D 25 OH   Date Value Ref Range Status   05/18/2023 20.9 (L) 30.0 - 80.0 ng/mL Final     Hemoglobin A1c   Date Value Ref Range Status   09/18/2023 8.2 (H) <=7.0 % Final   Sleep Study: DAVION with BiPAP  Weight Loss Encouraged  Increase Physical Activity    9. Cigarette nicotine dependence without complication  Smoking cessation education provided, encouraged. Informed of smoking cessation program. Patient refused smoking cessation at this time. I spent 3 minutes discussing smoking cessation with patient.     10. Diabetic retinopathy screening  - Diabetic Eye Screening Photo    11. Need for diphtheria-tetanus-pertussis (Tdap) vaccine  - Tdap Vaccine    12. Chronic respiratory failure, unspecified whether with hypoxia or hypercapnia  Patient currently prescribed Trilogy for chronic respiratory failure s/t COPD. Patient states she has been compliant and benefiting from her home noninvasive volume ventilator. I recommend that the patient continues her home noninvasive volume ventilator during hours of sleep and as needed  during the day when symptoms arise. A home CPAP/BiPAP is not appropriate for her ventilatory requirements.       Follow up in about 6 months (around 3/18/2024) for Labs. In addition to their scheduled follow up, the patient has also been instructed to follow up on as needed basis.     Leslie Gabriel, LEXUSP

## 2023-09-18 NOTE — PROGRESS NOTES
Linn Mckeon is a 57 y.o. female here for a diabetic eye screening with non-dilated fundus photos per NOEMI Andrews.    Patient cooperative?: Yes  Small pupils?: No  Last eye exam: unknown    For exam results, see Encounter Report.

## 2023-09-19 ENCOUNTER — TELEPHONE (OUTPATIENT)
Dept: GASTROENTEROLOGY | Facility: CLINIC | Age: 58
End: 2023-09-19
Payer: MEDICAID

## 2023-09-19 NOTE — TELEPHONE ENCOUNTER
Last visit in Pulm 12/20/2022, next visit scheduled 12/26/2023.    ----- Message from Fang Ko sent at 9/19/2023 10:05 AM CDT -----  Regarding: BIPAP Re certifiation  Patient called and stated her PCP informed her to reach out to pulmonary clinic due to needing her BIPAP re certification.     Patient can be reached at [phone]

## 2023-09-21 NOTE — TELEPHONE ENCOUNTER
Spoke with Marla at [facility]. She stated that patient has a Trilogy machine not a BiPAP. Her PCP signed the order but would prefer if Pulm would take over for any of [facility]'s needs. Understanding voiced.    Called and spoke with patient to inform her of what Marla stated and that Pulm will see her at her scheduled appt in 12/2023. Patient voiced understanding.

## 2023-10-02 DIAGNOSIS — E11.65 UNCONTROLLED TYPE 2 DIABETES MELLITUS WITH HYPERGLYCEMIA: ICD-10-CM

## 2023-10-02 RX ORDER — INSULIN GLARGINE 100 [IU]/ML
30 INJECTION, SOLUTION SUBCUTANEOUS 2 TIMES DAILY
Qty: 30 ML | Refills: 11 | Status: SHIPPED | OUTPATIENT
Start: 2023-10-02 | End: 2024-09-26

## 2023-10-04 ENCOUNTER — HOSPITAL ENCOUNTER (OUTPATIENT)
Dept: WOUND CARE | Facility: HOSPITAL | Age: 58
Discharge: HOME | End: 2023-10-04
Attending: NURSE PRACTITIONER
Payer: MEDICAID

## 2023-10-04 VITALS
SYSTOLIC BLOOD PRESSURE: 139 MMHG | DIASTOLIC BLOOD PRESSURE: 77 MMHG | TEMPERATURE: 98 F | OXYGEN SATURATION: 94 % | HEART RATE: 69 BPM | RESPIRATION RATE: 20 BRPM

## 2023-10-04 DIAGNOSIS — E11.628 TYPE 2 DIABETES MELLITUS WITH OTHER SKIN COMPLICATION, WITH LONG-TERM CURRENT USE OF INSULIN: ICD-10-CM

## 2023-10-04 DIAGNOSIS — L84 CALLUS OF FOOT: ICD-10-CM

## 2023-10-04 DIAGNOSIS — M79.671 RIGHT FOOT PAIN: ICD-10-CM

## 2023-10-04 DIAGNOSIS — Z99.81 OXYGEN DEPENDENT: ICD-10-CM

## 2023-10-04 DIAGNOSIS — B35.1 MYCOTIC TOENAILS: ICD-10-CM

## 2023-10-04 DIAGNOSIS — Z79.4 TYPE 2 DIABETES MELLITUS WITH OTHER SKIN COMPLICATION, WITH LONG-TERM CURRENT USE OF INSULIN: ICD-10-CM

## 2023-10-04 DIAGNOSIS — M79.672 LEFT FOOT PAIN: ICD-10-CM

## 2023-10-04 DIAGNOSIS — J43.1 PANLOBULAR EMPHYSEMA: ICD-10-CM

## 2023-10-04 DIAGNOSIS — E11.9 ENCOUNTER FOR COMPREHENSIVE DIABETIC FOOT EXAMINATION, TYPE 2 DIABETES MELLITUS: Primary | ICD-10-CM

## 2023-10-04 DIAGNOSIS — Z91.199 NONCOMPLIANCE WITH TREATMENT PLAN: ICD-10-CM

## 2023-10-04 DIAGNOSIS — L85.3 XEROSIS OF SKIN: ICD-10-CM

## 2023-10-04 DIAGNOSIS — R26.9 IMPAIRED GAIT: ICD-10-CM

## 2023-10-04 PROCEDURE — 27000999 HC MEDICAL RECORD PHOTO DOCUMENTATION

## 2023-10-04 PROCEDURE — 11057 PARNG/CUTG B9 HYPRKR LES >4: CPT

## 2023-10-04 PROCEDURE — 99499 NO LOS: ICD-10-PCS | Mod: ,,, | Performed by: NURSE PRACTITIONER

## 2023-10-04 PROCEDURE — 11056 PARNG/CUTG B9 HYPRKR LES 2-4: CPT | Mod: ,,, | Performed by: NURSE PRACTITIONER

## 2023-10-04 PROCEDURE — 99211 OFF/OP EST MAY X REQ PHY/QHP: CPT | Mod: 25

## 2023-10-04 PROCEDURE — 11721 DEBRIDE NAIL 6 OR MORE: CPT

## 2023-10-04 PROCEDURE — 11719 PR TRIM NAIL(S): ICD-10-PCS | Mod: ,,, | Performed by: NURSE PRACTITIONER

## 2023-10-04 PROCEDURE — 11720 PR DEBRIDEMENT OF NAIL(S), 1-5: ICD-10-PCS | Mod: 59,,, | Performed by: NURSE PRACTITIONER

## 2023-10-04 PROCEDURE — 11720 DEBRIDE NAIL 1-5: CPT | Mod: 59,,, | Performed by: NURSE PRACTITIONER

## 2023-10-04 PROCEDURE — 11719 TRIM NAIL(S) ANY NUMBER: CPT | Mod: ,,, | Performed by: NURSE PRACTITIONER

## 2023-10-04 PROCEDURE — 11056 PR TRIM BENIGN HYPERKERATOTIC SKIN LESION,2-4: ICD-10-PCS | Mod: ,,, | Performed by: NURSE PRACTITIONER

## 2023-10-04 PROCEDURE — 11720 DEBRIDE NAIL 1-5: CPT

## 2023-10-04 PROCEDURE — 99499 UNLISTED E&M SERVICE: CPT | Mod: ,,, | Performed by: NURSE PRACTITIONER

## 2023-10-04 PROCEDURE — 11719 TRIM NAIL(S) ANY NUMBER: CPT

## 2023-10-04 NOTE — PROGRESS NOTES
TODAY'S VISIT NOTE WAS IMPORTED FROM LAST WOUND CLINIC VISIT OF 23.  I ATTEST THAT I REVIEWED THE HPI, ROS, LABS, WOUND-RELATED IMAGING, PHYSICAL EXAMINATION, EVALUATION AND PLAN SECTIONS OF IMPORTED NOTE AND REVISED TODAY'S VISIT NOTE TO REFLECT TODAY'S NEW ASSESSMENT FINDINGS AND TODAY'S UPDATES TO WOUND TREATMENT PLAN.          Chief Complaint:  Routine diabetic foot care, with bilateral foot pain      History of Present Illness:  56 yo Black female being seen today for routine diabetic foot care, and bilateral foot pain from recurrent calluses, which is affecting her ability to walk.   Hx includes treatment plan and medication non-compliance, COPD, HFpEF (40%), chronic hypercapnic respiratory failure with oxygen and bedtime Trilogy dependency, uncontrolled Type 2 diabetes, HTN, HLD, DAVION on CPAP, morbid obesity (BMI 48.06), and smoking (25 pk/year history).  Enrolled in [facility] smoking cessation program.  Smoking 4-5 cigarettes per day.    Followed by NOEMI Andrews, [facility] IM for PCP.  Last visit with Ms. Gabriel was on 23.  Followed by [facility] endocrine, pulmonology, and cardiology clinics.   Recent Cologuard (+); scheduled for colonoscopy 24.        Review of Most Recent Labs:  23:  RBC 5.68.  H&H 14.9 & 47.1.  Plts 290.  CR 1.03.  HgbA1C 8.2.    23:  CBC unremarkable.  CR 0.98.  Alb 3.2.  Chol 94.  HDL 30.  LDL 54.  Trig 51.  HgbA1C 10.2.    23:  CBC unremarkable.  CR 1.11.  eGFR 58.  Alk Phos 152.     Imagin23 xray of right foot:  no acute osseus abnormality.  Plantar calcaneal enthesophyte.  Dorsal calcaneal enthesophyte.    2022 arterial NIVA:    The right lower extremity demonstrated multiphasic waveforms at all levels.  The KWADWO on the right was mildly abnormal. The right lower extremity demonstrated mild arterial flow reduction.     The left lower extremity demonstrated multiphasic waveforms at all levels.   The KWADWO on the left was mildly abnormal.  The left lower extremity  "demonstrated mild arterial flow reduction.      Today 10/4/23:  Corns and calluses have recurred over tips of big toes and bottoms of feet.  She is unable to reach those areas long enough to perform callus care, without getting short-winded.  She is able to apply toenail polish though, as prescribed.  Corns and calluses on bottoms of feet are causing pressure and discomfort with walking.  Wearing tennis shoes now; no longer wearing sandals, per self-report.   Numbness and tinging continue in feet/toes, as before.  Still has pain in calves with walking distances.  Feet continue to feel cold all the time.  Still has swelling in ankles and feet on a daily basis.   Tries to remember to apply Lac-Hydrin and Vaseline to feet, as prescribed.  Did not apply those this morning.      6/30/23:  Wearing sandals today because they are more comfortable and easier to put on that regular shoes.  Remembers me speaking to her each visit about how shoes protect diabetic feet.  Corns and calluses are worse on feet and toes than last visit.  Has Lac-Hydrin and Vaseline at home.  Not able to see bottoms of feet.  Remembers being encouraged to get a mirror to use for foot checks.  Smoking "about the same" as she was last visit.  Has to wait 3 months before she can be re-enrolled in smoking program.  Smoking 3-5 cigarettes/day.  Wears Trilogy at night.      4/12/23:  Continues to have numbness and burning pain in bilateral feet and toes, on a daily basis, as well as pain in calves with walking distances and laying down.  Feet always feel cold.  Having pain in bottoms of both feet, where she has recurrent corns/calluses.  Did get toenail polish antibiotics and has been applying that every 2 to 3 days.  No new s/e with that.  Wearing slip-on sandals today for me to evaluate feet.  Normally wears protective shoes at home.      1/6/2023:  Trying to cut back on smoking.  Smoking < 1/2 ppd, per self-report.  Using Lac-Hydrin and Vaseline on " feet, as instructed.  Having some mild discomfort over corns on bottoms of feet with walking.   No new rashes, lesions, or skin breakdown.   Does report numbness and tingling in feet and toes.  Feet feel cold most days.  Does experience pain in calves with prolonged walking and laying down, at times.        Review of Systems:   Except as stated in HPI, all other 10 body systems normal      Physical Exam Vitals & Measurements:    Vitals:    10/04/23 0945   BP: 139/77   Pulse: 69   Resp: 20   Temp: 98 °F (36.7 °C)           General:  Hypertensive with diabetes, asymptomatic with; afebrile. Morbidly obese; in no acute distress.  Wearing black cloth tennis shoes today.    Respiratory:   Dyspneac on moderate exertion; no coughing.  Wearing oxygen via nasal cannula.   Cardiovascular:    Mild woody non-pitting edema to BLE.  Generalized non-pitting edema around bilateral medial and lateral ankles.  No hemosiderin staining or varicosities. No hair distribution over BLE.  Skin is shiny and taut over anterior tibial areas. Feet and toes cool to touch. No temperature differences between BLE.   DP pulses palpable.  PT pulses dopplered.    Musculoskeletal:  Significantly decreased range of motion, and strength, of all extremities.   Loss of anatomical sites from knees distally secondary to body habitus.   Pes planus bilaterally.  No bunions or hammertoes.   Decreased dorsiflexion and flexion in bilateral feet and hallux toes.  Intrinsic minus feet bilaterally.    Neurologic: A&O X 3; cranial nerves intact.   Normal monofilament testing in bilateral feet; no LOPS noted.    Psychiatric: Calm, cooperative. Mood and affect normal. Responses appropriate  Integumentary:     Severe generalized xerosis over bilateral plantar feet.     Two large bilateral plantar corns/calluses with significant tenderness with palpation.  Corns quite deep in both lesions.  No underlying skin breakdown with paring.      Linear calluses to posterior right  4-5 distal toes.  No underlying skin breakdown with paring.      Thickened calluses to distal hallux toes:  No underlying skin breakdown with paring.      10 mycotic toenails, which were overgrown and grossly dystrophic, with mild odor with debridement.                                Assessment/Plan:    Encounter for diabetic foot care, Type 2, insulin-dependent:  Last visit with PCP, Ms. LeslieNOEMI Govea, on 9/18/23.    Diabetic teaching reinforced, with focused attention towards daily foot checks and prompt medical attn for changes in skin.  Wound clinic and UCC precautions reinforced.     Q9 qualifier.      Morbid obesity, BMI 48.06:  This is a co-factor to extensive, deep, and multiple calluses/corns over bilateral feet/toes. Ms. Mckeon is at extreme risk of non-healing diabetic foot wounds, given her diabetes, treatment plan non-compliance, medication non-compliance, and ongoing smoking.      Type 2 diabetes:  Poorly-controlled, but improving per last HgbA1C of 8.2% on 9/18/23.    Normal monofilament testing.  No LOPS identified.  DP and PT pulses present.    Being followed by [facility] endocrine.     Mycotic Toenails X 10:  Prescribed CMPD Voricon 2.67%/Vanc 6.67% nail suspension in DSMO.   Compliance questionable.                                            PROCEDURE NOTE    Procedure Today:  Debridement of 5 severely mycotic toenails; as well as cutting/filing of 5 moderately mycotic toenails.  Rationale:  To remove fungal, and possibly bacterial, infection from toenails.  Debulking of toenail, will enable topical antibacterial/antifungals to reach nailbed for complete tx of nail infection.    Informed verbal consent obtained.  Using standard podiatry clippers, Dremmel, and fahad boards, I excised infected toenail from 5 toes.  Then, I cut and sanded remaining 5 toenails with clippers and fahad boards.  No bleeding or discomfort during procedure.  Patient tolerated procedure without complications.      Bilateral  foot pain, with impaired gait:  Etiology:  Large and tender corns/calluses to plantar forefeet.   Pain from plantar corns/calluses impeding ability to walk without pain.     Calluses of Bilateral Plantar Feet, as well as to bilateral hallux toes:                                           PROCEDURE NOTE    Procedure Today: paring and sanding down of 6 corns/calluses   Rationale: Calluses/corns can lead to diabetic foot ulcers.  Also, Ms. Mckeon is reporting discomfort in corns/calluses with ambulation.   Informed verbal consent obtained.    Using #4 dermal curette, Dremmel, and [place] boards, I pared 6 calluses/corns to healthy soft tissue.  Mild bleeding noted from plantar calluses, which was controlled with mild pressure and dressings.  Teaching provided on s/s of complications to call wound clinic for, post-procedure, with rationale.       Xerosis of bilateral heels:  Poorly-controlled.  Compliance with tx plan questionable.    Prescribed Lac-Hydrin followed by Vaseline BID.  CPM  Reinforced teaching on benefit of moisturizing feet daily, with rationale.  Adherence with tx plan encouraged.  Teaching reinforced to not apply lotions/creams between toes, with rationale.      Non-Compliance with Treatment Plan and Medications:  This is an identified co-factor towards increased risk of DFU, osteo, and lower limb amputations.     Smoking:  Previously enrolled in [facility] smoking cessation program.               RTC in three months for routine DFC and callus care.   Teaching reinforced on s/s to call wound clinic for promptly.

## 2023-10-17 DIAGNOSIS — E11.65 UNCONTROLLED TYPE 2 DIABETES MELLITUS WITH HYPERGLYCEMIA: ICD-10-CM

## 2023-10-17 RX ORDER — METFORMIN HYDROCHLORIDE 1000 MG/1
1000 TABLET ORAL 2 TIMES DAILY WITH MEALS
Qty: 60 TABLET | Refills: 11 | Status: SHIPPED | OUTPATIENT
Start: 2023-10-17

## 2023-10-18 ENCOUNTER — CLINICAL SUPPORT (OUTPATIENT)
Dept: SMOKING CESSATION | Facility: CLINIC | Age: 58
End: 2023-10-18

## 2023-10-18 DIAGNOSIS — F17.200 NICOTINE DEPENDENCE: Primary | ICD-10-CM

## 2023-10-18 PROCEDURE — 99407 BEHAV CHNG SMOKING > 10 MIN: CPT | Mod: S$GLB,,, | Performed by: GENERAL PRACTICE

## 2023-10-18 PROCEDURE — 99407 PR TOBACCO USE CESSATION INTENSIVE >10 MINUTES: ICD-10-PCS | Mod: S$GLB,,, | Performed by: GENERAL PRACTICE

## 2023-10-18 PROCEDURE — 99999 PR PBB SHADOW E&M-EST. PATIENT-LVL I: CPT | Mod: PBBFAC,,,

## 2023-10-18 PROCEDURE — 99999 PR PBB SHADOW E&M-EST. PATIENT-LVL I: ICD-10-PCS | Mod: PBBFAC,,,

## 2023-10-18 NOTE — PROGRESS NOTES
Spoke with patient today in regard to smoking cessation progress 3/6/12 month telephone follow up, she states that she is not tobacco free.  Commended patient on the accomplishments thus far.  Informed patient of benefit period, future follow up, and contact information if any further help or support is needed.  Will complete smart form for 3/6/12 month follow up on Quit attempt #1 and resolve episode.

## 2023-10-19 DIAGNOSIS — E11.65 UNCONTROLLED TYPE 2 DIABETES MELLITUS WITH HYPERGLYCEMIA: ICD-10-CM

## 2023-10-19 DIAGNOSIS — E11.9 TYPE 2 DIABETES MELLITUS WITHOUT COMPLICATION, UNSPECIFIED WHETHER LONG TERM INSULIN USE: Primary | ICD-10-CM

## 2023-10-19 NOTE — TELEPHONE ENCOUNTER
Spoke with pt. She stated she needs Harper sensors but cant get any because the pharmacy stopped her supplies.  Informed pt that [facility] insurance changed pharmacies and we can order sensors from their mail [facility] pharmacy. Pt voiced understanding.     14

## 2023-10-19 NOTE — TELEPHONE ENCOUNTER
----- Message from Adrianna Burgos sent at 10/19/2023  2:48 PM CDT -----  Regarding: Supply mgmt  KATERINA PT         Pt LVM requesting a call back regarding her supplies. Her order was cancelled bc the pharmacy was changed.   Pt # [phone]

## 2023-10-20 RX ORDER — FLASH GLUCOSE SENSOR
KIT MISCELLANEOUS
Qty: 6 KIT | Refills: 11 | Status: SHIPPED | OUTPATIENT
Start: 2023-10-20

## 2023-10-20 NOTE — TELEPHONE ENCOUNTER
Informed pt supplies are now being sent from [facility] pharm in [place].   Completed PA request form. Awaiting signature

## 2023-10-24 ENCOUNTER — TELEPHONE (OUTPATIENT)
Dept: ENDOCRINOLOGY | Facility: CLINIC | Age: 58
End: 2023-10-24
Payer: MEDICAID

## 2023-10-24 NOTE — TELEPHONE ENCOUNTER
----- Message from Adrianna Burgos sent at 10/24/2023 12:24 PM CDT -----  Regarding: Pt call  KATERINA CROSS       Pt LVM today @ 9:40am wanting to know if the nurse got in touch with the pharmacy.   Please call back @ [phone]

## 2023-10-24 NOTE — TELEPHONE ENCOUNTER
Spoke with pt. She stated she called the pharmacy and they told her they will ship her prescription out this week.

## 2023-10-24 NOTE — TELEPHONE ENCOUNTER
Called patient. No response. Unable to leave VM. Not setup?   Discharge planning issues Prophylactic measure

## 2023-10-24 NOTE — TELEPHONE ENCOUNTER
Informed pt Harper 2 sensors has been approved and that [facility] pharmacy has been trying to contact to verify address to send prescription. Gave pt pharm number to contact. Voiced understanding.

## 2023-11-03 ENCOUNTER — HOSPITAL ENCOUNTER (OUTPATIENT)
Dept: CARDIOLOGY | Facility: HOSPITAL | Age: 58
Discharge: HOME | End: 2023-11-03
Payer: MEDICAID

## 2023-11-03 VITALS
DIASTOLIC BLOOD PRESSURE: 78 MMHG | WEIGHT: 279 LBS | SYSTOLIC BLOOD PRESSURE: 118 MMHG | BODY MASS INDEX: 47.63 KG/M2 | HEIGHT: 64 IN

## 2023-11-03 DIAGNOSIS — I50.20 HEART FAILURE WITH REDUCED EJECTION FRACTION: ICD-10-CM

## 2023-11-03 LAB
AV INDEX (PROSTH): 0.71
AV MEAN GRADIENT: 3 MMHG
AV PEAK GRADIENT: 6 MMHG
AV VALVE AREA BY VELOCITY RATIO: 2.14 CM²
AV VALVE AREA: 2.38 CM²
AV VELOCITY RATIO: 0.64
BSA FOR ECHO PROCEDURE: 2.39 M2
CV ECHO LV RWT: 0.6 CM
DOP CALC AO PEAK VEL: 1.23 M/S
DOP CALC AO VTI: 25.2 CM
DOP CALC LVOT AREA: 3.3 CM2
DOP CALC LVOT DIAMETER: 2.06 CM
DOP CALC LVOT PEAK VEL: 0.79 M/S
DOP CALC LVOT STROKE VOLUME: 59.96 CM3
DOP CALC MV VTI: 25.7 CM
DOP CALCLVOT PEAK VEL VTI: 18 CM
E WAVE DECELERATION TIME: 270.55 MSEC
E/A RATIO: 0.89
E/E' RATIO: 11 M/S
ECHO LV POSTERIOR WALL: 1.32 CM (ref 0.6–1.1)
FRACTIONAL SHORTENING: 34 % (ref 28–44)
INTERVENTRICULAR SEPTUM: 1.46 CM (ref 0.6–1.1)
LEFT ATRIUM SIZE: 4.14 CM
LEFT INTERNAL DIMENSION IN SYSTOLE: 2.88 CM (ref 2.1–4)
LEFT VENTRICLE DIASTOLIC VOLUME INDEX: 38.25 ML/M2
LEFT VENTRICLE DIASTOLIC VOLUME: 86.06 ML
LEFT VENTRICLE MASS INDEX: 105 G/M2
LEFT VENTRICLE SYSTOLIC VOLUME INDEX: 14.1 ML/M2
LEFT VENTRICLE SYSTOLIC VOLUME: 31.67 ML
LEFT VENTRICULAR INTERNAL DIMENSION IN DIASTOLE: 4.37 CM (ref 3.5–6)
LEFT VENTRICULAR MASS: 235.28 G
LV LATERAL E/E' RATIO: 9.43 M/S
LV SEPTAL E/E' RATIO: 13.2 M/S
LVOT MG: 1.01 MMHG
LVOT MV: 0.46 CM/S
MV MEAN GRADIENT: 1 MMHG
MV PEAK A VEL: 0.74 M/S
MV PEAK E VEL: 0.66 M/S
MV PEAK GRADIENT: 3 MMHG
MV STENOSIS PRESSURE HALF TIME: 78.46 MS
MV VALVE AREA BY CONTINUITY EQUATION: 2.33 CM2
MV VALVE AREA P 1/2 METHOD: 2.8 CM2
OHS LV EJECTION FRACTION SIMPSONS BIPLANE MOD: 59 %
RA PRESSURE ESTIMATED: 3 MMHG
RIGHT VENTRICULAR END-DIASTOLIC DIMENSION: 4 CM
TDI LATERAL: 0.07 M/S
TDI SEPTAL: 0.05 M/S
TDI: 0.06 M/S
Z-SCORE OF LEFT VENTRICULAR DIMENSION IN END DIASTOLE: -6.23
Z-SCORE OF LEFT VENTRICULAR DIMENSION IN END SYSTOLE: -4.26

## 2023-11-03 PROCEDURE — 93306 TTE W/DOPPLER COMPLETE: CPT

## 2023-11-29 ENCOUNTER — TELEPHONE (OUTPATIENT)
Dept: INTERNAL MEDICINE | Facility: CLINIC | Age: 58
End: 2023-11-29
Payer: MEDICAID

## 2023-11-29 NOTE — TELEPHONE ENCOUNTER
Pt called and states she needs a new order for a new oxygen tank sent in for her. LOV was on 9/18/23 and has appt scheduled on 3/18/24.

## 2023-11-30 ENCOUNTER — TELEPHONE (OUTPATIENT)
Dept: ENDOCRINOLOGY | Facility: CLINIC | Age: 58
End: 2023-11-30
Payer: MEDICAID

## 2023-11-30 NOTE — TELEPHONE ENCOUNTER
Pt notified that pulmonology would need to send order for oxygen. Pt verbalized understanding and will call with any questions or concerns. Transferred to pulmonology and gave phone number in case they dont answer

## 2023-11-30 NOTE — TELEPHONE ENCOUNTER
----- Message from Adrianna Burgos sent at 11/30/2023 10:57 AM CST -----  Regarding: Supply mgmt  KATERINA PT       Pt is requesting a refill on test strips.   [facility]   Pt #  [phone]

## 2023-12-01 ENCOUNTER — TELEPHONE (OUTPATIENT)
Dept: PULMONOLOGY | Facility: CLINIC | Age: 58
End: 2023-12-01
Payer: MEDICAID

## 2023-12-01 NOTE — TELEPHONE ENCOUNTER
Called and spoke to Rebekah at [facility] to see what is needed in order to get patient's oxygen re-certified. Rebekah stated that a new order as well as a 6 minute walk test will be needed in order to see if patient still qualifies for oxygen. Understanding voiced.    Called and spoke with patient. Informed her that she will need to be scheduled an appt in Pulm clinic to get re-certified for her oxygen. Informed her that PAR will contact her to schedule an appt. Patient verbalized understanding.

## 2023-12-01 NOTE — TELEPHONE ENCOUNTER
----- Message from Lawanda Mojica sent at 11/30/2023  2:49 PM CST -----  Regarding: Oxygen  Pt lvm @ 1:04pm today stating she needs a refill on her oxygen. Pt can be reached @ [phone]

## 2023-12-04 DIAGNOSIS — R19.5 NONSPECIFIC ABNORMAL FINDING IN STOOL CONTENTS: Primary | ICD-10-CM

## 2023-12-04 NOTE — TELEPHONE ENCOUNTER
----- Message from [employer] sent at 12/4/2023 12:16 PM CST -----  Regarding: Prep maryuri 1/25/24  [employer] [facility]

## 2023-12-05 RX ORDER — POLYETHYLENE GLYCOL 3350, SODIUM SULFATE, SODIUM CHLORIDE, POTASSIUM CHLORIDE, SODIUM ASCORBATE, AND ASCORBIC ACID 7.5-2.691G
2000 KIT ORAL SEE ADMIN INSTRUCTIONS
Qty: 1 KIT | Refills: 0 | Status: SHIPPED | OUTPATIENT
Start: 2023-12-05

## 2023-12-07 DIAGNOSIS — E11.65 UNCONTROLLED TYPE 2 DIABETES MELLITUS WITH HYPERGLYCEMIA: Primary | ICD-10-CM

## 2023-12-07 NOTE — TELEPHONE ENCOUNTER
Last visit in [facility]: 6/29/2023    Patient's next visit in [facility] ENDOCR[facility]: 12/29/2023

## 2023-12-07 NOTE — TELEPHONE ENCOUNTER
----- Message from Beth Walters sent at 12/7/2023  9:21 AM CST -----  Jacqueline pt      Pt stated she needed a refill on her strips , Pt also stated can Dr Phillips nurse give her a call .        Pt #[phone]

## 2023-12-11 ENCOUNTER — TELEPHONE (OUTPATIENT)
Dept: INFECTIOUS DISEASES | Facility: CLINIC | Age: 58
End: 2023-12-11
Payer: MEDICAID

## 2023-12-11 NOTE — TELEPHONE ENCOUNTER
----- Message from Paloma Danielle LPN sent at 11/30/2023 11:48 AM CST -----  Regarding: RE: Supply mgmt    ----- Message -----  From: Adrianna Burgos  Sent: 11/30/2023  10:58 AM CST  To: [facility] End[facility]inology Clinical Support Staff  Subject: Supply mgmt                                      KATERINA PT       Pt is requesting a refill on test strips.   [facility]   Pt #  [phone]

## 2023-12-12 ENCOUNTER — OFFICE VISIT (OUTPATIENT)
Dept: PULMONOLOGY | Facility: CLINIC | Age: 58
End: 2023-12-12
Payer: MEDICAID

## 2023-12-12 VITALS
DIASTOLIC BLOOD PRESSURE: 70 MMHG | TEMPERATURE: 98 F | WEIGHT: 287.63 LBS | HEART RATE: 91 BPM | OXYGEN SATURATION: 96 % | RESPIRATION RATE: 20 BRPM | HEIGHT: 64 IN | BODY MASS INDEX: 49.11 KG/M2 | SYSTOLIC BLOOD PRESSURE: 107 MMHG

## 2023-12-12 DIAGNOSIS — J44.1 COPD EXACERBATION: Primary | ICD-10-CM

## 2023-12-12 DIAGNOSIS — J44.9 CHRONIC OBSTRUCTIVE PULMONARY DISEASE, UNSPECIFIED COPD TYPE: ICD-10-CM

## 2023-12-12 DIAGNOSIS — F17.200 NEEDS SMOKING CESSATION EDUCATION: ICD-10-CM

## 2023-12-12 DIAGNOSIS — Z72.0 TOBACCO USE: ICD-10-CM

## 2023-12-12 PROCEDURE — 99215 OFFICE O/P EST HI 40 MIN: CPT | Mod: PBBFAC

## 2023-12-12 PROCEDURE — 3074F SYST BP LT 130 MM HG: CPT | Mod: CPTII,,, | Performed by: INTERNAL MEDICINE

## 2023-12-12 PROCEDURE — 3078F DIAST BP <80 MM HG: CPT | Mod: CPTII,,, | Performed by: INTERNAL MEDICINE

## 2023-12-12 PROCEDURE — 1159F PR MEDICATION LIST DOCUMENTED IN MEDICAL RECORD: ICD-10-PCS | Mod: CPTII,,, | Performed by: INTERNAL MEDICINE

## 2023-12-12 PROCEDURE — 1159F MED LIST DOCD IN RCRD: CPT | Mod: CPTII,,, | Performed by: INTERNAL MEDICINE

## 2023-12-12 PROCEDURE — 3008F PR BODY MASS INDEX (BMI) DOCUMENTED: ICD-10-PCS | Mod: CPTII,,, | Performed by: INTERNAL MEDICINE

## 2023-12-12 PROCEDURE — 3074F PR MOST RECENT SYSTOLIC BLOOD PRESSURE < 130 MM HG: ICD-10-PCS | Mod: CPTII,,, | Performed by: INTERNAL MEDICINE

## 2023-12-12 PROCEDURE — 1160F RVW MEDS BY RX/DR IN RCRD: CPT | Mod: CPTII,,, | Performed by: INTERNAL MEDICINE

## 2023-12-12 PROCEDURE — 3078F PR MOST RECENT DIASTOLIC BLOOD PRESSURE < 80 MM HG: ICD-10-PCS | Mod: CPTII,,, | Performed by: INTERNAL MEDICINE

## 2023-12-12 PROCEDURE — 99214 PR OFFICE/OUTPT VISIT, EST, LEVL IV, 30-39 MIN: ICD-10-PCS | Mod: S$PBB,,, | Performed by: INTERNAL MEDICINE

## 2023-12-12 PROCEDURE — 1160F PR REVIEW ALL MEDS BY PRESCRIBER/CLIN PHARMACIST DOCUMENTED: ICD-10-PCS | Mod: CPTII,,, | Performed by: INTERNAL MEDICINE

## 2023-12-12 PROCEDURE — 3008F BODY MASS INDEX DOCD: CPT | Mod: CPTII,,, | Performed by: INTERNAL MEDICINE

## 2023-12-12 PROCEDURE — 99214 OFFICE O/P EST MOD 30 MIN: CPT | Mod: S$PBB,,, | Performed by: INTERNAL MEDICINE

## 2023-12-12 RX ORDER — VARENICLINE TARTRATE 0.5 (11)-1
KIT ORAL
Qty: 1 EACH | Refills: 0 | Status: SHIPPED | OUTPATIENT
Start: 2023-12-12

## 2023-12-12 NOTE — PROGRESS NOTES
Regina is needing a 6 minute walk test for updated O2 sats in order to re-certify patient's oxygen with insurance company. Notified Dr. Vasquez/Dr. Almazan. Stated to complete 6 minute walk test. 6 minute walk test completed. O2 sat at rest 98%, O2 sat on exertion 87%, O2 sat on exertion with O2 at 2L 95%. -115 during walk test. Notified Dr. Vasquez/Dr. Almazan. [facility] DME order to renew oxygen completed by Dr. Almazan. No new orders at this time.

## 2023-12-12 NOTE — PROGRESS NOTES
Pulmonology Clinic Note      SUBJECTIVE:     Chief Complaint: COPD (Oxygen recert, follow up)       History of Present Illness:  Linn Mckeon is a 57 y.o. female with PMH of COPD, HFrEF (EF 40%), HTN, HLD, DM, DAVION w/ hypercapnia on Trilogy machine who comes to Pulmonology clinic for follow up.    Patient is doing well, no current complaints. Comes today for O2 prescription renewal.    Review of patient's allergies indicates:  No Known Allergies    Past Medical History:   Diagnosis Date    Callus of foot 07/07/2022    CHF (congestive heart failure)     Chronic heart failure with preserved ejection fraction 05/20/2022    COPD (chronic obstructive pulmonary disease)     Diabetes mellitus     GOODMAN (dyspnea on exertion) 05/20/2022    Dystrophic nail 07/07/2022    Hyperlipidemia     Hyperlipidemia LDL goal <70 05/20/2022    Hypertension     Morbid obesity with body mass index (BMI) greater than or equal to 50 07/07/2022    Non compliance w medication regimen 07/07/2022    Noncompliance with treatment plan 07/07/2022    DAVION on CPAP 05/20/2022    Peripheral edema 05/20/2022    Primary hypertension 05/20/2022    Type 2 diabetes mellitus with skin complication 05/20/2022    Xerosis of skin 07/07/2022     Past Surgical History:   Procedure Laterality Date    HYSTERECTOMY       Family History   Problem Relation Age of Onset    Hypertension Mother     Heart disease Mother     Diabetes Mother     Heart attack Father     Diabetes Sister     Hypertension Sister     Diabetes Brother     Hypertension Brother      Social History     Tobacco Use    Smoking status: Every Day     Current packs/day: 0.25     Types: Cigarettes    Smokeless tobacco: Never    Tobacco comments:     Smokes 5 cigarettes a day   Substance Use Topics    Alcohol use: Not Currently    Drug use: Never       Current Outpatient Medications   Medication Sig    albuterol (PROVENTIL) 2.5 mg /3 mL (0.083 %) nebulizer solution Take 3 mLs (2.5 mg total) by nebulization  "every 6 (six) hours as needed for Wheezing. Rescue    ammonium lactate (LAC-HYDRIN) 12 % lotion APPLY TO DRY SKIN AND RUB IN WELL TWICE DAILY. NOTHING BETWEEN TOES    atorvastatin (LIPITOR) 80 MG tablet Take 1 tablet (80 mg total) by mouth once daily.    BD ULTRA-FINE JORDON PEN NEEDLE 32 gauge x 5/32" Ndle 2 (two) times daily.    BD ULTRA-FINE JORDON PEN NEEDLE 32 gauge x 5/32" Ndle     blood sugar diagnostic Strp To check BG 2 times daily, to use with insurance preferred meter    buPROPion (WELLBUTRIN SR) 150 MG TBSR 12 hr tablet Take 1 tablet (150 mg ) by mouth 2 (two) times daily.    CICLOPIROX-ITRACONAZOLE-UREA TOP CLEAN NAILS, SHAKE BOTTLE WELL, APPLY TWICE DAILY TO ALL AFFECTED NAILS USING APPLICATOR. (UP TO 2 MLS/DAY)    empagliflozin (JARDIANCE) 25 mg tablet Take 1 tablet (25 mg total) by mouth once daily.    ergocalciferol (ERGOCALCIFEROL) 50,000 unit Cap Take 1 capsule (50,000 Units total) by mouth every 7 days.    ezetimibe (ZETIA) 10 mg tablet Take 1 tablet (10 mg total) by mouth once daily.    flash glucose scanning reader (FREESTYLE JOSE ALFREDO 2 READER) [facility] Freestyle Jose Alfredo 2 reader dispense 1. Use as directed    flash glucose sensor (FREESTYLE JOSE ALFREDO 2 SENSOR) Kit Jose Alfredo 2 sensor change every 14 days (Disp 6 each)(90 day)    fluticasone-umeclidin-vilanter (TRELEGY ELLIPTA) 100-62.5-25 mcg DsDv Inhale 1 puff into the lungs once daily.    furosemide (LASIX) 40 MG tablet Take 1 tablet (40 mg total) by mouth 2 (two) times a day.    insulin glargine (LANTUS U-100 INSULIN) 100 unit/mL injection Inject 30 Units into the skin 2 (two) times daily.    ipratropium-albuteroL (COMBIVENT RESPIMAT)  mcg/actuation inhaler Inhale 2 puffs into the lungs every 6 (six) hours as needed for Wheezing. Rescue    lancets (ACCU-CHEK FASTCLIX LANCET DRUM) [facility] USE ONE DAILY    LANCETS MISC     latanoprost 0.005 % ophthalmic solution Apply 1 drop to eye.    metFORMIN (GLUCOPHAGE) 1000 MG tablet TAKE 1 TABLET BY MOUTH TWICE DAILY " "WITH MEALS    metoprolol succinate (TOPROL-XL) 100 MG 24 hr tablet Take 1 tablet (100 mg total) by mouth once daily.    mupirocin (BACTROBAN) 2 % ointment Apply topically 2 (two) times daily.    nicotine (NICODERM CQ) 21 mg/24 hr Place 1 patch onto the skin once daily.    nicotine polacrilex 4 MG Lozg Take 1 lozenge (4 mg total) by mouth as needed.    nystatin (MYCOSTATIN) ointment Apply topically 2 (two) times daily.    pen needle, diabetic 31 gauge x 5/16" Ndle 1 each by Misc.(Non-Drug; Combo Route) route 2 (two) times daily with meals.    propylene glycoL (SYSTANE BALANCE) 0.6 % Drop Apply 1 drop to eye 2 (two) times a day.    sacubitriL-valsartan (ENTRESTO) 49-51 mg per tablet Take 1 tablet by mouth 2 (two) times daily.    SITagliptin phosphate (JANUVIA) 100 MG Tab Take 1 tablet (100 mg total) by mouth once daily.    spironolactone (ALDACTONE) 25 MG tablet Take 1 tablet (25 mg total) by mouth once daily.    blood-glucose meter kit To check BG 2 times daily, to use with insurance preferred meter    polyethylene glycol (MOVIPREP) 100-7.5-2.691 gram solution Take 2,000 mLs by mouth As instructed (take as directed per instructions provided by GI clinic). (Patient not taking: Reported on 12/12/2023)    varenicline (CHANTIX STARTING MONTH BOX) 0.5 mg (11)- 1 mg (42) tablet Take one 0.5mg tab by mouth once daily X3 days,then increase to one 0.5mg tab twice daily X4 days,then increase to one 1mg tab twice daily     No current facility-administered medications for this visit.       Review of Systems   Constitutional:  Positive for malaise/fatigue. Negative for chills and fever.   HENT:  Negative for sore throat.    Respiratory:  Positive for shortness of breath (mild, chronic). Negative for cough and sputum production.    Cardiovascular:  Negative for chest pain and palpitations.   Gastrointestinal:  Negative for diarrhea, nausea and vomiting.   Musculoskeletal:  Negative for falls.   Neurological:  Negative for " dizziness and headaches.   Psychiatric/Behavioral:  Negative for depression.         OBJECTIVE:     Vitals:    12/12/23 1348   BP: 107/70   Pulse: 91   Resp: 20   Temp: 98 °F (36.7 °C)       Physical Exam  Constitutional:       General: She is not in acute distress.     Appearance: Normal appearance. She is obese. She is not ill-appearing.   HENT:      Head: Normocephalic and atraumatic.      Right Ear: External ear normal.      Left Ear: External ear normal.      Nose: Nose normal.   Eyes:      Extraocular Movements: Extraocular movements intact.   Cardiovascular:      Rate and Rhythm: Normal rate and regular rhythm.      Heart sounds: No murmur heard.     No friction rub. No gallop.   Pulmonary:      Effort: Pulmonary effort is normal. No respiratory distress.      Breath sounds: No wheezing, rhonchi or rales.   Abdominal:      General: There is no distension.      Palpations: Abdomen is soft.      Tenderness: There is no abdominal tenderness.   Musculoskeletal:      Right lower leg: No edema.      Left lower leg: No edema.   Skin:     General: Skin is warm and dry.   Neurological:      General: No focal deficit present.      Mental Status: She is alert and oriented to person, place, and time.           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 She does currently qualify for renewing O2 prescription based on ambulatory SPO2 testing  - Encouraged patient to try to lose weight, discussed various methods to help with weight loss. Anticipate with weight loss her shortness of breath will also improve  -  Continues to smoke 0.5 pk/daily (20+ pk/yr history)  - The Patient has been compliant and benefiting his home noninvasive volume ventilator. Recommend that he continues his home noninvasive volume ventilator during hours of sleep and as needed during the day when symptoms arise. A home CPAP/BiPAP is not appropriate for his ventilatory requirements     RTC in 6 months    Mark Vasquez MD  Internal Medicine - PGY-2    Pulmonology Clinic - [facility]

## 2024-01-02 NOTE — PROGRESS NOTES
CHIEF COMPLAINT:   Chief Complaint   Patient presents with    f/u denies chest pain has oxygen 2L  needs clearance for co                                                  HPI:  Linn Mckeon 58 y.o. female  with a PMH significant for Diabetes, DAVION on BiPAP, HTN, HLD, Depression, COPD on home O2, and Tobacco Use  who presents to cardiology clinic for follow up.  At her last appointment patient stated that she was feeling fairly well overall.  She endorsed ongoing GOODMAN/SOB, but stated that shortness of breath is her baseline and that she require supplemental O2 as needed, typically when going out in public places.  She has baseline COPD that is severe which is the main cause of her shortness of breath.  Given that patient has been maximized on GDMT at last office visit, echocardiogram was ordered.  Echo completed November 2023 revealed EF of 59%, mild RV enlargement, overall normal systolic function, mild VA, otherwise unremarkable.  See full report below.    Today the patient states that she is feeling pretty well overall.  She denies any chest pain, palpitations, PND, orthopnea, claudication symptoms, lightheadedness, dizziness, or syncope.  She does endorse occasional shortness of breath that is her baseline, and states that she uses supplemental O2 as needed, typically when going out in public places.  She was severe COPD at baseline and states that this is her main indication for supplemental O2.  She states that she is able to complete her ADLs without any issues or ischemic symptoms.  She reports compliance with all medications.  She continues to smoke approximately 1/2 pack of cigarettes per day but is trying to quit on her own. She continues to trilogy at night for sleep apnea in his greatly benefitting from use.  See full cardiac workup below.      Historical Information: Patient completed Lexiscan stress test on September 7, 2022 which was normal.  See report below.  She also completed KWADWO testing on  September 13, 2022 which revealed mild arterial flow reduction in the bilateral lower extremities.  See report below.  She completed an Echocardiogram on July 26, 2022 which revealed a reduced ejection fraction of 40% with Grade 1 diastolic dysfunction.  See report below.    She previously completed an Echocardiogram on May 10, 2021 which revealed mild concentric LVH, EF of approximately 50 to 55%, mild MR, and mild aortic valve sclerosis without stenosis.    Patient had a hospital admission on September 11, 2022 for COPD exacerbation and acute CHF exacerbation.  She was treated with IV Lasix, antibiotics, steroids, and neb treatments.                                                                                                                                                                                                                                                                                                                                                                                                                                                                                       CARDIAC TESTING:  Echo 11.3.23    Left Ventricle: There is mild concentric hypertrophy. There is normal systolic function. Biplane (2D) method of discs ejection fraction is 59%.    Right Ventricle: Mild right ventricular enlargement. Systolic function is normal.    Aortic Valve: The aortic valve is a trileaflet valve. There is mild aortic valve sclerosis. There is normal leaflet mobility.    Mitral Valve: The mitral valve is structurally normal.    Pulmonic Valve: There is mild regurgitation.    IVC/SVC: Normal venous pressure at 3 mmHg.    Echocardiogram  Results for orders placed during the hospital encounter of 12/06/22  Echo  Interpretation Summary  · The left ventricle is normal in size with mild concentric hypertrophy and low normal systolic function.  · Normal right ventricular size with low normal right ventricular  systolic function.  · The estimated ejection fraction is 50%.  · Grade I left ventricular diastolic dysfunction.  · Mild to moderate pulmonic regurgitation.        Echo - 7/26/2022   Interpretation Summary  · Concentric hypertrophy and mildly decreased systolic function.  · The estimated ejection fraction is 40%.  · Grade I left ventricular diastolic dysfunction.  · Severe right ventricular enlargement.  · Mild left atrial enlargement.  · Moderate right atrial enlargement.  · Mild mitral regurgitation.  · Mild tricuspid regurgitation.  · Elevated central venous pressure (15 mmHg).  · The estimated PA systolic pressure is 56 mmHg.  · There is pulmonary hypertension.  · IVC is dilated and collapses<50% with inspiration.  · Mild to moderate pulmonic regurgitation.     Stress Test  Results for orders placed during the hospital encounter of 08/30/22  Nuclear Stress - Cardiology Interpreted  Interpretation Summary    Normal myocardial perfusion scan. There is no evidence of myocardial ischemia or infarction.    The gated perfusion images showed an ejection fraction of 55% at rest. The gated perfusion images showed an ejection fraction of 51% post stress.    The EKG portion of this study is abnormal but not diagnostic.    The patient reported no chest pain during the stress test.    There were no arrhythmias during stress.  The patient has a low risk nuclear stress alex.  Nuclear stress test indicates no ischemia.     Lexiscan Stress Test 9.7.22:  Normal myocardial perfusion scan. There is no evidence of myocardial ischemia or infarction.  The gated perfusion images showed an ejection fraction of 55% at rest. The gated perfusion images showed an ejection fraction of 51% post stress.  The EKG portion of this study is abnormal but not diagnostic.  The patient reported no chest pain during the stress test.  There were no arrhythmias during stress.        KWADWO Testing 9.13.22:  The right lower extremity demonstrated multiphasic  waveforms at all levels.   The KWADWO on the right was mildly abnormal.  The right lower extremity demonstrated mild arterial flow reduction.  The left lower extremity demonstrated multiphasic waveforms at all levels.   The KWADWO on the left was mildly abnormal.  The left lower extremity demonstrated mild arterial flow reduction.          Patient Active Problem List   Diagnosis    GOODMAN (dyspnea on exertion)    Chronic heart failure with preserved ejection fraction    Primary hypertension    Hyperlipidemia LDL goal <70    Type 2 diabetes mellitus with hyperglycemia, with long-term current use of insulin    Peripheral edema    DAVION on CPAP    Class 3 severe obesity due to excess calories with serious comorbidity and body mass index (BMI) of 45.0 to 49.9 in adult    Noncompliance with treatment plan    Xerosis of skin    Callus of foot    Dystrophic nail    Non compliance w medication regimen    Heart failure with reduced ejection fraction    Cigarette nicotine dependence without complication    COPD (chronic obstructive pulmonary disease)    Vitamin D deficiency    Mycotic toenails    Encounter for comprehensive diabetic foot examination, type 2 diabetes mellitus    BMI 45.0-49.9, adult    Type 2 diabetes mellitus with skin complication, with long-term current use of insulin    Right foot pain    Left foot pain    Impaired gait     Past Surgical History:   Procedure Laterality Date    HYSTERECTOMY       Social History     Socioeconomic History    Marital status:    Tobacco Use    Smoking status: Every Day     Current packs/day: 0.25     Types: Cigarettes    Smokeless tobacco: Never    Tobacco comments:     Smokes 5 cigarettes a day   Substance and Sexual Activity    Alcohol use: Not Currently    Drug use: Never    Sexual activity: Not Currently     Social Determinants of Health     Financial Resource Strain: Low Risk  (10/14/2022)    Overall Financial Resource Strain (CARDIA)     Difficulty of Paying Living Expenses:  Not very hard   Food Insecurity: No Food Insecurity (2/24/2023)    Hunger Vital Sign     Worried About Running Out of Food in the Last Year: Never true     Ran Out of Food in the Last Year: Never true   Transportation Needs: No Transportation Needs (2/24/2023)    PRAPARE - Transportation     Lack of Transportation (Medical): No     Lack of Transportation (Non-Medical): No   Physical Activity: Inactive (8/25/2023)    Exercise Vital Sign     Days of Exercise per Week: 0 days     Minutes of Exercise per Session: 0 min   Stress: Stress Concern Present (8/25/2023)    [facility] [employer] - Occupational Stress Questionnaire     Feeling of Stress : Rather much   Social Connections: Unknown (10/14/2022)    Social Connection and Isolation Panel [NHANES]     Frequency of Communication with Friends and Family: More than three times a week     Frequency of Social Gatherings with Friends and Family: More than three times a week     Attends [employer] Services: More than 4 times per year   Recent Concern: Social Connections - Moderately Isolated (10/14/2022)    Social Connection and Isolation Panel [NHANES]     Frequency of Communication with Friends and Family: More than three times a week     Frequency of Social Gatherings with Friends and Family: More than three times a week     Attends [employer] Services: More than 4 times per year     Active Member of Clubs or Organizations: No     Attends Club or Organization Meetings: Never     Marital Status:    Housing Stability: Unknown (2/24/2023)    Housing Stability Vital Sign     Unstable Housing in the Last Year: No        Family History   Problem Relation Age of Onset    Hypertension Mother     Heart disease Mother     Diabetes Mother     Heart attack Father     Diabetes Sister     Hypertension Sister     Diabetes Brother     Hypertension Brother      Review of patient's allergies indicates:  No Known Allergies      ROS:  Review of Systems  "  Constitutional: Negative.    HENT: Negative.     Eyes: Negative.    Respiratory: Negative.  Negative for shortness of breath (Occasionally).    Cardiovascular: Negative.  Negative for chest pain, palpitations, orthopnea, claudication, leg swelling and PND.   Gastrointestinal: Negative.    Genitourinary: Negative.    Musculoskeletal: Negative.    Skin: Negative.    Neurological: Negative.    Endo/Heme/Allergies: Negative.    Psychiatric/Behavioral: Negative.                                                                                                                                                                                  Negative except as stated in the history of present illness. See HPI for details.    PHYSICAL EXAM:  Visit Vitals  /70 (BP Location: Left forearm, Patient Position: Sitting, BP Method: Large (Automatic))   Pulse 70   Temp 97.9 °F (36.6 °C) (Oral)   Resp 20   Ht 5' 4" (1.626 m)   Wt 130.2 kg (287 lb 0.6 oz)   SpO2 99%   BMI 49.27 kg/m²         Physical Exam  HENT:      Head: Normocephalic.      Nose: Nose normal.   Eyes:      Pupils: Pupils are equal, round, and reactive to light.   Cardiovascular:      Rate and Rhythm: Normal rate and regular rhythm.      Pulses: Normal pulses.      Heart sounds: No murmur heard.  Pulmonary:      Effort: Pulmonary effort is normal. No respiratory distress.   Abdominal:      General: There is no distension.   Musculoskeletal:         General: Normal range of motion.      Cervical back: Normal range of motion.      Right lower leg: No edema.      Left lower leg: No edema.   Skin:     General: Skin is warm.   Neurological:      General: No focal deficit present.      Mental Status: She is alert and oriented to person, place, and time.   Psychiatric:         Mood and Affect: Mood normal.         Current Outpatient Medications   Medication Instructions    albuterol (PROVENTIL) 2.5 mg, Nebulization, Every 6 hours PRN, Rescue    ammonium lactate " "(LAC-HYDRIN) 12 % lotion APPLY TO DRY SKIN AND RUB IN WELL TWICE DAILY. NOTHING BETWEEN TOES    aspirin (ECOTRIN) 81 mg, Oral, Daily    atorvastatin (LIPITOR) 80 mg, Oral, Daily    BD ULTRA-FINE JORDON PEN NEEDLE 32 gauge x 5/32" Ndle 2 times daily    BD ULTRA-FINE JORDON PEN NEEDLE 32 gauge x 5/32" Ndle No dose, route, or frequency recorded.    blood sugar diagnostic Strp To check BG 2 times daily, to use with insurance preferred meter    blood-glucose meter kit To check BG 2 times daily, to use with insurance preferred meter    buPROPion (WELLBUTRIN SR) 150 MG TBSR 12 hr tablet Take 1 tablet (150 mg ) by mouth 2 (two) times daily.    CICLOPIROX-ITRACONAZOLE-UREA TOP CLEAN NAILS, SHAKE BOTTLE WELL, APPLY TWICE DAILY TO ALL AFFECTED NAILS USING APPLICATOR. (UP TO 2 MLS/DAY)    empagliflozin (JARDIANCE) 25 mg, Oral, Daily    ergocalciferol (ERGOCALCIFEROL) 50,000 Units, Oral, Every 7 days    ezetimibe (ZETIA) 10 mg, Oral, Daily    flash glucose scanning reader (FREESTYLE JOSE ALFREDO 2 READER) [facility] [place] dispense 1. Use as directed    flash glucose sensor (FREESTYLE JOSE ALFREDO 2 SENSOR) Kit Jose Alfredo 2 sensor change every 14 days (Disp 6 each)(90 day)    fluticasone-umeclidin-vilanter (TRELEGY ELLIPTA) 100-62.5-25 mcg DsDv 1 puff, Inhalation, Daily    furosemide (LASIX) 40 mg, Oral, 2 times daily    insulin glargine (LANTUS U-100 INSULIN) 30 Units, Subcutaneous, 2 times daily    ipratropium-albuteroL (COMBIVENT RESPIMAT)  mcg/actuation inhaler 2 puffs, Inhalation, Every 6 hours PRN, Rescue    lancets (ACCU-CHEK FASTCLIX LANCET DRUM) [facility] [place] DAILY    LANCETS MISC No dose, route, or frequency recorded.    latanoprost 0.005 % ophthalmic solution 1 drop, Ophthalmic    metFORMIN (GLUCOPHAGE) 1,000 mg, Oral, 2 times daily with meals    metoprolol succinate (TOPROL-XL) 100 mg, Oral, Daily    mupirocin (BACTROBAN) 2 % ointment Topical (Top), 2 times daily    nicotine (NICODERM CQ) 21 mg/24 hr 1 patch, Transdermal, " "Daily    nicotine polacrilex 4 mg, Oral, As needed (PRN)    nystatin (MYCOSTATIN) ointment Topical (Top), 2 times daily    pen needle, diabetic 31 gauge x 5/16" Ndle 1 each, Misc.(Non-Drug; Combo Route), 2 times daily with meals    polyethylene glycol (MOVIPREP) 100-7.5-2.691 gram solution 2,000 mLs, Oral, See admin instructions    propylene glycoL (SYSTANE BALANCE) 0.6 % Drop 1 drop, Ophthalmic, 2 times daily    sacubitriL-valsartan (ENTRESTO) 49-51 mg per tablet 1 tablet, Oral, 2 times daily    SITagliptin phosphate (JANUVIA) 100 mg, Oral, Daily    spironolactone (ALDACTONE) 25 mg, Oral, Daily    varenicline (CHANTIX STARTING MONTH BOX) 0.5 mg (11)- 1 mg (42) tablet Take one 0.5mg tab by mouth once daily X3 days,then increase to one 0.5mg tab twice daily X4 days,then increase to one 1mg tab twice daily        All medications, laboratory studies, cardiac diagnostic imaging reviewed.     Lab Results   Component Value Date    LDL 54.00 05/18/2023    LDL 56.00 01/04/2023    TRIG 51 05/18/2023    TRIG 51 01/04/2023    CREATININE 1.03 (H) 09/18/2023    MG 2.20 09/12/2022    K 4.6 09/18/2023        ASSESSMENT/PLAN:  Heart Failure with Improved Ejection Fraction   - Denies any chest pain or palpitations.  Reports occasional shortness of breath but states that overall it has greatly improved.  Still requires supplemental O2 on occasion, specifically when ambulating far distances or when she is out in public.  She attributes majority of her shortness of breath to her severe, longstanding COPD  - Etiology: NiCM  - HF Status: NYHA Class II, ACC/AHA Stage C  - LVEF: 59% per TTE on 11.3.23 which is improved from 50% TTE on 12.6.22  - Current GDMT: Entresto 49mg/51mg, Metoprolol Succinate 100 MG, Spironolactone 25, Jardiance 10mg- Continue for now, tolerating well  - Diuresis: Lasix 40mg BID  - Continue low Na Diet   - Strict Is/Os and daily weights  - Much improved on now that on GDMT   - EKG today with normal sinus rhythm, " heart rate 71 beats per minute    HTN  BP at goal - 134/70  Continue Spironolactone, Entresto, Metoprolol and and Lasix  Counseled on low salt diet and exercise as tolerated    HLD  LDL goal <70  LDL 54 per labs 5.18.23  Continue Atorvastatin 80mg daily and Zetia 10mg daily  FLP/CMP ordered per PCP, patient advised to complete this prior to her next office visit  Counseled on medication compliance    DM  A1c improved to 8.2% after addition of jardiance  Management per PCP    Tobacco Abuse  Smokes approximately 1/2 pack of cigarettes  Counseled on smoking cessation today   Patient is trying to quit    DAVION on Trelegy Machine   She reports nightly Trelegy compliance and feels she is benefitting from use  Recommended continued nightly Trelegy use     BLE pain - improved  KWADWO testing 9.13.22: mild arterial flow reduction in the bilateral lower extremities.     Cardiac Risk Stratification  According to the revised cardiac risk index, patient is considered to be at least intermediate risk (RCRI score 2: for diabetes on insulin and history of heart failure = 10.1% risk) for cardiac events such as death, MI, or cardiac arrest for low risk colonoscopy procedure.      Complete lab work ordered per primary care   Follow up in cardiology clinic in 4 months or sooner if needed  Follow up with PCP as directed  We will send cardiac risk stratification to GI department

## 2024-01-10 RX ORDER — ASPIRIN 81 MG/1
81 TABLET ORAL DAILY
COMMUNITY

## 2024-01-11 DIAGNOSIS — E11.9 TYPE 2 DIABETES MELLITUS WITHOUT COMPLICATION, WITH LONG-TERM CURRENT USE OF INSULIN: Primary | ICD-10-CM

## 2024-01-11 DIAGNOSIS — Z79.4 TYPE 2 DIABETES MELLITUS WITHOUT COMPLICATION, WITH LONG-TERM CURRENT USE OF INSULIN: Primary | ICD-10-CM

## 2024-01-16 DIAGNOSIS — L85.3 XEROSIS CUTIS: ICD-10-CM

## 2024-01-17 RX ORDER — AMMONIUM LACTATE 12 G/100G
LOTION TOPICAL
Qty: 226 G | Refills: 11 | Status: SHIPPED | OUTPATIENT
Start: 2024-01-17

## 2024-01-22 ENCOUNTER — HOSPITAL ENCOUNTER (OUTPATIENT)
Dept: WOUND CARE | Facility: HOSPITAL | Age: 59
Discharge: HOME | End: 2024-01-22
Attending: NURSE PRACTITIONER
Payer: MEDICAID

## 2024-01-22 ENCOUNTER — OFFICE VISIT (OUTPATIENT)
Dept: CARDIOLOGY | Facility: CLINIC | Age: 59
End: 2024-01-22
Payer: MEDICAID

## 2024-01-22 VITALS
SYSTOLIC BLOOD PRESSURE: 136 MMHG | TEMPERATURE: 98 F | OXYGEN SATURATION: 94 % | HEART RATE: 79 BPM | DIASTOLIC BLOOD PRESSURE: 83 MMHG

## 2024-01-22 VITALS
RESPIRATION RATE: 20 BRPM | DIASTOLIC BLOOD PRESSURE: 70 MMHG | HEART RATE: 70 BPM | WEIGHT: 287.06 LBS | SYSTOLIC BLOOD PRESSURE: 134 MMHG | TEMPERATURE: 98 F | BODY MASS INDEX: 49.01 KG/M2 | HEIGHT: 64 IN | OXYGEN SATURATION: 99 %

## 2024-01-22 DIAGNOSIS — E11.628 TYPE 2 DIABETES MELLITUS WITH OTHER SKIN COMPLICATION, WITH LONG-TERM CURRENT USE OF INSULIN: ICD-10-CM

## 2024-01-22 DIAGNOSIS — E78.5 HYPERLIPIDEMIA LDL GOAL <70: Chronic | ICD-10-CM

## 2024-01-22 DIAGNOSIS — G47.33 OSA ON CPAP: Chronic | ICD-10-CM

## 2024-01-22 DIAGNOSIS — Z79.4 TYPE 2 DIABETES MELLITUS WITH OTHER SKIN COMPLICATION, WITH LONG-TERM CURRENT USE OF INSULIN: ICD-10-CM

## 2024-01-22 DIAGNOSIS — F17.210 CIGARETTE NICOTINE DEPENDENCE WITHOUT COMPLICATION: Chronic | ICD-10-CM

## 2024-01-22 DIAGNOSIS — R26.9 IMPAIRED GAIT: ICD-10-CM

## 2024-01-22 DIAGNOSIS — L85.3 XEROSIS OF SKIN: ICD-10-CM

## 2024-01-22 DIAGNOSIS — Z91.199 NONCOMPLIANCE WITH TREATMENT PLAN: ICD-10-CM

## 2024-01-22 DIAGNOSIS — I10 PRIMARY HYPERTENSION: Primary | Chronic | ICD-10-CM

## 2024-01-22 DIAGNOSIS — L84 CALLUS OF FOOT: ICD-10-CM

## 2024-01-22 DIAGNOSIS — B07.0 PLANTAR WARTS: Primary | ICD-10-CM

## 2024-01-22 DIAGNOSIS — I50.32 CHRONIC HEART FAILURE WITH PRESERVED EJECTION FRACTION: ICD-10-CM

## 2024-01-22 DIAGNOSIS — M79.672 LEFT FOOT PAIN: ICD-10-CM

## 2024-01-22 DIAGNOSIS — B35.1 MYCOTIC TOENAILS: ICD-10-CM

## 2024-01-22 DIAGNOSIS — R06.09 DOE (DYSPNEA ON EXERTION): ICD-10-CM

## 2024-01-22 DIAGNOSIS — M79.671 RIGHT FOOT PAIN: ICD-10-CM

## 2024-01-22 DIAGNOSIS — E11.9 ENCOUNTER FOR COMPREHENSIVE DIABETIC FOOT EXAMINATION, TYPE 2 DIABETES MELLITUS: ICD-10-CM

## 2024-01-22 DIAGNOSIS — I50.20 HEART FAILURE WITH REDUCED EJECTION FRACTION: Chronic | ICD-10-CM

## 2024-01-22 PROCEDURE — 3078F DIAST BP <80 MM HG: CPT | Mod: CPTII,,,

## 2024-01-22 PROCEDURE — 11056 PARNG/CUTG B9 HYPRKR LES 2-4: CPT | Mod: ,,, | Performed by: NURSE PRACTITIONER

## 2024-01-22 PROCEDURE — 11719 TRIM NAIL(S) ANY NUMBER: CPT | Mod: 59,,, | Performed by: NURSE PRACTITIONER

## 2024-01-22 PROCEDURE — 99212 OFFICE O/P EST SF 10 MIN: CPT | Mod: 25,,, | Performed by: NURSE PRACTITIONER

## 2024-01-22 PROCEDURE — 1160F RVW MEDS BY RX/DR IN RCRD: CPT | Mod: CPTII,,,

## 2024-01-22 PROCEDURE — 3075F SYST BP GE 130 - 139MM HG: CPT | Mod: CPTII,,,

## 2024-01-22 PROCEDURE — 11057 PARNG/CUTG B9 HYPRKR LES >4: CPT

## 2024-01-22 PROCEDURE — 99214 OFFICE O/P EST MOD 30 MIN: CPT | Mod: S$PBB,,,

## 2024-01-22 PROCEDURE — 4010F ACE/ARB THERAPY RXD/TAKEN: CPT | Mod: CPTII,,,

## 2024-01-22 PROCEDURE — 99211 OFF/OP EST MAY X REQ PHY/QHP: CPT | Mod: 25

## 2024-01-22 PROCEDURE — 3008F BODY MASS INDEX DOCD: CPT | Mod: CPTII,,,

## 2024-01-22 PROCEDURE — 11719 TRIM NAIL(S) ANY NUMBER: CPT

## 2024-01-22 PROCEDURE — 27000999 HC MEDICAL RECORD PHOTO DOCUMENTATION

## 2024-01-22 PROCEDURE — 99215 OFFICE O/P EST HI 40 MIN: CPT | Mod: PBBFAC,25,27

## 2024-01-22 PROCEDURE — 1159F MED LIST DOCD IN RCRD: CPT | Mod: CPTII,,,

## 2024-01-22 PROCEDURE — 93005 ELECTROCARDIOGRAM TRACING: CPT

## 2024-01-22 NOTE — PROGRESS NOTES
TODAY'S VISIT NOTE WAS IMPORTED FROM LAST WOUND CLINIC VISIT OF 10/4/23.  I ATTEST THAT I REVIEWED THE HPI, ROS, LABS, WOUND-RELATED IMAGING, PHYSICAL EXAMINATION, EVALUATION AND PLAN SECTIONS OF IMPORTED NOTE AND REVISED TODAY'S VISIT NOTE TO REFLECT TODAY'S NEW ASSESSMENT FINDINGS AND TODAY'S UPDATES TO WOUND TREATMENT PLAN.          Chief Complaint:  Routine diabetic foot care      History of Present Illness:  59 yo Black female being seen today for routine diabetic foot care, and bilateral foot pain from recurrent calluses, which is affecting her ability to walk.   Hx includes treatment plan and medication non-compliance, COPD, HFpEF (40%), chronic hypercapnic respiratory failure with oxygen and bedtime Trilogy dependency, uncontrolled Type 2 diabetes, HTN, HLD, DAVION on CPAP, morbid obesity (BMI 49.27), and smoking (25 pk/year history).  Enrolled in [facility] smoking cessation program.  Smoking 4-5 cigarettes per day.    Followed by NOEMI Andrews, [facility] IM for PCP.  Last visit with Ms. Gabriel was on 23.  Followed by [facility] endocrine, pulmonology, and cardiology clinics.   Recent Cologuard (+); scheduled for colonoscopy 24.        Review of Most Recent Labs:  23:  RBC 5.68.  H&H 14.9 & 47.1.  Plts 290.  CR 1.03.  HgbA1C 8.2.    23:  CBC unremarkable.  CR 0.98.  Alb 3.2.  Chol 94.  HDL 30.  LDL 54.  Trig 51.  HgbA1C 10.2.    23:  CBC unremarkable.  CR 1.11.  eGFR 58.  Alk Phos 152.     Imagin23 xray of right foot:  no acute osseus abnormality.  Plantar calcaneal enthesophyte.  Dorsal calcaneal enthesophyte.    2022 arterial NIVA:    The right lower extremity demonstrated multiphasic waveforms at all levels.  The KWADWO on the right was mildly abnormal. The right lower extremity demonstrated mild arterial flow reduction.     The left lower extremity demonstrated multiphasic waveforms at all levels.   The KWADWO on the left was mildly abnormal.  The left lower extremity demonstrated mild  "arterial flow reduction.      Today 1/22/24:  Has not been applying medications to feet and to toenails, as prescribed.  CBG this morning before eating was 245, per self-report.  Is agreeable to being referred to Dr. Kulkarni, podiatrist, for an evaluation of severe plantar warts to bilateral feet.  Warts are beginning to feel tender with walking.      10/4/23:  Corns and calluses have recurred over tips of big toes and bottoms of feet.  She is unable to reach those areas long enough to perform callus care, without getting short-winded.  She is able to apply toenail polish though, as prescribed.  Corns and calluses on bottoms of feet are causing pressure and discomfort with walking.  Wearing tennis shoes now; no longer wearing sandals, per self-report.   Numbness and tinging continue in feet/toes, as before.  Still has pain in calves with walking distances.  Feet continue to feel cold all the time.  Still has swelling in ankles and feet on a daily basis.   Tries to remember to apply Lac-Hydrin and Vaseline to feet, as prescribed.  Did not apply those this morning.      6/30/23:  Wearing sandals today because they are more comfortable and easier to put on that regular shoes.  Remembers me speaking to her each visit about how shoes protect diabetic feet.  Corns and calluses are worse on feet and toes than last visit.  Has Lac-Hydrin and Vaseline at home.  Not able to see bottoms of feet.  Remembers being encouraged to get a mirror to use for foot checks.  Smoking "about the same" as she was last visit.  Has to wait 3 months before she can be re-enrolled in smoking program.  Smoking 3-5 cigarettes/day.  Wears Trilogy at night.      4/12/23:  Continues to have numbness and burning pain in bilateral feet and toes, on a daily basis, as well as pain in calves with walking distances and laying down.  Feet always feel cold.  Having pain in bottoms of both feet, where she has recurrent corns/calluses.  Did get toenail polish " antibiotics and has been applying that every 2 to 3 days.  No new s/e with that.  Wearing slip-on sandals today for me to evaluate feet.  Normally wears protective shoes at home.      1/6/2023:  Trying to cut back on smoking.  Smoking < 1/2 ppd, per self-report.  Using Lac-Hydrin and Vaseline on feet, as instructed.  Having some mild discomfort over corns on bottoms of feet with walking.   No new rashes, lesions, or skin breakdown.   Does report numbness and tingling in feet and toes.  Feet feel cold most days.  Does experience pain in calves with prolonged walking and laying down, at times.        Review of Systems:   Except as stated in HPI, all other 10 body systems normal      Physical Exam Vitals & Measurements:    Vitals:    01/22/24 0902   BP: 136/83   Pulse: 79   Temp: 98.3 °F (36.8 °C)           General:  Hypertensive with diabetes, asymptomatic with; afebrile. Morbidly obese; in no acute distress.  Wearing black slip-on sandals today.    Respiratory:   Dyspneac on moderate exertion; no coughing.  Has oxygen tank with her; not wearing oxygen though.     Cardiovascular:    Mild woody non-pitting edema to BLE.  Generalized non-pitting edema around bilateral medial and lateral ankles.  No hemosiderin staining or varicosities. No hair distribution over BLE.  Skin is shiny and taut over anterior tibial areas. Feet and toes cool to touch. No temperature differences between BLE.   DP pulses palpable.  PT pulses dopplered.    Musculoskeletal:  Significantly decreased range of motion, and strength, of all extremities.   Loss of anatomical sites from knees distally secondary to body habitus.   Pes planus bilaterally.  No bunions or hammertoes.   Decreased dorsiflexion and flexion in bilateral feet and hallux toes.  Intrinsic minus feet bilaterally.    Neurologic: A&O X 3; cranial nerves intact.   Normal monofilament testing in bilateral feet; no LOPS noted.    Psychiatric: Calm, cooperative. Mood and affect normal.  Responses appropriate  Integumentary:     Severe generalized xerosis over bilateral plantar feet.     Two large bilateral plantar corns/calluses with significant tenderness with palpation.  Corns quite deep in both lesions.  No underlying skin breakdown with paring.      Linear calluses to posterior right 4-5 distal toes.  No underlying skin breakdown with paring.      Thickened calluses to distal hallux toes:  No underlying skin breakdown with paring.      10 mycotic toenails, which were overgrown and grossly dystrophic, with mild odor with debridement.                                    Assessment/Plan:    Encounter for diabetic foot care, Type 2, insulin-dependent:  Last visit with PCP, NOEMI Arora, on 9/18/23.    Diabetic teaching reinforced, with focused attention towards daily foot checks and prompt medical attn for changes in skin.  Wound clinic and UCC precautions reinforced.       Morbid obesity, BMI 49.27:  This is a co-factor to extensive, deep, and multiple calluses/corns over bilateral feet/toes. Ms. Mckeon is at extreme risk of non-healing diabetic foot wounds, given her diabetes, treatment plan non-compliance, medication non-compliance, and ongoing smoking.      Type 2 diabetes:  Poorly-controlled, but improving per last HgbA1C of 8.2% on 9/18/23.    Normal monofilament testing.  No LOPS identified.  DP and PT pulses present.    Being followed by [facility] endocrine.     Mycotic Toenails X 10:  Prescribed CMPD Voricon 2.67%/Vanc 6.67% nail suspension in DSMO.   Compliance questionable.                                            PROCEDURE NOTE    Procedure Today:   Cutting/filing of 10 moderately mycotic toenails.  Rationale:  Overgrown toenails, as patient presented today, can lead to DFU, skin and bone infections, and lower limb amputations.  Ms. Mckeon' toes were too tender to debride toenails today.  Non-compliant with anti-infective toenail medications, AEB presentation of feet and skin  today.      Informed verbal consent obtained.  Using standard podiatry clippers  and fahad boards, I cut and sanded 10 toenails with clippers and fahad boards.  No bleeding or discomfort during procedure.  Patient tolerated procedure without complications.      Bilateral foot pain, with impaired gait:  Etiology:  Large and tender corns/calluses to plantar forefeet.   Pain from plantar corns/calluses impeding ability to walk without pain.   Diff dx:  corns and calluses versus underlying plantar warts  Teaching provided on underlying cause of condition, s/s of condition, complications, and treatment options of condition.    Handout on plantar warts given today.  Discussed referring her to podiatrist for surgical consult to remove, what is most likely, underlying plantar warts to each foot.      Calluses of bilateral feet and toes:   Two largest plantar lesions are most likely underlying plantar warts.                                           PROCEDURE NOTE    Procedure Today: paring and sanding down of 6 corns/calluses   Rationale: Calluses/corns can lead to diabetic foot ulcers.  Also, Ms. Mckeon is reporting discomfort in corns/calluses with ambulation.   Informed verbal consent obtained.    Using #4 dermal curette, Dremmel, and fahad boards, I pared 6 calluses/corns to healthy soft tissue.  Mild bleeding noted from plantar calluses, which was controlled with mild pressure and dressings.  Teaching reinforced on s/s of complications to call wound clinic for, post-procedure, with rationale.       Xerosis of bilateral heels:  Poorly-controlled.  Compliance with tx plan questionable.    Prescribed Lac-Hydrin followed by Vaseline BID.  CPM  Reinforced teaching on benefit of moisturizing feet daily, with rationale.  Adherence with tx plan encouraged.  Teaching reinforced to not apply lotions/creams between toes, with rationale.      Non-Compliance with Treatment Plan and Medications:  This is an identified co-factor  towards increased risk of DFU, osteo, and lower limb amputations.     Smoking:  Previously enrolled in [facility] smoking cessation program.               RTC in three months for routine DFC and callus care.   Teaching reinforced on s/s to call wound clinic for promptly.

## 2024-01-22 NOTE — PATIENT INSTRUCTIONS
Complete lab work ordered per primary care   Follow up in cardiology clinic in 4 months or sooner if needed  Follow up with PCP as directed  We will send cardiac risk stratification to GI department

## 2024-01-24 ENCOUNTER — ANESTHESIA EVENT (OUTPATIENT)
Dept: ENDOSCOPY | Facility: HOSPITAL | Age: 59
End: 2024-01-24
Payer: MEDICAID

## 2024-01-24 NOTE — ANESTHESIA PREPROCEDURE EVALUATION
01/24/2024  Linn Mckeon is a 58 y.o., female with PMHx of morbid obesity, HTN, HLD, HFpEF, COPD (home oxygen), smoking, DAVION, DM, depression presents for colonoscopy secondary to +cologuard.    Metoprolol--last dose held this am secondary to bradycardia    Active Ambulatory Problems     Diagnosis Date Noted    GOODMAN (dyspnea on exertion) 05/20/2022    Chronic heart failure with preserved ejection fraction 05/20/2022    Primary hypertension 05/20/2022    Hyperlipidemia LDL goal <70 05/20/2022    Type 2 diabetes mellitus with hyperglycemia, with long-term current use of insulin 05/20/2022    Peripheral edema 05/20/2022    DAVION on CPAP 05/20/2022    Class 3 severe obesity due to excess calories with serious comorbidity and body mass index (BMI) of 45.0 to 49.9 in adult 07/07/2022    Noncompliance with treatment plan 07/07/2022    Xerosis of skin 07/07/2022    Callus of foot 07/07/2022    Dystrophic nail 07/07/2022    Non compliance w medication regimen 07/07/2022    Heart failure with reduced ejection fraction 08/04/2022    Cigarette nicotine dependence without complication 08/04/2022    COPD (chronic obstructive pulmonary disease) 09/12/2022    Vitamin D deficiency 11/18/2022    Mycotic toenails 01/09/2023    Encounter for comprehensive diabetic foot examination, type 2 diabetes mellitus 04/13/2023    BMI 45.0-49.9, adult 04/13/2023    Type 2 diabetes mellitus with skin complication, with long-term current use of insulin 04/13/2023    Right foot pain 04/13/2023    Left foot pain 04/13/2023    Impaired gait 04/13/2023     Resolved Ambulatory Problems     Diagnosis Date Noted    Wellness examination 11/18/2022     Past Medical History:   Diagnosis Date    CHF (congestive heart failure)     Diabetes mellitus     Hyperlipidemia     Hypertension     Morbid obesity with body mass index (BMI) greater than or equal  "to 50 07/07/2022    Type 2 diabetes mellitus with skin complication 05/20/2022           Pre-op Assessment    I have reviewed the NPO Status.      Review of Systems  Anesthesia Hx:  No problems with previous Anesthesia                Social:  Smoker       Cardiovascular:     Hypertension, well controlled       CHF    hyperlipidemia                             Pulmonary:   COPD, severe     Sleep Apnea, CPAP                Renal/:  Renal/ Normal                 Hepatic/GI:  Hepatic/GI Normal                 Neurological:  Neurology Normal                                      Endocrine:  Diabetes, poorly controlled, type 2         Morbid Obesity / BMI > 40  Psych:    depression              Vitals:    01/25/24 0651   BP: 111/72   BP Location: Left arm   Pulse: 78   Resp: 20   Temp: 36.6 °C (97.9 °F)   TempSrc: Oral   SpO2: 97%  Comment: on 2 l/min n/c   Weight: 131.4 kg (289 lb 9.6 oz)   Height: 5' 4" (1.626 m)         Physical Exam  General: Alert, Cooperative and Well nourished    Airway:  Mallampati: II   Mouth Opening: Normal  TM Distance: Normal  Tongue: Normal  Neck ROM: Normal ROM    Dental:  Intact    Chest/Lungs:  Clear to auscultation, Normal Respiratory Rate    Heart:  Rate: Normal  Rhythm: Regular Rhythm  Sounds: Normal       Latest Reference Range & Units 01/25/24 06:47   POCT Glucose 70 - 110 mg/dL 113 (H)   (H): Data is abnormally high  Lab Results   Component Value Date    WBC 10.40 09/18/2023    HGB 14.9 09/18/2023    HCT 47.1 (H) 09/18/2023    MCV 82.9 09/18/2023     09/18/2023       CMP  Sodium Level   Date Value Ref Range Status   09/18/2023 139 136 - 145 mmol/L Final     Potassium Level   Date Value Ref Range Status   09/18/2023 4.6 3.5 - 5.1 mmol/L Final     Carbon Dioxide   Date Value Ref Range Status   09/18/2023 24 22 - 29 mmol/L Final     Blood Urea Nitrogen   Date Value Ref Range Status   09/18/2023 18.0 9.8 - 20.1 mg/dL Final     Creatinine   Date Value Ref Range Status "   09/18/2023 1.03 (H) 0.55 - 1.02 mg/dL Final     Calcium Level Total   Date Value Ref Range Status   09/18/2023 10.0 8.4 - 10.2 mg/dL Final     Albumin Level   Date Value Ref Range Status   09/18/2023 3.2 (L) 3.5 - 5.0 g/dL Final     Bilirubin Total   Date Value Ref Range Status   09/18/2023 0.7 <=1.5 mg/dL Final     Alkaline Phosphatase   Date Value Ref Range Status   09/18/2023 124 40 - 150 unit/L Final     Aspartate Aminotransferase   Date Value Ref Range Status   09/18/2023 14 5 - 34 unit/L Final     Alanine Aminotransferase   Date Value Ref Range Status   09/18/2023 13 0 - 55 unit/L Final     eGFR   Date Value Ref Range Status   09/18/2023 >60 mls/min/1.73/m2 Final     Lab Results   Component Value Date    HGBA1C 8.2 (H) 09/18/2023                 Anesthesia Plan  Type of Anesthesia, risks & benefits discussed:    Anesthesia Type: Gen Natural Airway  Intra-op Monitoring Plan: Standard ASA Monitors  Post Op Pain Control Plan: IV/PO Opioids PRN  Induction:  IV  Informed Consent: Informed consent signed with the Patient and all parties understand the risks and agree with anesthesia plan.  All questions answered.   ASA Score: 4  Day of Surgery Review of History & Physical: H&P Update referred to the surgeon/provider.    Ready For Surgery From Anesthesia Perspective.     .

## 2024-01-25 ENCOUNTER — ANESTHESIA (OUTPATIENT)
Dept: ENDOSCOPY | Facility: HOSPITAL | Age: 59
End: 2024-01-25
Payer: MEDICAID

## 2024-01-25 ENCOUNTER — HOSPITAL ENCOUNTER (OUTPATIENT)
Facility: HOSPITAL | Age: 59
Discharge: HOME | End: 2024-01-25
Attending: INTERNAL MEDICINE | Admitting: INTERNAL MEDICINE
Payer: MEDICAID

## 2024-01-25 DIAGNOSIS — K57.30 DIVERTICULOSIS LARGE INTESTINE W/O PERFORATION OR ABSCESS W/O BLEEDING: ICD-10-CM

## 2024-01-25 DIAGNOSIS — D12.8 ADENOMATOUS RECTAL POLYP: Primary | ICD-10-CM

## 2024-01-25 DIAGNOSIS — R19.5 POSITIVE COLORECTAL CANCER SCREENING USING COLOGUARD TEST: ICD-10-CM

## 2024-01-25 DIAGNOSIS — D12.5 ADENOMATOUS POLYP OF SIGMOID COLON: ICD-10-CM

## 2024-01-25 DIAGNOSIS — D12.4 ADENOMATOUS POLYP OF DESCENDING COLON: ICD-10-CM

## 2024-01-25 LAB — POCT GLUCOSE: 113 MG/DL (ref 70–110)

## 2024-01-25 PROCEDURE — 37000008 HC ANESTHESIA 1ST 15 MINUTES: Performed by: INTERNAL MEDICINE

## 2024-01-25 PROCEDURE — 27201423 OPTIME MED/SURG SUP & DEVICES STERILE SUPPLY: Performed by: INTERNAL MEDICINE

## 2024-01-25 PROCEDURE — 88305 TISSUE EXAM BY PATHOLOGIST: CPT | Mod: TC | Performed by: INTERNAL MEDICINE

## 2024-01-25 PROCEDURE — 45380 COLONOSCOPY AND BIOPSY: CPT | Performed by: INTERNAL MEDICINE

## 2024-01-25 PROCEDURE — 63600175 PHARM REV CODE 636 W HCPCS: Performed by: NURSE ANESTHETIST, CERTIFIED REGISTERED

## 2024-01-25 PROCEDURE — 45380 COLONOSCOPY AND BIOPSY: CPT | Mod: ,,, | Performed by: INTERNAL MEDICINE

## 2024-01-25 PROCEDURE — 37000009 HC ANESTHESIA EA ADD 15 MINS: Performed by: INTERNAL MEDICINE

## 2024-01-25 PROCEDURE — 25000003 PHARM REV CODE 250: Performed by: NURSE ANESTHETIST, CERTIFIED REGISTERED

## 2024-01-25 PROCEDURE — 63600175 PHARM REV CODE 636 W HCPCS: Performed by: ANESTHESIOLOGY

## 2024-01-25 PROCEDURE — D9220A PRA ANESTHESIA: Mod: ,,, | Performed by: NURSE ANESTHETIST, CERTIFIED REGISTERED

## 2024-01-25 RX ORDER — LIDOCAINE HYDROCHLORIDE 20 MG/ML
INJECTION INTRAVENOUS
Status: DISCONTINUED | OUTPATIENT
Start: 2024-01-25 | End: 2024-01-25

## 2024-01-25 RX ORDER — SODIUM CHLORIDE, SODIUM LACTATE, POTASSIUM CHLORIDE, CALCIUM CHLORIDE 600; 310; 30; 20 MG/100ML; MG/100ML; MG/100ML; MG/100ML
INJECTION, SOLUTION INTRAVENOUS CONTINUOUS
Status: DISCONTINUED | OUTPATIENT
Start: 2024-01-25 | End: 2024-01-25 | Stop reason: HOSPADM

## 2024-01-25 RX ORDER — PROPOFOL 10 MG/ML
INJECTION, EMULSION INTRAVENOUS
Status: DISCONTINUED | OUTPATIENT
Start: 2024-01-25 | End: 2024-01-25

## 2024-01-25 RX ORDER — LIDOCAINE HYDROCHLORIDE 10 MG/ML
1 INJECTION, SOLUTION EPIDURAL; INFILTRATION; INTRACAUDAL; PERINEURAL ONCE
Status: DISCONTINUED | OUTPATIENT
Start: 2024-01-25 | End: 2024-01-25 | Stop reason: HOSPADM

## 2024-01-25 RX ORDER — METOPROLOL SUCCINATE 50 MG/1
100 TABLET, EXTENDED RELEASE ORAL
Status: DISCONTINUED | OUTPATIENT
Start: 2024-01-25 | End: 2024-01-25 | Stop reason: HOSPADM

## 2024-01-25 RX ORDER — DEXMEDETOMIDINE HYDROCHLORIDE 100 UG/ML
INJECTION, SOLUTION INTRAVENOUS
Status: DISCONTINUED | OUTPATIENT
Start: 2024-01-25 | End: 2024-01-25

## 2024-01-25 RX ADMIN — PROPOFOL 25 MG: 10 INJECTION, EMULSION INTRAVENOUS at 07:01

## 2024-01-25 RX ADMIN — PROPOFOL 50 MG: 10 INJECTION, EMULSION INTRAVENOUS at 08:01

## 2024-01-25 RX ADMIN — PROPOFOL 50 MG: 10 INJECTION, EMULSION INTRAVENOUS at 07:01

## 2024-01-25 RX ADMIN — SODIUM CHLORIDE, POTASSIUM CHLORIDE, SODIUM LACTATE AND CALCIUM CHLORIDE: 600; 310; 30; 20 INJECTION, SOLUTION INTRAVENOUS at 07:01

## 2024-01-25 RX ADMIN — DEXMEDETOMIDINE 30 MCG: 200 INJECTION, SOLUTION INTRAVENOUS at 07:01

## 2024-01-25 RX ADMIN — PROPOFOL 25 MG: 10 INJECTION, EMULSION INTRAVENOUS at 08:01

## 2024-01-25 RX ADMIN — LIDOCAINE HYDROCHLORIDE 50 MG: 20 INJECTION INTRAVENOUS at 07:01

## 2024-01-25 RX ADMIN — DEXMEDETOMIDINE 20 MCG: 200 INJECTION, SOLUTION INTRAVENOUS at 07:01

## 2024-01-25 NOTE — H&P
Colonoscopy History and Physical    Patient Name: Linn Mckeon  MRN: [id number]  : 1965  Date of Procedure:  2024  Referring Physician: Yan Bashir MD  Primary Physician: Leslie Gabriel FNP  Procedure Physician: Yan Bashir MD    Procedure - Colonoscopy  ASA - per anesthesia  Mallampati - per anesthesia  History of Anesthesia problems - no  Family history Anesthesia problems -  no   Plan of anesthesia - General    Diagnosis:  Positive Cologuard positive Cologuard test  Chief Complaint: Same as above    HPI: Patient is an 58 y.o. female is here for colonoscopy after she was noted to have a positive Cologuard test.  She has a history of chronic constipation following a hysterectomy.  She has had no overt rectal bleeding.  She is morbidly obese.  She denies family history of colon polyps, colon cancer or colitis.      Last colonoscopy:none none   Family history:  Negative   Anticoagulation:  No    ROS:  Constitutional: No fevers, chills, No weight loss  CV: No chest pain  Pulm: No cough, No shortness of breath  GI: see HPI    Medical History:   Past Medical History:   Diagnosis Date    Callus of foot 2022    CHF (congestive heart failure)     Chronic heart failure with preserved ejection fraction 2022    COPD (chronic obstructive pulmonary disease)     Diabetes mellitus     GOODMAN (dyspnea on exertion) 2022    Dystrophic nail 2022    Hyperlipidemia     Hyperlipidemia LDL goal <70 2022    Hypertension     Morbid obesity with body mass index (BMI) greater than or equal to 50 2022    Non compliance w medication regimen 2022    Noncompliance with treatment plan 2022    DAVION on CPAP 2022    Peripheral edema 2022    Primary hypertension 2022    Type 2 diabetes mellitus with skin complication 2022    Xerosis of skin 2022         Surgical History:   Past Surgical History:   Procedure Laterality Date    HYSTERECTOMY          Family History:   Family History   Problem Relation Age of Onset    Hypertension Mother     Heart disease Mother     Diabetes Mother     Heart attack Father     Diabetes Sister     Hypertension Sister     Diabetes Brother     Hypertension Brother    .    Social History:   Social History     Socioeconomic History    Marital status:    Tobacco Use    Smoking status: Every Day     Current packs/day: 0.25     Types: Cigarettes    Smokeless tobacco: Never    Tobacco comments:     Smokes 5 cigarettes a day   Substance and Sexual Activity    Alcohol use: Not Currently    Drug use: Never    Sexual activity: Not Currently     Social Determinants of Health     Financial Resource Strain: Low Risk  (10/14/2022)    Overall Financial Resource Strain (CARDIA)     Difficulty of Paying Living Expenses: Not very hard   Food Insecurity: No Food Insecurity (2/24/2023)    Hunger Vital Sign     Worried About Running Out of Food in the Last Year: Never true     Ran Out of Food in the Last Year: Never true   Transportation Needs: No Transportation Needs (2/24/2023)    PRAPARE - Transportation     Lack of Transportation (Medical): No     Lack of Transportation (Non-Medical): No   Physical Activity: Inactive (8/25/2023)    Exercise Vital Sign     Days of Exercise per Week: 0 days     Minutes of Exercise per Session: 0 min   Stress: Stress Concern Present (8/25/2023)    Citizen of [place] of Occupational Health - Occupational Stress Questionnaire     Feeling of Stress : Rather much   Social Connections: Unknown (10/14/2022)    Social Connection and Isolation Panel [NHANES]     Frequency of Communication with Friends and Family: More than three times a week     Frequency of Social Gatherings with Friends and Family: More than three times a week     Attends Alevism Services: More than 4 times per year   Recent Concern: Social Connections - Moderately Isolated (10/14/2022)    Social Connection and Isolation Panel [NHANES]      "Frequency of Communication with Friends and Family: More than three times a week     Frequency of Social Gatherings with Friends and Family: More than three times a week     Attends Christianity Services: More than 4 times per year     Active Member of Clubs or Organizations: No     Attends Club or Organization Meetings: Never     Marital Status:    Housing Stability: Unknown (2/24/2023)    Housing Stability Vital Sign     Unstable Housing in the Last Year: No       Review of patient's allergies indicates:  No Known Allergies    Medications:   Medications Prior to Admission   Medication Sig Dispense Refill Last Dose    albuterol (PROVENTIL) 2.5 mg /3 mL (0.083 %) nebulizer solution Take 3 mLs (2.5 mg total) by nebulization every 6 (six) hours as needed for Wheezing. Rescue 90 mL 1 1/24/2024    ammonium lactate (LAC-HYDRIN) 12 % lotion APPLY TO DRY SKIN AND RUB IN WELL TWICE DAILY. NOTHING BETWEEN TOES 226 g 11 1/24/2024    aspirin (ECOTRIN) 81 MG EC tablet Take 81 mg by mouth once daily.   1/24/2024    atorvastatin (LIPITOR) 80 MG tablet Take 1 tablet (80 mg total) by mouth once daily. 90 tablet 3 1/24/2024    BD ULTRA-FINE JORDON PEN NEEDLE 32 gauge x 5/32" Ndle 2 (two) times daily.   1/24/2024    BD ULTRA-FINE JORDON PEN NEEDLE 32 gauge x 5/32" Ndle    1/24/2024    blood sugar diagnostic Strp To check BG 2 times daily, to use with insurance preferred meter 100 strip 11 1/24/2024    buPROPion (WELLBUTRIN SR) 150 MG TBSR 12 hr tablet Take 1 tablet (150 mg ) by mouth 2 (two) times daily. 60 tablet 0 1/24/2024    CICLOPIROX-ITRACONAZOLE-UREA TOP CLEAN NAILS, SHAKE BOTTLE WELL, APPLY TWICE DAILY TO ALL AFFECTED NAILS USING APPLICATOR. (UP TO 2 MLS/DAY)   1/24/2024    empagliflozin (JARDIANCE) 25 mg tablet Take 1 tablet (25 mg total) by mouth once daily. 90 tablet 2 1/24/2024    ergocalciferol (ERGOCALCIFEROL) 50,000 unit Cap Take 1 capsule (50,000 Units total) by mouth every 7 days. 12 capsule 2 1/24/2024    ezetimibe " (ZETIA) 10 mg tablet Take 1 tablet (10 mg total) by mouth once daily. 90 tablet 3 1/24/2024    flash glucose scanning reader (FREESTYLE JOSE ALFREDO 2 READER) [facility] Freestyle Jose Alfredo 2 reader dispense 1. Use as directed 1 each 0 1/24/2024    flash glucose sensor (FREESTYLE JOSE ALFREDO 2 SENSOR) Kit Jose Alfredo 2 sensor change every 14 days (Disp 6 each)(90 day) 6 kit 11 1/24/2024    fluticasone-umeclidin-vilanter (TRELEGY ELLIPTA) 100-62.5-25 mcg DsDv Inhale 1 puff into the lungs once daily. 28 each 3 1/24/2024    furosemide (LASIX) 40 MG tablet Take 1 tablet (40 mg total) by mouth 2 (two) times a day. 180 tablet 3 1/24/2024    insulin glargine (LANTUS U-100 INSULIN) 100 unit/mL injection Inject 30 Units into the skin 2 (two) times daily. 30 mL 11 1/24/2024    ipratropium-albuteroL (COMBIVENT RESPIMAT)  mcg/actuation inhaler Inhale 2 puffs into the lungs every 6 (six) hours as needed for Wheezing. Rescue 4 g 1 1/24/2024    metFORMIN (GLUCOPHAGE) 1000 MG tablet TAKE 1 TABLET BY MOUTH TWICE DAILY WITH MEALS 60 tablet 11 1/24/2024    metoprolol succinate (TOPROL-XL) 100 MG 24 hr tablet Take 1 tablet (100 mg total) by mouth once daily. 30 tablet 11 1/24/2024    mupirocin (BACTROBAN) 2 % ointment Apply topically 2 (two) times daily. 22 g 1 1/24/2024    nicotine (NICODERM CQ) 21 mg/24 hr Place 1 patch onto the skin once daily. 28 patch 0 Past Week    nicotine polacrilex 4 MG Lozg Take 1 lozenge (4 mg total) by mouth as needed. 108 lozenge 1 Past Week    nystatin (MYCOSTATIN) ointment Apply topically 2 (two) times daily. 15 g 1 1/24/2024    propylene glycoL (SYSTANE BALANCE) 0.6 % Drop Apply 1 drop to eye 2 (two) times a day. 15 mL 1 1/24/2024    sacubitriL-valsartan (ENTRESTO) 49-51 mg per tablet Take 1 tablet by mouth 2 (two) times daily. 60 tablet 6 1/24/2024    SITagliptin phosphate (JANUVIA) 100 MG Tab Take 1 tablet (100 mg total) by mouth once daily. 90 tablet 2 1/24/2024    spironolactone (ALDACTONE) 25 MG tablet Take 1 tablet (25  "mg total) by mouth once daily. 90 tablet 3 1/24/2024    blood-glucose meter kit To check BG 2 times daily, to use with insurance preferred meter 1 each 0     lancets (ACCU-CHEK FASTCLIX LANCET DRUM) [facility] USE ONE DAILY 100 each 3     LANCETS MISC        latanoprost 0.005 % ophthalmic solution Apply 1 drop to eye.       pen needle, diabetic 31 gauge x 5/16" Ndle 1 each by Misc.(Non-Drug; Combo Route) route 2 (two) times daily with meals. 60 each 11     polyethylene glycol (MOVIPREP) 100-7.5-2.691 gram solution Take 2,000 mLs by mouth As instructed (take as directed per instructions provided by GI clinic). (Patient not taking: Reported on 12/12/2023) 1 kit 0     varenicline (CHANTIX STARTING MONTH BOX) 0.5 mg (11)- 1 mg (42) tablet Take one 0.5mg tab by mouth once daily X3 days,then increase to one 0.5mg tab twice daily X4 days,then increase to one 1mg tab twice daily (Patient not taking: Reported on 1/22/2024) 1 each 0          Physical Exam:    Vital Signs:   Vitals:    01/25/24 0651   BP: 111/72   Pulse: 78   Resp: 20   Temp: 97.9 °F (36.6 °C)     /72 (BP Location: Left arm)   Pulse 78   Temp 97.9 °F (36.6 °C) (Oral)   Resp 20   Ht 5' 4" (1.626 m)   Wt 131.4 kg (289 lb 9.6 oz)   SpO2 97% Comment: on 2 l/min n/c  BMI 49.71 kg/m²     General:          Well appearing in no acute distress  Lungs: Clear to auscultation bilaterally, respirations unlabored  Heart: Regular rate and rhythm, S1 and S2 normal, no obvious murmurs  Abdomen:         Soft, non-tender, bowel sounds normal, no masses, no organomegaly        Labs:  Lab Results   Component Value Date    WBC 10.40 09/18/2023    HGB 14.9 09/18/2023    HCT 47.1 (H) 09/18/2023    MCV 82.9 09/18/2023     09/18/2023     Lab Results   Component Value Date    INR 1.12 05/15/2019     Lab Results   Component Value Date     09/18/2023    K 4.6 09/18/2023    CO2 24 09/18/2023    BUN 18.0 09/18/2023    CREATININE 1.03 (H) 09/18/2023    LABPROT 7.8 " 09/18/2023    ALBUMIN 3.2 (L) 09/18/2023    BILITOT 0.7 09/18/2023    ALKPHOS 124 09/18/2023    ALT 13 09/18/2023    AST 14 09/18/2023         Assessment and Plan:    History reviewed, vital signs satisfactory, cardiopulmonary status satisfactory.  I have explained the sedation options, risks, benefits, and alternatives of this endoscopic procedure to the patient including but not limited to bleeding, inflammation, infection, perforation, and death.  All questions were answered and the patient consented to proceed with procedure as planned.   The patient is deemed an appropriate candidate for the sedation as planned.      Yan Bashir MD   of Clinical Medicine  Gastroenterology and Hepatology  [facility]    1/25/2024  7:07 AM

## 2024-01-25 NOTE — PLAN OF CARE
Back from procedure. Awake alert. Drinking coke at present. No acute distress. Dc instructions given and reviewed

## 2024-01-25 NOTE — PROVATION PATIENT INSTRUCTIONS
Discharge Summary/Instructions after an Endoscopic Procedure  Patient Name: Linn Mckeon  Patient MRN: [id number]  Patient YOB: 1965 Thursday, January 25, 2024  Yan Bashir MD  Dear patient,  As a result of recent federal legislation (The Federal Cures Act), you may   receive lab or pathology results from your procedure in your [facility]   account before your physician is able to contact you. Your physician or   their representative will relay the results to you with their   recommendations at their soonest availability.  Thank you,  RESTRICTIONS:  During your procedure today, you received medications for sedation.  These   medications may affect your judgment, balance and coordination.  Therefore,   for 24 hours, you have the following restrictions:   - DO NOT drive a car, operate machinery, make legal/financial decisions,   sign important papers or drink alcohol.    ACTIVITY:  Today: no heavy lifting, straining or running due to procedural   sedation/anesthesia.  The following day: return to full activity including work.  DIET:  Eat and drink normally unless instructed otherwise.     TREATMENT FOR COMMON SIDE EFFECTS:  - Mild abdominal pain, nausea, belching, bloating or excessive gas:  rest,   eat lightly and use a heating pad.  - Sore Throat: treat with throat lozenges and/or gargle with warm salt   water.  - Because air was used during the procedure, expelling large amounts of air   from your rectum or belching is normal.  - If a bowel prep was taken, you may not have a bowel movement for 1-3 days.    This is normal.  SYMPTOMS TO WATCH FOR AND REPORT TO YOUR PHYSICIAN:  1. Abdominal pain or bloating, other than gas cramps.  2. Chest pain.  3. Back pain.  4. Signs of infection such as: chills or fever occurring within 24 hours   after the procedure.  5. Rectal bleeding, which would show as bright red, maroon, or black stools.   (A tablespoon of blood from the rectum is not serious,  especially if   hemorrhoids are present.)  6. Vomiting.  7. Weakness or dizziness.  GO DIRECTLY TO THE NEAREST EMERGENCY ROOM IF YOU HAVE ANY OF THE FOLLOWING:      Difficulty breathing              Chills and/or fever over 101 F   Persistent vomiting and/or vomiting blood   Severe abdominal pain   Severe chest pain   Black, tarry stools   Bleeding- more than one tablespoon   Any other symptom or condition that you feel may need urgent attention  Your doctor recommends these additional instructions:  If any biopsies were taken, your doctors clinic will contact you in 1 to 2   weeks with any results.  - Discharge patient to home (ambulatory).   - Resume previous diet today.   - Repeat colonoscopy in 3 years for surveillance.  For questions, problems or results please call your physician - Yan Bashir MD at Work:  [phone].  [facility] , EMERGENCY ROOM PHONE NUMBER: [phone]  IF A COMPLICATION OR EMERGENCY SITUATION ARISES AND YOU ARE UNABLE TO REACH   YOUR PHYSICIAN - GO DIRECTLY TO THE EMERGENCY ROOM.  MD Yan Comer MD  1/25/2024 8:15:57 AM  This report has been verified and signed electronically.  Dear patient,  As a result of recent federal legislation (The Federal Cures Act), you may   receive lab or pathology results from your procedure in your [facility]   account before your physician is able to contact you. Your physician or   their representative will relay the results to you with their   recommendations at their soonest availability.  Thank you,  PROVATION

## 2024-01-25 NOTE — TRANSFER OF CARE
Anesthesia Transfer of Care Note    Patient: Linn Mckeon    Procedure(s) Performed: Procedure(s) (LRB):  COLONOSCOPY, WITH 1 OR MORE BIOPSIES (N/A)    Patient location: GI    Anesthesia Type: general    Post pain: adequate analgesia    Post assessment: no apparent anesthetic complications    Post vital signs: stable    Level of consciousness: awake and lethargic    Nausea/Vomiting: no nausea/vomiting    Complications: none    Transfer of care protocol was followedComments: Report to Becca JACOBSEN      Last vitals: 112/80 p62 r20 sat 100 fm t36

## 2024-01-25 NOTE — PLAN OF CARE
Ordered metoporol since pt did not take today. Current b/p and pulse at 65P and 109/78. Dr chapmagne here and discussed pressure. Will hold off on giving toprol  per verbal order dr champagne

## 2024-01-25 NOTE — ANESTHESIA POSTPROCEDURE EVALUATION
Anesthesia Post Evaluation    Patient: Linn Mckeon    Procedure(s) Performed: Procedure(s) (LRB):  COLONOSCOPY, WITH 1 OR MORE BIOPSIES (N/A)    Final Anesthesia Type: general      Patient location during evaluation: GI PACU  Patient participation: Yes- Able to Participate  Level of consciousness: awake and lethargic  Post-procedure vital signs: reviewed and stable  Pain management: adequate    PONV status at discharge: No PONV  Anesthetic complications: no      Cardiovascular status: hemodynamically stable  Respiratory status: unassisted, spontaneous ventilation and face mask  Hydration status: euvolemic  Follow-up not needed.              Vitals Value Taken Time   /72 01/25/24 0651   Temp 36.6 °C (97.9 °F) 01/25/24 0651   Pulse 78 01/25/24 0651   Resp 20 01/25/24 0651   SpO2 97 % 01/25/24 0651         No case tracking events are documented in the log.      Pain/Jose Score: No data recorded

## 2024-01-25 NOTE — DISCHARGE SUMMARY
[facility] - Endoscopy  Discharge Note  Short Stay    Procedure(s) (LRB):  COLONOSCOPY, WITH 1 OR MORE BIOPSIES (N/A)  Procedure of was explained to the patient in detail and consent obtained.  The patient was transferred to the endoscopy suite.  General IV anesthesia was provided by anesthesia Services.  Rectal exam was performed and normal.  The Olympus videocolonoscope was introduced per rectum and advanced around the colon to the cecum.  There was some areas of thick yellowish mucoid material present within the colonic lumen but washed clear with suction.  The ileocecal valve and appendiceal orifice were identified and normal.  The distal terminal ileum inspected and noted to be normal.  Examination of the ascending and transverse colon revealed no abnormalities.  In the proximal descending colon a 3 mm sessile polyps identified and removed with cold biopsy.  There was scattered diverticula in the descending colon and more numerous diverticula in the sigmoid colon, both large and small.  In the sigmoid colon a 3 mm sessile polyp was identified and removed with cold biopsy forceps.  In the rectum a 4 mm sessile polyp was identified and removed with cold biopsy forceps.  The rectum otherwise normal.  The endoscope was withdrawn.  Withdrawal time cecum to rectum 23 minutes.  The procedure was well tolerated and the patient returned to the recovery area for observation.      Discharge plan at the time of discharge is patient is awake alert with stable vital signs.  Her abdomen is soft and nontender.  The patient will be discharged to resume a regular diet today and normal activities tomorrow.  A follow-up colonoscopy in 3 years is recommended.    OUTCOME: Patient tolerated treatment/procedure well without complication and is now ready for discharge.    DISPOSITION: Home or Self Care    FINAL DIAGNOSIS:  <principal problem not specified>    FOLLOWUP: In clinic    DISCHARGE INSTRUCTIONS:    Discharge Procedure  Orders   Diet general     Call MD for:  temperature >100.4     Call MD for:  persistent nausea and vomiting     Call MD for:  severe uncontrolled pain     Call MD for:  difficulty breathing, headache or visual disturbances     Activity as tolerated         Clinical Reference Documents Added to Patient Instructions         Document    COLONOSCOPY DISCHARGE INSTRUCTIONS (ENGLISH)            TIME SPENT ON DISCHARGE: 10 minutes

## 2024-01-26 VITALS
HEART RATE: 60 BPM | SYSTOLIC BLOOD PRESSURE: 122 MMHG | DIASTOLIC BLOOD PRESSURE: 81 MMHG | RESPIRATION RATE: 20 BRPM | BODY MASS INDEX: 49.44 KG/M2 | WEIGHT: 289.63 LBS | HEIGHT: 64 IN | OXYGEN SATURATION: 99 % | TEMPERATURE: 98 F

## 2024-02-01 ENCOUNTER — TELEPHONE (OUTPATIENT)
Dept: ENDOSCOPY | Facility: HOSPITAL | Age: 59
End: 2024-02-01
Payer: MEDICAID

## 2024-02-01 NOTE — PROGRESS NOTES
Please let patient know that polyps removed were adenoma polyps.   These types of polyps are not cancer, but they are pre-cancerous (meaning that they can turn into cancers). Removing the polyp removes the risk of it becoming cancer.  Repeat colonoscopy is recommended in 3 years.

## 2024-02-01 NOTE — TELEPHONE ENCOUNTER
----- Message from Yan Bashir MD sent at 2/1/2024  7:11 AM CST -----  Please let patient know that polyps removed were adenoma polyps.   These types of polyps are not cancer, but they are pre-cancerous (meaning that they can turn into cancers). Removing the polyp removes the risk of it becoming cancer.  Repeat colonoscopy is recommended in 3 years.

## 2024-03-05 ENCOUNTER — CLINICAL SUPPORT (OUTPATIENT)
Dept: DIABETES | Facility: CLINIC | Age: 59
End: 2024-03-05
Payer: MEDICAID

## 2024-03-05 DIAGNOSIS — Z79.4 TYPE 2 DIABETES MELLITUS WITHOUT COMPLICATION, WITH LONG-TERM CURRENT USE OF INSULIN: ICD-10-CM

## 2024-03-05 DIAGNOSIS — E11.9 TYPE 2 DIABETES MELLITUS WITHOUT COMPLICATION, WITH LONG-TERM CURRENT USE OF INSULIN: ICD-10-CM

## 2024-03-05 PROCEDURE — 99212 OFFICE O/P EST SF 10 MIN: CPT | Mod: PBBFAC

## 2024-03-05 PROCEDURE — G0108 DIAB MANAGE TRN  PER INDIV: HCPCS | Mod: PBBFAC

## 2024-03-15 VITALS — WEIGHT: 291.38 LBS | HEIGHT: 64 IN | BODY MASS INDEX: 49.75 KG/M2

## 2024-03-15 NOTE — PROGRESS NOTES
"Diabetes Care Specialist Progress Note  Author: Cyn Weir RD  Date: 3/5/2024    Program Intake  Reason for Diabetes Program Visit:: Initial Diabetes Assessment (Initial Episode #2)  Current diabetes risk level:: moderate (risk score 1.5)  Continuous Glucose Monitoring  Patient has CGM: Yes  Personal CGM type:: Freestyle Harper 2 w/ reader    Lab Results   Component Value Date    HGBA1C 8.2 (H) 09/18/2023       Clinical    Weight: 132.2 kg (291 lb 6.4 oz)   Height: 5' 4" (162.6 cm)   Body mass index is 50.02 kg/m².    Problem Review  Active comorbidities affecting diabetes self-care.: yes  Comorbidities: Hypertension, Cardiovascular Disease    Clinical Assessment  Current Diabetes Treatment: Oral Medication, Insulin (Jardiance 25mg qAM, Lantus 30 units BID (8-10AM & 5-6PM), Metformin 1000mg BID, Januvia 100mg qAM)  Have you ever experienced hypoglycemia (low blood sugar)?: yes  Have you ever experienced hyperglycemia (high blood sugar)?: yes  In the last month, how often have you experienced high blood sugar?: more than once a day (61% of the time per Harper report)    Medication Information  How many days a week do you miss your medications?: 2    Labs  Do you have regular lab work to monitor your medications?: Yes  Type of Regular Lab Work: A1c, Cholesterol, Microalbumin, CBC, BMP  Where do you get your labs drawn?: [facility]  Lab Compliance Barriers: No    Nutritional Status  Diet: Regular  Meal Plan 24 Hour Recall: Breakfast, Lunch, Dinner, Snack  Meal Plan 24 Hour Recall - Breakfast: egg sandwich x2, Coke Zero  Meal Plan 24 Hour Recall - Lunch: meat & 1.5 cups mashed potatoes, greens or green beans, Coke Zero  Meal Plan 24 Hour Recall - Dinner: lunch leftovers  Meal Plan 24 Hour Recall - Snack: (HS) jumbo honey bun (also wakes during night to use restroom & eats snacks)  Change in appetite?: Yes (increased appetite per pt)  Recent Changes in Weight: Weight Gain (9# wt gain since September)  Current nutritional " He states you had changed his bp meds, he states it is causing dizzyness and his bp is low, these are the following readings he gave me, 93/66, 104/77, 111/60.     status an area of need that is impacting patient's ability to self-manage diabetes?: Yes    Additional Social History    Support  Does anyone support you with your diabetes care?: yes  Who supports you?: son/daughter (daughter)  Does the current support meet the patient's needs?: Yes  Is Support an area impacting ability to self-manage diabetes?: No    Access to Mass Media & Technology  Media or technology needs impacting ability to self-manage diabetes?: No    Cognitive/Behavioral Health  Alert and Oriented: Yes  Difficulty Thinking: No  Requires Prompting: No  Requires assistance for routine expression?: No  Cognitive or behavioral barriers impacting ability to self-manage diabetes?: No    Culture/Buddhist  Culture or Adventist beliefs that may impact ability to access healthcare: No    Communication  Language preference: English  Communication needs impacting ability to self-manage diabetes?: No    Health Literacy  Preferred Learning Method: Face to Face, Reading Materials  Health literacy needs impacting ability to self-manage diabetes?: No      Diabetes Self-Management Skills Assessment    Diabetes Disease Process/Treatment Options  Diabetes Disease Process/Treatment Options: Skills Assessment Completed: Yes  Assessment indicates:: Instruction Needed  Area of need?: Yes    Nutrition/Healthy Eating  Challenges to healthy eating:: portion control, snacking between meals and at night, lack of will power  Method of carbohydrate measurement:: no method  Patient can identify foods that impact blood sugar.: yes  Patient-identified foods:: soda, sweets, starchy vegetables (corn, peas, beans), starches (bread, pasta, rice, cereal)  Nutrition/Healthy Eating Skills Assessment Completed:: Yes  Assessment indicates:: Instruction Needed  Area of need?: Yes    Physical Activity/Exercise  Physical Activity/Exercise Skills Assessment Completed: : No  Deffered due to:: Time  Area of need?: Deferred    Medications  Patient is  able to describe current diabetes management routine.: yes  Diabetes management routine:: insulin, oral medications  Patient is able to identify current diabetes medications, dosages, and appropriate timing of medications.: yes  Patient reports problems or concerns with current medication regimen.: no  Medication Skills Assessment Completed:: Yes  Assessment indicates:: Instruction Needed  Area of need?: Yes    Home Blood Glucose Monitoring  Patient states that blood sugar is checked at home daily.: yes  Monitoring Method:: personal continuous glucose monitor  Personal CGM type:: Freestyle Harper 2 w/ reader  Patient is able to use personal CGM appropriately.: yes  CGM Report reviewed?: yes    Harper 2 reader download (2/21/24 to 3/5/24): See media file for details.  Pt with glucose consistently on upper end of normal range or hyperglycemia with no lows.  Noted alerts set at 70 & 240.  Will consider increasing high alert if average glucose has not decreased by next follow up.    Sensor usage: 33%  Average glucose: 202mg/dL    15% CGM readings greater than 250 mg/dL  46% CGM readings between 181-250 mg/dL  39% CGM readings in target glucose range of  mg/dL   0% CGM readings between 54-79%  0% CGM readings less than 54 mg/dL     Home Blood Glucose Monitoring Skills Assessment Completed: : Yes  Assessment indicates:: Adequate understanding  Area of need?: Yes    Acute Complications  Acute Complications Skills Assessment Completed: : No  Deffered due to:: Time  Area of need?: Deferred    Chronic Complications  Chronic Complications Skills Assessment Completed: : No  Deferred due to:: Time  Area of need?: Deferred    Psychosocial/Coping  Psychosocial/Coping Skills Assessment Completed: : No  Deffered due to:: Time  Area of need?: Deferred      Assessment Summary and Plan    Based on today's diabetes care assessment, the following areas of need were identified:          3/5/2024    12:01 AM   Social   Support No    Access to Mass Media/Tech No   Cognitive/Behavioral Health No   Culture/Orthodoxy No   Communication No   Health Literacy No            3/5/2024    12:01 AM   Clinical   Lab Compliance No   Nutritional Status Yes - Pt with excessive appetite & frequently eating excessive carbohydrate portions at meals & HS snacks.            3/5/2024    12:01 AM   Diabetes Self-Management Skills   Diabetes Disease Process/Treatment Options Yes - Discussed types of diabetes & pathophysiology.      Nutrition/Healthy Eating Yes - see care plan   Physical Activity/Exercise Deferred   Medication Yes - Reviewed mechanism of action for Jardiance & Lantus.  Pt reports missing PM doses of medications such as metformin ~ 2-3x/week.  Suggested phone alarm.  Pt reports no decrease in appetite with Trulicity in the past but states she did not have any side effects with medication.  Will reach out to endo to see if Mounjaro may bee a good fit for blood sugar control as well as appetite/wt control.   Home Blood Glucose Monitoring Yes - see care plan   Acute Complications Deferred   Chronic Complications Deferred   Psychosocial/Coping Deferred          Today's interventions were provided through individual discussion.      Patient verbalized understanding of instruction.  Pt was able to return back demonstration of instructions today. Patient understood key points, needs reinforcement and further instruction.     Diabetes Self-Management Care Plan:    Today's Diabetes Self-Management Care Plan was developed with Linn's input. Linn has agreed to work toward the following goal(s) to improve his/her overall diabetes control.      Care Plan: Diabetes Management   Updates made since 2/14/2024 12:00 AM        Problem: Blood Glucose Self-Monitoring         Goal: Pt will use Freestyle Harper reader to scan blood glucose at least 4-6 times daily and report hypoglycemia to provider    Start Date: 1/5/2023   Expected End Date: 4/2/2024   Recent  Progress: On track   Priority: High   Barriers: Lack of Supplies   Note:    2/24/23 - Pt was told by [employer] that she needs a new Rx from her provider for an additional reader and [employer] is unable to return her reader. However, her insurance provided her with a reader within the last year. Contacted Charo Perez, Diabetes Care Specialist, from [employer] for assistance. She asks that pt contact her and she will discuss with [employer] Customer [employer] on three-way call. Contacted pt to provide Charo's phone # and instructions to contact her.     5/2/23 - New reader received. Noted scanning less often last week. 1 episode of hypoglycemia noted (65) for Libreview report 4/15-4/28/23.     8/25/23 - Scanning has become less frequent over last few days but overall 4-6 times daily. Encouraged to continue.    3/5/24: Pt continues use of Harper 2 sensors with reader.  Per reports, pt scanning 2-7 times per day with only 33% use/active sensor over the past 2 weeks.  Pt reports this was because her sensor fell off early & her daughter was not able to help her put a new one one because her daughter was in the hospital.  Pt reports that she keeps reader with her at all times.         Problem: Healthy Eating         Goal: Patient will use plate method for diabetes to limit starches to </= 1 cup per meal & will go back for seconds of nonstarchy vegetables or meat as needed for satiety.    Start Date: 3/5/2024   Expected End Date: 4/2/2024   Priority: Medium   Barriers: Lack of Motivation to Change   Note:    3/5/24: Reviewed sources of carbohydrate & appropriate portion sizes.  Discussed plate method for diabetes.  Pt c/o increased appetite lately.  Pt eats 3 meals/d with snacks before bed & during the night when waking to use the restroom.  Pt mainly drinks Coke Zero & rarely drinks regular soda (</= 1x/wk).  Pt reports rice & pasta portions are typically 1.5-2 cups per meal when she eats these foods.           Follow Up Plan     Follow  up in about 4 weeks (around 4/2/2024) for review of Harper report, health rating, distress, exercise, acute/chronic complications, & coping.    Today's care plan and follow up schedule was discussed with patient.  Linn verbalized understanding of the care plan, goals, and agrees to follow up plan.        The patient was encouraged to communicate with his/her health care provider/physician and care team regarding his/her condition(s) and treatment.  I provided the patient with my contact information today and encouraged to contact me via phone or [facility] Patient Portal as needed.     Length of Visit   Total Time: 60 Minutes

## 2024-03-18 ENCOUNTER — PATIENT OUTREACH (OUTPATIENT)
Dept: ENDOCRINOLOGY | Facility: CLINIC | Age: 59
End: 2024-03-18
Payer: MEDICAID

## 2024-03-18 ENCOUNTER — TELEPHONE (OUTPATIENT)
Dept: ENDOCRINOLOGY | Facility: CLINIC | Age: 59
End: 2024-03-18

## 2024-03-18 ENCOUNTER — LAB VISIT (OUTPATIENT)
Dept: LAB | Facility: HOSPITAL | Age: 59
End: 2024-03-18
Attending: NURSE PRACTITIONER
Payer: MEDICAID

## 2024-03-18 DIAGNOSIS — Z79.4 TYPE 2 DIABETES MELLITUS WITH HYPERGLYCEMIA, WITH LONG-TERM CURRENT USE OF INSULIN: Chronic | ICD-10-CM

## 2024-03-18 DIAGNOSIS — I10 PRIMARY HYPERTENSION: ICD-10-CM

## 2024-03-18 DIAGNOSIS — E55.9 VITAMIN D DEFICIENCY: Chronic | ICD-10-CM

## 2024-03-18 DIAGNOSIS — E11.65 TYPE 2 DIABETES MELLITUS WITH HYPERGLYCEMIA, WITH LONG-TERM CURRENT USE OF INSULIN: Chronic | ICD-10-CM

## 2024-03-18 DIAGNOSIS — E11.65 UNCONTROLLED TYPE 2 DIABETES MELLITUS WITH HYPERGLYCEMIA: Primary | ICD-10-CM

## 2024-03-18 DIAGNOSIS — E78.5 HYPERLIPIDEMIA LDL GOAL <70: Chronic | ICD-10-CM

## 2024-03-18 LAB
ALBUMIN SERPL-MCNC: 3.3 G/DL (ref 3.5–5)
ALBUMIN/GLOB SERPL: 0.8 RATIO (ref 1.1–2)
ALP SERPL-CCNC: 106 UNIT/L (ref 40–150)
ALT SERPL-CCNC: 11 UNIT/L (ref 0–55)
AST SERPL-CCNC: 11 UNIT/L (ref 5–34)
BASOPHILS # BLD AUTO: 0.1 X10(3)/MCL
BASOPHILS NFR BLD AUTO: 0.9 %
BILIRUB SERPL-MCNC: 0.8 MG/DL
BUN SERPL-MCNC: 22.3 MG/DL (ref 9.8–20.1)
CALCIUM SERPL-MCNC: 9.8 MG/DL (ref 8.4–10.2)
CHLORIDE SERPL-SCNC: 104 MMOL/L (ref 98–107)
CHOLEST SERPL-MCNC: 102 MG/DL
CHOLEST/HDLC SERPL: 4 {RATIO} (ref 0–5)
CO2 SERPL-SCNC: 24 MMOL/L (ref 22–29)
CREAT SERPL-MCNC: 1.16 MG/DL (ref 0.55–1.02)
CREAT UR-MCNC: 61.1 MG/DL (ref 45–106)
DEPRECATED CALCIDIOL+CALCIFEROL SERPL-MC: 22.3 NG/ML (ref 30–80)
EOSINOPHIL # BLD AUTO: 0.15 X10(3)/MCL (ref 0–0.9)
EOSINOPHIL NFR BLD AUTO: 1.4 %
ERYTHROCYTE [DISTWIDTH] IN BLOOD BY AUTOMATED COUNT: 15.9 % (ref 11.5–17)
EST. AVERAGE GLUCOSE BLD GHB EST-MCNC: 220.2 MG/DL
GFR SERPLBLD CREATININE-BSD FMLA CKD-EPI: 54 MLS/MIN/1.73/M2
GLOBULIN SER-MCNC: 4.4 GM/DL (ref 2.4–3.5)
GLUCOSE SERPL-MCNC: 181 MG/DL (ref 74–100)
HBA1C MFR BLD: 9.3 %
HCT VFR BLD AUTO: 47.4 % (ref 37–47)
HDLC SERPL-MCNC: 29 MG/DL (ref 35–60)
HGB BLD-MCNC: 15.1 G/DL (ref 12–16)
IMM GRANULOCYTES # BLD AUTO: 0.06 X10(3)/MCL (ref 0–0.04)
IMM GRANULOCYTES NFR BLD AUTO: 0.6 %
LDLC SERPL CALC-MCNC: 61 MG/DL (ref 50–140)
LYMPHOCYTES # BLD AUTO: 1.31 X10(3)/MCL (ref 0.6–4.6)
LYMPHOCYTES NFR BLD AUTO: 12.4 %
MCH RBC QN AUTO: 26.3 PG (ref 27–31)
MCHC RBC AUTO-ENTMCNC: 31.9 G/DL (ref 33–36)
MCV RBC AUTO: 82.4 FL (ref 80–94)
MICROALBUMIN UR-MCNC: <5 UG/ML
MICROALBUMIN/CREAT RATIO PNL UR: NORMAL
MONOCYTES # BLD AUTO: 0.75 X10(3)/MCL (ref 0.1–1.3)
MONOCYTES NFR BLD AUTO: 7.1 %
NEUTROPHILS # BLD AUTO: 8.18 X10(3)/MCL (ref 2.1–9.2)
NEUTROPHILS NFR BLD AUTO: 77.6 %
NRBC BLD AUTO-RTO: 0 %
PLATELET # BLD AUTO: 283 X10(3)/MCL (ref 130–400)
PMV BLD AUTO: 10.3 FL (ref 7.4–10.4)
POTASSIUM SERPL-SCNC: 4.4 MMOL/L (ref 3.5–5.1)
PROT SERPL-MCNC: 7.7 GM/DL (ref 6.4–8.3)
RBC # BLD AUTO: 5.75 X10(6)/MCL (ref 4.2–5.4)
SODIUM SERPL-SCNC: 137 MMOL/L (ref 136–145)
TRIGL SERPL-MCNC: 58 MG/DL (ref 37–140)
TSH SERPL-ACNC: 1.08 UIU/ML (ref 0.35–4.94)
VLDLC SERPL CALC-MCNC: 12 MG/DL
WBC # SPEC AUTO: 10.55 X10(3)/MCL (ref 4.5–11.5)

## 2024-03-18 PROCEDURE — 84443 ASSAY THYROID STIM HORMONE: CPT

## 2024-03-18 PROCEDURE — 83036 HEMOGLOBIN GLYCOSYLATED A1C: CPT

## 2024-03-18 PROCEDURE — 80061 LIPID PANEL: CPT

## 2024-03-18 PROCEDURE — 95251 CONT GLUC MNTR ANALYSIS I&R: CPT | Mod: ,,, | Performed by: NURSE PRACTITIONER

## 2024-03-18 PROCEDURE — 36415 COLL VENOUS BLD VENIPUNCTURE: CPT

## 2024-03-18 PROCEDURE — 82043 UR ALBUMIN QUANTITATIVE: CPT

## 2024-03-18 PROCEDURE — 85025 COMPLETE CBC W/AUTO DIFF WBC: CPT

## 2024-03-18 PROCEDURE — 82306 VITAMIN D 25 HYDROXY: CPT

## 2024-03-18 PROCEDURE — 80053 COMPREHEN METABOLIC PANEL: CPT

## 2024-03-18 NOTE — TELEPHONE ENCOUNTER
Dispensed Days Supply Quantity Provider Pharmacy   Januvia 100 mg tablet 11/21/2023 90 90 each Leslie Gabriel, [facility] an...   Januvia 100 mg tablet 10/17/2023 30 30 each Leslie Gabriel, [facility] an...   Januvia 100 mg tablet 09/11/2023 30 30 each Leslie Gabriel, [facility] an...   Januvia 100 mg tablet 08/10/2023 30 30 each Leslie Gabriel, [facility] an...     Looks like patient may not has been taking her Januvia regularly all other medications were filled recently please ask her if she is currently taking her Januvia 100.  Mounjaro  is still in shortage  She was not taking her Januvia she can restarted if I start Mounjaro she will have to stop Januvia

## 2024-03-18 NOTE — PROGRESS NOTES
Harper interpretation BRANDIE Urbina NP    Patient has a Harper interpretation 02/21/2024-05/05/2024.  Worsening CGM active 33%.  Average glucose 202.  Time in range 15% very high, 46% high, 39% target range, 0% low, 0% very low.  Patient's scans an average of 3-7 times per day insulin data not including making interpretation limited.  Patient was a majority of her blood glucose on higher end of normal range with prandial spikes to the mid 200s.  Majority of the days patient's blood glucose lowest numbers are 130-140's.  Patient was times prandial spikes 300s indicating she may be eating high carb meal or beverages.     Diabetes is currently working with the patient on improving ADA diet patient has had uncontrolled appetite at times.

## 2024-03-21 ENCOUNTER — OFFICE VISIT (OUTPATIENT)
Dept: INTERNAL MEDICINE | Facility: CLINIC | Age: 59
End: 2024-03-21
Payer: MEDICAID

## 2024-03-21 DIAGNOSIS — J43.1 PANLOBULAR EMPHYSEMA: Chronic | ICD-10-CM

## 2024-03-21 DIAGNOSIS — E11.65 TYPE 2 DIABETES MELLITUS WITH HYPERGLYCEMIA, WITH LONG-TERM CURRENT USE OF INSULIN: Chronic | ICD-10-CM

## 2024-03-21 DIAGNOSIS — Z79.4 TYPE 2 DIABETES MELLITUS WITH HYPERGLYCEMIA, WITH LONG-TERM CURRENT USE OF INSULIN: Chronic | ICD-10-CM

## 2024-03-21 DIAGNOSIS — I50.20 HEART FAILURE WITH REDUCED EJECTION FRACTION: Chronic | ICD-10-CM

## 2024-03-21 DIAGNOSIS — E78.5 HYPERLIPIDEMIA LDL GOAL <70: Chronic | ICD-10-CM

## 2024-03-21 DIAGNOSIS — I10 PRIMARY HYPERTENSION: Primary | Chronic | ICD-10-CM

## 2024-03-21 DIAGNOSIS — Z12.31 ENCOUNTER FOR SCREENING MAMMOGRAM FOR MALIGNANT NEOPLASM OF BREAST: ICD-10-CM

## 2024-03-21 PROCEDURE — 3061F NEG MICROALBUMINURIA REV: CPT | Mod: CPTII,95,, | Performed by: NURSE PRACTITIONER

## 2024-03-21 PROCEDURE — 1159F MED LIST DOCD IN RCRD: CPT | Mod: CPTII,95,, | Performed by: NURSE PRACTITIONER

## 2024-03-21 PROCEDURE — 4010F ACE/ARB THERAPY RXD/TAKEN: CPT | Mod: CPTII,95,, | Performed by: NURSE PRACTITIONER

## 2024-03-21 PROCEDURE — 1160F RVW MEDS BY RX/DR IN RCRD: CPT | Mod: CPTII,95,, | Performed by: NURSE PRACTITIONER

## 2024-03-21 PROCEDURE — 3046F HEMOGLOBIN A1C LEVEL >9.0%: CPT | Mod: CPTII,95,, | Performed by: NURSE PRACTITIONER

## 2024-03-21 PROCEDURE — 3066F NEPHROPATHY DOC TX: CPT | Mod: CPTII,95,, | Performed by: NURSE PRACTITIONER

## 2024-03-21 PROCEDURE — 99214 OFFICE O/P EST MOD 30 MIN: CPT | Mod: 95,,, | Performed by: NURSE PRACTITIONER

## 2024-03-21 RX ORDER — ERGOCALCIFEROL 1.25 MG/1
50000 CAPSULE ORAL
Qty: 12 CAPSULE | Refills: 2 | Status: SHIPPED | OUTPATIENT
Start: 2024-03-21

## 2024-03-21 RX ORDER — KETOCONAZOLE 20 MG/G
CREAM TOPICAL DAILY
Qty: 30 G | Refills: 1 | Status: SHIPPED | OUTPATIENT
Start: 2024-03-21

## 2024-03-21 NOTE — PROGRESS NOTES
Patient ID: [id number]     Chief Complaint: Results    HPI:     The patient location is: home  The chief complaint leading to consultation is: follow up    Visit type: audiovisual    Face to Face time with patient: 18 minutes  25 minutes of total time spent on the encounter, which includes face to face time and non-face to face time preparing to see the patient (eg, review of tests), Obtaining and/or reviewing separately obtained history, Documenting clinical information in the electronic or other health record, Independently interpreting results (not separately reported) and communicating results to the patient/family/caregiver, or Care coordination (not separately reported).     Each patient to whom he or she provides medical services by telemedicine is:  (1) informed of the relationship between the physician and patient and the respective role of any other health care provider with respect to management of the patient; and (2) notified that he or she may decline to receive medical services by telemedicine and may withdraw from such care at any time.      Linn Mckeon is a 58 y.o. female with diagnosis of HTN, HLD, Class II HFpEF 50-55%, DM2, COPD with Oxygen Dependence, DAVION, Tobacco Use, Obesity. Patient seen today for follow up. Patient last seen in clinic on 09/18/2023.  Patient currently prescribed Jardiance 25 mg daily, Lantus 30 units bid, Metformin 1,000 mg bid, Januvia 100 mg daily. Patient followed by Diabetes Education and Endocrinology. Patient states she checks her CBGs at home, denies hypoglycemia. Patient is interested in Ozempic or Mounjaro, has upcoming appt with Endocrinology.   Patient currently prescribed Trilogy for chronic respiratory failure s/t COPD. Patient states she has been compliant and benefiting from her home noninvasive volume ventilator. Patient had a positive Cologuard, Colonoscopy completed on 01/25/2024.   Patient denies any other acute complaints.     Patient followed by  Diabetes Education. Last appointment on 03/05/2024. Patient has follow up appointment scheduled for 04/02/2024.     Patient followed by Cardiology Clinic. Last appointment on 01/22/2024. Heart Failure with Improved Ejection Fraction: Denies any chest pain or palpitations. Reports occasional shortness of breath but states that overall it has greatly improved. Still requires supplemental O2 on occasion, specifically when ambulating far distances or when she is out in public. She attributes majority of her shortness of breath to her severe, longstanding COPD. Etiology: NiCM. HF Status: NYHA Class II, ACC/AHA Stage C. LVEF: 59% per TTE on 11.3.23 which is improved from 50% TTE on 12.6.22. Current GDMT: Entresto 49mg/51mg, Metoprolol Succinate 100 MG, Spironolactone 25, Jardiance 10mg- Continue for now, tolerating well. Diuresis: Lasix 40mg BID. Continue low Na Diet. Strict Is/Os and daily weights. Much improved on now that on GDMT. EKG today with normal sinus rhythm, heart rate 71 beats per minute. HTN: BP at goal - 134/70. Continue Spironolactone, Entresto, Metoprolol and and Lasix. Counseled on low salt diet and exercise as tolerated. HLD:   LDL goal <70. LDL 54 per labs 5.18.23. Continue Atorvastatin 80mg daily and Zetia 10mg daily. FLP/CMP ordered per PCP, patient advised to complete this prior to her next office visit. Counseled on medication compliance. DM: A1c improved to 8.2% after addition of jardiance. Management per PCP. DAVION on Trelegy Machine: She reports nightly Trelegy compliance and feels she is benefitting from use. Recommended continued nightly Trelegy use. BLE pain - improved. KWADWO testing 9.13.22: mild arterial flow reduction in the bilateral lower extremities. Patient has follow up appointment scheduled for 05/22/2024.     Patient followed by Wound Care. Last appointment on 01/22/2024. debridement of 10 toenails performed. paring and sanding down of 8 corns/calluses. Patient has follow up appointment  scheduled for 12/26/2023.    Patient followed by Pulmonology Clinic. Last appointment on 12/12/2023. 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 Continue trelegy ellipta 100 mcg 1 puff daily, albuterol nebulizer/combivent PRN. Walked patient without O2 in clinic today, SpO2 98% at rest, lowest SPO2 while ambulating was 87%, Return to 95% w/ application of 2 L NC. She does currently qualify for renewing O2 prescription based on ambulatory SPO2 testing. Encouraged patient to try to lose weight, discussed various methods to help with weight loss. Anticipate with weight loss her shortness of breath will also improve. Continues to smoke 0.5 pk/daily (20+ pk/yr history). The Patient has been compliant and benefiting his home noninvasive volume ventilator. Recommend that he continues his home noninvasive volume ventilator during hours of sleep and as needed during the day when symptoms arise. A home CPAP/BiPAP is not appropriate for his ventilatory requirements. Patient has follow up appointment scheduled for 05/21/2024.     Patient followed by Endocrinology Clinic. Last appointment on 06/29/2023. Uncontrolled type 2 diabetes mellitus with hyperglycemia: Recent A1C 10.2. Medications: Metformin 1000 mg twice daily, Januvia 100 mg, Jardiance 10mg, Lantus 30 units BID. Changes:  Jardiance increased to 25 mg Januvia restarted on 05/18/2023. Home Glucometer Use: Harper. Eye Exam: 09/28/2021 repeat fundus today attempted previous and to obtain. Last Hypoglycemic episode: Occasional. Follow ADA diet, avoid soda, simple sweets, and limit rice, breads and starches. Maintain healthy weight, exercise 4-5 times a week for 30 minutes. Diet and medication compliance discussed on visit. Patient has follow up appointment scheduled for 12/29/2023.      Patient followed by  Ophthalmology Clinic. Last appointment on 04/28/2022. DM without retinopathy: Last A1c 03/2021: 10.3%; encouraged good control/regular PCP follow ups; Photos 4/2022; DFE next due 4/2023. Mild POAG OU: No family hx, +AA, thin CCT, upper limit normal IOP. Gonio open 360 OU; C/D: 0.4 OU; Tmax 22 // 21; OCT RNFL full OU; Ganglion cell layer wnl OU; Latanoprost QHS OU started 9/1/21; IOP good today. Dry eye/MGD: Diffuse, confluent PEE OU; Start ATs 4-6 times per day, ointment QHS; Warm compresses BID. Presbyopia: doing well, monitor. HTN retinopathy: Recommend good bp control. RTC 6 mo IOP. No follow up appointment noted, will message clinic.      Review of patient's allergies indicates:  No Known Allergies    Breast Cancer Screening: MMG benign on 04/10/2023; ordered 03/21/2024  Cervical Cancer Screening: Hysterectomy  Colorectal Cancer Screening: Colonoscopy on 01/25/2024 with recommended repeat in 3 years  Diabetic Eye Exam: 09/18/2023  Diabetic Foot Exam: 01/22/2024  Lung Cancer Screening: Refuses at this time  Prostate Cancer Screening: N/A  AAA Screening: N/A  Osteoporosis Screening: deferred due to age  Medicare Wellness: N/A  Immunizations:   Immunization History   Administered Date(s) Administered    COVID-19, MRNA, LN-S, PF (Pfizer) (Purple Cap) 07/28/2021, 08/18/2021    Tdap 09/18/2023     Past Surgical History:   Procedure Laterality Date    COLONOSCOPY, WITH POLYPECTOMY USING HOT BIOPSY FORCEPS N/A 01/25/2024    Procedure: COLONOSCOPY, WITH POLYPECTOMY USING HOT BIOPSY FORCEPS;  Surgeon: Yan Bashir MD;  Location: [facility] ENDOSCOPY;  Service: Endoscopy;  Laterality: N/A;  Cold forceps    HYSTERECTOMY       family history includes Diabetes in her brother, mother, and sister; Heart attack in her father; Heart disease in her mother; Hypertension in her brother, mother, and sister.    Social History     Socioeconomic History    Marital status:    Tobacco Use    Smoking status: Every Day     Current  packs/day: 0.25     Types: Cigarettes    Smokeless tobacco: Never    Tobacco comments:     Smokes 5 cigarettes a day   Substance and Sexual Activity    Alcohol use: Not Currently    Drug use: Never    Sexual activity: Not Currently     Social Determinants of Health     Financial Resource Strain: Low Risk  (3/21/2024)    Overall Financial Resource Strain (CARDIA)     Difficulty of Paying Living Expenses: Not hard at all   Food Insecurity: No Food Insecurity (3/21/2024)    Hunger Vital Sign     Worried About Running Out of Food in the Last Year: Never true     Ran Out of Food in the Last Year: Never true   Transportation Needs: No Transportation Needs (3/21/2024)    PRAPARE - Transportation     Lack of Transportation (Medical): No     Lack of Transportation (Non-Medical): No   Physical Activity: Inactive (3/21/2024)    Exercise Vital Sign     Days of Exercise per Week: 0 days     Minutes of Exercise per Session: 0 min   Stress: No Stress Concern Present (3/21/2024)    [employer] [place] [employer] - Occupational Stress Questionnaire     Feeling of Stress : Not at all   Social Connections: Unknown (3/21/2024)    Social Connection and Isolation Panel [NHANES]     Frequency of Communication with Friends and Family: Twice a week     Frequency of Social Gatherings with Friends and Family: Never     Active Member of Clubs or Organizations: No     Attends Club or Organization Meetings: Never     Marital Status:    Housing Stability: Low Risk  (3/21/2024)    Housing Stability Vital Sign     Unable to Pay for Housing in the Last Year: No     Number of Places Lived in the Last Year: 1     Unstable Housing in the Last Year: No     Current Outpatient Medications   Medication Instructions    albuterol (PROVENTIL) 2.5 mg, Nebulization, Every 6 hours PRN, Rescue    ammonium lactate (LAC-HYDRIN) 12 % lotion APPLY TO DRY SKIN AND RUB IN WELL TWICE DAILY. NOTHING BETWEEN TOES    aspirin (ECOTRIN) 81 mg, Oral, Daily  "   atorvastatin (LIPITOR) 80 mg, Oral, Daily    BD ULTRA-FINE JORDON PEN NEEDLE 32 gauge x 5/32" Ndle 2 times daily    BD ULTRA-FINE JORDON PEN NEEDLE 32 gauge x 5/32" Ndle No dose, route, or frequency recorded.    blood sugar diagnostic Strp To check BG 2 times daily, to use with insurance preferred meter    blood-glucose meter kit To check BG 2 times daily, to use with insurance preferred meter    buPROPion (WELLBUTRIN SR) 150 MG TBSR 12 hr tablet Take 1 tablet (150 mg ) by mouth 2 (two) times daily.    CICLOPIROX-ITRACONAZOLE-UREA TOP CLEAN NAILS, SHAKE BOTTLE WELL, APPLY TWICE DAILY TO ALL AFFECTED NAILS USING APPLICATOR. (UP TO 2 MLS/DAY)    empagliflozin (JARDIANCE) 25 mg, Oral, Daily    ergocalciferol (ERGOCALCIFEROL) 50,000 Units, Oral, Every 7 days    ezetimibe (ZETIA) 10 mg, Oral, Daily    flash glucose scanning reader (FREESTYLE JOSE ALFREDO 2 READER) [facility] [place] Alfredo 2 reader dispense 1. Use as directed    flash glucose sensor (FREESTYLE JOSE ALFREDO 2 SENSOR) Kit Jose Alfredo 2 sensor change every 14 days (Disp 6 each)(90 day)    fluticasone-umeclidin-vilanter (TRELEGY ELLIPTA) 100-62.5-25 mcg DsDv 1 puff, Inhalation, Daily    furosemide (LASIX) 40 mg, Oral, 2 times daily    insulin glargine (LANTUS U-100 INSULIN) 30 Units, Subcutaneous, 2 times daily    ipratropium-albuteroL (COMBIVENT RESPIMAT)  mcg/actuation inhaler 2 puffs, Inhalation, Every 6 hours PRN, Rescue    ketoconazole (NIZORAL) 2 % cream Topical (Top), Daily    lancets (ACCU-CHEK FASTCLIX LANCET DRUM) [facility] [place] DAILY    LANCETS MISC No dose, route, or frequency recorded.    latanoprost 0.005 % ophthalmic solution 1 drop, Ophthalmic    metFORMIN (GLUCOPHAGE) 1,000 mg, Oral, 2 times daily with meals    metoprolol succinate (TOPROL-XL) 100 mg, Oral, Daily    mupirocin (BACTROBAN) 2 % ointment Topical (Top), 2 times daily    nicotine (NICODERM CQ) 21 mg/24 hr 1 patch, Transdermal, Daily    nicotine polacrilex 4 mg, Oral, As needed (PRN)    nystatin " "(MYCOSTATIN) ointment Topical (Top), 2 times daily    pen needle, diabetic 31 gauge x 5/16" Ndle 1 each, Misc.(Non-Drug; Combo Route), 2 times daily with meals    polyethylene glycol (MOVIPREP) 100-7.5-2.691 gram solution 2,000 mLs, Oral, See admin instructions    propylene glycoL (SYSTANE BALANCE) 0.6 % Drop 1 drop, Ophthalmic, 2 times daily    sacubitriL-valsartan (ENTRESTO) 49-51 mg per tablet 1 tablet, Oral, 2 times daily    SITagliptin phosphate (JANUVIA) 100 mg, Oral, Daily    spironolactone (ALDACTONE) 25 mg, Oral, Daily    varenicline (CHANTIX STARTING MONTH BOX) 0.5 mg (11)- 1 mg (42) tablet Take one 0.5mg tab by mouth once daily X3 days,then increase to one 0.5mg tab twice daily X4 days,then increase to one 1mg tab twice daily       Subjective:     Review of Systems   Constitutional: Negative.    HENT: Negative.     Eyes: Negative.    Respiratory: Negative.     Cardiovascular: Negative.    Gastrointestinal: Negative.    Endocrine: Negative.    Genitourinary: Negative.    Musculoskeletal: Negative.    Skin: Negative.    Allergic/Immunologic: Negative.    Neurological: Negative.    Hematological: Negative.    Psychiatric/Behavioral: Negative.         Objective:     There were no vitals taken for this visit.      Physical Exam  Neurological:      Mental Status: She is alert and oriented to person, place, and time.   Psychiatric:         Mood and Affect: Mood normal.       Labs Reviewed:     Hematology:  Lab Results   Component Value Date    WBC 10.55 03/18/2024    HGB 15.1 03/18/2024    HCT 47.4 (H) 03/18/2024     03/18/2024     Chemistry:  Lab Results   Component Value Date     03/18/2024    K 4.4 03/18/2024    CHLORIDE 104 03/18/2024    BUN 22.3 (H) 03/18/2024    CREATININE 1.16 (H) 03/18/2024    EGFRNORACEVR 54 03/18/2024    GLUCOSE 181 (H) 03/18/2024    CALCIUM 9.8 03/18/2024    ALKPHOS 106 03/18/2024    LABPROT 7.7 03/18/2024    ALBUMIN 3.3 (L) 03/18/2024    BILIDIR 0.2 12/06/2021    IBILI " 0.30 12/06/2021    AST 11 03/18/2024    ALT 11 03/18/2024    MG 2.20 09/12/2022    PHOS 4.4 09/11/2022    [id number] 22.3 (L) 03/18/2024     Lab Results   Component Value Date    HGBA1C 9.3 (H) 03/18/2024     Lipid Panel:  Lab Results   Component Value Date    CHOL 102 03/18/2024    HDL 29 (L) 03/18/2024    LDL 61.00 03/18/2024    TRIG 58 03/18/2024    TOTALCHOLEST 4 03/18/2024     Thyroid:  Lab Results   Component Value Date    TSH 1.084 03/18/2024     Urine:  Lab Results   Component Value Date    COLORUA Yellow 05/18/2023    APPEARANCEUA Turbid (A) 05/18/2023    SGUA 1.019 05/18/2023    PHUA 6.0 05/18/2023    PROTEINUA Trace (A) 05/18/2023    GLUCOSEUA 2+ (A) 05/18/2023    KETONESUA Negative 05/18/2023    BLOODUA 1+ (A) 05/18/2023    NITRITESUA Negative 05/18/2023    LEUKOCYTESUR 500 (A) 05/18/2023    RBCUA 21-50 (A) 05/18/2023    WBCUA 21-50 (A) 05/18/2023    BACTERIA Few (A) 05/18/2023    SQEPUA Moderate (A) 05/18/2023    HYALINECASTS None Seen 05/18/2023    CREATRANDUR 61.1 03/18/2024        Assessment:       ICD-10-CM ICD-9-CM   1. Primary hypertension  I10 401.9   2. Hyperlipidemia LDL goal <70  E78.5 272.4   3. Heart failure with reduced ejection fraction  I50.20 428.20   4. Panlobular emphysema  J43.1 492.8   5. Type 2 diabetes mellitus with hyperglycemia, with long-term current use of insulin  E11.65 250.00    Z79.4 790.29     V58.67   6. Encounter for screening mammogram for malignant neoplasm of breast  Z12.31 V76.12       Plan:     1. Primary hypertension  There were no vitals filed for this visit.     Results for orders placed or performed in visit on 05/18/23   Microalbumin/Creatinine Ratio, Urine   Result Value Ref Range    Urine Microalbumin 19.5 <=30.0 ug/ml    Urine Creatinine 103.8 47.0 - 110.0 mg/dL    Microalbumin Creatinine Ratio 18.8 0.0 - 30.0 mg/gm Cr     Results for orders placed or performed in visit on 01/22/24   IN OFFICE EKG 12-LEAD (to [place])    Collection Time: 01/22/24 11:33 AM     Narrative    Test Reason : I10,    Vent. Rate : 071 BPM     Atrial Rate : 071 BPM     P-R Int : 200 ms          QRS Dur : 086 ms      QT Int : 394 ms       P-R-T Axes : 000 219 203 degrees     QTc Int : 428 ms    Normal sinus rhythm  Right superior axis deviation  Low voltage QRS  T wave abnormality, consider inferior ischemia  Abnormal ECG    Confirmed by Reynold Eaton MD ([id number]) on 1/22/2024 5:44:03 PM    Referred By:             Confirmed By:Reynold Eaton MD   Continue Coreg 25 mg bid, Lasix 40 mg bid, Spironolactone 25 mg daily, Entresto 24-26 mg bid  DASH diet  Encouraged home blood pressure monitoring    2. Hyperlipidemia LDL goal <70  Continue Atorvastatin 80 mg daily and Zetia 10 mg daily  Weight loss encouraged  Low fat/high fiber diet  Increase physical activity  Tobacco cessation encouraged  Cholesterol Total   Date Value Ref Range Status   03/18/2024 102 <=200 mg/dL Final     HDL Cholesterol   Date Value Ref Range Status   03/18/2024 29 (L) 35 - 60 mg/dL Final     Triglyceride   Date Value Ref Range Status   03/18/2024 58 37 - 140 mg/dL Final     LDL Cholesterol   Date Value Ref Range Status   03/18/2024 61.00 50.00 - 140.00 mg/dL Final     3. Heart failure with reduced ejection fraction  Followed by Cardiology Clinic  Continue Coreg 25 mg bid, Lasix 40 mg bid, Spironolactone 25 mg daily, Entresto 24-26 mg bid  - empagliflozin (JARDIANCE) 25 mg tablet; Take 1 tablet (25 mg total) by mouth once daily.  Dispense: 90 tablet; Refill: 2    4. Panlobular emphysema  Followed by Pulmonology Clinic  Continue O2  PFTs on 10/12/2022  Continue Trelegy Ellipta daily, Combivent prn, Albuterol prn    5. Type 2 diabetes mellitus with hyperglycemia, with long-term current use of insulin  Continue Lantus 30 units bid, Metformin 1,000 mg bid, Januvia 100 mg daily, Jardiance to 25 mg daily  Encouraged home CBG monitoring.  Hypoglycemic episodes: denies  There is no height or weight on file to calculate BMI.  Hemoglobin  A1c   Date Value Ref Range Status   03/18/2024 9.3 (H) <=7.0 % Final     Results for orders placed or performed in visit on 03/18/24   Microalbumin/Creatinine Ratio, Urine   Result Value Ref Range    Urine Microalbumin <5.0 <=30.0 ug/ml    Urine Creatinine 61.1 45.0 - 106.0 mg/dL    Microalbumin Creatinine Ratio     On Atorvastatin and Zetia  On Entresto  Weight Loss Encouraged  ADA Diet    6. Encounter for screening mammogram for malignant neoplasm of breast  - Mammo Digital Screening Bilat w/ Damian; Future      Follow up in about 3 months (around 6/21/2024) for Labs. In addition to their scheduled follow up, the patient has also been instructed to follow up on as needed basis.     Leslie Gabriel, LEXUSP

## 2024-03-25 DIAGNOSIS — E11.65 UNCONTROLLED TYPE 2 DIABETES MELLITUS WITH HYPERGLYCEMIA: ICD-10-CM

## 2024-03-25 RX ORDER — PEN NEEDLE, DIABETIC 31 GX5/16"
NEEDLE, DISPOSABLE MISCELLANEOUS 2 TIMES DAILY
Qty: 60 EACH | Refills: 11 | Status: SHIPPED | OUTPATIENT
Start: 2024-03-25

## 2024-04-02 ENCOUNTER — CLINICAL SUPPORT (OUTPATIENT)
Dept: DIABETES | Facility: CLINIC | Age: 59
End: 2024-04-02
Payer: MEDICAID

## 2024-04-02 DIAGNOSIS — E11.9 TYPE 2 DIABETES MELLITUS WITHOUT COMPLICATION, WITH LONG-TERM CURRENT USE OF INSULIN: Primary | ICD-10-CM

## 2024-04-02 DIAGNOSIS — Z79.4 TYPE 2 DIABETES MELLITUS WITHOUT COMPLICATION, WITH LONG-TERM CURRENT USE OF INSULIN: Primary | ICD-10-CM

## 2024-04-02 PROCEDURE — G0108 DIAB MANAGE TRN  PER INDIV: HCPCS | Mod: PBBFAC

## 2024-04-02 PROCEDURE — 99211 OFF/OP EST MAY X REQ PHY/QHP: CPT | Mod: PBBFAC

## 2024-04-11 ENCOUNTER — TELEPHONE (OUTPATIENT)
Dept: DIABETES | Facility: CLINIC | Age: 59
End: 2024-04-11
Payer: MEDICAID

## 2024-04-11 VITALS — BODY MASS INDEX: 49.4 KG/M2 | WEIGHT: 289.38 LBS | HEIGHT: 64 IN

## 2024-04-11 NOTE — PROGRESS NOTES
"Diabetes Care Specialist Follow-up Note  Author: Cyn Weir RD  Date: 4/2/2024    Program Intake  Reason for Diabetes Program Visit:: Intervention  Type of Intervention:: Individual  Individual: Education  Education: Self-Management Skill Review  Current diabetes risk level:: moderate (risk score 2)    Lab Results   Component Value Date    HGBA1C 9.3 (H) 03/18/2024     A1c Pre Diabetes Care Specialist Intervention:  8.2% (9/18/23)    Clinical    Weight: 131.3 kg (289 lb 6.4 oz)   Height: 5' 4" (162.6 cm)   Body mass index is 49.68 kg/m².  Wt loss of 2 lbs since last visit on 3/5/24    Medications:  - Jardiance 25mg qAM  - Januvia 100mg qAM  - Metformin 1000mg BID  - Lantus 30 units BID    ** Pt c/o difficulty remembering to take PM meds (2nd dose of metformin & lantus).  Will check with endo about taking 60 units Lantus in AM vs 30 BID      [facility]:   [employer] reader download (3/19/24 to 4/1/24): See media file for full report.  Overall, average glucose improved from 202 to 186 & TIR improved from 39% to 45% since last visit.  No hypoglycemia episodes noted.  Noted some post-prandial hyperglycemia with glucose levels typically remaining on upper end of normal range between meals.    Sensor usage: 74%  Average glucose: 186mg/dL  GMI: 7.8%    7% CGM readings greater than 250 mg/dL  48% CGM readings between 181-250 mg/dL  45% CGM readings in target glucose range of  mg/dL   0% CGM readings between 54-79%  0% CGM readings less than 54 mg/dL     Diabetes Self-Management Skills Assessment     Physical Activity/Exercise  Patient's daily activity level:: sedentary  Physical Activity/Exercise Skills Assessment Completed: : Yes  Assessment indicates:: Instruction Needed  Area of need?: Yes    Acute Complications  Acute Complications Skills Assessment Completed: : Yes  Assessment indicates:: Instruction Needed  Area of need?: Yes    Chronic Complications  Chronic Complications Skills Assessment Completed: : Yes  Assessment " indicates:: Instruction Needed  Area of need?: Yes    Psychosocial/Coping  Patient can identify ways of coping with chronic disease.: yes  Patient-stated ways of coping with chronic disease:: spiritual/Sikh practices (listening to gospel music, watching movies)  Psychosocial/Coping Skills Assessment Completed: : Yes  Assessment indicates:: Adequate understanding  Area of need?: No      During today's follow-up visit,  the following areas required further assessment and content was provided/reviewed.    Based on today's diabetes care assessment, the following areas of need were identified:            4/2/2024    12:01 AM   Diabetes Self-Management Skills   Physical Activity/Exercise Yes - Discussed effects of exercise on glucose level.  Pt limited to short spurts of activity due to oxygen needs but is always moving around the house.   Acute Complications Yes - Reviewed hyperglycemia/hypoglycemia/DKA, s/s, & treatment.  Pt accurately describes hypoglycemia as glucose < 70 & states it has been months since last hypoglycemia episode.  She reports feeling tired/hungry/sweaty/grouchy/confused when experiencing hypoglycemia & typically treats with a peanut butter & jelly sandwich.  Discussed proper treatment with rule of 15s with fast acting source of carbohydrate.  Pt reports symptoms of hyperglycemia are thirst/urination/tired & reports hyperglycemia is glucose > 180.  Pt has never been hospitalized for high blood sugar per her reports.    Chronic Complications Yes - Discussed chronic complications of uncontrolled diabetes.  Discussed current A1C & A1C goal.  Explained that A1C < 7% decreases risk of complications.    Psychosocial/Coping No - Discussed effects of stress on glucose & coping mechanisms.        Today's interventions were provided through individual discussion.    Patient verbalized understanding of instruction.  Pt was able to return back demonstration of instructions today. Patient understood key  points, needs reinforcement and further instruction.     Diabetes Self-Management Care Plan Review and Evaluation of Progress:    During today's follow-up Linn's Diabetes Self-Management Care Plan progress was reviewed and progress was evaluated including his/her input. Linn has agreed to continue his/her journey to improve/maintain overall diabetes control by continuing to set health goals. See care plan progress below.      Care Plan: Diabetes Management   Updates made since 3/12/2024 12:00 AM        Problem: Blood Glucose Self-Monitoring         Goal: Pt will use Freestyle Harper reader to scan blood glucose at least 4-6 times daily and report hypoglycemia to provider    Start Date: 1/5/2023   Expected End Date: 6/11/2024   This Visit's Progress: On track   Recent Progress: On track   Priority: High   Barriers: Lack of Supplies   Note:      2/24/23 - Pt was told by Kelan that she needs a new Rx from her provider for an additional reader and Kelan is unable to return her reader. However, her insurance provided her with a reader within the last year. Contacted Charo Perez, Diabetes Care Specialist, from [employer] for assistance. She asks that pt contact her and she will discuss with [employer] on three-way call. Contacted pt to provide Charo's phone # and instructions to contact her.     5/2/23 - New reader received. Noted scanning less often last week. 1 episode of hypoglycemia noted (65) for Lib[employer] report 4/15-4/28/23.     8/25/23 - Scanning has become less frequent over last few days but overall 4-6 times daily. Encouraged to continue.    3/5/24: Pt continues use of Harper 2 sensors with reader.  Per reports, pt scanning 2-7 times per day with only 33% use/active sensor over the past 2 weeks.  Pt reports this was because her sensor fell off early & her daughter was not able to help her put a new one one because her daughter was in the hospital.  Pt reports that she keeps reader with her  at all times.      4/2/24: Pt with active Harper 2 sensor 74% of the time over the past 2 weeks.  Per reports, she is scanning 3-7x/d (3x/d on 5 out of 14 days).  Due to some gaps in information in late PM/early AM, encouraged pt to scan right before bed & upon waking in the AM.  Pt verbalizes understanding.         Problem: Healthy Eating         Goal: Patient will use plate method for diabetes to limit starches to </= 1 cup per meal & will go back for seconds of nonstarchy vegetables or meat as needed for satiety.    Start Date: 3/5/2024   Expected End Date: 6/11/2024   This Visit's Progress: On track   Priority: Medium   Barriers: Lack of Motivation to Change   Note:    4/2/24: 24h food recall: breakfast = skipped; late lunch = ribs, 1 cup potato salad, water; dinner = chicken tenders, 1/2 cup potato salad, glass of Cola; snacks = none.  Per food recall, pt adequately limiting starches to </= 1 cup per meal as discussed with the exception of soda consumption in addition to starch with 1 meal.  Will continue to monitor.    3/5/24: Reviewed sources of carbohydrate & appropriate portion sizes.  Discussed plate method for diabetes.  Pt c/o increased appetite lately.  Pt eats 3 meals/d with snacks before bed & during the night when waking to use the restroom.  Pt mainly drinks Coke Zero & rarely drinks regular soda (</= 1x/wk).  Pt reports rice & pasta portions are typically 1.5-2 cups per meal when she eats these foods.         Follow Up Plan     Follow up in about 10 weeks (around 6/11/2024) for Harper report & goal progress.  Will consider post-program if new A1C level improved.    Today's care plan and follow up schedule was discussed with patient.  Linn verbalized understanding of the care plan, goals, and agrees to follow up plan.        The patient was encouraged to communicate with his/her health care provider/physician and care team regarding his/her condition(s) and treatment.  I provided the patient with  my contact information today and encouraged to contact me via phone or [facility] Patient Portal as needed.     Length of Visit   Total Time: 60 Minutes

## 2024-04-11 NOTE — TELEPHONE ENCOUNTER
Given her average daily basal dose is barely above 30 units, I would not increase to 60u qam.    I would instead increase to 33u qam and have her increase the dose by 3u every 3 days until fastings are in range of .

## 2024-04-11 NOTE — TELEPHONE ENCOUNTER
Hey!    I recently saw this patient of Jacqueline's for diabetes education & she is having difficulty remembering her PM meds.  Her Harper report is uploaded to .  She has an average glucose of 186 with no hypoglycemia.  I just wanted to run it by someone and see if it would be a problem to switch her Lantus regimen from 30 units BID to 60 units qAM to help with compliance.  Currently, she forgets the 30 unit PM dose the majority of the time.  Please advise.  Medications for diabetes are as follows:    - Jardiance 25mg qAM  - Januvia 100mg qAM  - Metformin 1000mg BID  - Lantus 30 units BID    Thanks!

## 2024-04-12 NOTE — TELEPHONE ENCOUNTER
Cyn Weir RD Stout, Daniel G., MD; [facility] Endocrinology Clinical Support Staff16 hours ago (3:39 PM)     JJEANE  That is a good point.  Thank you.  I called the patient & instructed her to begin taking 33 units every AM (no insulin in PM) & increase by 3 units q3d until fasting glucose of  is reached.  Pt verbalizes understanding & is able to repeat back instructions.  Encouraged pt to call as needed with questions or concerns.

## 2024-04-25 ENCOUNTER — HOSPITAL ENCOUNTER (OUTPATIENT)
Dept: RADIOLOGY | Facility: HOSPITAL | Age: 59
Discharge: HOME | End: 2024-04-25
Attending: NURSE PRACTITIONER
Payer: MEDICAID

## 2024-04-25 DIAGNOSIS — Z12.31 ENCOUNTER FOR SCREENING MAMMOGRAM FOR MALIGNANT NEOPLASM OF BREAST: ICD-10-CM

## 2024-04-25 PROCEDURE — 77067 SCR MAMMO BI INCL CAD: CPT | Mod: 26,,, | Performed by: RADIOLOGY

## 2024-04-25 PROCEDURE — 77063 BREAST TOMOSYNTHESIS BI: CPT | Mod: 26,,, | Performed by: RADIOLOGY

## 2024-04-25 PROCEDURE — 77067 SCR MAMMO BI INCL CAD: CPT | Mod: TC

## 2024-05-10 NOTE — PROGRESS NOTES
CHIEF COMPLAINT:   No chief complaint on file.                                                 HPI:  Linn Mckeon 58 y.o. female  with a PMH significant for Diabetes, DAVION on BiPAP, HTN, HLD, Depression, COPD on home O2, and Tobacco Use  who presents to cardiology clinic for follow up.  She has baseline COPD that is severe which is the main cause of her shortness of breath- she uses supplemental O2, especially when out in public. Echo completed November 2023 revealed EF of 59%, mild RV enlargement, overall normal systolic function, mild MA, otherwise unremarkable.  See full report below.  At last office visit patient stated that she was feeling stable overall.  She endorsed ongoing shortness of breath that was her baseline and stated that she continued to use her supplemental oxygen as needed.  She denied any further complaints at that time.    Today the patient states that she feels stable from a cardiac standpoint.  As previously stated, she continues to have SOB/GOODMAN secondary to her severe COPD.  She otherwise states that she feels good.  She states that she feels better than she did at her last visit.  She denies any chest pain, palpitations, PND, orthopnea, peripheral edema, claudication symptoms, lightheadedness, dizziness, or syncope.  She states that she is able to complete her ADLs without any ischemic symptoms.  She reports compliance with all her current medications.  She continues to smoke approximately 1/2 pack of cigarettes per day but hopes to quit.  She continues to use trilogy at night for sleep apnea in his greatly benefitting from use.    Historical Information: Patient completed Lexiscan stress test on September 7, 2022 which was normal.  See report below.  She also completed KWADWO testing on September 13, 2022 which revealed mild arterial flow reduction in the bilateral lower extremities.  See report below.  She completed an Echocardiogram on July 26, 2022 which revealed a reduced ejection  fraction of 40% with Grade 1 diastolic dysfunction.  See report below.    She previously completed an Echocardiogram on May 10, 2021 which revealed mild concentric LVH, EF of approximately 50 to 55%, mild MR, and mild aortic valve sclerosis without stenosis.    Patient had a hospital admission on September 11, 2022 for COPD exacerbation and acute CHF exacerbation.  She was treated with IV Lasix, antibiotics, steroids, and neb treatments.                                                                                                                                                                                                                                                                                                                                                                                                                                                                                       CARDIAC TESTING:  Echo 11.3.23    Left Ventricle: There is mild concentric hypertrophy. There is normal systolic function. Biplane (2D) method of discs ejection fraction is 59%.    Right Ventricle: Mild right ventricular enlargement. Systolic function is normal.    Aortic Valve: The aortic valve is a trileaflet valve. There is mild aortic valve sclerosis. There is normal leaflet mobility.    Mitral Valve: The mitral valve is structurally normal.    Pulmonic Valve: There is mild regurgitation.    IVC/SVC: Normal venous pressure at 3 mmHg.    Echocardiogram  Results for orders placed during the hospital encounter of 12/06/22  Echo  Interpretation Summary  · The left ventricle is normal in size with mild concentric hypertrophy and low normal systolic function.  · Normal right ventricular size with low normal right ventricular systolic function.  · The estimated ejection fraction is 50%.  · Grade I left ventricular diastolic dysfunction.  · Mild to moderate pulmonic regurgitation.        Echo - 7/26/2022   Interpretation  Summary  · Concentric hypertrophy and mildly decreased systolic function.  · The estimated ejection fraction is 40%.  · Grade I left ventricular diastolic dysfunction.  · Severe right ventricular enlargement.  · Mild left atrial enlargement.  · Moderate right atrial enlargement.  · Mild mitral regurgitation.  · Mild tricuspid regurgitation.  · Elevated central venous pressure (15 mmHg).  · The estimated PA systolic pressure is 56 mmHg.  · There is pulmonary hypertension.  · IVC is dilated and collapses<50% with inspiration.  · Mild to moderate pulmonic regurgitation.     Stress Test  Results for orders placed during the hospital encounter of 08/30/22  Nuclear Stress - Cardiology Interpreted  Interpretation Summary    Normal myocardial perfusion scan. There is no evidence of myocardial ischemia or infarction.    The gated perfusion images showed an ejection fraction of 55% at rest. The gated perfusion images showed an ejection fraction of 51% post stress.    The EKG portion of this study is abnormal but not diagnostic.    The patient reported no chest pain during the stress test.    There were no arrhythmias during stress.  The patient has a low risk nuclear stress alex.  Nuclear stress test indicates no ischemia.     Lexiscan Stress Test 9.7.22:  Normal myocardial perfusion scan. There is no evidence of myocardial ischemia or infarction.  The gated perfusion images showed an ejection fraction of 55% at rest. The gated perfusion images showed an ejection fraction of 51% post stress.  The EKG portion of this study is abnormal but not diagnostic.  The patient reported no chest pain during the stress test.  There were no arrhythmias during stress.        KWADWO Testing 9.13.22:  The right lower extremity demonstrated multiphasic waveforms at all levels.   The KWADWO on the right was mildly abnormal.  The right lower extremity demonstrated mild arterial flow reduction.  The left lower extremity demonstrated multiphasic  waveforms at all levels.   The KWAWDO on the left was mildly abnormal.  The left lower extremity demonstrated mild arterial flow reduction.          Patient Active Problem List   Diagnosis    GOODMAN (dyspnea on exertion)    Chronic heart failure with preserved ejection fraction    Primary hypertension    Hyperlipidemia LDL goal <70    Type 2 diabetes mellitus with hyperglycemia, with long-term current use of insulin    Peripheral edema    DAVION on CPAP    Class 3 severe obesity due to excess calories with serious comorbidity and body mass index (BMI) of 45.0 to 49.9 in adult    Noncompliance with treatment plan    Xerosis of skin    Callus of foot    Dystrophic nail    Non compliance w medication regimen    Heart failure with reduced ejection fraction    Cigarette nicotine dependence without complication    COPD (chronic obstructive pulmonary disease)    Vitamin D deficiency    Mycotic toenails    Encounter for comprehensive diabetic foot examination, type 2 diabetes mellitus    BMI 45.0-49.9, adult    Type 2 diabetes mellitus with skin complication, with long-term current use of insulin    Right foot pain    Left foot pain    Impaired gait    Diverticulosis large intestine w/o perforation or abscess w/o bleeding    Adenomatous polyp of descending colon    Adenomatous polyp of sigmoid colon    Adenomatous rectal polyp     Past Surgical History:   Procedure Laterality Date    COLONOSCOPY, WITH POLYPECTOMY USING HOT BIOPSY FORCEPS N/A 01/25/2024    Procedure: COLONOSCOPY, WITH POLYPECTOMY USING HOT BIOPSY FORCEPS;  Surgeon: Yan Bashir MD;  Location: [facility] ENDOSCOPY;  Service: Endoscopy;  Laterality: N/A;  Cold forceps    HYSTERECTOMY       Social History     Socioeconomic History    Marital status:    Tobacco Use    Smoking status: Every Day     Current packs/day: 0.25     Types: Cigarettes    Smokeless tobacco: Never    Tobacco comments:     Smokes 5 cigarettes a day   Substance and Sexual Activity    Alcohol  use: Not Currently    Drug use: Never    Sexual activity: Not Currently     Social Determinants of Health     Financial Resource Strain: Low Risk  (3/21/2024)    Overall Financial Resource Strain (CARDIA)     Difficulty of Paying Living Expenses: Not hard at all   Food Insecurity: No Food Insecurity (3/21/2024)    Hunger Vital Sign     Worried About Running Out of Food in the Last Year: Never true     Ran Out of Food in the Last Year: Never true   Transportation Needs: No Transportation Needs (3/21/2024)    PRAPARE - Transportation     Lack of Transportation (Medical): No     Lack of Transportation (Non-Medical): No   Physical Activity: Inactive (3/21/2024)    Exercise Vital Sign     Days of Exercise per Week: 0 days     Minutes of Exercise per Session: 0 min   Stress: No Stress Concern Present (3/21/2024)    [place] [employer] - Occupational Stress Questionnaire     Feeling of Stress : Not at all   Housing Stability: Low Risk  (3/21/2024)    Housing Stability Vital Sign     Unable to Pay for Housing in the Last Year: No     Number of Places Lived in the Last Year: 1     Unstable Housing in the Last Year: No        Family History   Problem Relation Name Age of Onset    Hypertension Mother Rex Hood     Heart disease Mother Rex Hood     Diabetes Mother Rex Hood     Heart attack Father      Diabetes Sister Ellie Aguiar     Hypertension Sister Ellie Aguiar     Diabetes Brother Kushal Hood     Hypertension Brother Kushal Hood      Review of patient's allergies indicates:  No Known Allergies      ROS:  Review of Systems   Constitutional: Negative.    HENT: Negative.     Eyes: Negative.    Respiratory: Negative.  Negative for shortness of breath (Occasionally).    Cardiovascular: Negative.  Negative for chest pain, palpitations, orthopnea, claudication, leg swelling and PND.   Gastrointestinal: Negative.    Genitourinary: Negative.    Musculoskeletal: Negative.   "  Skin: Negative.    Neurological: Negative.    Endo/Heme/Allergies: Negative.    Psychiatric/Behavioral: Negative.                                                                                                                                                                                  Negative except as stated in the history of present illness. See HPI for details.    PHYSICAL EXAM:  There were no vitals taken for this visit.        Physical Exam  Constitutional:       Appearance: She is obese.   HENT:      Head: Normocephalic.      Nose: Nose normal.   Eyes:      Pupils: Pupils are equal, round, and reactive to light.   Cardiovascular:      Rate and Rhythm: Normal rate and regular rhythm.      Pulses: Normal pulses.      Heart sounds: No murmur heard.  Pulmonary:      Effort: Pulmonary effort is normal. No respiratory distress.   Abdominal:      General: There is no distension.   Musculoskeletal:         General: Normal range of motion.      Cervical back: Normal range of motion.      Right lower leg: No edema.      Left lower leg: No edema.   Skin:     General: Skin is warm.   Neurological:      General: No focal deficit present.      Mental Status: She is alert and oriented to person, place, and time.   Psychiatric:         Mood and Affect: Mood normal.         Current Outpatient Medications   Medication Instructions    albuterol (PROVENTIL) 2.5 mg, Nebulization, Every 6 hours PRN, Rescue    ammonium lactate (LAC-HYDRIN) 12 % lotion APPLY TO DRY SKIN AND RUB IN WELL TWICE DAILY. NOTHING BETWEEN TOES    aspirin (ECOTRIN) 81 mg, Oral, Daily    atorvastatin (LIPITOR) 80 mg, Oral, Daily    BD ULTRA-FINE JORDON PEN NEEDLE 32 gauge x 5/32" Ndle 2 times daily    BD ULTRA-FINE JORDON PEN NEEDLE 32 gauge x 5/32" Ndle No dose, route, or frequency recorded.    blood sugar diagnostic Strp To check BG 2 times daily, to use with insurance preferred meter    blood-glucose meter kit To check BG 2 times daily, to use with " "insurance preferred meter    buPROPion (WELLBUTRIN SR) 150 MG TBSR 12 hr tablet Take 1 tablet (150 mg ) by mouth 2 (two) times daily.    CICLOPIROX-ITRACONAZOLE-UREA TOP CLEAN NAILS, SHAKE BOTTLE WELL, APPLY TWICE DAILY TO ALL AFFECTED NAILS USING APPLICATOR. (UP TO 2 MLS/DAY)    empagliflozin (JARDIANCE) 25 mg, Oral, Daily    ergocalciferol (ERGOCALCIFEROL) 50,000 Units, Oral, Every 7 days    ezetimibe (ZETIA) 10 mg, Oral, Daily    flash glucose scanning reader (FREESTYLE JOSE ALFREDO 2 READER) [facility] Freestyle Jose Alfredo 2 reader dispense 1. Use as directed    flash glucose sensor (FREESTYLE JOSE ALFREDO 2 SENSOR) Kit Jose Alfredo 2 sensor change every 14 days (Disp 6 each)(90 day)    fluticasone-umeclidin-vilanter (TRELEGY ELLIPTA) 100-62.5-25 mcg DsDv 1 puff, Inhalation, Daily    furosemide (LASIX) 40 mg, Oral, 2 times daily    insulin glargine U-100 (Lantus) (LANTUS U-100 INSULIN) 30 Units, Subcutaneous, 2 times daily    ipratropium-albuteroL (COMBIVENT RESPIMAT)  mcg/actuation inhaler 2 puffs, Inhalation, Every 6 hours PRN, Rescue    ketoconazole (NIZORAL) 2 % cream Topical (Top), Daily    lancets (ACCU-CHEK FASTCLIX LANCET DRUM) [facility] USE ONE DAILY    LANCETS MISC No dose, route, or frequency recorded.    latanoprost 0.005 % ophthalmic solution 1 drop, Ophthalmic    metFORMIN (GLUCOPHAGE) 1,000 mg, Oral, 2 times daily with meals    metoprolol succinate (TOPROL-XL) 100 mg, Oral, Daily    mupirocin (BACTROBAN) 2 % ointment Topical (Top), 2 times daily    nicotine (NICODERM CQ) 21 mg/24 hr 1 patch, Transdermal, Daily    nicotine polacrilex 4 mg, Oral, As needed (PRN)    nystatin (MYCOSTATIN) ointment Topical (Top), 2 times daily    pen needle, diabetic (BD ULTRA-FINE JORDON PEN NEEDLE) 32 gauge x 5/32" Ndle 2 times daily    polyethylene glycol (MOVIPREP) 100-7.5-2.691 gram solution 2,000 mLs, Oral, See admin instructions    propylene glycoL (SYSTANE BALANCE) 0.6 % Drop 1 drop, Ophthalmic, 2 times daily    sacubitriL-valsartan (ENTRESTO) " 49-51 mg per tablet 1 tablet, Oral, 2 times daily    SITagliptin phosphate (JANUVIA) 100 mg, Oral, Daily    spironolactone (ALDACTONE) 25 mg, Oral, Daily    varenicline (CHANTIX STARTING MONTH BOX) 0.5 mg (11)- 1 mg (42) tablet Take one 0.5mg tab by mouth once daily X3 days,then increase to one 0.5mg tab twice daily X4 days,then increase to one 1mg tab twice daily        All medications, laboratory studies, cardiac diagnostic imaging reviewed.     Lab Results   Component Value Date    LDL 61.00 03/18/2024    LDL 54.00 05/18/2023    TRIG 58 03/18/2024    TRIG 51 05/18/2023    CREATININE 1.16 (H) 03/18/2024    MG 2.20 09/12/2022    K 4.4 03/18/2024        ASSESSMENT/PLAN:  Heart Failure with Improved Ejection Fraction   - Stable from last office visit, patient states that she feels better   - Denies any chest pain or palpitations.  Reports occasional shortness of breath but states that overall it has greatly improved.  Still requires supplemental O2 on occasion, specifically when ambulating far distances or when she is out in public.  She attributes majority of her shortness of breath to her severe, longstanding COPD  - No progression in symptoms   - Etiology: NiCM  - HF Status: NYHA Class II, ACC/AHA Stage C  - LVEF: 59% per TTE on 11.3.23 which is improved from 50% TTE on 12.6.22  - Current GDMT: Entresto 49mg/51mg, Metoprolol Succinate 100 MG, Spironolactone 25, Jardiance 10mg- Continue for now, tolerating well  - Diuresis: Lasix 40mg BID  - Continue low Na Diet   - Strict Is/Os and daily weights  - Much improved on now that on GDMT   - No further testing needed at this time    HTN  BP at goal - 123/63  Continue Spironolactone, Entresto, Metoprolol and and Lasix  Counseled on low salt diet and exercise as tolerated    HLD  LDL goal <70  LDL 61 per labs 3.18.24  Continue Atorvastatin 80mg daily and Zetia 10mg daily  FLP/CMP ordered per PCP, patient advised to complete this prior to her next office visit  Counseled  on medication compliance    DM  A1c 9.3  Management per PCP    Tobacco Abuse  Smokes approximately 1/2 pack of cigarettes  Counseled on smoking cessation today   Patient is trying to quit    DAVION on Trelegy Machine   She reports nightly Trelegy compliance and feels she is benefitting from use  Recommended continued nightly Trelegy use     BLE pain - improved  KWADWO testing 9.13.22: mild arterial flow reduction in the bilateral lower extremities.       Follow up in cardiology clinic in 4 months or sooner if needed  Follow up with PCP as directed  Please notify clinic for any new concerns or change in symptoms

## 2024-05-20 ENCOUNTER — TELEPHONE (OUTPATIENT)
Dept: ENDOCRINOLOGY | Facility: CLINIC | Age: 59
End: 2024-05-20
Payer: MEDICAID

## 2024-05-20 NOTE — TELEPHONE ENCOUNTER
BETHANY TREJO (Key: [id number])  PA Case ID #: [id number]  Need Help? Call us at [phone]  Status  Sent to Plan today  Drug  FreeStyle Harper 14 Day Sensor    Form  [employer] [place] [employer] Electronic PA Form (2017 NCPDP)

## 2024-05-20 NOTE — PROGRESS NOTES
Dispensed Days Supply Quantity Provider Pharmacy   Januvia 100 mg tablet 11/21/2023 90 90 each Leslie Gabriel, [facility] an...   Januvia 100 mg tablet 10/17/2023 30 30 each Leslie Gabriel, [facility] an...   Januvia 100 mg tablet 09/11/2023 30 30 each Leslie Gabriel, [facility] an...   Januvia 100 mg tablet 08/10/2023 30 30 each Leslie Gabriel, [facility] an...     Looks like patient may not has been taking her Januvia regularly all other medications were filled recently please ask her if she is currently taking her Januvia 100.  Mounjaro  is still in shortage  She was not taking her Januvia she can restarted if I start Mounjaro she will have to stop Januvia    Addended by: SHANI SUBRAMANIAN on: 5/20/2024 11:41 AM     Modules accepted: Orders

## 2024-05-20 NOTE — TELEPHONE ENCOUNTER
BETHANY TREJO (Key: [id number])  PA Case ID #: [id number]  Need Help? Call us at [phone]  Outcome  Approved today  PA has been Approved. PA End Date: 11/16/2024  Drug  FreeStyle Harper 14 Day Sensor    Form  [employer] [place] [employer] Electronic PA Form (2017 NCPDP)

## 2024-05-22 ENCOUNTER — OFFICE VISIT (OUTPATIENT)
Dept: CARDIOLOGY | Facility: CLINIC | Age: 59
End: 2024-05-22
Payer: MEDICAID

## 2024-05-22 VITALS
BODY MASS INDEX: 48.22 KG/M2 | HEART RATE: 72 BPM | HEIGHT: 64 IN | OXYGEN SATURATION: 100 % | SYSTOLIC BLOOD PRESSURE: 123 MMHG | DIASTOLIC BLOOD PRESSURE: 63 MMHG | TEMPERATURE: 98 F | WEIGHT: 282.44 LBS | RESPIRATION RATE: 18 BRPM

## 2024-05-22 DIAGNOSIS — I50.20 HEART FAILURE WITH REDUCED EJECTION FRACTION: Chronic | ICD-10-CM

## 2024-05-22 DIAGNOSIS — E78.5 HYPERLIPIDEMIA LDL GOAL <70: Chronic | ICD-10-CM

## 2024-05-22 DIAGNOSIS — R06.09 DOE (DYSPNEA ON EXERTION): ICD-10-CM

## 2024-05-22 DIAGNOSIS — G47.33 OSA ON CPAP: Chronic | ICD-10-CM

## 2024-05-22 DIAGNOSIS — I50.32 CHRONIC HEART FAILURE WITH PRESERVED EJECTION FRACTION: ICD-10-CM

## 2024-05-22 DIAGNOSIS — I10 PRIMARY HYPERTENSION: Primary | Chronic | ICD-10-CM

## 2024-05-22 DIAGNOSIS — F17.210 CIGARETTE NICOTINE DEPENDENCE WITHOUT COMPLICATION: Chronic | ICD-10-CM

## 2024-05-22 DIAGNOSIS — E66.01 CLASS 3 SEVERE OBESITY DUE TO EXCESS CALORIES WITH SERIOUS COMORBIDITY AND BODY MASS INDEX (BMI) OF 45.0 TO 49.9 IN ADULT: Chronic | ICD-10-CM

## 2024-05-22 PROCEDURE — 99214 OFFICE O/P EST MOD 30 MIN: CPT | Mod: S$PBB,,,

## 2024-05-22 PROCEDURE — 3066F NEPHROPATHY DOC TX: CPT | Mod: CPTII,,,

## 2024-05-22 PROCEDURE — 3046F HEMOGLOBIN A1C LEVEL >9.0%: CPT | Mod: CPTII,,,

## 2024-05-22 PROCEDURE — 4010F ACE/ARB THERAPY RXD/TAKEN: CPT | Mod: CPTII,,,

## 2024-05-22 PROCEDURE — 1159F MED LIST DOCD IN RCRD: CPT | Mod: CPTII,,,

## 2024-05-22 PROCEDURE — 3074F SYST BP LT 130 MM HG: CPT | Mod: CPTII,,,

## 2024-05-22 PROCEDURE — 3061F NEG MICROALBUMINURIA REV: CPT | Mod: CPTII,,,

## 2024-05-22 PROCEDURE — 1160F RVW MEDS BY RX/DR IN RCRD: CPT | Mod: CPTII,,,

## 2024-05-22 PROCEDURE — 99215 OFFICE O/P EST HI 40 MIN: CPT | Mod: PBBFAC

## 2024-05-22 PROCEDURE — 3008F BODY MASS INDEX DOCD: CPT | Mod: CPTII,,,

## 2024-05-22 PROCEDURE — 3078F DIAST BP <80 MM HG: CPT | Mod: CPTII,,,

## 2024-05-22 RX ORDER — EZETIMIBE 10 MG/1
10 TABLET ORAL DAILY
Qty: 90 TABLET | Refills: 3 | Status: SHIPPED | OUTPATIENT
Start: 2024-05-22 | End: 2025-05-22

## 2024-05-22 RX ORDER — SPIRONOLACTONE 25 MG/1
25 TABLET ORAL DAILY
Qty: 90 TABLET | Refills: 3 | Status: SHIPPED | OUTPATIENT
Start: 2024-05-22 | End: 2025-05-22

## 2024-05-22 RX ORDER — FUROSEMIDE 40 MG/1
40 TABLET ORAL 2 TIMES DAILY
Qty: 180 TABLET | Refills: 3 | Status: SHIPPED | OUTPATIENT
Start: 2024-05-22 | End: 2025-05-22

## 2024-05-22 RX ORDER — METOPROLOL SUCCINATE 100 MG/1
100 TABLET, EXTENDED RELEASE ORAL DAILY
Qty: 30 TABLET | Refills: 11 | Status: SHIPPED | OUTPATIENT
Start: 2024-05-22 | End: 2025-05-22

## 2024-05-22 RX ORDER — ATORVASTATIN CALCIUM 80 MG/1
80 TABLET, FILM COATED ORAL DAILY
Qty: 90 TABLET | Refills: 3 | Status: SHIPPED | OUTPATIENT
Start: 2024-05-22 | End: 2025-05-22

## 2024-05-22 NOTE — PATIENT INSTRUCTIONS
Follow up in cardiology clinic in 4 months or sooner if needed  Follow up with PCP as directed  Please notify clinic for any new concerns or change in symptoms

## 2024-05-27 ENCOUNTER — HOSPITAL ENCOUNTER (OUTPATIENT)
Dept: WOUND CARE | Facility: HOSPITAL | Age: 59
Discharge: HOME | End: 2024-05-27
Attending: NURSE PRACTITIONER
Payer: MEDICAID

## 2024-05-27 VITALS
HEART RATE: 83 BPM | DIASTOLIC BLOOD PRESSURE: 64 MMHG | SYSTOLIC BLOOD PRESSURE: 126 MMHG | OXYGEN SATURATION: 95 % | TEMPERATURE: 98 F

## 2024-05-27 DIAGNOSIS — L85.3 XEROSIS OF SKIN: ICD-10-CM

## 2024-05-27 DIAGNOSIS — L84 CALLUS OF FOOT: ICD-10-CM

## 2024-05-27 DIAGNOSIS — Z91.199 NONCOMPLIANCE WITH TREATMENT PLAN: ICD-10-CM

## 2024-05-27 DIAGNOSIS — R26.9 IMPAIRED GAIT: ICD-10-CM

## 2024-05-27 DIAGNOSIS — E11.9 ENCOUNTER FOR COMPREHENSIVE DIABETIC FOOT EXAMINATION, TYPE 2 DIABETES MELLITUS: Primary | ICD-10-CM

## 2024-05-27 DIAGNOSIS — M79.671 RIGHT FOOT PAIN: ICD-10-CM

## 2024-05-27 DIAGNOSIS — E11.628 TYPE 2 DIABETES MELLITUS WITH OTHER SKIN COMPLICATION, WITH LONG-TERM CURRENT USE OF INSULIN: ICD-10-CM

## 2024-05-27 DIAGNOSIS — Z79.4 TYPE 2 DIABETES MELLITUS WITH OTHER SKIN COMPLICATION, WITH LONG-TERM CURRENT USE OF INSULIN: ICD-10-CM

## 2024-05-27 DIAGNOSIS — B07.0 PLANTAR WARTS: ICD-10-CM

## 2024-05-27 DIAGNOSIS — B35.1 MYCOTIC TOENAILS: ICD-10-CM

## 2024-05-27 DIAGNOSIS — M79.672 LEFT FOOT PAIN: ICD-10-CM

## 2024-05-27 PROCEDURE — 27000999 HC MEDICAL RECORD PHOTO DOCUMENTATION

## 2024-05-27 PROCEDURE — 11056 PARNG/CUTG B9 HYPRKR LES 2-4: CPT | Mod: ,,, | Performed by: NURSE PRACTITIONER

## 2024-05-27 PROCEDURE — 11719 TRIM NAIL(S) ANY NUMBER: CPT

## 2024-05-27 PROCEDURE — 11720 DEBRIDE NAIL 1-5: CPT

## 2024-05-27 PROCEDURE — 11719 TRIM NAIL(S) ANY NUMBER: CPT | Mod: 51,,, | Performed by: NURSE PRACTITIONER

## 2024-05-27 PROCEDURE — 99499 UNLISTED E&M SERVICE: CPT | Mod: ,,, | Performed by: NURSE PRACTITIONER

## 2024-05-27 PROCEDURE — 99211 OFF/OP EST MAY X REQ PHY/QHP: CPT | Mod: 25

## 2024-05-27 PROCEDURE — 11057 PARNG/CUTG B9 HYPRKR LES >4: CPT

## 2024-05-27 RX ORDER — INSULIN GLARGINE-YFGN 100 [IU]/ML
INJECTION, SOLUTION SUBCUTANEOUS
COMMUNITY
Start: 2024-05-02

## 2024-05-27 NOTE — PROGRESS NOTES
TODAY'S VISIT NOTE WAS IMPORTED FROM LAST WOUND CLINIC VISIT OF 24.  I ATTEST THAT I REVIEWED THE HPI, ROS, LABS, WOUND-RELATED IMAGING, PHYSICAL EXAMINATION, EVALUATION AND PLAN SECTIONS OF IMPORTED NOTE AND REVISED TODAY'S VISIT NOTE TO REFLECT TODAY'S NEW ASSESSMENT FINDINGS AND TODAY'S UPDATES TO WOUND TREATMENT PLAN.          Chief Complaint:  Routine diabetic foot care      History of Present Illness:  57 yo Black female being seen today for routine diabetic foot care, and bilateral foot pain from recurrent calluses, which is affecting her ability to walk.   Hx includes treatment plan and medication non-compliance, COPD, HFpEF (40%), chronic hypercapnic respiratory failure with oxygen and bedtime Trilogy dependency, uncontrolled Type 2 diabetes, HTN, HLD, DAVION on CPAP, morbid obesity (BMI 49.27), and smoking (25 pk/year history).  Previously enrolled in [facility] smoking cessation program.  Smoking approximately 1/2 ppd cigarettes.    Does not smoke with oxygen anywhere near her.  Followed by NOEMI Andrews, [facility] IM for PCP.  Last visit with Ms. Gabriel was on 3/21/24.  Followed by [facility] endocrine, pulmonology, and cardiology clinics.          Review of Most Recent Labs:  3/18/24:  RBC 5.75.  H&H 15.1 & 47.4.  Plts 283.  BUN/CR 22.3 & 1.16, eGFR 54.  Chol 102, HDL 29.  Trig 58, LDL 61.  HgbA1C 9.3.    23:  RBC 5.68.  H&H 14.9 & 47.1.  Plts 290.  CR 1.03.  HgbA1C 8.2.    23:  CBC unremarkable.  CR 0.98.  Alb 3.2.  Chol 94.  HDL 30.  LDL 54.  Trig 51.  HgbA1C 10.2.    23:  CBC unremarkable.  CR 1.11.  eGFR 58.  Alk Phos 152.     Imagin23 xray of right foot:  no acute osseus abnormality.  Plantar calcaneal enthesophyte.  Dorsal calcaneal enthesophyte.    2022 arterial NIVA:    The right lower extremity demonstrated multiphasic waveforms at all levels.  The KWADWO on the right was mildly abnormal. The right lower extremity demonstrated mild arterial flow reduction.     The left lower extremity  demonstrated multiphasic waveforms at all levels.   The KWADWO on the left was mildly abnormal.  The left lower extremity demonstrated mild arterial flow reduction.      Today 5/27/24:  Having pain over top of right foot, where she has a thickened callus.  Denies numbness and tingling in feet and toes, otherwise.  Using toenail antibiotics more frequently than she has been.  Applies Lac-Hydrin and Vaseline, when she can reach feet.  Both feet are hurting very badly from recurrent calluses from plantar warts.  Does not want me to resend referral to podiatrist, Dr. Kulkarni, to be evaluated for plantar wart excision.  Wants to come in for foot care, as she has been doing, here in wound clinic.  Has transportation issues and will not be able to find a ride to [place].      1/22/24:  Has not been applying medications to feet and to toenails, as prescribed.  CBG this morning before eating was 245, per self-report.  Is agreeable to being referred to Dr. Kulkarni, podiatrist, for an evaluation of severe plantar warts to bilateral feet.  Warts are beginning to feel tender with walking.      10/4/23:  Corns and calluses have recurred over tips of big toes and bottoms of feet.  She is unable to reach those areas long enough to perform callus care, without getting short-winded.  She is able to apply toenail polish though, as prescribed.  Corns and calluses on bottoms of feet are causing pressure and discomfort with walking.  Wearing tennis shoes now; no longer wearing sandals, per self-report.   Numbness and tinging continue in feet/toes, as before.  Still has pain in calves with walking distances.  Feet continue to feel cold all the time.  Still has swelling in ankles and feet on a daily basis.   Tries to remember to apply Lac-Hydrin and Vaseline to feet, as prescribed.  Did not apply those this morning.      6/30/23:  Wearing sandals today because they are more comfortable and easier to put on that regular shoes.  Remembers me  "speaking to her each visit about how shoes protect diabetic feet.  Corns and calluses are worse on feet and toes than last visit.  Has Lac-Hydrin and Vaseline at home.  Not able to see bottoms of feet.  Remembers being encouraged to get a mirror to use for foot checks.  Smoking "about the same" as she was last visit.  Has to wait 3 months before she can be re-enrolled in smoking program.  Smoking 3-5 cigarettes/day.  Wears Trilogy at night.      4/12/23:  Continues to have numbness and burning pain in bilateral feet and toes, on a daily basis, as well as pain in calves with walking distances and laying down.  Feet always feel cold.  Having pain in bottoms of both feet, where she has recurrent corns/calluses.  Did get toenail polish antibiotics and has been applying that every 2 to 3 days.  No new s/e with that.  Wearing slip-on sandals today for me to evaluate feet.  Normally wears protective shoes at home.      1/6/2023:  Trying to cut back on smoking.  Smoking < 1/2 ppd, per self-report.  Using Lac-Hydrin and Vaseline on feet, as instructed.  Having some mild discomfort over corns on bottoms of feet with walking.   No new rashes, lesions, or skin breakdown.   Does report numbness and tingling in feet and toes.  Feet feel cold most days.  Does experience pain in calves with prolonged walking and laying down, at times.        Review of Systems:   Except as stated in HPI, all other 10 body systems normal      Physical Exam Vitals & Measurements:    Vitals:    05/27/24 1049   BP: 126/64   Pulse: 83   Temp: 98.3 °F (36.8 °C)           General:    VSS, afebrile. Morbidly obese; in no acute distress.  Wearing black slip-on sandals today.    Respiratory:   Dyspneac on moderate exertion; no coughing.  Has oxygen tank with her; wearing oxygen via nasal canula.    Cardiovascular:    Mild woody non-pitting edema to BLE.  Generalized non-pitting edema around bilateral medial and lateral ankles.  No hemosiderin staining or " varicosities. No hair distribution over BLE.  Skin is shiny and taut over anterior tibial areas. Feet and toes cool to touch. No temperature differences between BLE.   DP and PT pulses dopplered.    Musculoskeletal:  Significantly decreased range of motion, and strength, of all extremities.   Loss of anatomical sites from knees distally secondary to body habitus.   Pes planus bilaterally.  No bunions or hammertoes.   Decreased dorsiflexion and flexion in bilateral feet and hallux toes.  Intrinsic minus feet bilaterally.    Neurologic: A&O X 3; cranial nerves intact.   Normal monofilament testing in bilateral feet; no LOPS noted.    Psychiatric: Calm, cooperative. Mood and affect normal. Responses appropriate  Integumentary:     Severe generalized xerosis over bilateral plantar feet.     Two large bilateral plantar corns/calluses with significant tenderness with palpation.  Corns quite deep in both lesions.  No underlying skin breakdown with paring.      Linear calluses to posterior right 3-4-5 distal toes.  No underlying skin breakdown with paring.      Thickened calluses to bilateral distal hallux toes:  No underlying skin breakdown with paring.      Thickened and tender horny callus to dorsal right foot over 1st met head:  No underlying skin breakdown with paring.     10 mycotic toenails, which were overgrown and grossly dystrophic, with mild odor with debridement.                                          Assessment/Plan:    Encounter for diabetic foot care, Type 2, insulin-dependent:  Last visit with PCP, NOEMI Arora, on 3/21/24.    Diabetic teaching reinforced, with focused attention towards daily foot checks and prompt medical attn for changes in skin.  Wound clinic and UCC precautions reinforced.       Morbid obesity, BMI 48.48:  This is a co-factor to extensive, deep, and multiple calluses/corns over bilateral feet/toes. Ms. Mckeon is at extreme risk of non-healing diabetic foot wounds, given her  diabetes, treatment plan non-compliance, medication non-compliance, and ongoing smoking.      Type 2 diabetes:  Poorly-controlled, per last HgbA1C of 9.3% on 3/18/24.    Normal monofilament testing.  No LOPS identified.  DP and PT pulses present.    Being followed by [facility] endocrine.     Mycotic Toenails X 10:  Prescribed CMPD Voricon 2.67%/Vanc 6.67% nail suspension in DSMO.   Compliance questionable.                                            PROCEDURE NOTE    Procedure Today:   Debridement (5) of severely mycotic toe nails.  Cutting/filing of 5 moderately mycotic toenails.  Rationale:  Overgrown and mycotic toenails, as patient presented today, can lead to DFU, skin and bone infections, and lower limb amputations.        Informed verbal consent obtained.  Using standard podiatry clippers, Dremmel,  and fahad boards, I excised infected nail from 5 toenails and sanded them down.  Then, using podiatry clippers and fahad boards, I cut and sanded 5 toenails.  No bleeding or discomfort during procedure.  Patient tolerated procedure without complications.      Bilateral foot pain, with impaired gait:  Etiology:  Large and tender plantar warts, corns/calluses to plantar forefeet.   Pain from plantar corns/calluses impeding ability to walk without pain.   Diff dx:  corns and calluses versus underlying plantar warts  She is unable to find transportation to [place] to be evaluated by podiatrist, Dr. Kulkarni for plantar wart excisions.  Calluses and corns are so severe that she is going to have to be seen more frequently to manage severe calluses of bilateral feet/toes.      Calluses of bilateral feet and toes:   Two largest plantar lesions are most likely underlying plantar warts.                                           PROCEDURE NOTE    Procedure Today: paring and sanding down of 7 corns/calluses   Rationale: Calluses/corns can lead to diabetic foot ulcers.  Also, Ms. Mckeon is reporting discomfort in corns/calluses  with ambulation.   Informed verbal consent obtained.    Using #4 dermal curettes, Dremmel, and fahad boards, I pared 7 calluses/corns to healthy soft tissue.  Mild bleeding noted from left plantar callus, which was controlled with mild pressure and dressing.  Teaching reinforced on s/s of complications to call wound clinic for, post-procedure, with rationale.       Xerosis of bilateral heels:  Poorly-controlled.  Compliance with tx plan questionable.    Prescribed Lac-Hydrin followed by Vaseline BID.  CPM  Reinforced teaching on benefit of moisturizing feet daily, with rationale.  Adherence with tx plan encouraged.  Teaching reinforced to not apply lotions/creams between toes, with rationale.      Non-Compliance with Treatment Plan and Medications:  This is an identified co-factor towards increased risk of DFU, osteo, and lower limb amputations.     Smoking:  Previously enrolled in [facility] smoking cessation program.     Smoking approximately 1/2 ppd.            RTC in one month for callus care.   Teaching reinforced on s/s to call wound clinic for promptly.

## 2024-05-29 ENCOUNTER — TELEPHONE (OUTPATIENT)
Dept: ENDOCRINOLOGY | Facility: CLINIC | Age: 59
End: 2024-05-29
Payer: MEDICAID

## 2024-05-29 NOTE — TELEPHONE ENCOUNTER
Fax also received from [employer] for pa on freestyle harper sensor. PA has already been approved through 11/16/2024. I left a message on [employer] voicemail   BETHANY TREJO ([employer]: [id number])  PA Case ID #: [id number]  Need Help? Call us at [phone]  Outcome  Approved today  PA has been Approved. PA End Date: 11/16/2024  Drug  FreeStyle Harper 14 Day Sensor    Form  [employer] [place] [employer] Electronic PA Form (2017 NCPDP)

## 2024-05-29 NOTE — TELEPHONE ENCOUNTER
----- Message from Beth Walters sent at 5/28/2024  2:06 PM CDT -----  Patient of Jacqueline    Pt stated the pharmacy contacted her for her medication, pharmacy needs Authorization.    Pt# [phone]    @2:06    05/28/2024

## 2024-06-11 ENCOUNTER — CLINICAL SUPPORT (OUTPATIENT)
Dept: DIABETES | Facility: CLINIC | Age: 59
End: 2024-06-11
Payer: MEDICAID

## 2024-06-11 DIAGNOSIS — E11.9 TYPE 2 DIABETES MELLITUS WITHOUT COMPLICATION, WITH LONG-TERM CURRENT USE OF INSULIN: Primary | ICD-10-CM

## 2024-06-11 DIAGNOSIS — Z79.4 TYPE 2 DIABETES MELLITUS WITHOUT COMPLICATION, WITH LONG-TERM CURRENT USE OF INSULIN: Primary | ICD-10-CM

## 2024-06-11 PROCEDURE — G0108 DIAB MANAGE TRN  PER INDIV: HCPCS | Mod: PBBFAC

## 2024-06-11 PROCEDURE — 99211 OFF/OP EST MAY X REQ PHY/QHP: CPT | Mod: PBBFAC

## 2024-06-12 ENCOUNTER — TELEPHONE (OUTPATIENT)
Dept: ENDOCRINOLOGY | Facility: CLINIC | Age: 59
End: 2024-06-12
Payer: MEDICAID

## 2024-06-12 NOTE — TELEPHONE ENCOUNTER
PA performed 5/2024 was for a freestyle harper 14 day sensor.  Pt uses freestyle harper 2. Pa initiated   BETHANY TREJO (Key: [id number])  PA Case ID #: [id number]  Need Help? Call us at [phone]  Outcome  Approved today  PA has been Approved. PA End Date: 12/09/2024  Drug  FreeStyle Harper 2 Sensor    Form  [employer] [place] [employer] Electronic PA Form (2017 NCPDP)

## 2024-06-12 NOTE — TELEPHONE ENCOUNTER
Encounter from 5/29/2024-Fax also received from [employer] for pa on freestyle harper sensor. PA has already been approved through 11/16/2024. I left a message on [employer] voicemail   BETHANY TREJO ([employer]: [id number])  PA Case ID #: [id number]  Need Help? Call us at [phone]  Outcome  Approved today  PA has been Approved. PA End Date: 11/16/2024  Drug  FreeStyle Harper 14 Day Sensor    Form  [employer] [place] [employer] Electronic PA Form (2017 NCPDP)

## 2024-06-12 NOTE — TELEPHONE ENCOUNTER
----- Message from Beth Walters sent at 6/11/2024 11:09 AM CDT -----  Patient Of Jacqueline Arias    Pt stated she still isn't getting approved through her insurance for Dexcom     Pt#[phone]    @06/11/2024    @11:19      Pt is not using a dexcom she has a freestyle anna   Advancement-Rotation Flap Text: The defect edges were debeveled with a #15 scalpel blade. Given the location of the defect, shape of the defect and the proximity to free margins an advancement-rotation flap was deemed most appropriate. Using a sterile surgical marker, an appropriate flap was drawn incorporating the defect and placing the expected incisions within the relaxed skin tension lines where possible. The area thus outlined was incised deep to adipose tissue with a #15 scalpel blade. The skin margins were undermined to an appropriate distance in all directions utilizing iris scissors. Following this, the designed flap was carried over into the primary defect and sutured into place.

## 2024-06-17 ENCOUNTER — TELEPHONE (OUTPATIENT)
Dept: DIABETES | Facility: CLINIC | Age: 59
End: 2024-06-17
Payer: MEDICAID

## 2024-06-17 VITALS — WEIGHT: 289.63 LBS | BODY MASS INDEX: 49.44 KG/M2 | HEIGHT: 64 IN

## 2024-06-17 NOTE — TELEPHONE ENCOUNTER
Called pt to check in & see if she had to titrate Latnus dose up from 33 units to 36 units since our visit on 6/11.  Pt reports that she is still taking 33 units but reports FBG ~ 190 for the past 2 days.  Instructed pt to increase Lantus to 36 units & continue this dose for 3 days.  Will call pt on Thursday to evaluate if she needs to continue increasing to get FBG  as ordered per endo.

## 2024-06-17 NOTE — PROGRESS NOTES
"Diabetes Care Specialist Follow-up Note  Author: Cyn Weir RD  Date: 6/11/2024    Program Intake  Reason for Diabetes Program Visit:: Intervention  Type of Intervention:: Individual  Individual: Education  Education: Self-Management Skill Review  Current diabetes risk level:: moderate (risk score 2)    Lab Results   Component Value Date    HGBA1C 9.3 (H) 03/18/2024     A1c Pre Diabetes Care Specialist Intervention:  8.2% (9/18/23)    Clinical    Weight: 131.4 kg (289 lb 9.6 oz)   Height: 5' 4" (162.6 cm)   Body mass index is 49.71 kg/m².  Wt stable since last visit on 04/02/24      Medications:  - Jardiance 25mg qAM  - Januvia 100mg qAM  - metformin 1000mg BID (forgets PM dose most days)  - Lantus 33 units qAM (orders to titrate up by 3 units q3d until fasting glucose )      SMBG:   Harper 2 reader download (05/15/24 to 05/28/24): See media file for full report. Pt without Harper since 5/28.  Difficult to determine patterns due to limited information.  Noted some post prandial hyperglycemia & no hypoglycemia.    Alarms set at 70 & 240    Sensor usage: 59%  Average glucose: 164mg/dL  GMI: 7.2%    4% CGM readings greater than 250 mg/dL  24% CGM readings between 181-250 mg/dL  72% CGM readings in target glucose range of  mg/dL   0% CGM readings between 54-79%  0% CGM readings less than 54 mg/dL       Glucometer Log:    Pt checking once daily since without Harper.  Afternoon numbers are post-prandial.           Today's interventions were provided through individual discussion.    Patient verbalized understanding of instruction.  Pt was able to return back demonstration of instructions today. Patient understood key points, needs reinforcement and further instruction.     Diabetes Self-Management Care Plan Review and Evaluation of Progress:    During today's follow-up Hilda Diabetes Self-Management Care Plan progress was reviewed and progress was evaluated including his/her input. Linn has agreed to " continue his/her journey to improve/maintain overall diabetes control by continuing to set health goals. See care plan progress below.      Care Plan: Diabetes Management   Updates made since 5/18/2024 12:00 AM        Problem: Blood Glucose Self-Monitoring         Goal: Pt will use Freestyle Harper reader to scan blood glucose at least 4-6 times daily and report hypoglycemia to provider    Start Date: 1/5/2023   Expected End Date: 8/27/2024   This Visit's Progress: On track   Recent Progress: On track   Priority: High   Barriers: Lack of Supplies   Note:      2/24/23 - Pt was told by [employer] that she needs a new Rx from her provider for an additional reader and [employer] is unable to return her reader. However, her insurance provided her with a reader within the last year. Contacted Charo Perez, Diabetes Care Specialist, from [employer] for assistance. She asks that pt contact her and she will discuss with [employer] on three-way call. Contacted pt to provide Charo's phone # and instructions to contact her.     5/2/23 - New reader received. Noted scanning less often last week. 1 episode of hypoglycemia noted (65) for Libreview report 4/15-4/28/23.     8/25/23 - Scanning has become less frequent over last few days but overall 4-6 times daily. Encouraged to continue.    3/5/24: Pt continues use of Harper 2 sensors with reader.  Per reports, pt scanning 2-7 times per day with only 33% use/active sensor over the past 2 weeks.  Pt reports this was because her sensor fell off early & her daughter was not able to help her put a new one one because her daughter was in the hospital.  Pt reports that she keeps reader with her at all times.      4/2/24: Pt with active Harper 2 sensor 74% of the time over the past 2 weeks.  Per reports, she is scanning 3-7x/d (3x/d on 5 out of 14 days).  Due to some gaps in information in late PM/early AM, encouraged pt to scan right before bed & upon waking in the AM.  Pt verbalizes  understanding.     6/11/24: Pt with active Harper 2 sensor only 59% of the time over the 2 week period reviewed & has not had a sensor for 1-2 weeks due to awaiting insurance approval for supplies.  Pt has been using glucometer since 5/28 but checking only once daily.  Per reports, pt only scanned >/= 4x/d on 14% of days in May.  Noted still some gaps in information in evening & early AM.  Again encouraged checking before bed & upon waking.        Problem: Healthy Eating         Goal: Patient will use plate method for diabetes to limit starches to </= 1 cup per meal & will go back for seconds of nonstarchy vegetables or meat as needed for satiety.    Start Date: 3/5/2024   Expected End Date: 8/27/2024   This Visit's Progress: On track   Recent Progress: On track   Priority: Medium   Barriers: Lack of Motivation to Change   Note:    6/11/24: 24h food recall: breakfast = egg sandwich (only eats breakfast sometimes); lunch = egg or ham sandwich; dinner = > 1 cup pasta with meatballs OR smothered chicken with rice, peas, Coke Zero or apple juice.  Pt reports that she typically eats only 2 meals/d (breakfast OR lunch, not both).  Per food recall, she appears to be exceeding carbohydrate goal at supper meal.  Discussed ideas for decreasing carb consumption with supper.    4/2/24: 24h food recall: breakfast = skipped; late lunch = ribs, 1 cup potato salad, water; dinner = chicken tenders, 1/2 cup potato salad, glass of Cola; snacks = none.  Per food recall, pt adequately limiting starches to </= 1 cup per meal as discussed with the exception of soda consumption in addition to starch with 1 meal.  Will continue to monitor.    3/5/24: Reviewed sources of carbohydrate & appropriate portion sizes.  Discussed plate method for diabetes.  Pt c/o increased appetite lately.  Pt eats 3 meals/d with snacks before bed & during the night when waking to use the restroom.  Pt mainly drinks Coke Zero & rarely drinks regular soda (</=  1x/wk).  Pt reports rice & pasta portions are typically 1.5-2 cups per meal when she eats these foods.         Follow Up Plan     Follow up in about 11 weeks (around 8/27/2024) for review of blood sugar logs/Harper report & goal progress.  Will consider post-program follow up at this time if A1C improved.  Plan to call patient periodically until next visit to check in & see how fasting glucose is looking to help her with titrating Lantus.    Today's care plan and follow up schedule was discussed with patient.  Linn verbalized understanding of the care plan, goals, and agrees to follow up plan.        The patient was encouraged to communicate with his/her health care provider/physician and care team regarding his/her condition(s) and treatment.  I provided the patient with my contact information today and encouraged to contact me via phone or [facility]'s Patient Portal as needed.     Length of Visit   Total Time: 30 Minutes

## 2024-06-18 ENCOUNTER — OFFICE VISIT (OUTPATIENT)
Dept: PULMONOLOGY | Facility: CLINIC | Age: 59
End: 2024-06-18
Payer: MEDICAID

## 2024-06-18 VITALS
OXYGEN SATURATION: 96 % | TEMPERATURE: 98 F | RESPIRATION RATE: 20 BRPM | HEIGHT: 64 IN | SYSTOLIC BLOOD PRESSURE: 110 MMHG | DIASTOLIC BLOOD PRESSURE: 70 MMHG | BODY MASS INDEX: 49.38 KG/M2 | WEIGHT: 289.25 LBS | HEART RATE: 72 BPM

## 2024-06-18 DIAGNOSIS — J44.9 CHRONIC OBSTRUCTIVE PULMONARY DISEASE, UNSPECIFIED COPD TYPE: ICD-10-CM

## 2024-06-18 DIAGNOSIS — J44.1 COPD EXACERBATION: Primary | ICD-10-CM

## 2024-06-18 DIAGNOSIS — Z72.0 TOBACCO USE: ICD-10-CM

## 2024-06-18 PROCEDURE — 3008F BODY MASS INDEX DOCD: CPT | Mod: CPTII,,, | Performed by: INTERNAL MEDICINE

## 2024-06-18 PROCEDURE — 1160F RVW MEDS BY RX/DR IN RCRD: CPT | Mod: CPTII,,, | Performed by: INTERNAL MEDICINE

## 2024-06-18 PROCEDURE — 3078F DIAST BP <80 MM HG: CPT | Mod: CPTII,,, | Performed by: INTERNAL MEDICINE

## 2024-06-18 PROCEDURE — 3061F NEG MICROALBUMINURIA REV: CPT | Mod: CPTII,,, | Performed by: INTERNAL MEDICINE

## 2024-06-18 PROCEDURE — 99213 OFFICE O/P EST LOW 20 MIN: CPT | Mod: S$PBB,,, | Performed by: INTERNAL MEDICINE

## 2024-06-18 PROCEDURE — 3074F SYST BP LT 130 MM HG: CPT | Mod: CPTII,,, | Performed by: INTERNAL MEDICINE

## 2024-06-18 PROCEDURE — 3066F NEPHROPATHY DOC TX: CPT | Mod: CPTII,,, | Performed by: INTERNAL MEDICINE

## 2024-06-18 PROCEDURE — 3046F HEMOGLOBIN A1C LEVEL >9.0%: CPT | Mod: CPTII,,, | Performed by: INTERNAL MEDICINE

## 2024-06-18 PROCEDURE — 1159F MED LIST DOCD IN RCRD: CPT | Mod: CPTII,,, | Performed by: INTERNAL MEDICINE

## 2024-06-18 PROCEDURE — 99215 OFFICE O/P EST HI 40 MIN: CPT | Mod: PBBFAC

## 2024-06-18 PROCEDURE — 4010F ACE/ARB THERAPY RXD/TAKEN: CPT | Mod: CPTII,,, | Performed by: INTERNAL MEDICINE

## 2024-06-18 NOTE — PROGRESS NOTES
Pulmonology Clinic Note      SUBJECTIVE:     Chief Complaint: COPD (Trilogy recert)       History of Present Illness:  Linn Mckeon is a 58 y.o. female with PMH of COPD, HFrEF (EF 40%), HTN, HLD, DM, DAVION w/ hypercapnia on Trilogy machine who comes to Pulmonology clinic for follow up.    Patient has actually been doing fairly well although she does report continued problems with intermittent dyspnea on exertion.  She is compliant with her oxygen and her trilogy and is benefitting from their use.  She has an occasional cough but no sputum production.  She denies being hospitalized since her last visit.  Rarely uses her nebulizer or rescue inhaler.    Review of patient's allergies indicates:  No Known Allergies    Past Medical History:   Diagnosis Date    Callus of foot 07/07/2022    CHF (congestive heart failure)     Chronic heart failure with preserved ejection fraction 05/20/2022    COPD (chronic obstructive pulmonary disease)     Diabetes mellitus     GOODMAN (dyspnea on exertion) 05/20/2022    Dystrophic nail 07/07/2022    Hyperlipidemia     Hyperlipidemia LDL goal <70 05/20/2022    Hypertension     Morbid obesity with body mass index (BMI) greater than or equal to 50 07/07/2022    Non compliance w medication regimen 07/07/2022    Noncompliance with treatment plan 07/07/2022    DAVION on CPAP 05/20/2022    Peripheral edema 05/20/2022    Primary hypertension 05/20/2022    Type 2 diabetes mellitus with skin complication 05/20/2022    Xerosis of skin 07/07/2022     Past Surgical History:   Procedure Laterality Date    COLONOSCOPY, WITH POLYPECTOMY USING HOT BIOPSY FORCEPS N/A 01/25/2024    Procedure: COLONOSCOPY, WITH POLYPECTOMY USING HOT BIOPSY FORCEPS;  Surgeon: Yan Bashir MD;  Location: [facility] ENDOSCOPY;  Service: Endoscopy;  Laterality: N/A;  Cold forceps    HYSTERECTOMY       Family History   Problem Relation Name Age of Onset    Hypertension Mother Rex Erwin     Heart disease Mother Rex  "Erwin     Diabetes Mother Rex Hood     Heart attack Father      Diabetes Sister Ellie Aguiar     Hypertension Sister Ellie Aguiar     Diabetes Brother Kushal Hood     Hypertension Brother Kushal Hood      Social History     Tobacco Use    Smoking status: Every Day     Current packs/day: 0.25     Types: Cigarettes    Smokeless tobacco: Never    Tobacco comments:     Smokes 5 cigarettes a day   Substance Use Topics    Alcohol use: Not Currently    Drug use: Never       Current Outpatient Medications   Medication Sig    albuterol (PROVENTIL) 2.5 mg /3 mL (0.083 %) nebulizer solution Take 3 mLs (2.5 mg total) by nebulization every 6 (six) hours as needed for Wheezing. Rescue    ammonium lactate (LAC-HYDRIN) 12 % lotion APPLY TO DRY SKIN AND RUB IN WELL TWICE DAILY. NOTHING BETWEEN TOES    aspirin (ECOTRIN) 81 MG EC tablet Take 81 mg by mouth once daily.    atorvastatin (LIPITOR) 80 MG tablet Take 1 tablet (80 mg total) by mouth once daily.    BD ULTRA-FINE JORDON PEN NEEDLE 32 gauge x 5/32" Ndle 2 (two) times daily.    BD ULTRA-FINE JORDON PEN NEEDLE 32 gauge x 5/32" Ndle     blood sugar diagnostic Strp To check BG 2 times daily, to use with insurance preferred meter    buPROPion (WELLBUTRIN SR) 150 MG TBSR 12 hr tablet Take 1 tablet (150 mg ) by mouth 2 (two) times daily.    CICLOPIROX-ITRACONAZOLE-UREA TOP CLEAN NAILS, SHAKE BOTTLE WELL, APPLY TWICE DAILY TO ALL AFFECTED NAILS USING APPLICATOR. (UP TO 2 MLS/DAY)    empagliflozin (JARDIANCE) 25 mg tablet Take 1 tablet (25 mg total) by mouth once daily.    ergocalciferol (ERGOCALCIFEROL) 50,000 unit Cap Take 1 capsule (50,000 Units total) by mouth every 7 days.    ezetimibe (ZETIA) 10 mg tablet Take 1 tablet (10 mg total) by mouth once daily.    flash glucose scanning reader (FREESTYLE HARPER 2 READER) [facility] Freestyle Harper 2 reader dispense 1. Use as directed    flash glucose sensor (FREESTYLE HARPER 2 SENSOR) Kit Harper 2 sensor change every 14 days " "(Disp 6 each)(90 day)    fluticasone-umeclidin-vilanter (TRELEGY ELLIPTA) 100-62.5-25 mcg DsDv Inhale 1 puff into the lungs once daily.    furosemide (LASIX) 40 MG tablet Take 1 tablet (40 mg total) by mouth 2 (two) times a day.    insulin glargine (LANTUS U-100 INSULIN) 100 unit/mL injection Inject 30 Units into the skin 2 (two) times daily.    ipratropium-albuteroL (COMBIVENT RESPIMAT)  mcg/actuation inhaler Inhale 2 puffs into the lungs every 6 (six) hours as needed for Wheezing. Rescue    ketoconazole (NIZORAL) 2 % cream Apply topically once daily.    lancets (ACCU-CHEK FASTCLIX LANCET DRUM) Misc USE ONE DAILY    LANCETS MISC     latanoprost 0.005 % ophthalmic solution Apply 1 drop to eye.    metFORMIN (GLUCOPHAGE) 1000 MG tablet TAKE 1 TABLET BY MOUTH TWICE DAILY WITH MEALS    metoprolol succinate (TOPROL-XL) 100 MG 24 hr tablet Take 1 tablet (100 mg total) by mouth once daily.    mupirocin (BACTROBAN) 2 % ointment Apply topically 2 (two) times daily.    nystatin (MYCOSTATIN) ointment Apply topically 2 (two) times daily.    pen needle, diabetic (BD ULTRA-FINE JORDON PEN NEEDLE) 32 gauge x 5/32" Ndle USE TWICE DAILY    propylene glycoL (SYSTANE BALANCE) 0.6 % Drop Apply 1 drop to eye 2 (two) times a day.    sacubitriL-valsartan (ENTRESTO) 49-51 mg per tablet Take 1 tablet by mouth 2 (two) times daily.    SITagliptin phosphate (JANUVIA) 100 MG Tab Take 1 tablet (100 mg total) by mouth once daily.    spironolactone (ALDACTONE) 25 MG tablet Take 1 tablet (25 mg total) by mouth once daily.    blood-glucose meter kit To check BG 2 times daily, to use with insurance preferred meter    insulin glargine-yfgn 100 unit/mL (3 mL) InPn SMARTSI Unit(s) SUB-Q Twice Daily (Patient not taking: Reported on 2024)    nicotine (NICODERM CQ) 21 mg/24 hr Place 1 patch onto the skin once daily. (Patient not taking: Reported on 2024)    nicotine polacrilex 4 MG Lozg Take 1 lozenge (4 mg total) by mouth as needed. " (Patient not taking: Reported on 6/18/2024)    polyethylene glycol (MOVIPREP) 100-7.5-2.691 gram solution Take 2,000 mLs by mouth As instructed (take as directed per instructions provided by GI clinic). (Patient not taking: Reported on 12/12/2023)    varenicline (CHANTIX STARTING MONTH BOX) 0.5 mg (11)- 1 mg (42) tablet Take one 0.5mg tab by mouth once daily X3 days,then increase to one 0.5mg tab twice daily X4 days,then increase to one 1mg tab twice daily (Patient not taking: Reported on 1/22/2024)     No current facility-administered medications for this visit.       Review of Systems   Constitutional:  Positive for malaise/fatigue. Negative for chills and fever.   HENT:  Negative for sore throat.    Respiratory:  Positive for shortness of breath (mild, chronic). Negative for cough and sputum production.    Cardiovascular:  Negative for chest pain and palpitations.   Gastrointestinal:  Negative for diarrhea, nausea and vomiting.   Musculoskeletal:  Negative for falls.   Neurological:  Negative for dizziness and headaches.   Psychiatric/Behavioral:  Negative for depression.         OBJECTIVE:     Vitals:    06/18/24 1438   BP: 110/70   Pulse: 72   Resp: 20   Temp: 98 °F (36.7 °C)       Physical Exam  Constitutional:       General: She is not in acute distress.     Appearance: Normal appearance. She is obese. She is not ill-appearing.   HENT:      Head: Normocephalic and atraumatic.      Right Ear: External ear normal.      Left Ear: External ear normal.      Nose: Nose normal.   Eyes:      Extraocular Movements: Extraocular movements intact.   Cardiovascular:      Rate and Rhythm: Normal rate and regular rhythm.      Heart sounds: No murmur heard.     No friction rub. No gallop.   Pulmonary:      Effort: Pulmonary effort is normal. No respiratory distress.      Breath sounds: No wheezing, rhonchi or rales.   Abdominal:      General: There is no distension.      Palpations: Abdomen is soft.      Tenderness: There is  no abdominal tenderness.   Musculoskeletal:      Right lower leg: No edema.      Left lower leg: No edema.   Skin:     General: Skin is warm and dry.   Neurological:      General: No focal deficit present.      Mental Status: She is alert and oriented to person, place, and time.           ASSESSMENT/PLAN:     [id number] discussed smoking cessation.  HFrEF with ejection fraction of 40% by most recent echo  Obesity  Chronic respiratory failure  - The Patient has been compliant and benefiting his home noninvasive volume ventilator. Recommend that he continues his home noninvasive volume ventilator during hours of sleep and as needed during the day when symptoms arise. A home CPAP/BiPAP is not appropriate for his ventilatory requirements     RTC in 6 months

## 2024-06-21 ENCOUNTER — TELEPHONE (OUTPATIENT)
Dept: DIABETES | Facility: CLINIC | Age: 59
End: 2024-06-21
Payer: MEDICAID

## 2024-06-21 NOTE — TELEPHONE ENCOUNTER
Called to check in with pt on FBG & current lantus dose to see if dose needs to be titrated up again.  No answer.  Will attempt again tomorrow.

## 2024-06-21 NOTE — TELEPHONE ENCOUNTER
Was able to reach pt to check in on how Lantus titration is going.  Pt reports that she has increased from 36 units to 39 units for the past 2 days and FBG still in 170s.  Enocuraged pt to consider increasing to 42 units in another day or 2 if FBG continues to be > 130.  Pt verbalizes understanding.

## 2024-07-19 ENCOUNTER — HOSPITAL ENCOUNTER (OUTPATIENT)
Dept: WOUND CARE | Facility: HOSPITAL | Age: 59
Discharge: HOME | End: 2024-07-19
Attending: NURSE PRACTITIONER
Payer: MEDICAID

## 2024-07-19 VITALS
SYSTOLIC BLOOD PRESSURE: 114 MMHG | TEMPERATURE: 98 F | HEART RATE: 80 BPM | DIASTOLIC BLOOD PRESSURE: 71 MMHG | OXYGEN SATURATION: 95 %

## 2024-07-19 DIAGNOSIS — M79.671 RIGHT FOOT PAIN: ICD-10-CM

## 2024-07-19 DIAGNOSIS — E11.628 TYPE 2 DIABETES MELLITUS WITH OTHER SKIN COMPLICATION, WITH LONG-TERM CURRENT USE OF INSULIN: ICD-10-CM

## 2024-07-19 DIAGNOSIS — B35.1 MYCOTIC TOENAILS: ICD-10-CM

## 2024-07-19 DIAGNOSIS — Z91.199 NONCOMPLIANCE WITH TREATMENT PLAN: ICD-10-CM

## 2024-07-19 DIAGNOSIS — B07.0 PLANTAR WARTS: Primary | ICD-10-CM

## 2024-07-19 DIAGNOSIS — L85.3 XEROSIS OF SKIN: ICD-10-CM

## 2024-07-19 DIAGNOSIS — Z79.4 TYPE 2 DIABETES MELLITUS WITH OTHER SKIN COMPLICATION, WITH LONG-TERM CURRENT USE OF INSULIN: ICD-10-CM

## 2024-07-19 DIAGNOSIS — Z99.81 OXYGEN DEPENDENT: ICD-10-CM

## 2024-07-19 DIAGNOSIS — L84 CALLUS OF FOOT: ICD-10-CM

## 2024-07-19 DIAGNOSIS — M79.672 LEFT FOOT PAIN: ICD-10-CM

## 2024-07-19 PROCEDURE — 99211 OFF/OP EST MAY X REQ PHY/QHP: CPT | Mod: 25

## 2024-07-19 PROCEDURE — 11057 PARNG/CUTG B9 HYPRKR LES >4: CPT | Mod: ,,, | Performed by: NURSE PRACTITIONER

## 2024-07-19 PROCEDURE — 11057 PARNG/CUTG B9 HYPRKR LES >4: CPT

## 2024-07-19 PROCEDURE — 27000999 HC MEDICAL RECORD PHOTO DOCUMENTATION

## 2024-07-19 PROCEDURE — 99212 OFFICE O/P EST SF 10 MIN: CPT | Mod: 25,,, | Performed by: NURSE PRACTITIONER

## 2024-07-20 PROBLEM — Z99.81 OXYGEN DEPENDENT: Status: ACTIVE | Noted: 2024-07-20

## 2024-07-20 PROBLEM — B07.0 PLANTAR WARTS: Status: ACTIVE | Noted: 2024-07-20

## 2024-07-20 NOTE — PROGRESS NOTES
[facility]   Outpatient Wound Care     Subjective:   Patient ID: Linn Mckeon is a 58 y.o. female.    Chief Complaint: Callouses (Bilateral foot callouses)      History of Present Illness:   58 y.o. Black or  female being seen today for Xerosis of skin, bilateral plantar warts, bilateral calluses, and bilateral foot pain.  She was seen by Alejandra Junior NP in  May for diabetic foot care. She was a patient of Alejandra Junior NP.  Presents to the clinic alone difficulty walking and oxygen-dependent.  Wearing oxygen at 2 liters continuously.   Followed by Leslie Gabriel FNP for PCP  last appt 3/21/24. She refused podiatry in past due to transportation issues.  Reviewed all previous medical history.    Today's visit 07/19/2024:  During examination bilateral feet and lower legs Xerosis of skin.  Long discussion with the patient the importance of being compliant patient has not filed her Lac-Hytrin since January and is not applying twice daily with thin layer of Vaseline.  Bilateral plantar surface callus warts, and bilateral great toe callus pared using #4 dermal curette.  Paring performed to decreased pain with ambulation, prevent ulcers, in any skin breakdown.  Apply bilateral feet with Vaseline with the exception between toes.  Instructed will have to  Lac-Hytrin and start using twice daily with thin layer of Vaseline.  Agreed.  Tolerated well.  Relief with a paring.  Mycotic toenails did not need to be trimmed at this visit.  Patient is also noncompliant with the apply compound topical for mycotic nails.  Will have her return to the clinic in 3 months for diabetic foot care.        History includes:      Past Medical History:   Diagnosis Date    Callus of foot 07/07/2022    CHF (congestive heart failure)     Chronic heart failure  with preserved ejection fraction 05/20/2022    COPD (chronic obstructive pulmonary disease)     Diabetes mellitus     GOODMAN (dyspnea on exertion) 05/20/2022    Dystrophic nail 07/07/2022    Hyperlipidemia     Hyperlipidemia LDL goal <70 05/20/2022    Hypertension     Morbid obesity with body mass index (BMI) greater than or equal to 50 07/07/2022    Non compliance w medication regimen 07/07/2022    Noncompliance with treatment plan 07/07/2022    DAVION on CPAP 05/20/2022    Peripheral edema 05/20/2022    Primary hypertension 05/20/2022    Type 2 diabetes mellitus with skin complication 05/20/2022    Xerosis of skin 07/07/2022      Past Surgical History:   Procedure Laterality Date    COLONOSCOPY, WITH POLYPECTOMY USING HOT BIOPSY FORCEPS N/A 01/25/2024    Procedure: COLONOSCOPY, WITH POLYPECTOMY USING HOT BIOPSY FORCEPS;  Surgeon: Yan Bashir MD;  Location: [facility] ENDOSCOPY;  Service: Endoscopy;  Laterality: N/A;  Cold forceps    HYSTERECTOMY        Social History     Socioeconomic History    Marital status:    Tobacco Use    Smoking status: Every Day     Current packs/day: 0.25     Types: Cigarettes    Smokeless tobacco: Never    Tobacco comments:     Smokes 5 cigarettes a day   Substance and Sexual Activity    Alcohol use: Not Currently    Drug use: Never    Sexual activity: Not Currently     Social Determinants of Health     Financial Resource Strain: Low Risk  (3/21/2024)    Overall Financial Resource Strain (CARDIA)     Difficulty of Paying Living Expenses: Not hard at all   Food Insecurity: No Food Insecurity (3/21/2024)    Hunger Vital Sign     Worried About Running Out of Food in the Last Year: Never true     Ran Out of Food in the Last Year: Never true   Transportation Needs: No Transportation Needs (3/21/2024)    PRAPARE - Transportation     Lack of Transportation (Medical): No     Lack of Transportation (Non-Medical): No   Physical Activity: Inactive (3/21/2024)    Exercise Vital Sign     Days of  "Exercise per Week: 0 days     Minutes of Exercise per Session: 0 min   Stress: No Stress Concern Present (3/21/2024)    [employer] - Occupational Stress Questionnaire     Feeling of Stress : Not at all   Housing Stability: Low Risk  (3/21/2024)    Housing Stability Vital Sign     Unable to Pay for Housing in the Last Year: No     Number of Places Lived in the Last Year: 1     Unstable Housing in the Last Year: No   .      Current Outpatient Medications   Medication Sig Dispense Refill    albuterol (PROVENTIL) 2.5 mg /3 mL (0.083 %) nebulizer solution Take 3 mLs (2.5 mg total) by nebulization every 6 (six) hours as needed for Wheezing. Rescue 90 mL 1    ammonium lactate (LAC-HYDRIN) 12 % lotion APPLY TO DRY SKIN AND RUB IN WELL TWICE DAILY. NOTHING BETWEEN TOES 226 g 11    aspirin (ECOTRIN) 81 MG EC tablet Take 81 mg by mouth once daily.      atorvastatin (LIPITOR) 80 MG tablet Take 1 tablet (80 mg total) by mouth once daily. 90 tablet 3    BD ULTRA-FINE JORDON PEN NEEDLE 32 gauge x 5/32" Ndle 2 (two) times daily.      BD ULTRA-FINE JORDON PEN NEEDLE 32 gauge x 5/32" Ndle       blood sugar diagnostic Strp To check BG 2 times daily, to use with insurance preferred meter 100 strip 11    blood-glucose meter kit To check BG 2 times daily, to use with insurance preferred meter 1 each 0    buPROPion (WELLBUTRIN SR) 150 MG TBSR 12 hr tablet Take 1 tablet (150 mg ) by mouth 2 (two) times daily. 60 tablet 0    CICLOPIROX-ITRACONAZOLE-UREA TOP CLEAN NAILS, SHAKE BOTTLE WELL, APPLY TWICE DAILY TO ALL AFFECTED NAILS USING APPLICATOR. (UP TO 2 MLS/DAY)      empagliflozin (JARDIANCE) 25 mg tablet Take 1 tablet (25 mg total) by mouth once daily. 90 tablet 2    ergocalciferol (ERGOCALCIFEROL) 50,000 unit Cap Take 1 capsule (50,000 Units total) by mouth every 7 days. 12 capsule 2    ezetimibe (ZETIA) 10 mg tablet Take 1 tablet (10 mg total) by mouth once daily. 90 tablet 3    flash glucose scanning reader " "(FREESTYLE JOSE ALFREDO 2 READER) [facility] Freestyle Jose Alfredo 2 reader dispense 1. Use as directed 1 each 0    flash glucose sensor (FREESTYLE JOSE ALFREDO 2 SENSOR) Kit Jose Alfredo 2 sensor change every 14 days (Disp 6 each)(90 day) 6 kit 11    fluticasone-umeclidin-vilanter (TRELEGY ELLIPTA) 100-62.5-25 mcg DsDv Inhale 1 puff into the lungs once daily. 28 each 3    furosemide (LASIX) 40 MG tablet Take 1 tablet (40 mg total) by mouth 2 (two) times a day. 180 tablet 3    insulin glargine (LANTUS U-100 INSULIN) 100 unit/mL injection Inject 30 Units into the skin 2 (two) times daily. 30 mL 11    insulin glargine-yfgn 100 unit/mL (3 mL) InPn SMARTSI Unit(s) SUB-Q Twice Daily (Patient not taking: Reported on 2024)      ipratropium-albuteroL (COMBIVENT RESPIMAT)  mcg/actuation inhaler Inhale 2 puffs into the lungs every 6 (six) hours as needed for Wheezing. Rescue 4 g 1    ketoconazole (NIZORAL) 2 % cream Apply topically once daily. 30 g 1    lancets (ACCU-CHEK FASTCLIX LANCET DRUM) [facility] USE ONE DAILY 100 each 3    LANCETS MISC       latanoprost 0.005 % ophthalmic solution Apply 1 drop to eye.      metFORMIN (GLUCOPHAGE) 1000 MG tablet TAKE 1 TABLET BY MOUTH TWICE DAILY WITH MEALS 60 tablet 11    metoprolol succinate (TOPROL-XL) 100 MG 24 hr tablet Take 1 tablet (100 mg total) by mouth once daily. 30 tablet 11    mupirocin (BACTROBAN) 2 % ointment Apply topically 2 (two) times daily. 22 g 1    nicotine (NICODERM CQ) 21 mg/24 hr Place 1 patch onto the skin once daily. (Patient not taking: Reported on 2024) 28 patch 0    nicotine polacrilex 4 MG Lozg Take 1 lozenge (4 mg total) by mouth as needed. (Patient not taking: Reported on 2024) 108 lozenge 1    nystatin (MYCOSTATIN) ointment Apply topically 2 (two) times daily. 15 g 1    pen needle, diabetic (BD ULTRA-FINE JORDON PEN NEEDLE) 32 gauge x 5/32" Ndle USE TWICE DAILY 60 each 11    polyethylene glycol (MOVIPREP) 100-7.5-2.691 gram solution Take 2,000 mLs by mouth As " "instructed (take as directed per instructions provided by GI clinic). (Patient not taking: Reported on 12/12/2023) 1 kit 0    propylene glycoL (SYSTANE BALANCE) 0.6 % Drop Apply 1 drop to eye 2 (two) times a day. 15 mL 1    sacubitriL-valsartan (ENTRESTO) 49-51 mg per tablet Take 1 tablet by mouth 2 (two) times daily. 60 tablet 6    SITagliptin phosphate (JANUVIA) 100 MG Tab Take 1 tablet (100 mg total) by mouth once daily. 90 tablet 2    spironolactone (ALDACTONE) 25 MG tablet Take 1 tablet (25 mg total) by mouth once daily. 90 tablet 3    varenicline (CHANTIX STARTING MONTH BOX) 0.5 mg (11)- 1 mg (42) tablet Take one 0.5mg tab by mouth once daily X3 days,then increase to one 0.5mg tab twice daily X4 days,then increase to one 1mg tab twice daily (Patient not taking: Reported on 1/22/2024) 1 each 0     No current facility-administered medications for this encounter.       Review of Systems   All other systems reviewed and are negative.         Labs Reviewed:   Chemistry:  Lab Results   Component Value Date    BUN 22.3 (H) 03/18/2024    BUN 18.0 09/18/2023    CREATININE 1.16 (H) 03/18/2024    CREATININE 1.03 (H) 09/18/2023    EGFRNORACEVR 54 03/18/2024    EGFRNORACEVR >60 09/18/2023    GLUCOSE 181 (H) 03/18/2024    AST 11 03/18/2024    AST 14 09/18/2023    ALT 11 03/18/2024    ALT 13 09/18/2023    HGBA1C 9.3 (H) 03/18/2024        Hematology:  Lab Results   Component Value Date    WBC 10.55 03/18/2024    WBC 10.40 09/18/2023    HGB 15.1 03/18/2024    HGB 14.9 09/18/2023    HCT 47.4 (H) 03/18/2024    HCT 47.1 (H) 09/18/2023     03/18/2024     09/18/2023       Inflammatory Markers:  No results found for: "HSCRP", "SEDRATE"     Objective:        Physical Exam  Vitals reviewed.   Cardiovascular:      Pulses:           Dorsalis pedis pulses are 2+ on the right side and 2+ on the left side.        Posterior tibial pulses are 2+ on the right side and 2+ on the left side.   Pulmonary:      Comments: Wearing O2 @ " 2 liters continuously.  Feet:      Right foot:      Skin integrity: Callus and dry skin present.      Toenail Condition: Right toenails are abnormally thick. Fungal disease present.     Left foot:      Skin integrity: Callus and dry skin present.      Toenail Condition: Left toenails are abnormally thick. Fungal disease present.  Skin:     General: Skin is dry.      Capillary Refill: Capillary refill takes less than 2 seconds.   Neurological:      Mental Status: She is alert.                    Assessment:         ICD-10-CM ICD-9-CM   1. Plantar warts  B07.0 078.12   2. Xerosis of skin  L85.3 706.8   3. Noncompliance with treatment plan  Z91.199 V15.81   4. Left foot pain  M79.672 729.5   5. Right foot pain  M79.671 729.5   6. Callus of foot  L84 700   7. Mycotic toenails  B35.1 110.1   8. Type 2 diabetes mellitus with other skin complication, with long-term current use of insulin  E11.628 250.80    Z79.4 V58.67   9. Oxygen dependent  Z99.81 V46.2   10. BMI 45.0-49.9, adult  Z68.42 V85.42         Plan:   Tissue pathology and/or culture taken:  [] Yes [x] No   Sharp debridement performed:   [] Yes [x] No   Labs ordered this visit:   [] Yes [x] No   Imaging ordered this visit:   [] Yes [x] No         1. Plantar warts     Pared.  Will continue to monitor.  Refusing podiatry at this time.   2. Xerosis of skin     Reinforced the use of Lac-Hytrin follow up by a thin layer Vaseline with the exception between toes and applied twice daily.   3. Noncompliance with treatment plan     Reinforced the importance of being compliant with the treatment plan.   4. Left foot pain     Relief with paring.   5. Right foot pain     Relief with paring.   6. Callus of foot     Relief with paring.  Discussion proper footwear, and applying Lac-Hytrin with thin layer Vaseline.   7. Mycotic toenails     Prescribed compound topical nail suspension, not applied as directed.  Reinforced the importance of compliance treatment plan.   8. Type 2  diabetes mellitus with other skin complication, with long-term current use of insulin     Last A1c 9.3.    Must have a strict diabetic diet, take glucose lowering medications as prescribed and encourage lower HA1C.   9. Oxygen dependent     Co-factor in delayed healing.  Wearing oxygen at 2 liters per nasal cannula.    10. BMI 45.0-49.9, adult     Co-factor in wound development.  Instructed the importance of diet and exercise.     The time spent including preparing to see the patient, obtaining patient history and assessment, evaluation of the plan of care, patient/caregiver counseling and education, orders, documentation, coordination of care, and other professional medical management activities for today's encounter was 20 minute.    Time spent performing procedures during today's encounter was 20 minute.    Follow up in about 3 months (around 10/19/2024). Teaching provided on s/s to call wound clinic for promptly.  ER precautions taught for after hours and weekends.     NOEMI Mederos

## 2024-08-06 ENCOUNTER — CLINICAL SUPPORT (OUTPATIENT)
Dept: DIABETES | Facility: CLINIC | Age: 59
End: 2024-08-06
Payer: MEDICAID

## 2024-08-06 VITALS — BODY MASS INDEX: 49.2 KG/M2 | HEIGHT: 64 IN | WEIGHT: 288.19 LBS

## 2024-08-06 DIAGNOSIS — Z79.4 TYPE 2 DIABETES MELLITUS WITHOUT COMPLICATION, WITH LONG-TERM CURRENT USE OF INSULIN: Primary | ICD-10-CM

## 2024-08-06 DIAGNOSIS — E11.9 TYPE 2 DIABETES MELLITUS WITHOUT COMPLICATION, WITH LONG-TERM CURRENT USE OF INSULIN: Primary | ICD-10-CM

## 2024-08-06 PROCEDURE — G0108 DIAB MANAGE TRN  PER INDIV: HCPCS | Mod: PBBFAC

## 2024-08-06 PROCEDURE — 99211 OFF/OP EST MAY X REQ PHY/QHP: CPT | Mod: PBBFAC

## 2024-08-07 ENCOUNTER — PATIENT OUTREACH (OUTPATIENT)
Dept: ENDOCRINOLOGY | Facility: CLINIC | Age: 59
End: 2024-08-07
Payer: MEDICAID

## 2024-08-07 ENCOUNTER — TELEPHONE (OUTPATIENT)
Dept: ENDOCRINOLOGY | Facility: CLINIC | Age: 59
End: 2024-08-07

## 2024-08-07 DIAGNOSIS — E11.65 TYPE 2 DIABETES MELLITUS WITH HYPERGLYCEMIA, WITH LONG-TERM CURRENT USE OF INSULIN: Primary | Chronic | ICD-10-CM

## 2024-08-07 DIAGNOSIS — E11.9 TYPE 2 DIABETES MELLITUS WITHOUT COMPLICATION, UNSPECIFIED WHETHER LONG TERM INSULIN USE: Primary | ICD-10-CM

## 2024-08-07 DIAGNOSIS — Z79.4 TYPE 2 DIABETES MELLITUS WITH HYPERGLYCEMIA, WITH LONG-TERM CURRENT USE OF INSULIN: Primary | Chronic | ICD-10-CM

## 2024-08-07 RX ORDER — BLOOD-GLUCOSE,RECEIVER,CONT
EACH MISCELLANEOUS
Qty: 1 EACH | Refills: 0 | Status: SHIPPED | OUTPATIENT
Start: 2024-08-07

## 2024-08-07 RX ORDER — BLOOD-GLUCOSE SENSOR
EACH MISCELLANEOUS
Qty: 3 EACH | Refills: 11 | Status: SHIPPED | OUTPATIENT
Start: 2024-08-07

## 2024-08-19 ENCOUNTER — LAB VISIT (OUTPATIENT)
Dept: LAB | Facility: HOSPITAL | Age: 59
End: 2024-08-19
Attending: NURSE PRACTITIONER
Payer: MEDICAID

## 2024-08-19 DIAGNOSIS — E11.65 TYPE 2 DIABETES MELLITUS WITH HYPERGLYCEMIA, WITH LONG-TERM CURRENT USE OF INSULIN: Chronic | ICD-10-CM

## 2024-08-19 DIAGNOSIS — Z79.4 TYPE 2 DIABETES MELLITUS WITH HYPERGLYCEMIA, WITH LONG-TERM CURRENT USE OF INSULIN: Chronic | ICD-10-CM

## 2024-08-19 LAB
ALBUMIN SERPL-MCNC: 3.2 G/DL (ref 3.5–5)
ALBUMIN/GLOB SERPL: 0.7 RATIO (ref 1.1–2)
ALP SERPL-CCNC: 112 UNIT/L (ref 40–150)
ALT SERPL-CCNC: 14 UNIT/L (ref 0–55)
ANION GAP SERPL CALC-SCNC: 8 MEQ/L
AST SERPL-CCNC: 13 UNIT/L (ref 5–34)
BILIRUB SERPL-MCNC: 0.9 MG/DL
BUN SERPL-MCNC: 21.2 MG/DL (ref 9.8–20.1)
CALCIUM SERPL-MCNC: 10 MG/DL (ref 8.4–10.2)
CHLORIDE SERPL-SCNC: 102 MMOL/L (ref 98–107)
CO2 SERPL-SCNC: 26 MMOL/L (ref 22–29)
CREAT SERPL-MCNC: 1.28 MG/DL (ref 0.55–1.02)
CREAT/UREA NIT SERPL: 17
EST. AVERAGE GLUCOSE BLD GHB EST-MCNC: 191.5 MG/DL
GFR SERPLBLD CREATININE-BSD FMLA CKD-EPI: 48 ML/MIN/1.73/M2
GLOBULIN SER-MCNC: 4.5 GM/DL (ref 2.4–3.5)
GLUCOSE SERPL-MCNC: 123 MG/DL (ref 74–100)
HBA1C MFR BLD: 8.3 %
POTASSIUM SERPL-SCNC: 4.6 MMOL/L (ref 3.5–5.1)
PROT SERPL-MCNC: 7.7 GM/DL (ref 6.4–8.3)
SODIUM SERPL-SCNC: 136 MMOL/L (ref 136–145)

## 2024-08-19 PROCEDURE — 80053 COMPREHEN METABOLIC PANEL: CPT

## 2024-08-19 PROCEDURE — 36415 COLL VENOUS BLD VENIPUNCTURE: CPT

## 2024-08-19 PROCEDURE — 83036 HEMOGLOBIN GLYCOSYLATED A1C: CPT

## 2024-08-20 ENCOUNTER — OFFICE VISIT (OUTPATIENT)
Dept: INTERNAL MEDICINE | Facility: CLINIC | Age: 59
End: 2024-08-20
Payer: MEDICAID

## 2024-08-20 ENCOUNTER — CLINICAL SUPPORT (OUTPATIENT)
Dept: INTERNAL MEDICINE | Facility: CLINIC | Age: 59
End: 2024-08-20
Attending: NURSE PRACTITIONER
Payer: MEDICAID

## 2024-08-20 VITALS
SYSTOLIC BLOOD PRESSURE: 130 MMHG | RESPIRATION RATE: 16 BRPM | HEART RATE: 64 BPM | HEIGHT: 64 IN | WEIGHT: 288.19 LBS | BODY MASS INDEX: 49.2 KG/M2 | TEMPERATURE: 98 F | DIASTOLIC BLOOD PRESSURE: 84 MMHG | OXYGEN SATURATION: 97 %

## 2024-08-20 DIAGNOSIS — E66.01 CLASS 3 SEVERE OBESITY DUE TO EXCESS CALORIES WITH SERIOUS COMORBIDITY AND BODY MASS INDEX (BMI) OF 45.0 TO 49.9 IN ADULT: Chronic | ICD-10-CM

## 2024-08-20 DIAGNOSIS — Z13.5 DIABETIC RETINOPATHY SCREENING: Primary | ICD-10-CM

## 2024-08-20 DIAGNOSIS — F17.210 CIGARETTE NICOTINE DEPENDENCE WITHOUT COMPLICATION: Chronic | ICD-10-CM

## 2024-08-20 DIAGNOSIS — Z13.5 DIABETIC RETINOPATHY SCREENING: ICD-10-CM

## 2024-08-20 DIAGNOSIS — E11.628 TYPE 2 DIABETES MELLITUS WITH OTHER SKIN COMPLICATION, WITH LONG-TERM CURRENT USE OF INSULIN: ICD-10-CM

## 2024-08-20 DIAGNOSIS — Z79.4 TYPE 2 DIABETES MELLITUS WITH HYPERGLYCEMIA, WITH LONG-TERM CURRENT USE OF INSULIN: Primary | Chronic | ICD-10-CM

## 2024-08-20 DIAGNOSIS — J43.1 PANLOBULAR EMPHYSEMA: Chronic | ICD-10-CM

## 2024-08-20 DIAGNOSIS — L84 CALLUS OF FOOT: ICD-10-CM

## 2024-08-20 DIAGNOSIS — I10 PRIMARY HYPERTENSION: Chronic | ICD-10-CM

## 2024-08-20 DIAGNOSIS — E11.65 TYPE 2 DIABETES MELLITUS WITH HYPERGLYCEMIA, WITH LONG-TERM CURRENT USE OF INSULIN: Primary | Chronic | ICD-10-CM

## 2024-08-20 DIAGNOSIS — L85.3 XEROSIS OF SKIN: ICD-10-CM

## 2024-08-20 DIAGNOSIS — Z79.4 TYPE 2 DIABETES MELLITUS WITH OTHER SKIN COMPLICATION, WITH LONG-TERM CURRENT USE OF INSULIN: ICD-10-CM

## 2024-08-20 DIAGNOSIS — E78.5 HYPERLIPIDEMIA LDL GOAL <70: Chronic | ICD-10-CM

## 2024-08-20 DIAGNOSIS — I50.20 HEART FAILURE WITH REDUCED EJECTION FRACTION: Chronic | ICD-10-CM

## 2024-08-20 PROCEDURE — 1160F RVW MEDS BY RX/DR IN RCRD: CPT | Mod: CPTII,,, | Performed by: NURSE PRACTITIONER

## 2024-08-20 PROCEDURE — 3066F NEPHROPATHY DOC TX: CPT | Mod: CPTII,,, | Performed by: NURSE PRACTITIONER

## 2024-08-20 PROCEDURE — 3079F DIAST BP 80-89 MM HG: CPT | Mod: CPTII,,, | Performed by: NURSE PRACTITIONER

## 2024-08-20 PROCEDURE — 3052F HG A1C>EQUAL 8.0%<EQUAL 9.0%: CPT | Mod: CPTII,,, | Performed by: NURSE PRACTITIONER

## 2024-08-20 PROCEDURE — 99215 OFFICE O/P EST HI 40 MIN: CPT | Mod: S$PBB,,, | Performed by: NURSE PRACTITIONER

## 2024-08-20 PROCEDURE — 3061F NEG MICROALBUMINURIA REV: CPT | Mod: CPTII,,, | Performed by: NURSE PRACTITIONER

## 2024-08-20 PROCEDURE — 1159F MED LIST DOCD IN RCRD: CPT | Mod: CPTII,,, | Performed by: NURSE PRACTITIONER

## 2024-08-20 PROCEDURE — 3008F BODY MASS INDEX DOCD: CPT | Mod: CPTII,,, | Performed by: NURSE PRACTITIONER

## 2024-08-20 PROCEDURE — 4010F ACE/ARB THERAPY RXD/TAKEN: CPT | Mod: CPTII,,, | Performed by: NURSE PRACTITIONER

## 2024-08-20 PROCEDURE — 99215 OFFICE O/P EST HI 40 MIN: CPT | Mod: PBBFAC | Performed by: NURSE PRACTITIONER

## 2024-08-20 PROCEDURE — 92228 IMG RTA DETC/MNTR DS PHY/QHP: CPT | Mod: TC,PBBFAC | Performed by: FAMILY MEDICINE

## 2024-08-20 PROCEDURE — 3075F SYST BP GE 130 - 139MM HG: CPT | Mod: CPTII,,, | Performed by: NURSE PRACTITIONER

## 2024-08-20 RX ORDER — ERGOCALCIFEROL 1.25 MG/1
50000 CAPSULE ORAL
Qty: 12 CAPSULE | Refills: 2 | Status: SHIPPED | OUTPATIENT
Start: 2024-08-20

## 2024-08-20 RX ORDER — NYSTATIN 100000 U/G
OINTMENT TOPICAL 2 TIMES DAILY
Qty: 15 G | Refills: 2 | Status: SHIPPED | OUTPATIENT
Start: 2024-08-20

## 2024-08-20 RX ORDER — FLUTICASONE PROPIONATE 50 MCG
1 SPRAY, SUSPENSION (ML) NASAL DAILY
Qty: 11.1 ML | Refills: 1 | Status: SHIPPED | OUTPATIENT
Start: 2024-08-20

## 2024-08-20 NOTE — PROGRESS NOTES
Linn Mkceon is a 58 y.o. female here for a diabetic eye screening with non-dilated fundus photos per NOEMI Andrews.    Patient cooperative?: Yes  Small pupils?: No  Last eye exam: NA    For exam results, see Encounter Report.

## 2024-08-20 NOTE — PROGRESS NOTES
Patient ID: [id number]     Chief Complaint: Diabetes and Hypertension (Lab results)    HPI:     Linn Mckeon is a 58 y.o. female with diagnosis of HTN, HLD, Class II HFpEF 50-55%, DM2, COPD with Oxygen Dependence, DAVION, Tobacco Use, Obesity. Patient seen today for follow up. Patient last seen in clinic on 03/21/2024.  Patient currently prescribed Trilogy for chronic respiratory failure s/t COPD. Patient currently using 2L O2 NC with O2 sat 97%. Patient states SOB at times, denies SOB today. Patient followed by Cardiology and Pulmonology. Patient states she has been compliant and benefiting from her home noninvasive volume ventilator. Patient had a positive Cologuard, Colonoscopy completed on 01/25/2024.   Today, patient requesting diabetic shoes. Patient does have uncontrolled DM2 with calluses to bilateral feet. Patient is followed by Wound Care Clinic here at [facility] and has refused Podiatry referral.   Patient denies any acute complaints.     Patient followed by Diabetes Education. Last appointment on 08/06/2024. Patient has follow up appointment scheduled for 08/27/2024.     Patient followed by Wound Care. Last appointment on 07/19/2024. Plantar warts: Pared. Will continue to monitor.  Refusing podiatry at this time. Xerosis of skin: Reinforced the use of Lac-Hytrin follow up by a thin layer Vaseline with the exception between toes and applied twice daily. Noncompliance with treatment plan. Patient has follow up appointment scheduled for 10/21/2024.    Patient followed by Pulmonology Clinic. Last appointment on 06/18/2024. COPD: stable, we discussed smoking cessation. HFrEF 40%, Obesity, Chronic respiratory failure: The Patient has been compliant and benefiting his home noninvasive volume ventilator. Recommend that he continues his home noninvasive volume ventilator during hours of sleep and as needed during the day when symptoms arise. A home CPAP/BiPAP is not appropriate for his ventilatory requirements.  Patient has follow up appointment scheduled for 12/10/2024.     Patient followed by Cardiology Clinic. Last appointment on 05/22/2024. Heart Failure with Improved Ejection Fraction: Stable from last office visit, patient states that she feels better. Denies any chest pain or palpitations. Reports occasional shortness of breath but states that overall it has greatly improved. Still requires supplemental O2 on occasion, specifically when ambulating far distances or when she is out in public. She attributes majority of her shortness of breath to her severe, longstanding COPD. No progression in symptoms. Etiology: NiCM. HF Status: NYHA Class II, ACC/AHA Stage C. LVEF: 59% per TTE on 11.3.23 which is improved from 50% TTE on 12.6.22. Current GDMT: Entresto 49mg/51mg, Metoprolol Succinate 100 MG, Spironolactone 25, Jardiance 10mg. Continue for now, tolerating well. Diuresis: Lasix 40mg BID. Continue low Na Diet. Strict Is/Os and daily weights. Much improved on now that on GDMT. No further testing needed at this time. HTN: BP at goal - 123/63. Continue Spironolactone, Entresto, Metoprolol and Lasix. Counseled on low salt diet and exercise as tolerated. HLD: LDL goal <70. LDL 61 per labs 03/18/2024. Continue Atorvastatin 80mg daily and Zetia 10mg daily. FLP/CMP ordered per PCP, patient advised to complete this prior to her next office visit. Counseled on medication compliance. DAVION on Trelegy Machine: She reports nightly Trelegy compliance and feels she is benefitting from use. Recommended continued nightly Trelegy use. BLE pain - improved. KWADWO testing 9.13.22: mild arterial flow reduction in the bilateral lower extremities. Patient has follow up appointment scheduled for 09/24/2024.     Patient followed by Endocrinology Clinic. Last appointment on 06/29/2023. Uncontrolled type 2 diabetes mellitus with hyperglycemia: Recent A1C 10.2. Medications: Metformin 1000 mg twice daily, Januvia 100 mg, Jardiance 10mg, Lantus 30 units  BID. Changes:  Jardiance increased to 25 mg Januvia restarted on 05/18/2023. Home Glucometer Use: Harper. Eye Exam: 09/28/2021 repeat fundus today attempted previous and to obtain. Last Hypoglycemic episode: Occasional. Follow ADA diet, avoid soda, simple sweets, and limit rice, breads and starches. Maintain healthy weight, exercise 4-5 times a week for 30 minutes. Diet and medication compliance discussed on visit. Patient has follow up appointment scheduled for 08/21/2024.      Patient followed by Ophthalmology Clinic. Last appointment on 04/28/2022. DM without retinopathy: Last A1c 03/2021: 10.3%; encouraged good control/regular PCP follow ups; Photos 4/2022; DFE next due 4/2023. Mild POAG OU: No family hx, +AA, thin CCT, upper limit normal IOP. Gonio open 360 OU; C/D: 0.4 OU; Tmax 22 // 21; OCT RNFL full OU; Ganglion cell layer wnl OU; Latanoprost QHS OU started 9/1/21; IOP good today. Dry eye/MGD: Diffuse, confluent PEE OU; Start ATs 4-6 times per day, ointment QHS; Warm compresses BID. Presbyopia: doing well, monitor. HTN retinopathy: Recommend good bp control. RTC 6 mo IOP. No follow up appointment noted, will message clinic.      I spent a total of 40 minutes on the day of the visit.  This includes face to face time and non-face to face time preparing to see the patient (eg, review of tests), obtaining and/or reviewing separately obtained history, documenting clinical information in the electronic or other health record, independently interpreting results and communicating results to the patient/family/caregiver, or care coordinator.      Review of patient's allergies indicates:  No Known Allergies    Breast Cancer Screening: MMG benign on 04/25/2024  Cervical Cancer Screening: Hysterectomy  Colorectal Cancer Screening: Colonoscopy on 01/25/2024 with recommended repeat in 3 years  Diabetic Eye Exam: ordered 08/20/2024  Diabetic Foot Exam: 01/22/2024  Lung Cancer Screening: Refuses at this time  Prostate Cancer  Screening: N/A  AAA Screening: N/A  Osteoporosis Screening: deferred due to age  Medicare Wellness: N/A  Immunizations:   Immunization History   Administered Date(s) Administered    COVID-19, MRNA, LN-S, PF (Pfizer) (Purple Cap) 07/28/2021, 08/18/2021    Tdap 09/18/2023     Past Surgical History:   Procedure Laterality Date    COLONOSCOPY, WITH POLYPECTOMY USING HOT BIOPSY FORCEPS N/A 01/25/2024    Procedure: COLONOSCOPY, WITH POLYPECTOMY USING HOT BIOPSY FORCEPS;  Surgeon: Yan Bashir MD;  Location: [facility] ENDOSCOPY;  Service: Endoscopy;  Laterality: N/A;  Cold forceps    HYSTERECTOMY       family history includes Diabetes in her brother, mother, and sister; Heart attack in her father; Heart disease in her mother; Hypertension in her brother, mother, and sister.    Social History     Socioeconomic History    Marital status:    Tobacco Use    Smoking status: Every Day     Current packs/day: 0.50     Types: Cigarettes    Smokeless tobacco: Never    Tobacco comments:     Smokes 5 cigarettes a day   Substance and Sexual Activity    Alcohol use: Not Currently    Drug use: Never    Sexual activity: Not Currently     Social Determinants of Health     Financial Resource Strain: Low Risk  (3/21/2024)    Overall Financial Resource Strain (CARDIA)     Difficulty of Paying Living Expenses: Not hard at all   Food Insecurity: No Food Insecurity (3/21/2024)    Hunger Vital Sign     Worried About Running Out of Food in the Last Year: Never true     Ran Out of Food in the Last Year: Never true   Transportation Needs: No Transportation Needs (3/21/2024)    PRAPARE - Transportation     Lack of Transportation (Medical): No     Lack of Transportation (Non-Medical): No   Physical Activity: Inactive (3/21/2024)    Exercise Vital Sign     Days of Exercise per Week: 0 days     Minutes of Exercise per Session: 0 min   Stress: No Stress Concern Present (3/21/2024)    [employer] [place] [employer] - Occupational  "Stress Questionnaire     Feeling of Stress : Not at all   Housing Stability: Low Risk  (3/21/2024)    Housing Stability Vital Sign     Unable to Pay for Housing in the Last Year: No     Number of Places Lived in the Last Year: 1     Unstable Housing in the Last Year: No     Current Outpatient Medications   Medication Instructions    albuterol (PROVENTIL) 2.5 mg, Nebulization, Every 6 hours PRN, Rescue    ammonium lactate (LAC-HYDRIN) 12 % lotion APPLY TO DRY SKIN AND RUB IN WELL TWICE DAILY. NOTHING BETWEEN TOES    aspirin (ECOTRIN) 81 mg, Oral, Daily    atorvastatin (LIPITOR) 80 mg, Oral, Daily    BD ULTRA-FINE JORDON PEN NEEDLE 32 gauge x 5/32" Ndle 2 times daily    BD ULTRA-FINE JORDON PEN NEEDLE 32 gauge x 5/32" Ndle No dose, route, or frequency recorded.    blood sugar diagnostic Strp To check BG 2 times daily, to use with insurance preferred meter    blood-glucose meter kit To check BG 2 times daily, to use with insurance preferred meter    blood-glucose meter,continuous (DEXCOM G7 ) Misc Dexcom G7  uses directed    blood-glucose sensor (DEXCOM G7 SENSOR) Norma Use every 10 days as directed    CICLOPIROX-ITRACONAZOLE-UREA TOP CLEAN NAILS, SHAKE BOTTLE WELL, APPLY TWICE DAILY TO ALL AFFECTED NAILS USING APPLICATOR. (UP TO 2 MLS/DAY)    empagliflozin (JARDIANCE) 25 mg, Oral, Daily    ergocalciferol (ERGOCALCIFEROL) 50,000 Units, Oral, Every 7 days    ezetimibe (ZETIA) 10 mg, Oral, Daily    flash glucose scanning reader (FREESTYLE JOSE ALFREDO 2 READER) [facility] Freestyle Jose Alfredo 2 reader dispense 1. Use as directed    flash glucose sensor (FREESTYLE JOSE ALFREDO 2 SENSOR) Kit Jose Alfredo 2 sensor change every 14 days (Disp 6 each)(90 day)    fluticasone propionate (FLONASE) 50 mcg, Each Nostril, Daily    fluticasone-umeclidin-vilanter (TRELEGY ELLIPTA) 100-62.5-25 mcg DsDv 1 puff, Inhalation, Daily    furosemide (LASIX) 40 mg, Oral, 2 times daily    insulin glargine U-100 (Lantus) (LANTUS U-100 INSULIN) 30 Units, Subcutaneous, " "2 times daily    insulin glargine-yfgn 100 unit/mL (3 mL) InPn     ipratropium-albuteroL (COMBIVENT RESPIMAT)  mcg/actuation inhaler 2 puffs, Inhalation, Every 6 hours PRN, Rescue    ketoconazole (NIZORAL) 2 % cream Topical (Top), Daily    lancets (ACCU-CHEK FASTCLIX LANCET DRUM) Misc USE ONE DAILY    LANCETS MISC No dose, route, or frequency recorded.    latanoprost 0.005 % ophthalmic solution 1 drop    metFORMIN (GLUCOPHAGE) 1,000 mg, Oral, 2 times daily with meals    metoprolol succinate (TOPROL-XL) 100 mg, Oral, Daily    nystatin (MYCOSTATIN) ointment Topical (Top), 2 times daily    pen needle, diabetic (BD ULTRA-FINE JORDON PEN NEEDLE) 32 gauge x 5/32" Ndle 2 times daily    sacubitriL-valsartan (ENTRESTO) 49-51 mg per tablet 1 tablet, Oral, 2 times daily    SITagliptin phosphate (JANUVIA) 100 mg, Oral, Daily    spironolactone (ALDACTONE) 25 mg, Oral, Daily       Subjective:     Review of Systems   Constitutional: Negative.    HENT: Negative.     Eyes: Negative.    Respiratory: Negative.     Cardiovascular: Negative.    Gastrointestinal: Negative.    Endocrine: Negative.    Genitourinary: Negative.    Musculoskeletal: Negative.    Skin: Negative.    Allergic/Immunologic: Negative.    Neurological: Negative.    Hematological: Negative.    Psychiatric/Behavioral: Negative.         Objective:     Visit Vitals  /84 (BP Location: Left arm, Patient Position: Sitting, BP Method: Large (Automatic))   Pulse 64   Temp 98.1 °F (36.7 °C) (Oral)   Resp 16   Ht 5' 4.02" (1.626 m)   Wt 130.7 kg (288 lb 3.2 oz)   SpO2 97%   BMI 49.44 kg/m²       Physical Exam  Vitals reviewed.   Constitutional:       Appearance: Normal appearance. She is obese.   HENT:      Head: Normocephalic and atraumatic.      Mouth/Throat:      Mouth: Mucous membranes are moist.      Pharynx: Oropharynx is clear.   Eyes:      Extraocular Movements: Extraocular movements intact.      Conjunctiva/sclera: Conjunctivae normal.      Pupils: Pupils are " equal, round, and reactive to light.   Cardiovascular:      Rate and Rhythm: Normal rate and regular rhythm.      Pulses:           Dorsalis pedis pulses are 1+ on the right side and 1+ on the left side.      Heart sounds: Normal heart sounds.   Pulmonary:      Effort: Pulmonary effort is normal.      Breath sounds: Normal breath sounds.      Comments: 2L O2 per NC  Abdominal:      General: Bowel sounds are normal.   Musculoskeletal:         General: Normal range of motion.      Cervical back: Normal range of motion.   Feet:      Right foot:      Skin integrity: Callus and dry skin present.      Left foot:      Skin integrity: Callus and dry skin present.   Skin:     General: Skin is warm and dry.   Neurological:      Mental Status: She is alert and oriented to person, place, and time.   Psychiatric:         Mood and Affect: Mood normal.         Behavior: Behavior normal.             Labs Reviewed:     Hematology:  Lab Results   Component Value Date    WBC 10.55 03/18/2024    HGB 15.1 03/18/2024    HCT 47.4 (H) 03/18/2024     03/18/2024     Chemistry:  Lab Results   Component Value Date     08/19/2024    K 4.6 08/19/2024    BUN 21.2 (H) 08/19/2024    CREATININE 1.28 (H) 08/19/2024    EGFRNORACEVR 48 08/19/2024    GLUCOSE 123 (H) 08/19/2024    CALCIUM 10.0 08/19/2024    ALKPHOS 112 08/19/2024    LABPROT 7.7 08/19/2024    ALBUMIN 3.2 (L) 08/19/2024    BILIDIR 0.2 12/06/2021    IBILI 0.30 12/06/2021    AST 13 08/19/2024    ALT 14 08/19/2024    MG 2.20 09/12/2022    PHOS 4.4 09/11/2022    [id number] 22.3 (L) 03/18/2024     Lab Results   Component Value Date    HGBA1C 8.3 (H) 08/19/2024     Lipid Panel:  Lab Results   Component Value Date    CHOL 102 03/18/2024    HDL 29 (L) 03/18/2024    LDL 61.00 03/18/2024    TRIG 58 03/18/2024    TOTALCHOLEST 4 03/18/2024     Thyroid:  Lab Results   Component Value Date    TSH 1.084 03/18/2024     Urine:  Lab Results   Component Value Date    APPEARANCEUA Turbid (A)  05/18/2023    SGUA 1.019 05/18/2023    PROTEINUA Trace (A) 05/18/2023    KETONESUA Negative 05/18/2023    LEUKOCYTESUR 500 (A) 05/18/2023    RBCUA 21-50 (A) 05/18/2023    WBCUA 21-50 (A) 05/18/2023    BACTERIA Few (A) 05/18/2023    SQEPUA Moderate (A) 05/18/2023    HYALINECASTS None Seen 05/18/2023    CREATRANDUR 61.1 03/18/2024        Assessment:       ICD-10-CM ICD-9-CM   1. Type 2 diabetes mellitus with hyperglycemia, with long-term current use of insulin  E11.65 250.00    Z79.4 790.29     V58.67   2. Heart failure with reduced ejection fraction  I50.20 428.20   3. Hyperlipidemia LDL goal <70  E78.5 272.4   4. Primary hypertension  I10 401.9   5. Panlobular emphysema  J43.1 492.8   6. Callus of foot  L84 700   7. Xerosis of skin  L85.3 706.8   8. Type 2 diabetes mellitus with other skin complication, with long-term current use of insulin  E11.628 250.80    Z79.4 V58.67   9. Class 3 severe obesity due to excess calories with serious comorbidity and body mass index (BMI) of 45.0 to 49.9 in adult  E66.01 278.01    Z68.42 V85.42   10. Cigarette nicotine dependence without complication  F17.210 305.1   11. Diabetic retinopathy screening  Z13.5 V80.2       Plan:     1. Type 2 diabetes mellitus with hyperglycemia, with long-term current use of insulin  Continue Lantus 30 units bid, Metformin 1,000 mg bid, Januvia 100 mg daily, Jardiance to 25 mg daily  Encouraged home CBG monitoring.  Hypoglycemic episodes: denies  Body mass index is 49.44 kg/m².  Hemoglobin A1c   Date Value Ref Range Status   08/19/2024 8.3 (H) <=7.0 % Final     Results for orders placed or performed in visit on 03/18/24   Microalbumin/Creatinine Ratio, Urine   Result Value Ref Range    Urine Microalbumin <5.0 <=30.0 ug/ml    Urine Creatinine 61.1 45.0 - 106.0 mg/dL    Microalbumin Creatinine Ratio     On Atorvastatin and Zetia  On Entresto  Weight Loss Encouraged  ADA Diet    2. Heart failure with reduced ejection fraction  Followed by Cardiology  Clinic  Continue Coreg 25 mg bid, Lasix 40 mg bid, Spironolactone 25 mg daily, Entresto 24-26 mg bid    3. Hyperlipidemia LDL goal <70  Continue Atorvastatin 80 mg daily and Zetia 10 mg daily  Weight loss encouraged  Low fat/high fiber diet  Increase physical activity  Tobacco cessation encouraged  Cholesterol Total   Date Value Ref Range Status   03/18/2024 102 <=200 mg/dL Final     HDL Cholesterol   Date Value Ref Range Status   03/18/2024 29 (L) 35 - 60 mg/dL Final     Triglyceride   Date Value Ref Range Status   03/18/2024 58 37 - 140 mg/dL Final     LDL Cholesterol   Date Value Ref Range Status   03/18/2024 61.00 50.00 - 140.00 mg/dL Final     4. Primary hypertension  Vitals:    08/20/24 0816   BP: 130/84   Pulse: 64   Resp: 16   Temp: 98.1 °F (36.7 °C)      Results for orders placed or performed in visit on 03/18/24   Microalbumin/Creatinine Ratio, Urine   Result Value Ref Range    Urine Microalbumin <5.0 <=30.0 ug/ml    Urine Creatinine 61.1 45.0 - 106.0 mg/dL    Microalbumin Creatinine Ratio       Results for orders placed or performed in visit on 01/22/24   IN OFFICE EKG 12-LEAD (to [place])    Collection Time: 01/22/24 11:33 AM    Narrative    Test Reason : I10,    Vent. Rate : 071 BPM     Atrial Rate : 071 BPM     P-R Int : 200 ms          QRS Dur : 086 ms      QT Int : 394 ms       P-R-T Axes : 000 219 203 degrees     QTc Int : 428 ms    Normal sinus rhythm  Right superior axis deviation  Low voltage QRS  T wave abnormality, consider inferior ischemia  Abnormal ECG    Confirmed by Reynold Eaton MD ([id number]) on 1/22/2024 5:44:03 PM    Referred By:             Confirmed By:Reynold Eaton MD   Continue Coreg 25 mg bid, Lasix 40 mg bid, Spironolactone 25 mg daily, Entresto 24-26 mg bid  DASH diet  Encouraged home blood pressure monitoring    5. Panlobular emphysema  Followed by Pulmonology Clinic  Continue O2  PFTs on 10/12/2022  Continue Trelegy Ellipta daily, Combivent prn, Albuterol prn    6. Callus of  foot  Followed by Wound Care  Diabetic shoes ordered    7. Xerosis of skin  Followed by Wound Care  Diabetic shoes ordered    8. Type 2 diabetes mellitus with other skin complication, with long-term current use of insulin  Followed by Wound Care, Endocrinology Clinic  Diabetic shoes ordered    9. Class 3 severe obesity due to excess calories with serious comorbidity and body mass index (BMI) of 45.0 to 49.9 in adult  Body mass index is 49.44 kg/m².  TSH   Date Value Ref Range Status   03/18/2024 1.084 0.350 - 4.940 uIU/mL Final     Vitamin D   Date Value Ref Range Status   03/18/2024 22.3 (L) 30.0 - 80.0 ng/mL Final     Hemoglobin A1c   Date Value Ref Range Status   08/19/2024 8.3 (H) <=7.0 % Final   Sleep Study: DAVION with Trilogy  Weight Loss Encouraged  Increase Physical Activity    10. Cigarette nicotine dependence without complication  Smoking cessation education provided, encouraged. Informed of smoking cessation program. Patient refused smoking cessation at this time. I spent 3 minutes discussing smoking cessation with patient.     11. Diabetic retinopathy screening  - Diabetic Eye Screening Photo      Follow up in about 4 months (around 12/20/2024) for Labs. In addition to their scheduled follow up, the patient has also been instructed to follow up on as needed basis.     NOEMI Watson

## 2024-08-21 ENCOUNTER — OFFICE VISIT (OUTPATIENT)
Dept: ENDOCRINOLOGY | Facility: CLINIC | Age: 59
End: 2024-08-21
Payer: MEDICAID

## 2024-08-21 ENCOUNTER — TELEPHONE (OUTPATIENT)
Dept: INTERNAL MEDICINE | Facility: CLINIC | Age: 59
End: 2024-08-21
Payer: MEDICAID

## 2024-08-21 ENCOUNTER — TELEPHONE (OUTPATIENT)
Dept: ENDOCRINOLOGY | Facility: CLINIC | Age: 59
End: 2024-08-21

## 2024-08-21 VITALS
RESPIRATION RATE: 17 BRPM | DIASTOLIC BLOOD PRESSURE: 71 MMHG | SYSTOLIC BLOOD PRESSURE: 105 MMHG | HEIGHT: 64 IN | TEMPERATURE: 98 F | BODY MASS INDEX: 49.38 KG/M2 | HEART RATE: 67 BPM | WEIGHT: 289.25 LBS

## 2024-08-21 DIAGNOSIS — E11.65 UNCONTROLLED TYPE 2 DIABETES MELLITUS WITH HYPERGLYCEMIA: Primary | ICD-10-CM

## 2024-08-21 DIAGNOSIS — H35.039 HYPERTENSIVE RETINOPATHY, UNSPECIFIED LATERALITY: Primary | ICD-10-CM

## 2024-08-21 DIAGNOSIS — N28.9 NEPHROPATHY: ICD-10-CM

## 2024-08-21 PROCEDURE — 3074F SYST BP LT 130 MM HG: CPT | Mod: CPTII,,, | Performed by: NURSE PRACTITIONER

## 2024-08-21 PROCEDURE — 4010F ACE/ARB THERAPY RXD/TAKEN: CPT | Mod: CPTII,,, | Performed by: NURSE PRACTITIONER

## 2024-08-21 PROCEDURE — 3078F DIAST BP <80 MM HG: CPT | Mod: CPTII,,, | Performed by: NURSE PRACTITIONER

## 2024-08-21 PROCEDURE — 3061F NEG MICROALBUMINURIA REV: CPT | Mod: CPTII,,, | Performed by: NURSE PRACTITIONER

## 2024-08-21 PROCEDURE — 99214 OFFICE O/P EST MOD 30 MIN: CPT | Mod: S$PBB,,, | Performed by: NURSE PRACTITIONER

## 2024-08-21 PROCEDURE — 3066F NEPHROPATHY DOC TX: CPT | Mod: CPTII,,, | Performed by: NURSE PRACTITIONER

## 2024-08-21 PROCEDURE — 1160F RVW MEDS BY RX/DR IN RCRD: CPT | Mod: CPTII,,, | Performed by: NURSE PRACTITIONER

## 2024-08-21 PROCEDURE — 99215 OFFICE O/P EST HI 40 MIN: CPT | Mod: PBBFAC | Performed by: NURSE PRACTITIONER

## 2024-08-21 PROCEDURE — 3052F HG A1C>EQUAL 8.0%<EQUAL 9.0%: CPT | Mod: CPTII,,, | Performed by: NURSE PRACTITIONER

## 2024-08-21 PROCEDURE — 3008F BODY MASS INDEX DOCD: CPT | Mod: CPTII,,, | Performed by: NURSE PRACTITIONER

## 2024-08-21 PROCEDURE — 1159F MED LIST DOCD IN RCRD: CPT | Mod: CPTII,,, | Performed by: NURSE PRACTITIONER

## 2024-08-21 NOTE — TELEPHONE ENCOUNTER
Good morning Lynda, Mrs Mckeon came to clinic for follow up appt and stated she has an appt with Cyn on 8/27/2024, which was cancelled, to change her from a freestyle anna to a dexcom. She has been trying to get in contact with you with no success. I do not see pt has been rescheduled with another DE as of today. Can you please contact pt for DE and to convert from freestyle anna to dexcom

## 2024-08-21 NOTE — TELEPHONE ENCOUNTER
Please notify patient that Hypertensive Retinopathy noted on eye exam. Referral to Ophthalmology Clinic ordered.

## 2024-08-21 NOTE — PROGRESS NOTES
Subjective     Patient ID: Linn Mckeon is a 58 y.o. female.    Chief Complaint: Diabetes     08/11/2021 telemedicine visit- endocrine Clinic Note: 55-year-old female scheduled today for endocrine clinic follow-up.  Due to recent surge and Covid and patient's comorbid conditions visit was changed to telemedicine visit.  History of uncontrolled type 2 diabetes, hypertension, hyperlipidemia, obstructive sleep apnea, noncompliance due to history of depression.  Uncontrolled type 2 diabetes current A1c improved to 8.8 from previous 9.6 and 10.4.  A1C improving not at goal.  Patient reports checking CBGs occasionally and states most of her CBGs are in the 100s and 200s and she is no longer seeing 300s she denies any hypoglycemia.  Patient was recently changed to Trulicity and denies any side effects.  Discussed with patient ADA diet and making dietary changes along with increasing Trulicity.  Nephropathy urine micro was elevated 12/3/2020 normal on 8/10/2021 patient is currently on ACE with improved A1c. [1]     11/18/2021 endocrine clinic note : 55-year-old female scheduled today for endocrine clinic follow-up.  History of uncontrolled type 2 diabetes, hypertension, hyperlipidemia, obstructive sleep apnea and depression.  Uncontrolled type 2 diabetes current A1C 8.6 Previous A1c 8.3, 8.8, 9.6 and 10.4.  Patient has been improving over the last year.  Patient checks CBGs occasionally ranges 100s and 200s. Patient states she forgets her morning medications about 2 to 3 days/week. When discussed with patient she does not use a pillbox. Patient was given a pillbox today with a.m. and p.m. slots. Patient will work on compliance with morning medications. Instructed patient this will help improve her A1c. Hypertension mild elevation today. Patient did not take her medication prior to her visit. History of nephropathy Urine Micro Creatine/ratio: 12/3/2020 Elevated normal 08/10/2021. [1]     3/21/2022 endocrine clinic  note: 56-year-old female scheduled today for endocrine clinic follow-up.  History of uncontrolled type 2 diabetes, hypertension, hyperlipidemia, obstructive sleep apnea and depression.  Type 2 diabetes current A1C 10.3 Previous A1c 8.6, 8.3, 8.8, 9.6 and 10.4.  On patient's previous visit she was given medication pillbox to help with compliance due to forgetting medications. pt checks CBG's occasionally not recently last CBG log  check Feb 17th no CBG's in 1 month. Pt states she had pneumonia (chart shows Dec 6th) and had a hard time swallowing and was not taking her medications at that time. pt has not been checking CBG's in over a month. Shes denies hypoglycemic symptoms. Pt attended DM education Dec 2020 until March 2021 with medication changes at that time with improved A1C. Nephropathy Urine Micro Creatine/ratio: 12/03/2020 Elevated, normal 08/10/2021 we will recheck urine micro today. HTN at goal.     Endocrine clinic note 09/21/2022:  56 year female scheduled today for endocrine clinic follow-up.  History of uncontrolled type 2 diabetes, hypertension, hyperlipidemia obstructive sleep apnea and depression.  Patient was recently hospitalized with congestive heart failure and returns to clinic today with medication changes for type 2 diabetes.  Uncontrolled type 2 diabetes recent A1c 11.7 previous 10.0 and 8.3.  Patient has a history of nonadherence to medications today her sisters with her and states that the patient has been taking her medications and now has a pillbox.  Patient restarted her metformin, glipizide, Januvia and was started on Lantus at the hospital patient states she is been taking 30 units twice a day hospital instructions were written for once a day but needles were written for twice a day but patient clarified that she is been taking twice a day.  Patient had not restarted her Trulicity yet but states since she started her regimen back in the last 2 days her blood sugars have been in the 80s  in the morning and she is been feeling weak.  Patient restarted her regimen about a week and a half ago and states in the last 2 days with consistently taking medication she is been having hypoglycemia in the morning.  She states yesterday her sister held her 30 units of Lantus at night due to blood sugars being too low.  Instructed patient she did not hold Lantus but will decrease her glipizide and she is return to clinic in 1 month with CBGS to titrate her medications.  She is currently hold Trulicity and when she returns to clinic in 1 month with glucose numbers will restart Trulicity at 0.75 mg and titrate patient's regimen.      Endocrine clinic note 11/02/2022:  56 year female scheduled today has follow-up to Endocrine Clinic.  Patient was scheduled to have 1 month follow-up for history of uncontrolled type 2 diabetes.  Regimen was adjusted patient was started on a Harper.  Patient returns to clinic today.  Patient was started on a Harper. Harper interpretation 10/20/2022-11/02/2022.  Average glucose 162 GMI indicater 7.2.  6% very high, 19% high, 75% target range, 0% low, 0% very low patient's scans in average of 2-11 times per day patient occasionally has prandial spikes in the evening indicating a high carb meal which is only occasionally, only 2 hypoglycemic episode is noted after lunch at 69 and prior to lunch at 63.  In the last week patient has had increasing hypoglycemic episodes mid day. Patient states she does not eat breakfast at times will decrease morning insulin patient is to call clinic if any further hypoglycemia occurs.  On patient's Dexcom report patient is 75% at target range and predicted A1c is 7.2.  Nephropathy urine micro elevated 05/06/2022 patient is on On Entresto which contains ACE.  Patient was offered flu vaccine today patient does not take flu vaccine and declined her vaccinations today.     Endocrine clinic note 06/29/2023:  57 year female scheduled today has follow-up to Endocrine  Clinic.  History of uncontrolled type 2 diabetes, hypertension, hyperlipidemia obstructive sleep apnea and depression. Uncontrolled type 2 diabetes recent A1c increased to 10.2 from previous 8.9. Patient has a Harper interpretation 06/16/2023-06/29/2023.  Time CGM active 47%.  Average glucose 185.  GM I 7.7.  10% very high, 36% high, 54% target range, 0% low, 0% very low.  Patient's scans an average of 4-7 times per day patient has a majority of her blood glucoses in the 200s occasionally patient will decrease down to the 140s at other times patient has spikes in the evening indicating she is eating a high high sugary meal. Patient's GMI averaging 7.7 patient's PCP one-month ago restarted her Januvia 100 mg and increased Jardiance to 25 mg.  Patient has no significant hypoglycemia.     Endocrine clinic 8/20/2024:  58 year female scheduled today for endocrine clinic follow-up.  History of uncontrolled type 2 diabetes, hypertension hyperlipidemia and depression.  Uncontrolled type 2 diabetes current A1c improved to 8.3 from previous 10.2 and 8.9.  Patient has a Harper    Harper interpretation 08/08/2024-08/21/2024.  Time CGM active 51%.  Average glucose 155, GM I 7.0.  Time in range 2% very high, 23% high, target range 75%, low 0%, very low 0%.  Patient has 75% within target range within through GM I 7.0.  Patient was attending Diabetes Education with improvement in her diet and increasing Jardiance with improving A1c.  Patient was occasional prandial spikes about every 4th night in the evening indicating she is eating a heavy meal at other times she is in the 100s.  Patient occasionally forgets her nighttime Lantus elevation.     Patient was on a Harper 2 has a Dexcom supplies today we will start patient's Dexcom G7 for continuous monitoring.  Patient will continue ADA diet current GM I at goal if not improved by next follow-up will add another oral medication.                Review of Systems   Constitutional:  Negative  for activity change, appetite change and fatigue.   HENT:  Negative for dental problem, hearing loss, tinnitus, trouble swallowing and goiter.    Eyes:  Negative for photophobia, pain and visual disturbance.   Respiratory:  Negative for cough, chest tightness and wheezing.    Cardiovascular:  Negative for chest pain, palpitations and leg swelling.   Gastrointestinal:  Negative for abdominal pain, constipation, diarrhea, nausea and reflux.   Endocrine: Negative for cold intolerance, heat intolerance, polydipsia and polyphagia.   Genitourinary:  Negative for difficulty urinating, flank pain, hematuria, hot flashes, menstrual irregularity, menstrual problem, nocturia and urgency.   Musculoskeletal:  Negative for back pain, gait problem, joint swelling, leg pain and joint deformity.   Integumentary:  Negative for color change, pallor, rash and breast discharge.   Allergic/Immunologic: Negative for environmental allergies, food allergies and immunocompromised state.   Neurological:  Negative for tremors, seizures, headaches, memory loss and coordination difficulties.   Psychiatric/Behavioral:  Negative for agitation, behavioral problems and sleep disturbance. The patient is not nervous/anxious.           Objective     Physical Exam  Constitutional:       General: She is not in acute distress.     Appearance: Normal appearance. She is not ill-appearing.   HENT:      Head: Normocephalic and atraumatic.      Right Ear: External ear normal.      Left Ear: External ear normal.      Nose: Nose normal. No congestion or rhinorrhea.      Mouth/Throat:      Mouth: Mucous membranes are moist.      Pharynx: Oropharynx is clear. No oropharyngeal exudate.   Eyes:      General:         Right eye: No discharge.         Left eye: No discharge.      Conjunctiva/sclera: Conjunctivae normal.      Pupils: Pupils are equal, round, and reactive to light.   Neck:      Thyroid: No thyroid mass, thyromegaly or thyroid tenderness.    Cardiovascular:      Rate and Rhythm: Normal rate and regular rhythm.      Pulses: Normal pulses.      Heart sounds: Normal heart sounds. No murmur heard.  Pulmonary:      Effort: Pulmonary effort is normal. No respiratory distress.      Breath sounds: Normal breath sounds.   Abdominal:      General: Abdomen is flat. Bowel sounds are normal. There is no distension.      Palpations: Abdomen is soft.      Tenderness: There is no abdominal tenderness.   Musculoskeletal:         General: No swelling or tenderness. Normal range of motion.      Cervical back: Normal range of motion and neck supple. No tenderness.      Right lower leg: No edema.      Left lower leg: No edema.   Feet:      Right foot:      Skin integrity: Skin integrity normal.      Left foot:      Skin integrity: Skin integrity normal.   Lymphadenopathy:      Cervical: No cervical adenopathy.   Skin:     General: Skin is warm and dry.      Coloration: Skin is not jaundiced or pale.   Neurological:      General: No focal deficit present.      Mental Status: She is alert and oriented to person, place, and time. Mental status is at baseline.      Coordination: Coordination normal.      Gait: Gait normal.   Psychiatric:         Mood and Affect: Mood normal.         Behavior: Behavior normal.         Thought Content: Thought content normal.            Assessment and Plan     1. Uncontrolled type 2 diabetes mellitus with hyperglycemia  Recent A1C 8.3 improving   Medications: Metformin 1000 mg twice daily, Januvia 100 mg, Jardiance 25 mg, Lantus 30 units BID  Changes:  Current GMI 7.0 at goal continue ADA diet   Home Glucometer Use: Harper changing to Dexcom G7  Eye Exam:  Ordered 08/20/2024 by PCP  Component Ref Range & Units 2 d ago  (8/19/24) 5 mo ago  (3/18/24) 11 mo ago  (9/18/23) 1 yr ago  (5/18/23) 1 yr ago  (1/4/23) 1 yr ago  (9/11/22) 2 yr ago  (5/5/22)   Hemoglobin A1c <=7.0 % 8.3 High  9.3 High  8.2 High  10.2 High  8.9 High  11.7 High  10.0 High     On today's visit Dexcom G7 started and patient has  set up    2. Nephropathy  Urine micro normal 03/18/2024 normal normal x1 year             Component Ref Range & Units 5 mo ago  (3/18/24) 1 yr ago  (5/18/23) 1 yr ago  (1/4/23) 2 yr ago  (5/6/22) 3 yr ago  (8/10/21) 3 yr ago  (5/5/21) 3 yr ago  (12/3/20)   Urine Microalbumin <=30.0 ug/ml <5.0 19.5 8.9 63.4 High  13.0 R 27.3 R 15.6 R   Urine Creatinine 45.0 - 106.0 mg/dL 61.1 103.8 R 91.3 R 70.3 R 105.2 72.3 72.1   Microalbumin Creatinine Ratio   18.8 R 9.7 R 90.2 High  R 12.4 R 37.8 High  R 216.4 High  R            Follow up in about 6 months (around 2/21/2025) for Type 2 diabetes, w/ Dexcom.

## 2024-08-22 NOTE — TELEPHONE ENCOUNTER
Lynda Paige, CELINA  You; Jacqueline Urbina, NP24 minutes ago (10:01 AM)     AG  She is scheduled to come in 9/4 for Dexcom download.

## 2024-08-22 NOTE — TELEPHONE ENCOUNTER
Great thank you Lynda. I did help her set up and start the dexcom yesterday. She cannot download the dexcom griffin, only the [employer] griffin, on her phone so we set her up with a

## 2024-08-23 ENCOUNTER — TELEPHONE (OUTPATIENT)
Dept: INTERNAL MEDICINE | Facility: CLINIC | Age: 59
End: 2024-08-23
Payer: MEDICAID

## 2024-08-23 NOTE — TELEPHONE ENCOUNTER
----- Message from Meli Vickers sent at 8/23/2024 10:09 AM CDT -----  Regarding: return call  Who Called: Linn Mckeon    Patient is returning phone call    Who Left Message for Patient:Ernestina    Does the patient know what this is regarding?:eye exam results      Preferred Method of Contact: Phone Call  Patient's Preferred Phone Number on File: [phone]   Best Call Back Number, if different:  Additional Information: Pt is returning a call to clinic; please advise.

## 2024-08-23 NOTE — TELEPHONE ENCOUNTER
Returned call informed of  abnormal eye exam  hypertensive  retinopathy. She informed me that someone from the eye clinic called and made her a appointment.

## 2024-08-23 NOTE — TELEPHONE ENCOUNTER
Called pt to relay PCP's message below and to give pt the [place] number of [phone], to [facility]. Unable to LVM.

## 2024-09-03 ENCOUNTER — TELEPHONE (OUTPATIENT)
Dept: ENDOCRINOLOGY | Facility: CLINIC | Age: 59
End: 2024-09-03
Payer: MEDICAID

## 2024-09-03 NOTE — TELEPHONE ENCOUNTER
----- Message from Gladis Mitchell sent at 9/3/2024  9:31 AM CDT -----  Pt of Jacqueline        Pt called in stating that her Dexcom  is not charging.    Pt requesting a call back.    Pt number [phone]      Please advise

## 2024-09-04 ENCOUNTER — CLINICAL SUPPORT (OUTPATIENT)
Dept: DIABETES | Facility: CLINIC | Age: 59
End: 2024-09-04
Payer: MEDICAID

## 2024-09-04 VITALS — BODY MASS INDEX: 49.69 KG/M2 | WEIGHT: 289.5 LBS

## 2024-09-04 DIAGNOSIS — Z79.4 TYPE 2 DIABETES MELLITUS WITH HYPERGLYCEMIA, WITH LONG-TERM CURRENT USE OF INSULIN: Primary | Chronic | ICD-10-CM

## 2024-09-04 DIAGNOSIS — E11.65 TYPE 2 DIABETES MELLITUS WITH HYPERGLYCEMIA, WITH LONG-TERM CURRENT USE OF INSULIN: Primary | Chronic | ICD-10-CM

## 2024-09-04 PROCEDURE — G0108 DIAB MANAGE TRN  PER INDIV: HCPCS | Mod: PBBFAC | Performed by: DIETITIAN, REGISTERED

## 2024-09-04 NOTE — PROGRESS NOTES
Diabetes Care Specialist Progress Note  Author: Lynda Paige RD  Date: 9/4/2024    Intake    Program Intake  Reason for Diabetes Program Visit:: Post Program Follow-Up  Type of Follow-Up: 6 month  Current diabetes risk level:: moderate    Current Diabetes Treatment: Diet/Exercise, Insulin, Oral Medications  Diet/Exercise Type/Dose: avoiding soda and limiting rice  Oral Medication Type/Dose: Metformin 1000mg BID, Januvia 100mg, Jardiance 25 mg and  Method of insulin delivery?: Injections  Injection Type: Pens  Pen Type/Dose: Lantus increased to 42 units and up to 46 units based on blood sugar    Continuous Glucose Monitoring  Personal CGM type:: Dexcom G7 with reader    Lab Results   Component Value Date    HGBA1C 8.3 (H) 08/19/2024       Weight: 131.3 kg (289 lb 8 oz)       Body mass index is 49.69 kg/m².    Lifestyle Coping Support & Clinical    Lifestyle/Coping/Support  Does anyone in your family have diabetes or does anyone in your family support you in your diabetes care?: daughter  Learning Barriers:: None  Psychosocial/Coping Skills Assessment Completed: : No  Deffered due to:: Time  Area of need?: Deferred    Problem Review  Active Comorbidities: Other (comment) (COPD)    Diabetes Self-Management Skills Assessment    Medication Skills Assessment  Patient is able to identify current diabetes medications, dosages, and appropriate timing of medications.: yes  Patient reports problems or concerns with current medication regimen.: no  Patient is  aware that some diabetes medications can cause low blood sugar?: Yes  Medication Skills Assessment Completed:: Yes  Assessment indicates:: Adequate understanding  Area of need?: No    Diabetes Disease Process/Treatment Options  Diabetes Disease Process/Treatment Options: Skills Assessment Completed: No  Deferred due to:: Time  Area of need?: Deferred    Nutrition/Healthy Eating  Meal Plan 24 Hour Recall - Beverage: zero drinks and water  Patient can identify foods  that impact blood sugar.: yes  Nutrition/Healthy Eating Skills Assessment Completed:: No  Deffered due to:: Time  Area of need?: Deferred    Physical Activity/Exercise  Patient's daily activity level:: lightly active  Patient formally exercises outside of work.: no  Patient can identify forms of physical activity.: yes  Physical Activity/Exercise Skills Assessment Completed: : Yes  Assessment indicates:: Adequate understanding  Area of need?: No    Home Blood Glucose Monitoring  Patient states that blood sugar is checked at home daily.: yes  Monitoring Method:: personal continuous glucose monitor  Personal CGM type:: Dexcom G7 with reader   What is your current Time in Range?: n/a  What is your A1c Target?: <7  Home Blood Glucose Monitoring Skills Assessment Completed: : Yes  Assessment indicates:: Instruction Needed  Area of need?: Yes    Acute Complications  Have you ever had hypoglycemia (low BG 70 or less)?: no  Have you ever had hyperglycemia (high  or more)?: no  Have you ever had DKA?: no  Acute Complications Skills Assessment Completed: : Yes  Assessment indicates:: Adequate understanding  Area of need?: No    Chronic Complications  Reviewed health maintenance: no (see comments)  Chronic Complications Skills Assessment Completed: : No  Deferred due to:: Time  Area of need?: Deferred      Assessment Summary and Plan    Based on today's diabetes care assessment, the following areas of need were identified:          9/4/2024    12:01 AM   Areas of Need   Medications/Current Diabetes Treatment No   Lifestyle Coping Support Deferred   Diabetes Disease Process/Treatment Options Deferred   Nutrition/Healthy Eating Deferred   Physical Activity/Exercise No   Home Blood Glucose Monitoring Yes - see care plan. States last 2 weeks glucose 95-120s.   Acute Complications No   Chronic Complications Deferred       Today's interventions were provided through individual discussion, instruction, and written materials  were provided.      Patient verbalized understanding of instruction and written materials.  Pt was able to return back demonstration of instructions today. Patient understood key points, needs reinforcement and further instruction.     Diabetes Self-Management Care Plan:    Today's Diabetes Self-Management Care Plan was developed with Linn's input. Linn has agreed to work toward the following goal(s) to improve his/her overall diabetes control.      Care Plan: Diabetes Management   Updates made since 8/5/2024 12:00 AM        Problem: Blood Glucose Self-Monitoring Deleted 9/4/2024   Note:    9/4/24 -        Goal: Pt will use Freestyle Harper reader to scan blood glucose at least 4-6 times daily and report hypoglycemia to provider Completed 8/6/2024   Start Date: 1/5/2023   Expected End Date: 8/27/2024   This Visit's Progress: Not met   Recent Progress: On track   Priority: High   Barriers: Lack of Supplies   Note:      2/24/23 - Pt was told by [facility] that she needs a new Rx from her provider for an additional reader and [facility] is unable to return her reader. However, her insurance provided her with a reader within the last year. Contacted Charo Perez, Diabetes Care Specialist, from [employer] for assistance. She asks that pt contact her and she will discuss with [employer] on three-way call. Contacted pt to provide Charo's phone # and instructions to contact her.     5/2/23 - New reader received. Noted scanning less often last week. 1 episode of hypoglycemia noted (65) for Lib[facility] report 4/15-4/28/23.     8/25/23 - Scanning has become less frequent over last few days but overall 4-6 times daily. Encouraged to continue.    3/5/24: Pt continues use of Harper 2 sensors with reader.  Per reports, pt scanning 2-7 times per day with only 33% use/active sensor over the past 2 weeks.  Pt reports this was because her sensor fell off early & her daughter was not able to help her put a new one one because  her daughter was in the hospital.  Pt reports that she keeps reader with her at all times.      4/2/24: Pt with active Harper 2 sensor 74% of the time over the past 2 weeks.  Per reports, she is scanning 3-7x/d (3x/d on 5 out of 14 days).  Due to some gaps in information in late PM/early AM, encouraged pt to scan right before bed & upon waking in the AM.  Pt verbalizes understanding.     6/11/24: Pt with active Harper 2 sensor only 59% of the time over the 2 week period reviewed & has not had a sensor for 1-2 weeks due to awaiting insurance approval for supplies.  Pt has been using glucometer since 5/28 but checking only once daily.  Per reports, pt only scanned >/= 4x/d on 14% of days in May.  Noted still some gaps in information in evening & early AM.  Again encouraged checking before bed & upon waking.     8/6/24: Pt with active Harper 2 sensor only 33% of the time over the last 2 weeks.  Pt went a 4 day period without sensor due to forgetting to put a new sensor on.  Still with large gaps in information in PM/HS limiting information.  Scanning >/= 4x/d only 27% of the time over the past 34 days.  Goal not met.  Will discuss with endo the possibility of transitioning to Harper 3 to help prevent gaps in information since scanning is not required except at sensor activation.       Problem: Healthy Eating Deleted 9/4/2024        Goal: Patient will use plate method for diabetes to limit starches to </= 1 cup per meal & will go back for seconds of nonstarchy vegetables or meat as needed for satiety. Completed 8/6/2024   Start Date: 3/5/2024   Expected End Date: 8/27/2024   This Visit's Progress: Not met   Recent Progress: On track   Priority: Medium   Barriers: Lack of Motivation to Change   Note:    8/6/24:     24h food recall:  - breakfast = 1 small breakfast sandwich, Coke Zero  - lunch = 1.5 cups spaghetti with meatballs, Coke Zero  - dinner = same as lunch  - HS snack = 1 mini pecan pie, Coke Zero    Pt reports  appetite decreasing but portions of carbs unchanged from last visit.  Pt reports rarely drinking regular Coke.  Again encouraged to limit starch portions to </=1 cup per meal.    6/11/24: 24h food recall: breakfast = egg sandwich (only eats breakfast sometimes); lunch = egg or ham sandwich; dinner = > 1 cup pasta with meatballs OR smothered chicken with rice, peas, Coke Zero or apple juice.  Pt reports that she typically eats only 2 meals/d (breakfast OR lunch, not both).  Per food recall, she appears to be exceeding carbohydrate goal at supper meal.  Discussed ideas for decreasing carb consumption with supper.    4/2/24: 24h food recall: breakfast = skipped; late lunch = ribs, 1 cup potato salad, water; dinner = chicken tenders, 1/2 cup potato salad, glass of Cola; snacks = none.  Per food recall, pt adequately limiting starches to </= 1 cup per meal as discussed with the exception of soda consumption in addition to starch with 1 meal.  Will continue to monitor.    3/5/24: Reviewed sources of carbohydrate & appropriate portion sizes.  Discussed plate method for diabetes.  Pt c/o increased appetite lately.  Pt eats 3 meals/d with snacks before bed & during the night when waking to use the restroom.  Pt mainly drinks Coke Zero & rarely drinks regular soda (</= 1x/wk).  Pt reports rice & pasta portions are typically 1.5-2 cups per meal when she eats these foods.       Problem: Blood Glucose Self-Monitoring         Goal: Patient will return in 1 month once she gets her new  and starts new sensor    Start Date: 9/4/2024   Expected End Date: 10/1/2024   Priority: High   Note:    9/4/24 -  will no longer hold a charge. Attempted to charge in clinic and noted that inner charging port appeared damaged. Contacted Dexcom customer support. Pt will receive new  in 4-5 days and will start new sensor. Instructed that charging symbol on usb cord should be facing up along with  to avoid damage.  Reminded pt that she should resume glucometer for daily blood glucose checks, contact Dexcom customer service with any issues with /sensors and provided 24-hr customer support #.         Follow Up Plan     Follow up in about 4 weeks (around 10/2/2024) for Dexcom download with new .    Today's care plan and follow up schedule was discussed with patient.  Linn verbalized understanding of the care plan, goals, and agrees to follow up plan.        The patient was encouraged to communicate with his/her health care provider/physician and care team regarding his/her condition(s) and treatment.  I provided the patient with my contact information today and encouraged to contact me via phone or [facility] Patient Portal as needed.     Length of Visit   Total Time: 60 Minutes

## 2024-09-24 ENCOUNTER — OFFICE VISIT (OUTPATIENT)
Dept: CARDIOLOGY | Facility: CLINIC | Age: 59
End: 2024-09-24
Payer: MEDICAID

## 2024-09-24 VITALS
SYSTOLIC BLOOD PRESSURE: 107 MMHG | TEMPERATURE: 98 F | RESPIRATION RATE: 18 BRPM | WEIGHT: 286.69 LBS | BODY MASS INDEX: 48.94 KG/M2 | HEART RATE: 66 BPM | DIASTOLIC BLOOD PRESSURE: 74 MMHG | OXYGEN SATURATION: 90 % | HEIGHT: 64 IN

## 2024-09-24 DIAGNOSIS — G47.33 OSA ON CPAP: Chronic | ICD-10-CM

## 2024-09-24 DIAGNOSIS — Z79.4 TYPE 2 DIABETES MELLITUS WITH HYPERGLYCEMIA, WITH LONG-TERM CURRENT USE OF INSULIN: Chronic | ICD-10-CM

## 2024-09-24 DIAGNOSIS — F17.210 CIGARETTE NICOTINE DEPENDENCE WITHOUT COMPLICATION: Chronic | ICD-10-CM

## 2024-09-24 DIAGNOSIS — R06.09 DOE (DYSPNEA ON EXERTION): ICD-10-CM

## 2024-09-24 DIAGNOSIS — E66.01 CLASS 3 SEVERE OBESITY DUE TO EXCESS CALORIES WITH SERIOUS COMORBIDITY AND BODY MASS INDEX (BMI) OF 45.0 TO 49.9 IN ADULT: Chronic | ICD-10-CM

## 2024-09-24 DIAGNOSIS — I50.32 CHRONIC HEART FAILURE WITH PRESERVED EJECTION FRACTION: ICD-10-CM

## 2024-09-24 DIAGNOSIS — I10 PRIMARY HYPERTENSION: Primary | Chronic | ICD-10-CM

## 2024-09-24 DIAGNOSIS — E11.65 TYPE 2 DIABETES MELLITUS WITH HYPERGLYCEMIA, WITH LONG-TERM CURRENT USE OF INSULIN: Chronic | ICD-10-CM

## 2024-09-24 DIAGNOSIS — E78.5 HYPERLIPIDEMIA LDL GOAL <70: Chronic | ICD-10-CM

## 2024-09-24 DIAGNOSIS — I50.20 HEART FAILURE WITH REDUCED EJECTION FRACTION: Chronic | ICD-10-CM

## 2024-09-24 PROCEDURE — 99215 OFFICE O/P EST HI 40 MIN: CPT | Mod: PBBFAC

## 2024-09-24 PROCEDURE — 99214 OFFICE O/P EST MOD 30 MIN: CPT | Mod: S$PBB,,,

## 2024-09-24 PROCEDURE — 3074F SYST BP LT 130 MM HG: CPT | Mod: CPTII,,,

## 2024-09-24 PROCEDURE — 3078F DIAST BP <80 MM HG: CPT | Mod: CPTII,,,

## 2024-09-24 PROCEDURE — 3061F NEG MICROALBUMINURIA REV: CPT | Mod: CPTII,,,

## 2024-09-24 PROCEDURE — 3052F HG A1C>EQUAL 8.0%<EQUAL 9.0%: CPT | Mod: CPTII,,,

## 2024-09-24 PROCEDURE — 3008F BODY MASS INDEX DOCD: CPT | Mod: CPTII,,,

## 2024-09-24 PROCEDURE — 1159F MED LIST DOCD IN RCRD: CPT | Mod: CPTII,,,

## 2024-09-24 PROCEDURE — 1160F RVW MEDS BY RX/DR IN RCRD: CPT | Mod: CPTII,,,

## 2024-09-24 PROCEDURE — 4010F ACE/ARB THERAPY RXD/TAKEN: CPT | Mod: CPTII,,,

## 2024-09-24 PROCEDURE — 3066F NEPHROPATHY DOC TX: CPT | Mod: CPTII,,,

## 2024-09-24 NOTE — PROGRESS NOTES
CHIEF COMPLAINT:   Chief Complaint   Patient presents with    Follow-up     4 mos f/u denies cardiac targets                                                  HPI:  Linn Mckeon 58 y.o. female  with a PMH significant for Diabetes, DAVION on BiPAP, HTN, HLD, Depression, COPD on home O2, and Tobacco Use  who presents to cardiology clinic for follow up.  She has baseline COPD that is severe which is the main cause of her shortness of breath- she uses supplemental O2, especially when out in public. Echo completed November 2023 revealed EF of 59%, mild RV enlargement, overall normal systolic function, mild WI, otherwise unremarkable.  See full report below.  At last office visit the patient stated that she felt stable from a cardiac standpoint.  She continued to endorse SOB/GOODMAN secondary to severe COPD, otherwise she stated that she felt fine.  She uses supplemental O2 as needed.     Today the patient states that she feels improved from her last visit.  She states that recently, her SOB/GOODMAN has been improving.  She states that she continues to use supplemental O2 as needed, but not all the time.  She states that recently she was also been able to move around a lot better.  She states that she has a slight headache sometimes but no other symptoms.  She denies any chest pain, palpitations, PND, orthopnea, lightheadedness, dizziness, syncope, lower extremity edema, or claudication symptoms.  She was able to complete her ADLs without any issues or ischemic symptoms.  She reports compliance with all her current medications and is tolerating them well.  She uses Trilogy at night and is greatly benefiting from use.  She continues to smoke approximately 1/2 pack of cigarettes per day and is not ready to quit at this time.  She states that recently she has had more of an appetite them before.       Historical Information: Patient completed Lexiscan stress test on September 7, 2022 which was normal.  See report below.  She  also completed KWADWO testing on September 13, 2022 which revealed mild arterial flow reduction in the bilateral lower extremities.  See report below.  She completed an Echocardiogram on July 26, 2022 which revealed a reduced ejection fraction of 40% with Grade 1 diastolic dysfunction.  See report below.    She previously completed an Echocardiogram on May 10, 2021 which revealed mild concentric LVH, EF of approximately 50 to 55%, mild MR, and mild aortic valve sclerosis without stenosis.    Patient had a hospital admission on September 11, 2022 for COPD exacerbation and acute CHF exacerbation.  She was treated with IV Lasix, antibiotics, steroids, and neb treatments.                                                                                                                                                                                                                                                                                                                                                                                                                                                                                       CARDIAC TESTING:  Echo 11.3.23    Left Ventricle: There is mild concentric hypertrophy. There is normal systolic function. Biplane (2D) method of discs ejection fraction is 59%.    Right Ventricle: Mild right ventricular enlargement. Systolic function is normal.    Aortic Valve: The aortic valve is a trileaflet valve. There is mild aortic valve sclerosis. There is normal leaflet mobility.    Mitral Valve: The mitral valve is structurally normal.    Pulmonic Valve: There is mild regurgitation.    IVC/SVC: Normal venous pressure at 3 mmHg.    Echocardiogram  Results for orders placed during the hospital encounter of 12/06/22  Echo  Interpretation Summary  · The left ventricle is normal in size with mild concentric hypertrophy and low normal systolic function.  · Normal right ventricular size with  low normal right ventricular systolic function.  · The estimated ejection fraction is 50%.  · Grade I left ventricular diastolic dysfunction.  · Mild to moderate pulmonic regurgitation.        Echo - 7/26/2022   Interpretation Summary  · Concentric hypertrophy and mildly decreased systolic function.  · The estimated ejection fraction is 40%.  · Grade I left ventricular diastolic dysfunction.  · Severe right ventricular enlargement.  · Mild left atrial enlargement.  · Moderate right atrial enlargement.  · Mild mitral regurgitation.  · Mild tricuspid regurgitation.  · Elevated central venous pressure (15 mmHg).  · The estimated PA systolic pressure is 56 mmHg.  · There is pulmonary hypertension.  · IVC is dilated and collapses<50% with inspiration.  · Mild to moderate pulmonic regurgitation.     Stress Test  Results for orders placed during the hospital encounter of 08/30/22  Nuclear Stress - Cardiology Interpreted  Interpretation Summary    Normal myocardial perfusion scan. There is no evidence of myocardial ischemia or infarction.    The gated perfusion images showed an ejection fraction of 55% at rest. The gated perfusion images showed an ejection fraction of 51% post stress.    The EKG portion of this study is abnormal but not diagnostic.    The patient reported no chest pain during the stress test.    There were no arrhythmias during stress.  The patient has a low risk nuclear stress alex.  Nuclear stress test indicates no ischemia.     Lexiscan Stress Test 9.7.22:  Normal myocardial perfusion scan. There is no evidence of myocardial ischemia or infarction.  The gated perfusion images showed an ejection fraction of 55% at rest. The gated perfusion images showed an ejection fraction of 51% post stress.  The EKG portion of this study is abnormal but not diagnostic.  The patient reported no chest pain during the stress test.  There were no arrhythmias during stress.        KWADWO Testing 9.13.22:  The right lower  extremity demonstrated multiphasic waveforms at all levels.   The KWADWO on the right was mildly abnormal.  The right lower extremity demonstrated mild arterial flow reduction.  The left lower extremity demonstrated multiphasic waveforms at all levels.   The KWADWO on the left was mildly abnormal.  The left lower extremity demonstrated mild arterial flow reduction.          Patient Active Problem List   Diagnosis    GOODMAN (dyspnea on exertion)    Chronic heart failure with preserved ejection fraction    Primary hypertension    Hyperlipidemia LDL goal <70    Type 2 diabetes mellitus with hyperglycemia, with long-term current use of insulin    Peripheral edema    DAVION on CPAP    Class 3 severe obesity due to excess calories with serious comorbidity and body mass index (BMI) of 45.0 to 49.9 in adult    Noncompliance with treatment plan    Xerosis of skin    Callus of foot    Dystrophic nail    Non compliance w medication regimen    Heart failure with reduced ejection fraction    Cigarette nicotine dependence without complication    COPD (chronic obstructive pulmonary disease)    Vitamin D deficiency    Mycotic toenails    Encounter for comprehensive diabetic foot examination, type 2 diabetes mellitus    BMI 45.0-49.9, adult    Type 2 diabetes mellitus with skin complication, with long-term current use of insulin    Right foot pain    Left foot pain    Impaired gait    Diverticulosis large intestine w/o perforation or abscess w/o bleeding    Adenomatous polyp of descending colon    Adenomatous polyp of sigmoid colon    Adenomatous rectal polyp    Plantar warts    Oxygen dependent     Past Surgical History:   Procedure Laterality Date    COLONOSCOPY, WITH POLYPECTOMY USING HOT BIOPSY FORCEPS N/A 01/25/2024    Procedure: COLONOSCOPY, WITH POLYPECTOMY USING HOT BIOPSY FORCEPS;  Surgeon: Yan Bashir MD;  Location: [facility] ENDOSCOPY;  Service: Endoscopy;  Laterality: N/A;  Cold forceps    HYSTERECTOMY       Social History      Socioeconomic History    Marital status:    Tobacco Use    Smoking status: Every Day     Current packs/day: 0.50     Types: Cigarettes    Smokeless tobacco: Never    Tobacco comments:     Smokes 5 cigarettes a day   Substance and Sexual Activity    Alcohol use: Not Currently    Drug use: Never    Sexual activity: Not Currently     Social Determinants of Health     Financial Resource Strain: Low Risk  (3/21/2024)    Overall Financial Resource Strain (CARDIA)     Difficulty of Paying Living Expenses: Not hard at all   Food Insecurity: No Food Insecurity (3/21/2024)    Hunger Vital Sign     Worried About Running Out of Food in the Last Year: Never true     Ran Out of Food in the Last Year: Never true   Transportation Needs: No Transportation Needs (9/4/2024)    TRANSPORTATION NEEDS     Transportation : No   Physical Activity: Inactive (9/4/2024)    Exercise Vital Sign     Days of Exercise per Week: 0 days     Minutes of Exercise per Session: 0 min   Stress: No Stress Concern Present (3/21/2024)    [place] [employer] - Occupational Stress Questionnaire     Feeling of Stress : Not at all   Housing Stability: Low Risk  (9/4/2024)    Housing Stability Vital Sign     Unable to Pay for Housing in the Last Year: No     Homeless in the Last Year: No        Family History   Problem Relation Name Age of Onset    Hypertension Mother Rex Hood     Heart disease Mother Rex Hood     Diabetes Mother Rex Hood     Heart attack Father      Diabetes Sister Ellie Aguiar     Hypertension Sister Ellie Aguiar     Diabetes Brother Kushal Hood     Hypertension Brother Kushal Hood      Review of patient's allergies indicates:  No Known Allergies      ROS:  Review of Systems   Constitutional: Negative.    HENT: Negative.     Eyes: Negative.    Respiratory: Negative.  Negative for shortness of breath (Occasionally).    Cardiovascular: Negative.  Negative for chest pain,  "palpitations, orthopnea, claudication, leg swelling and PND.   Gastrointestinal: Negative.    Genitourinary: Negative.    Musculoskeletal: Negative.    Skin: Negative.    Neurological: Negative.    Endo/Heme/Allergies: Negative.    Psychiatric/Behavioral: Negative.                                                                                                                                                                                  Negative except as stated in the history of present illness. See HPI for details.    PHYSICAL EXAM:  Visit Vitals  /74   Pulse 66   Temp 98.1 °F (36.7 °C) (Oral)   Resp 18   Ht 5' 4" (1.626 m)   Wt 130 kg (286 lb 11.3 oz)   SpO2 (!) 90%   BMI 49.21 kg/m²           Physical Exam  Constitutional:       Appearance: She is obese.   HENT:      Head: Normocephalic.      Nose: Nose normal.   Eyes:      Pupils: Pupils are equal, round, and reactive to light.   Cardiovascular:      Rate and Rhythm: Normal rate and regular rhythm.      Pulses: Normal pulses.      Heart sounds: No murmur heard.  Pulmonary:      Effort: Pulmonary effort is normal. No respiratory distress.   Abdominal:      General: There is no distension.   Musculoskeletal:         General: Normal range of motion.      Cervical back: Normal range of motion.      Right lower leg: No edema.      Left lower leg: No edema.   Skin:     General: Skin is warm.   Neurological:      General: No focal deficit present.      Mental Status: She is alert and oriented to person, place, and time.   Psychiatric:         Mood and Affect: Mood normal.         Current Outpatient Medications   Medication Instructions    albuterol (PROVENTIL) 2.5 mg, Nebulization, Every 6 hours PRN, Rescue    ammonium lactate (LAC-HYDRIN) 12 % lotion APPLY TO DRY SKIN AND RUB IN WELL TWICE DAILY. NOTHING BETWEEN TOES    aspirin (ECOTRIN) 81 mg, Oral, Daily    atorvastatin (LIPITOR) 80 mg, Oral, Daily    BD ULTRA-FINE JORDON PEN NEEDLE 32 gauge x 5/32" Ndle 2 " "times daily    BD ULTRA-FINE JORDON PEN NEEDLE 32 gauge x 5/32" Ndle No dose, route, or frequency recorded.    blood sugar diagnostic Strp To check BG 2 times daily, to use with insurance preferred meter    blood-glucose meter kit To check BG 2 times daily, to use with insurance preferred meter    blood-glucose meter,continuous (DEXCOM G7 ) Misc Dexcom G7  uses directed    blood-glucose sensor (DEXCOM G7 SENSOR) Norma Use every 10 days as directed    CICLOPIROX-ITRACONAZOLE-UREA TOP CLEAN NAILS, SHAKE BOTTLE WELL, APPLY TWICE DAILY TO ALL AFFECTED NAILS USING APPLICATOR. (UP TO 2 MLS/DAY)    empagliflozin (JARDIANCE) 25 mg, Oral, Daily    ergocalciferol (ERGOCALCIFEROL) 50,000 Units, Oral, Every 7 days    ezetimibe (ZETIA) 10 mg, Oral, Daily    flash glucose scanning reader (FREESTYLE JOSE ALFREDO 2 READER) [facility] Freestyle Jose Alfredo 2 reader dispense 1. Use as directed    flash glucose sensor (FREESTYLE JOSE ALFREDO 2 SENSOR) Kit Jose Alfredo 2 sensor change every 14 days (Disp 6 each)(90 day)    fluticasone propionate (FLONASE) 50 mcg, Each Nostril, Daily    fluticasone-umeclidin-vilanter (TRELEGY ELLIPTA) 100-62.5-25 mcg DsDv 1 puff, Inhalation, Daily    furosemide (LASIX) 40 mg, Oral, 2 times daily    insulin glargine U-100 (Lantus) (LANTUS U-100 INSULIN) 30 Units, Subcutaneous, 2 times daily    insulin glargine-yfgn 100 unit/mL (3 mL) InPn     ipratropium-albuteroL (COMBIVENT RESPIMAT)  mcg/actuation inhaler 2 puffs, Inhalation, Every 6 hours PRN, Rescue    ketoconazole (NIZORAL) 2 % cream Topical (Top), Daily    lancets (ACCU-CHEK FASTCLIX LANCET DRUM) [facility] USE ONE DAILY    LANCETS MISC No dose, route, or frequency recorded.    latanoprost 0.005 % ophthalmic solution 1 drop    metFORMIN (GLUCOPHAGE) 1,000 mg, Oral, 2 times daily with meals    metoprolol succinate (TOPROL-XL) 100 mg, Oral, Daily    nystatin (MYCOSTATIN) ointment Topical (Top), 2 times daily    pen needle, diabetic (BD ULTRA-FINE JORDON PEN NEEDLE) 32 " "gauge x 5/32" Ndle 2 times daily    sacubitriL-valsartan (ENTRESTO) 49-51 mg per tablet 1 tablet, Oral, 2 times daily    SITagliptin phosphate (JANUVIA) 100 mg, Oral, Daily    spironolactone (ALDACTONE) 25 mg, Oral, Daily        All medications, laboratory studies, cardiac diagnostic imaging reviewed.     Lab Results   Component Value Date    LDL 61.00 03/18/2024    LDL 54.00 05/18/2023    TRIG 58 03/18/2024    TRIG 51 05/18/2023    CREATININE 1.28 (H) 08/19/2024    MG 2.20 09/12/2022    K 4.6 08/19/2024        ASSESSMENT/PLAN:  Heart Failure with Improved Ejection Fraction   - Stable from last office visit, patient states that " I feel much better that before"    - Denies any chest pain or palpitations.   - Reports occasional shortness of breath but states that overall it has greatly improved.  Still requires supplemental O2 on occasion, specifically when ambulating far distances or when she is out in public.  She attributes majority of her shortness of breath to her severe, longstanding COPD  - No progression in symptoms   - Etiology: NiCM  - HF Status: NYHA Class II, ACC/AHA Stage C  - LVEF: 59% per TTE on 11.3.23 which is improved from 50% TTE on 12.6.22  - Current GDMT: Entresto 49mg/51mg, Metoprolol Succinate 100 MG, Spironolactone 25, Jardiance 10mg- Continue for now, tolerating well  - Diuresis: Lasix 40mg BID  - Continue low Na Diet   - Strict Is/Os and daily weights  - Much improved on now that on GDMT   - No further testing needed at this time    HTN  BP at goal - 107/74  Continue Spironolactone, Entresto, Metoprolol and and Lasix  Counseled on low salt diet and exercise as tolerated    HLD  LDL goal <70  LDL 61 per labs 3.18.24  Continue Atorvastatin 80mg daily and Zetia 10mg daily  FLP/CMP ordered per PCP, patient advised to complete this prior to her next office visit  Counseled on medication compliance    DM  A1c 8.3  Management per PCP    Tobacco Abuse  Smokes approximately 1/2 pack of " cigarettes  Counseled on smoking cessation today   Patient is trying to quit    DAVION on Trelegy Machine   She reports nightly Trelegy compliance and feels she is benefitting from use  Recommended continued nightly Trelegy use     BLE pain - improved  No complaints today   KWADWO testing 9.13.22: mild arterial flow reduction in the bilateral lower extremities.       Follow up in cardiology clinic in 4 months or sooner if needed   Follow up with PCP as directed   Please notify clinic if any new concerns or change in symptoms

## 2024-10-01 ENCOUNTER — CLINICAL SUPPORT (OUTPATIENT)
Dept: DIABETES | Facility: CLINIC | Age: 59
End: 2024-10-01
Payer: MEDICAID

## 2024-10-01 VITALS — WEIGHT: 287.38 LBS | BODY MASS INDEX: 49.33 KG/M2

## 2024-10-01 DIAGNOSIS — E11.65 TYPE 2 DIABETES MELLITUS WITH HYPERGLYCEMIA, WITH LONG-TERM CURRENT USE OF INSULIN: Primary | Chronic | ICD-10-CM

## 2024-10-01 DIAGNOSIS — Z79.4 TYPE 2 DIABETES MELLITUS WITH HYPERGLYCEMIA, WITH LONG-TERM CURRENT USE OF INSULIN: Primary | Chronic | ICD-10-CM

## 2024-10-01 PROCEDURE — G0108 DIAB MANAGE TRN  PER INDIV: HCPCS | Mod: PBBFAC | Performed by: DIETITIAN, REGISTERED

## 2024-10-01 NOTE — PROGRESS NOTES
Diabetes Care Specialist Progress Note  Author: Lynda Paige RD  Date: 10/1/2024    Intake    Program Intake  Reason for Diabetes Program Visit:: Post Program Follow-Up    Current Diabetes Treatment: Oral Medications, Insulin, Diet/Exercise  Oral Medication Type/Dose: Metformin 1000gm, Januvia 100 mg and Jardiance 25 mg  Method of insulin delivery?: Injections  Injection Type: Pens  Pen Type/Dose: Lantus increased to 42 units    Continuous Glucose Monitoring  Patient has CGM: Yes  Personal CGM type:: Dexcom G7 with  (new  setup today)    Lab Results   Component Value Date    HGBA1C 8.3 (H) 08/19/2024       Weight: 130.4 kg (287 lb 6.4 oz)       Body mass index is 49.33 kg/m².    Lifestyle Coping Support & Clinical    Lifestyle/Coping/Support  Does anyone in your family have diabetes or does anyone in your family support you in your diabetes care?: daughters and brother  Psychosocial/Coping Skills Assessment Completed: : Yes  Assessment indicates:: Adequate understanding  Area of need?: No         Diabetes Self-Management Skills Assessment    Medication Skills Assessment  Patient is able to identify current diabetes medications, dosages, and appropriate timing of medications.: yes  Patient reports problems or concerns with current medication regimen.: no  Medication Skills Assessment Completed:: Yes  Assessment indicates:: Adequate understanding  Area of need?: No              Physical Activity/Exercise  Patient's daily activity level:: lightly active  Patient can identify forms of physical activity.: yes  Physical Activity/Exercise Skills Assessment Completed: : Yes  Assessment indicates:: Adequate understanding  Area of need?: No    Home Blood Glucose Monitoring  Personal CGM type:: Dexcom G7 with  (new  setup today)   What is your current Time in Range?: n/a  Home Blood Glucose Monitoring Skills Assessment Completed: : Yes  Assessment indicates:: Instruction Needed  Area of  need?: Yes    Acute Complications  Have you ever had hypoglycemia (low BG 70 or less)?: no  Have you ever had hyperglycemia (high  or more)?: no    Chronic Complications  Chronic Complications Skills Assessment Completed: : No  Deferred due to:: Time  Area of need?: Deferred      Assessment Summary and Plan    Based on today's diabetes care assessment, the following areas of need were identified:          10/1/2024     9:59 AM   Areas of Need   Physical Activity/Exercise  Monitoring No  Yes - see care plan       Today's interventions were provided through individual discussion, instruction, and written materials were provided.      Patient verbalized understanding of instruction and written materials.  Pt was able to return back demonstration of instructions today. Patient understood key points, needs reinforcement and further instruction.     Diabetes Self-Management Care Plan:    Today's Diabetes Self-Management Care Plan was developed with Linn's input. Linn has agreed to work toward the following goal(s) to improve his/her overall diabetes control.      Care Plan: Diabetes Management   Updates made since 9/1/2024 12:00 AM        Problem: Blood Glucose Self-Monitoring         Goal: Patient will return in 1 month once she gets her new  and starts new sensor    Start Date: 9/4/2024   Expected End Date: 12/3/2024   This Visit's Progress: On track   Priority: High   Note:    9/4/24 -  will no longer hold a charge. Attempted to charge in clinic and noted that inner charging port appeared damaged. Contacted Dexcom customer support. Pt will receive new  in 4-5 days and will start new sensor. Instructed that charging symbol on usb cord should be facing up along with  to avoid damage. Reminded pt that she should resume glucometer for daily blood glucose checks, contact Dexcom customer service with any issues with /sensors and provided 24-hr customer support  #.    10/1/24 - Pt returned to clinic with new  and sensor to restart. Reviewed appropriate charging position. Started new sensor and  set-up. Low alert set to 70. High alert set to 300 per pt preference. Will return in 2 months for Dexcom  download. Pt states she has Dexcom customer service # and will contact with any sensor/ issues. Reviewed precautions with CT scan and removal of sensor for MRI. Reviewed arrow symbols and when in doubt, get meter out.          Follow Up Plan     Follow up in about 2 months (around 12/1/2024) for Dexcom download.    Today's care plan and follow up schedule was discussed with patient.  Linn verbalized understanding of the care plan, goals, and agrees to follow up plan.        The patient was encouraged to communicate with his/her health care provider/physician and care team regarding his/her condition(s) and treatment.  I provided the patient with my contact information today and encouraged to contact me via phone or [facility] Patient Portal as needed.     Length of Visit   Total Time: 30 Minutes

## 2024-10-07 DIAGNOSIS — E11.65 TYPE 2 DIABETES MELLITUS WITH HYPERGLYCEMIA, WITH LONG-TERM CURRENT USE OF INSULIN: Primary | ICD-10-CM

## 2024-10-07 DIAGNOSIS — Z79.4 TYPE 2 DIABETES MELLITUS WITH HYPERGLYCEMIA, WITH LONG-TERM CURRENT USE OF INSULIN: Primary | ICD-10-CM

## 2024-10-07 RX ORDER — INSULIN GLARGINE-YFGN 100 [IU]/ML
30 INJECTION, SOLUTION SUBCUTANEOUS 2 TIMES DAILY
Qty: 30 ML | Refills: 11 | Status: SHIPPED | OUTPATIENT
Start: 2024-10-07

## 2024-10-07 NOTE — TELEPHONE ENCOUNTER
----- Message from Gladis sent at 10/7/2024  8:31 AM CDT -----  Pt of Jacqueline        Pt called stating she needs refill on medication insulin glargine (LANTUS U-100 INSULIN) 100 unit/mL injection.    Pt call back number  [phone]      Please advise

## 2024-10-15 DIAGNOSIS — I50.20 HEART FAILURE WITH REDUCED EJECTION FRACTION: Chronic | ICD-10-CM

## 2024-10-15 NOTE — TELEPHONE ENCOUNTER
----- Message from Renata sent at 10/15/2024 10:18 AM CDT -----  Patient of Jacqueline Romo     Patient returning your call , please contact again when your available     [phone]      10:19a

## 2024-10-21 ENCOUNTER — HOSPITAL ENCOUNTER (OUTPATIENT)
Dept: WOUND CARE | Facility: HOSPITAL | Age: 59
Discharge: HOME | End: 2024-10-21
Attending: NURSE PRACTITIONER
Payer: MEDICAID

## 2024-10-21 VITALS
RESPIRATION RATE: 18 BRPM | SYSTOLIC BLOOD PRESSURE: 126 MMHG | DIASTOLIC BLOOD PRESSURE: 72 MMHG | HEART RATE: 85 BPM | TEMPERATURE: 98 F | OXYGEN SATURATION: 94 %

## 2024-10-21 DIAGNOSIS — L60.2 OVERGROWN NAIL: ICD-10-CM

## 2024-10-21 DIAGNOSIS — L60.3 DYSTROPHIC NAIL: ICD-10-CM

## 2024-10-21 DIAGNOSIS — L84 CALLUS OF FOOT: ICD-10-CM

## 2024-10-21 DIAGNOSIS — E11.628 TYPE 2 DIABETES MELLITUS WITH OTHER SKIN COMPLICATION, WITH LONG-TERM CURRENT USE OF INSULIN: ICD-10-CM

## 2024-10-21 DIAGNOSIS — E11.9 ENCOUNTER FOR COMPREHENSIVE DIABETIC FOOT EXAMINATION, TYPE 2 DIABETES MELLITUS: Primary | ICD-10-CM

## 2024-10-21 DIAGNOSIS — M79.671 BILATERAL FOOT PAIN: ICD-10-CM

## 2024-10-21 DIAGNOSIS — M79.672 BILATERAL FOOT PAIN: ICD-10-CM

## 2024-10-21 DIAGNOSIS — Z79.4 TYPE 2 DIABETES MELLITUS WITH OTHER SKIN COMPLICATION, WITH LONG-TERM CURRENT USE OF INSULIN: ICD-10-CM

## 2024-10-21 PROCEDURE — 99499 UNLISTED E&M SERVICE: CPT | Mod: ,,, | Performed by: NURSE PRACTITIONER

## 2024-10-21 PROCEDURE — 11055 PARING/CUTG B9 HYPRKER LES 1: CPT

## 2024-10-21 PROCEDURE — 11057 PARNG/CUTG B9 HYPRKR LES >4: CPT | Mod: ,,, | Performed by: NURSE PRACTITIONER

## 2024-10-21 PROCEDURE — 11719 TRIM NAIL(S) ANY NUMBER: CPT | Mod: 59,,, | Performed by: NURSE PRACTITIONER

## 2024-10-21 PROCEDURE — 11720 DEBRIDE NAIL 1-5: CPT

## 2024-10-21 PROCEDURE — 11720 DEBRIDE NAIL 1-5: CPT | Mod: 59,,, | Performed by: NURSE PRACTITIONER

## 2024-10-21 PROCEDURE — 11719 TRIM NAIL(S) ANY NUMBER: CPT

## 2024-10-21 PROCEDURE — 11057 PARNG/CUTG B9 HYPRKR LES >4: CPT

## 2024-10-21 PROCEDURE — 99211 OFF/OP EST MAY X REQ PHY/QHP: CPT

## 2024-10-21 PROCEDURE — 27000999 HC MEDICAL RECORD PHOTO DOCUMENTATION

## 2024-10-21 PROCEDURE — 11056 PARNG/CUTG B9 HYPRKR LES 2-4: CPT

## 2024-10-21 NOTE — PROGRESS NOTES
[facility]   Outpatient Wound Care     Subjective:   Patient ID: Linn Mckeon is a 58 y.o. female.    Chief Complaint: Nail Care      History of Present Illness:   58 y.o. Black or  female presents to wound care clinic today for diabetic foot care, bilateral foot calluses, and bilateral foot pain.  Presents to clinic alone.  Ambulates without difficulty.  Reviewed previous medical history since last visit of 07/19/2024.  Past medical history:  She was seen by Alejandra Junior NP in  May for diabetic foot care. She was a patient of Alejandra Junior NP.  Presents to the clinic alone difficulty walking and oxygen-dependent.  Wearing oxygen at 2 liters continuously.   Followed by Priscilla Strickland FNP for PCP  last appt 8/20/24. She refused podiatry in past due to transportation issues.      Today's visit 10/21/2024:    Diabetic foot exam performed.  Monofilament test done decreased sensation noted in all areas.  Bilateral lower extremities cool to touch with absent hair growth.  Trimmed all 10 toenails using podiatry clippers, and filed with [place] board.  Debride 4/6 nails dystrophic using Dremmel.  Rationale for debridement to decrease pressure and prevent ulcers.  Calluses bilateral lower feet and tips of great toes. Pared callus using #4 dermal curette. Rationale for paring today to decrease pressure, and prevent ulcers.  Instructed the importance of checking feet daily due to decrease sensation high risk for diabetic foot ulcers.  Instructed on proper footwear, nail care, and daily skin assessment.  Instructed the importance of follow up with a PCP for diabetic shoes and inserts.  Will have her return to the clinic in 3 months.  Instructed to call the office with any questions, concerns, or new skin issues.  Verbalized understanding of all  instructions.      07/19/2024:  During examination bilateral feet and lower legs Xerosis of skin.  Long discussion with the patient the importance of being compliant patient has not filed her Lac-Hytrin since January and is not applying twice daily with thin layer of Vaseline.  Bilateral plantar surface callus warts, and bilateral great toe callus pared using #4 dermal curette.  Paring performed to decreased pain with ambulation, prevent ulcers, in any skin breakdown.  Apply bilateral feet with Vaseline with the exception between toes.  Instructed will have to  Lac-Hytrin and start using twice daily with thin layer of Vaseline.  Agreed.  Tolerated well.  Relief with a paring.  Mycotic toenails did not need to be trimmed at this visit.  Patient is also noncompliant with the apply compound topical for mycotic nails.  Will have her return to the clinic in 3 months for diabetic foot care.        History includes:      Past Medical History:   Diagnosis Date    Callus of foot 07/07/2022    CHF (congestive heart failure)     Chronic heart failure with preserved ejection fraction 05/20/2022    COPD (chronic obstructive pulmonary disease)     Diabetes mellitus     GOODMAN (dyspnea on exertion) 05/20/2022    Dystrophic nail 07/07/2022    Hyperlipidemia     Hyperlipidemia LDL goal <70 05/20/2022    Hypertension     Morbid obesity with body mass index (BMI) greater than or equal to 50 07/07/2022    Non compliance w medication regimen 07/07/2022    Noncompliance with treatment plan 07/07/2022    DAVION on CPAP 05/20/2022    Peripheral edema 05/20/2022    Primary hypertension 05/20/2022    Type 2 diabetes mellitus with skin complication 05/20/2022    Xerosis of skin 07/07/2022      Past Surgical History:   Procedure Laterality Date    COLONOSCOPY, WITH POLYPECTOMY USING HOT BIOPSY FORCEPS N/A 01/25/2024    Procedure: COLONOSCOPY, WITH POLYPECTOMY USING HOT BIOPSY FORCEPS;  Surgeon: Mckay, Yan KATE MD;  Location: [facility]  ENDOSCOPY;  Service: Endoscopy;  Laterality: N/A;  Cold forceps    HYSTERECTOMY        Social History     Socioeconomic History    Marital status:    Tobacco Use    Smoking status: Every Day     Current packs/day: 0.50     Types: Cigarettes    Smokeless tobacco: Never    Tobacco comments:     Smokes 5 cigarettes a day   Substance and Sexual Activity    Alcohol use: Not Currently    Drug use: Never    Sexual activity: Not Currently     Social Drivers of Health     Financial Resource Strain: Low Risk  (3/21/2024)    Overall Financial Resource Strain (CARDIA)     Difficulty of Paying Living Expenses: Not hard at all   Food Insecurity: No Food Insecurity (3/21/2024)    Hunger Vital Sign     Worried About Running Out of Food in the Last Year: Never true     Ran Out of Food in the Last Year: Never true   Transportation Needs: No Transportation Needs (9/4/2024)    TRANSPORTATION NEEDS     Transportation : No   Physical Activity: Inactive (9/4/2024)    Exercise Vital Sign     Days of Exercise per Week: 0 days     Minutes of Exercise per Session: 0 min   Stress: No Stress Concern Present (3/21/2024)    [employer] - Occupational Stress Questionnaire     Feeling of Stress : Not at all   Housing Stability: Low Risk  (9/4/2024)    Housing Stability Vital Sign     Unable to Pay for Housing in the Last Year: No     Homeless in the Last Year: No   .      Current Outpatient Medications   Medication Sig Dispense Refill    albuterol (PROVENTIL) 2.5 mg /3 mL (0.083 %) nebulizer solution Take 3 mLs (2.5 mg total) by nebulization every 6 (six) hours as needed for Wheezing. Rescue 90 mL 1    ammonium lactate (LAC-HYDRIN) 12 % lotion APPLY TO DRY SKIN AND RUB IN WELL TWICE DAILY. NOTHING BETWEEN TOES 226 g 11    aspirin (ECOTRIN) 81 MG EC tablet Take 81 mg by mouth once daily.      atorvastatin (LIPITOR) 80 MG tablet Take 1 tablet (80 mg total) by mouth once daily. 90 tablet 3    BD ULTRA-FINE JORDON PEN  "NEEDLE 32 gauge x 5/32" Ndle 2 (two) times daily.      BD ULTRA-FINE JORDON PEN NEEDLE 32 gauge x 5/32" Ndle       blood sugar diagnostic Strp To check BG 2 times daily, to use with insurance preferred meter 100 strip 11    blood-glucose meter kit To check BG 2 times daily, to use with insurance preferred meter 1 each 0    blood-glucose meter,continuous (DEXCOM G7 ) Misc Dexcom G7  uses directed 1 each 0    blood-glucose sensor (DEXCOM G7 SENSOR) Norma Use every 10 days as directed 3 each 11    CICLOPIROX-ITRACONAZOLE-UREA TOP CLEAN NAILS, SHAKE BOTTLE WELL, APPLY TWICE DAILY TO ALL AFFECTED NAILS USING APPLICATOR. (UP TO 2 MLS/DAY)      empagliflozin (JARDIANCE) 25 mg tablet Take 1 tablet (25 mg total) by mouth once daily. 90 tablet 2    ergocalciferol (ERGOCALCIFEROL) 50,000 unit Cap Take 1 capsule (50,000 Units total) by mouth every 7 days. 12 capsule 2    ezetimibe (ZETIA) 10 mg tablet Take 1 tablet (10 mg total) by mouth once daily. 90 tablet 3    flash glucose scanning reader (FREESTYLE JOSE ALFREDO 2 READER) [facility] Freestyle Jose Alfredo 2 reader dispense 1. Use as directed 1 each 0    flash glucose sensor (FREESTYLE JOSE ALFREDO 2 SENSOR) Kit Jose Alfredo 2 sensor change every 14 days (Disp 6 each)(90 day) 6 kit 11    fluticasone propionate (FLONASE) 50 mcg/actuation nasal spray 1 spray (50 mcg total) by Each Nostril route once daily. 11.1 mL 1    fluticasone-umeclidin-vilanter (TRELEGY ELLIPTA) 100-62.5-25 mcg DsDv Inhale 1 puff into the lungs once daily. 28 each 3    furosemide (LASIX) 40 MG tablet Take 1 tablet (40 mg total) by mouth 2 (two) times a day. 180 tablet 3    insulin glargine (LANTUS U-100 INSULIN) 100 unit/mL injection Inject 30 Units into the skin 2 (two) times daily. 30 mL 11    insulin glargine-yfgn 100 unit/mL (3 mL) InPn INJECT 30 UNITS UNDER THE SKIN TWICE DAILY 30 mL 11    ipratropium-albuteroL (COMBIVENT RESPIMAT)  mcg/actuation inhaler Inhale 2 puffs into the lungs every 6 (six) hours as needed " "for Wheezing. Rescue 4 g 1    ketoconazole (NIZORAL) 2 % cream Apply topically once daily. 30 g 1    lancets (ACCU-CHEK FASTCLIX LANCET DRUM) Misc USE ONE DAILY 100 each 3    LANCETS MISC       latanoprost 0.005 % ophthalmic solution Apply 1 drop to eye. (Patient not taking: Reported on 8/20/2024)      metFORMIN (GLUCOPHAGE) 1000 MG tablet TAKE 1 TABLET BY MOUTH TWICE DAILY WITH MEALS 60 tablet 11    metoprolol succinate (TOPROL-XL) 100 MG 24 hr tablet Take 1 tablet (100 mg total) by mouth once daily. 30 tablet 11    nystatin (MYCOSTATIN) ointment Apply topically 2 (two) times daily. 15 g 2    pen needle, diabetic (BD ULTRA-FINE JORDON PEN NEEDLE) 32 gauge x 5/32" Ndle USE TWICE DAILY 60 each 11    sacubitriL-valsartan (ENTRESTO) 49-51 mg per tablet Take 1 tablet by mouth 2 (two) times daily. 180 tablet 3    SITagliptin phosphate (JANUVIA) 100 MG Tab Take 1 tablet (100 mg total) by mouth once daily. 90 tablet 2    spironolactone (ALDACTONE) 25 MG tablet Take 1 tablet (25 mg total) by mouth once daily. 90 tablet 3     No current facility-administered medications for this encounter.       Review of Systems   All other systems reviewed and are negative.         Labs Reviewed:   Chemistry:  Lab Results   Component Value Date    BUN 21.2 (H) 08/19/2024    BUN 22.3 (H) 03/18/2024    CREATININE 1.28 (H) 08/19/2024    CREATININE 1.16 (H) 03/18/2024    EGFRNORACEVR 48 08/19/2024    EGFRNORACEVR 54 03/18/2024    GLUCOSE 123 (H) 08/19/2024    AST 13 08/19/2024    AST 11 03/18/2024    ALT 14 08/19/2024    ALT 11 03/18/2024    HGBA1C 8.3 (H) 08/19/2024        Hematology:  Lab Results   Component Value Date    WBC 10.55 03/18/2024    WBC 10.40 09/18/2023    HGB 15.1 03/18/2024    HGB 14.9 09/18/2023    HCT 47.4 (H) 03/18/2024    HCT 47.1 (H) 09/18/2023     03/18/2024     09/18/2023       Inflammatory Markers:  No results found for: "HSCRP", "SEDRATE"     Objective:        Physical Exam  Vitals reviewed. "   Cardiovascular:      Pulses:           Dorsalis pedis pulses are 2+ on the right side and 2+ on the left side.        Posterior tibial pulses are 2+ on the right side and 2+ on the left side.   Pulmonary:      Comments: Wearing O2 @ 2 liters continuously.  Feet:      Right foot:      Skin integrity: Callus and dry skin present.      Toenail Condition: Right toenails are abnormally thick and long. Fungal disease present.     Left foot:      Skin integrity: Callus and dry skin present.      Toenail Condition: Left toenails are abnormally thick and long. Fungal disease present.     Comments: Monofilament test done decreased sensation noted in all areas.  Skin:     General: Skin is cool and dry.      Capillary Refill: Capillary refill takes less than 2 seconds.   Neurological:      Mental Status: She is alert.                        Assessment:         ICD-10-CM ICD-9-CM   1. Encounter for comprehensive diabetic foot examination, type 2 diabetes mellitus  E11.9 250.00   2. Overgrown nail  L60.2 703.8   3. Dystrophic nail  L60.3 703.8   4. Callus of foot  L84 700   5. Bilateral foot pain  M79.671 729.5    M79.672    6. Type 2 diabetes mellitus with other skin complication, with long-term current use of insulin  E11.628 250.80    Z79.4 V58.67   7. BMI 45.0-49.9, adult  Z68.42 V85.42         Plan:   Tissue pathology and/or culture taken:  [] Yes [x] No   Sharp debridement performed:   [] Yes [x] No   Labs ordered this visit:   [] Yes [x] No   Imaging ordered this visit:   [] Yes [x] No         1. Encounter for comprehensive diabetic foot examination, type 2 diabetes mellitus     Diabetic foot exam performed.  Instructed on proper foot care.   2. Overgrown nail     Trimmed.  Instructed on proper nail care.   3. Dystrophic nail     Debride.  Instructed on proper nail care.   4. Callus of foot     Pared.  Instructed on proper footwear.   5. Bilateral foot pain     Pain relief with a paring.   6. Type 2 diabetes mellitus  with other skin complication, with long-term current use of insulin       Co-factor in wound development.    Last A1c 8.3.    Must have a strict diabetic diet, take glucose lowering medications as prescribed and encourage lower HA1C.   7. BMI 45.0-49.9, adult     Co-factor in wound development.  Instructed the importance of diet and exercise.       The time spent including preparing to see the patient, obtaining patient history and assessment, evaluation of the plan of care, patient/caregiver counseling and education, orders, documentation, coordination of care, and other professional medical management activities for today's encounter was 20 minute.    Time spent performing procedures during today's encounter was 20 minute.    Follow up in about 3 months (around 1/21/2025). Teaching provided on s/s to call wound clinic for promptly.  ER precautions taught for after hours and weekends.     NOEMI Mederos

## 2024-11-11 DIAGNOSIS — E11.65 UNCONTROLLED TYPE 2 DIABETES MELLITUS WITH HYPERGLYCEMIA: ICD-10-CM

## 2024-11-11 DIAGNOSIS — I50.20 HEART FAILURE WITH REDUCED EJECTION FRACTION: Chronic | ICD-10-CM

## 2024-11-11 NOTE — TELEPHONE ENCOUNTER
----- Message from Damien sent at 11/11/2024  9:24 AM CST -----  Patient of Jacqueline    Patient called wanting to know if the below medications can be refilled:      SITagliptin phosphate (JANUVIA) 100 MG Tab  empagliflozin (JARDIANCE) 25 mg tablet    [phone]  9:26  Thanks,

## 2024-12-03 ENCOUNTER — CLINICAL SUPPORT (OUTPATIENT)
Dept: DIABETES | Facility: CLINIC | Age: 59
End: 2024-12-03
Payer: MEDICAID

## 2024-12-03 DIAGNOSIS — E11.65 TYPE 2 DIABETES MELLITUS WITH HYPERGLYCEMIA, WITH LONG-TERM CURRENT USE OF INSULIN: Primary | Chronic | ICD-10-CM

## 2024-12-03 DIAGNOSIS — E11.9 TYPE 2 DIABETES MELLITUS WITHOUT COMPLICATION, WITH LONG-TERM CURRENT USE OF INSULIN: Primary | ICD-10-CM

## 2024-12-03 DIAGNOSIS — Z79.4 TYPE 2 DIABETES MELLITUS WITH HYPERGLYCEMIA, WITH LONG-TERM CURRENT USE OF INSULIN: Primary | Chronic | ICD-10-CM

## 2024-12-03 DIAGNOSIS — Z79.4 TYPE 2 DIABETES MELLITUS WITHOUT COMPLICATION, WITH LONG-TERM CURRENT USE OF INSULIN: Primary | ICD-10-CM

## 2024-12-03 PROCEDURE — G0108 DIAB MANAGE TRN  PER INDIV: HCPCS | Mod: PBBFAC | Performed by: DIETITIAN, REGISTERED

## 2024-12-03 PROCEDURE — 99211 OFF/OP EST MAY X REQ PHY/QHP: CPT | Mod: PBBFAC | Performed by: DIETITIAN, REGISTERED

## 2024-12-03 NOTE — PROGRESS NOTES
Diabetes Care Specialist Follow-up Note  Author: Lynda Paige RD  Date: 12/3/2024    Intake    Program Intake  Reason for Diabetes Program Visit:: Post Program Follow-Up    Current Diabetes Treatment: Oral Medications, Diet/Exercise, Insulin  Diet/Exercise Type/Dose: avoiding soda and moving more (going to [facility])  Oral Medication Type/Dose: Metformin 1000mg in am, Januvia 100mg and Jardiance 25 mg  Method of insulin delivery?: Injections  Injection Type: Pens  Pen Type/Dose: Lantus 42-45 units based on blood sugar    Continuous Glucose Monitoring  Patient has CGM: Yes  Personal CGM type:: Dexcom G7 with   GMI Date: 12/03/24  GMI Value: 7.1 %      Lab Results   Component Value Date    HGBA1C 8.3 (H) 08/19/2024     A1c Pre Diabetes Care Specialist Intervention:  11.7%      Weight: (P) 131.1 kg (289 lb)       Body mass index is 49.61 kg/m² (pended).  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 [facility] for 1 hour    Lifestyle Coping Support & Clinical       Diabetes Self-Management Skills Assessment    Medication Skills Assessment  Patient is able to identify current diabetes medications, dosages, and appropriate timing of medications.: yes  Patient reports problems or concerns with current medication regimen.: no  Patient is  aware that some diabetes medications can cause low blood sugar?: Yes  Medication Skills Assessment Completed:: Yes  Assessment indicates:: Adequate understanding  Area of need?: No    Diabetes Disease Process/Treatment Options  Diabetes Type?: Type II  If previous diabetes education, when/where:: yes  Is patient aware of what causes diabetes?: Yes  Does patient understand the pathophysiology of diabetes?: Yes  Diabetes Disease Process/Treatment Options: Skills Assessment Completed: Yes  Assessment indicates:: Adequate understanding  Area of need?: No    Nutrition/Healthy Eating  Meal Plan 24 Hour Recall - Lunch:  mitch alak with instant mashed potatoes and greens  Meal Plan 24 Hour Recall - Dinner: meatballs with potatoes and greens  Meal Plan 24 Hour Recall - Beverage: zero drinks and water  Who shops/cooks?: self  Patient can identify foods that impact blood sugar.: yes  Nutrition/Healthy Eating Skills Assessment Completed:: Yes  Assessment indicates:: Adequate understanding  Area of need?: No    Physical Activity/Exercise  Patient's daily activity level:: lightly active  Patient formally exercises outside of work.: no  Patient can identify forms of physical activity.: yes  Physical Activity/Exercise Skills Assessment Completed: : Yes  Assessment indicates:: Adequate understanding  Area of need?: No    Home Blood Glucose Monitoring  Patient states that blood sugar is checked at home daily.: yes  Monitoring Method:: personal continuous glucose monitor  Personal CGM type:: Dexcom G7 with    What is your current Time in Range?: 71%  Home Blood Glucose Monitoring Skills Assessment Completed: : Yes  Assessment indicates:: Instruction Needed  Area of need?: Yes         Chronic Complications  Reviewed health maintenance: yes  Have you completed your annual diabetes maintenance labwork? : yes  Do you examine your feet daily?: no  Has your doctor examined your feet?: yes  Do you see a Dentist?: no  Do you see an eye doctor?: yes  Chronic Complications Skills Assessment Completed: : Yes  Assessment indicates:: Instruction Needed  Area of need?: Yes      During today's follow-up visit,  the following areas required further assessment and content was provided/reviewed.    Based on today's diabetes care assessment, the following areas of need were identified:      Identified Areas of Need      Medication/Current Diabetes Treatment: No   Lifestyle Coping/Support:     Diabetes Disease Process/Treatment Options: No   Nutrition/Healthy Eating: No    Physical Activity/Exercise: No    Home Blood Glucose Monitoring: Yes - see  care plan   Acute Complications:      Chronic Complications: Yes - Reviewed diabetes complications and preventative screenings including annual dilated eye exams, regular dental exams, and daily foot self-exams. Reviewed foot care guidelines.   Discussed current A1c, Blood Pressure, and Cholesterol results (ABC's of Diabetes) and ADA target guidelines.       Today's interventions were provided through individual discussion, instruction, and written materials were provided.    Patient verbalized understanding of instruction and written materials.  Pt was able to return back demonstration of instructions today. Patient understood key points, needs reinforcement and further instruction.     Diabetes Self-Management Care Plan Review and Evaluation of Progress:    During today's follow-up Hilda Diabetes Self-Management Care Plan progress was reviewed and progress was evaluated including his/her input. Linn has agreed to continue his/her journey to improve/maintain overall diabetes control by continuing to set health goals. See care plan progress below.      Care Plan: Diabetes Management   Updates made since 12/4/2023 12:00 AM        Problem: Blood Glucose Self-Monitoring Deleted 9/4/2024   Note:    9/4/24 -        Goal: Pt will use Freestyle Harper reader to scan blood glucose at least 4-6 times daily and report hypoglycemia to provider Completed 8/6/2024   Start Date: 1/5/2023   Expected End Date: 8/27/2024   This Visit's Progress: Not met   Recent Progress: On track   Priority: High   Barriers: Lack of Supplies   Note:      2/24/23 - Pt was told by [employer] that she needs a new Rx from her provider for an additional reader and [employer] is unable to return her reader. However, her insurance provided her with a reader within the last year. Contacted Charo Perez, Diabetes Care Specialist, from [employer] for assistance. She asks that pt contact her and she will discuss with [employer] on three-way call.  Contacted pt to provide Charo's phone # and instructions to contact her.     5/2/23 - New reader received. Noted scanning less often last week. 1 episode of hypoglycemia noted (65) for [facility] report 4/15-4/28/23.     8/25/23 - Scanning has become less frequent over last few days but overall 4-6 times daily. Encouraged to continue.    3/5/24: Pt continues use of Harper 2 sensors with reader.  Per reports, pt scanning 2-7 times per day with only 33% use/active sensor over the past 2 weeks.  Pt reports this was because her sensor fell off early & her daughter was not able to help her put a new one one because her daughter was in the hospital.  Pt reports that she keeps reader with her at all times.      4/2/24: Pt with active Harper 2 sensor 74% of the time over the past 2 weeks.  Per reports, she is scanning 3-7x/d (3x/d on 5 out of 14 days).  Due to some gaps in information in late PM/early AM, encouraged pt to scan right before bed & upon waking in the AM.  Pt verbalizes understanding.     6/11/24: Pt with active Harper 2 sensor only 59% of the time over the 2 week period reviewed & has not had a sensor for 1-2 weeks due to awaiting insurance approval for supplies.  Pt has been using glucometer since 5/28 but checking only once daily.  Per reports, pt only scanned >/= 4x/d on 14% of days in May.  Noted still some gaps in information in evening & early AM.  Again encouraged checking before bed & upon waking.     8/6/24: Pt with active Harper 2 sensor only 33% of the time over the last 2 weeks.  Pt went a 4 day period without sensor due to forgetting to put a new sensor on.  Still with large gaps in information in PM/HS limiting information.  Scanning >/= 4x/d only 27% of the time over the past 34 days.  Goal not met.  Will discuss with endo the possibility of transitioning to Harper 3 to help prevent gaps in information since scanning is not required except at sensor activation.       Problem: Healthy Eating Deleted  9/4/2024        Goal: Patient will use plate method for diabetes to limit starches to </= 1 cup per meal & will go back for seconds of nonstarchy vegetables or meat as needed for satiety. Completed 8/6/2024   Start Date: 3/5/2024   Expected End Date: 8/27/2024   This Visit's Progress: Not met   Recent Progress: On track   Priority: Medium   Barriers: Lack of Motivation to Change   Note:    8/6/24:     24h food recall:  - breakfast = 1 small breakfast sandwich, Coke Zero  - lunch = 1.5 cups spaghetti with meatballs, Coke Zero  - dinner = same as lunch  - HS snack = 1 mini pecan pie, Coke Zero    Pt reports appetite decreasing but portions of carbs unchanged from last visit.  Pt reports rarely drinking regular Coke.  Again encouraged to limit starch portions to </=1 cup per meal.    6/11/24: 24h food recall: breakfast = egg sandwich (only eats breakfast sometimes); lunch = egg or ham sandwich; dinner = > 1 cup pasta with meatballs OR smothered chicken with rice, peas, Coke Zero or apple juice.  Pt reports that she typically eats only 2 meals/d (breakfast OR lunch, not both).  Per food recall, she appears to be exceeding carbohydrate goal at supper meal.  Discussed ideas for decreasing carb consumption with supper.    4/2/24: 24h food recall: breakfast = skipped; late lunch = ribs, 1 cup potato salad, water; dinner = chicken tenders, 1/2 cup potato salad, glass of Cola; snacks = none.  Per food recall, pt adequately limiting starches to </= 1 cup per meal as discussed with the exception of soda consumption in addition to starch with 1 meal.  Will continue to monitor.    3/5/24: Reviewed sources of carbohydrate & appropriate portion sizes.  Discussed plate method for diabetes.  Pt c/o increased appetite lately.  Pt eats 3 meals/d with snacks before bed & during the night when waking to use the restroom.  Pt mainly drinks Coke Zero & rarely drinks regular soda (</= 1x/wk).  Pt reports rice & pasta portions are typically  1.5-2 cups per meal when she eats these foods.       Problem: Blood Glucose Self-Monitoring Resolved 12/3/2024        Goal: Patient will return in 1 month once she gets her new  and starts new sensor Completed 12/3/2024   Start Date: 9/4/2024   Expected End Date: 12/3/2024   This Visit's Progress: Met   Recent Progress: On track   Priority: High   Note:    9/4/24 -  will no longer hold a charge. Attempted to charge in clinic and noted that inner charging port appeared damaged. Contacted Dexcom customer support. Pt will receive new  in 4-5 days and will start new sensor. Instructed that charging symbol on usb cord should be facing up along with  to avoid damage. Reminded pt that she should resume glucometer for daily blood glucose checks, contact Dexcom customer service with any issues with /sensors and provided 24-hr customer support #.    10/1/24 - Pt returned to clinic with new  and sensor to restart. Reviewed appropriate charging position. Started new sensor and  set-up. Low alert set to 70. High alert set to 300 per pt preference. Will return in 2 months for Dexcom  download. Pt states she has Dexcom customer service # and will contact with any sensor/ issues. Reviewed precautions with CT scan and removal of sensor for MRI. Reviewed arrow symbols and when in doubt, get meter out.     12/3/24 - she was without sensor for several days however the 30 day avgs are similar to 14 days. Instructed to contact [employer] for replacement sensor anytime they fall off or there is an error. Pt agrees.         Follow Up Plan     Follow up in about 6 months (around 6/3/2025) for Episode 4 - maintenance.    Today's care plan and follow up schedule was discussed with patient.  Linn verbalized understanding of the care plan, goals, and agrees to follow up plan.        The patient was encouraged to communicate with his/her health care provider/physician and  care team regarding his/her condition(s) and treatment.  I provided the patient with my contact information today and encouraged to contact me via phone or [facility] Patient Portal as needed.     Length of Visit   Total Time: 30 Minutes

## 2024-12-03 NOTE — LETTER
December 3, 2024      Jacqueline Urbina, NP  [id number] [facility] [place] [address]         Patient: Linn Mckeon   MR Number: [id number]   YOB: 1965   Date of Visit: 12/3/2024       Dear Dr. Urbina:    Thank you for referring Linn for diabetes self-management education and support services. She has completed all components of our Diabetes Management Program. Below is a summary of her clinical outcomes and goal progress.    Patient Outcomes:    A1c Status:   Lab Results   Component Value Date    HGBA1C 8.3 (H) 08/19/2024    HGBA1C 9.3 (H)  11.7 (H) 03/18/2024 09/11/2022       Care Plan: Diabetes Management   Updates made since 12/4/2023 12:00 AM        Problem: Blood Glucose Self-Monitoring Deleted 9/4/2024   Note:    9/4/24 -        Goal: Pt will use Freestyle Harper reader to scan blood glucose at least 4-6 times daily and report hypoglycemia to provider Completed 8/6/2024   Start Date: 1/5/2023   Expected End Date: 8/27/2024   This Visit's Progress: Not met   Recent Progress: On track   Priority: High   Barriers: Lack of Supplies   Note:      2/24/23 - Pt was told by [employer] that she needs a new Rx from her provider for an additional reader and [employer] is unable to return her reader. However, her insurance provided her with a reader within the last year. Contacted Charo Perez, Diabetes Care Specialist, from [employer] for assistance. She asks that pt contact her and she will discuss with [employer] Customer [employer] on three-way call. Contacted pt to provide Charo's phone # and instructions to contact her.     5/2/23 - New reader received. Noted scanning less often last week. 1 episode of hypoglycemia noted (65) for [facility] report 4/15-4/28/23.     8/25/23 - Scanning has become less frequent over last few days but overall 4-6 times daily. Encouraged to continue.    3/5/24: Pt continues use of Harper 2 sensors with reader.  Per reports, pt scanning 2-7 times per day  with only 33% use/active sensor over the past 2 weeks.  Pt reports this was because her sensor fell off early & her daughter was not able to help her put a new one one because her daughter was in the hospital.  Pt reports that she keeps reader with her at all times.      4/2/24: Pt with active Harper 2 sensor 74% of the time over the past 2 weeks.  Per reports, she is scanning 3-7x/d (3x/d on 5 out of 14 days).  Due to some gaps in information in late PM/early AM, encouraged pt to scan right before bed & upon waking in the AM.  Pt verbalizes understanding.     6/11/24: Pt with active Harper 2 sensor only 59% of the time over the 2 week period reviewed & has not had a sensor for 1-2 weeks due to awaiting insurance approval for supplies.  Pt has been using glucometer since 5/28 but checking only once daily.  Per reports, pt only scanned >/= 4x/d on 14% of days in May.  Noted still some gaps in information in evening & early AM.  Again encouraged checking before bed & upon waking.     8/6/24: Pt with active Haprer 2 sensor only 33% of the time over the last 2 weeks.  Pt went a 4 day period without sensor due to forgetting to put a new sensor on.  Still with large gaps in information in PM/HS limiting information.  Scanning >/= 4x/d only 27% of the time over the past 34 days.  Goal not met.  Will discuss with endo the possibility of transitioning to Harper 3 to help prevent gaps in information since scanning is not required except at sensor activation.       Problem: Healthy Eating Deleted 9/4/2024        Goal: Patient will use plate method for diabetes to limit starches to </= 1 cup per meal & will go back for seconds of nonstarchy vegetables or meat as needed for satiety. Completed 8/6/2024   Start Date: 3/5/2024   Expected End Date: 8/27/2024   This Visit's Progress: Not met   Recent Progress: On track   Priority: Medium   Barriers: Lack of Motivation to Change   Note:    8/6/24:     24h food recall:  - breakfast = 1  small breakfast sandwich, Coke Zero  - lunch = 1.5 cups spaghetti with meatballs, Coke Zero  - dinner = same as lunch  - HS snack = 1 mini pecan pie, Coke Zero    Pt reports appetite decreasing but portions of carbs unchanged from last visit.  Pt reports rarely drinking regular Coke.  Again encouraged to limit starch portions to </=1 cup per meal.    6/11/24: 24h food recall: breakfast = egg sandwich (only eats breakfast sometimes); lunch = egg or ham sandwich; dinner = > 1 cup pasta with meatballs OR smothered chicken with rice, peas, Coke Zero or apple juice.  Pt reports that she typically eats only 2 meals/d (breakfast OR lunch, not both).  Per food recall, she appears to be exceeding carbohydrate goal at supper meal.  Discussed ideas for decreasing carb consumption with supper.    4/2/24: 24h food recall: breakfast = skipped; late lunch = ribs, 1 cup potato salad, water; dinner = chicken tenders, 1/2 cup potato salad, glass of Cola; snacks = none.  Per food recall, pt adequately limiting starches to </= 1 cup per meal as discussed with the exception of soda consumption in addition to starch with 1 meal.  Will continue to monitor.    3/5/24: Reviewed sources of carbohydrate & appropriate portion sizes.  Discussed plate method for diabetes.  Pt c/o increased appetite lately.  Pt eats 3 meals/d with snacks before bed & during the night when waking to use the restroom.  Pt mainly drinks Coke Zero & rarely drinks regular soda (</= 1x/wk).  Pt reports rice & pasta portions are typically 1.5-2 cups per meal when she eats these foods.       Problem: Blood Glucose Self-Monitoring Resolved 12/3/2024        Goal: Patient will return in 1 month once she gets her new  and starts new sensor Completed 12/3/2024   Start Date: 9/4/2024   Expected End Date: 12/3/2024   This Visit's Progress: Met   Recent Progress: On track   Priority: High   Note:    9/4/24 -  will no longer hold a charge. Attempted to charge in  clinic and noted that inner charging port appeared damaged. Contacted Dexcom customer support. Pt will receive new  in 4-5 days and will start new sensor. Instructed that charging symbol on usb cord should be facing up along with  to avoid damage. Reminded pt that she should resume glucometer for daily blood glucose checks, contact Dexcom customer service with any issues with /sensors and provided 24-hr customer support #.    10/1/24 - Pt returned to clinic with new  and sensor to restart. Reviewed appropriate charging position. Started new sensor and  set-up. Low alert set to 70. High alert set to 300 per pt preference. Will return in 2 months for Dexcom  download. Pt states she has Dexcom customer service # and will contact with any sensor/ issues. Reviewed precautions with CT scan and removal of sensor for MRI. Reviewed arrow symbols and when in doubt, get meter out.     12/3/24 - she was without sensor for several days however the 30 day avgs are similar to 14 days. Instructed to contact [employer] for replacement sensor anytime they fall off or there is an error. Pt agrees.         Follow up:   Linn to attend medical appointments as scheduled  Linn to update you on her DM education progress as needed  Linn would like to return to diabetes education every 6 months for maintenance.      If you have questions, please do not hesitate to call me. I look forward to providing additional education and support as needed.    Sincerely,    Lynda Paige RD  Diabetes Care and

## 2024-12-03 NOTE — PROGRESS NOTES
Diabetes Care Specialist Progress Note  Author: Lynda Paige RD  Date: 12/3/2024    Intake    Program Intake  Reason for Diabetes Program Visit:: Post Program Follow-Up    Current Diabetes Treatment: Oral Medications, Diet/Exercise, Insulin  Diet/Exercise Type/Dose: avoiding soda and [facility]  Oral Medication Type/Dose: Metformin 1000mg in am, Januvia 100mg and Jardiance 25 mg  Method of insulin delivery?: Injections  Injection Type: Pens  Pen Type/Dose: Lantus 42-45 units based on blood sugar    Continuous Glucose Monitoring  Patient has CGM: Yes  Personal CGM type:: Dexcom G7 with   GMI Date: 12/03/24  GMI Value: 7.1 %    Lab Results   Component Value Date    HGBA1C 8.3 (H) 08/19/2024       Weight: (P) 131.1 kg (289 lb)       Body mass index is 49.61 kg/m² (pended).    Lifestyle Coping Support & Clinical              Diabetes Self-Management Skills Assessment    Medication Skills Assessment  Patient is able to identify current diabetes medications, dosages, and appropriate timing of medications.: yes  Patient reports problems or concerns with current medication regimen.: no  Patient is  aware that some diabetes medications can cause low blood sugar?: Yes  Medication Skills Assessment Completed:: Yes  Assessment indicates:: Adequate understanding  Area of need?: No                   Home Blood Glucose Monitoring  Personal CGM type:: Dexcom G7 with                 Assessment Summary and Plan    Based on today's diabetes care assessment, the following areas of need were identified:      Identified Areas of Need      Medication/Current Diabetes Treatment: No   Lifestyle Coping/Support:     Diabetes Disease Process/Treatment Options:     Nutrition/Healthy Eating:      Physical Activity/Exercise:      Home Blood Glucose Monitoring:      Acute Complications:      Chronic Complications:         Today's interventions were provided through individual discussion, instruction, and  written materials were provided.      Patient verbalized understanding of instruction and written materials.  Pt was able to return back demonstration of instructions today. Patient understood key points, needs reinforcement and further instruction.     Diabetes Self-Management Care Plan:    Today's Diabetes Self-Management Care Plan was developed with Linn's input. Linn has agreed to work toward the following goal(s) to improve his/her overall diabetes control.      Care Plan: Diabetes Management   Updates made since 12/4/2023 12:00 AM        Problem: Blood Glucose Self-Monitoring Deleted 9/4/2024   Note:    9/4/24 -        Goal: Pt will use Freestyle Harper reader to scan blood glucose at least 4-6 times daily and report hypoglycemia to provider Completed 8/6/2024   Start Date: 1/5/2023   Expected End Date: 8/27/2024   This Visit's Progress: Not met   Recent Progress: On track   Priority: High   Barriers: Lack of Supplies   Note:      2/24/23 - Pt was told by [employer] that she needs a new Rx from her provider for an additional reader and [employer] is unable to return her reader. However, her insurance provided her with a reader within the last year. Contacted Charo Perez, Diabetes Care Specialist, from [employer] for assistance. She asks that pt contact her and she will discuss with [employer] on three-way call. Contacted pt to provide Charo's phone # and instructions to contact her.     5/2/23 - New reader received. Noted scanning less often last week. 1 episode of hypoglycemia noted (65) for Lib[facility] report 4/15-4/28/23.     8/25/23 - Scanning has become less frequent over last few days but overall 4-6 times daily. Encouraged to continue.    3/5/24: Pt continues use of Harper 2 sensors with reader.  Per reports, pt scanning 2-7 times per day with only 33% use/active sensor over the past 2 weeks.  Pt reports this was because her sensor fell off early & her daughter was not able to help her put a  new one one because her daughter was in the hospital.  Pt reports that she keeps reader with her at all times.      4/2/24: Pt with active Harper 2 sensor 74% of the time over the past 2 weeks.  Per reports, she is scanning 3-7x/d (3x/d on 5 out of 14 days).  Due to some gaps in information in late PM/early AM, encouraged pt to scan right before bed & upon waking in the AM.  Pt verbalizes understanding.     6/11/24: Pt with active Harper 2 sensor only 59% of the time over the 2 week period reviewed & has not had a sensor for 1-2 weeks due to awaiting insurance approval for supplies.  Pt has been using glucometer since 5/28 but checking only once daily.  Per reports, pt only scanned >/= 4x/d on 14% of days in May.  Noted still some gaps in information in evening & early AM.  Again encouraged checking before bed & upon waking.     8/6/24: Pt with active Harper 2 sensor only 33% of the time over the last 2 weeks.  Pt went a 4 day period without sensor due to forgetting to put a new sensor on.  Still with large gaps in information in PM/HS limiting information.  Scanning >/= 4x/d only 27% of the time over the past 34 days.  Goal not met.  Will discuss with endo the possibility of transitioning to Harper 3 to help prevent gaps in information since scanning is not required except at sensor activation.       Problem: Healthy Eating Deleted 9/4/2024        Goal: Patient will use plate method for diabetes to limit starches to </= 1 cup per meal & will go back for seconds of nonstarchy vegetables or meat as needed for satiety. Completed 8/6/2024   Start Date: 3/5/2024   Expected End Date: 8/27/2024   This Visit's Progress: Not met   Recent Progress: On track   Priority: Medium   Barriers: Lack of Motivation to Change   Note:    8/6/24:     24h food recall:  - breakfast = 1 small breakfast sandwich, Coke Zero  - lunch = 1.5 cups spaghetti with meatballs, Coke Zero  - dinner = same as lunch  - HS snack = 1 mini pecan pie, Coke  Zero    Pt reports appetite decreasing but portions of carbs unchanged from last visit.  Pt reports rarely drinking regular Coke.  Again encouraged to limit starch portions to </=1 cup per meal.    6/11/24: 24h food recall: breakfast = egg sandwich (only eats breakfast sometimes); lunch = egg or ham sandwich; dinner = > 1 cup pasta with meatballs OR smothered chicken with rice, peas, Coke Zero or apple juice.  Pt reports that she typically eats only 2 meals/d (breakfast OR lunch, not both).  Per food recall, she appears to be exceeding carbohydrate goal at supper meal.  Discussed ideas for decreasing carb consumption with supper.    4/2/24: 24h food recall: breakfast = skipped; late lunch = ribs, 1 cup potato salad, water; dinner = chicken tenders, 1/2 cup potato salad, glass of Cola; snacks = none.  Per food recall, pt adequately limiting starches to </= 1 cup per meal as discussed with the exception of soda consumption in addition to starch with 1 meal.  Will continue to monitor.    3/5/24: Reviewed sources of carbohydrate & appropriate portion sizes.  Discussed plate method for diabetes.  Pt c/o increased appetite lately.  Pt eats 3 meals/d with snacks before bed & during the night when waking to use the restroom.  Pt mainly drinks Coke Zero & rarely drinks regular soda (</= 1x/wk).  Pt reports rice & pasta portions are typically 1.5-2 cups per meal when she eats these foods.       Problem: Blood Glucose Self-Monitoring Resolved 12/3/2024        Goal: Patient will return in 1 month once she gets her new  and starts new sensor Completed 12/3/2024   Start Date: 9/4/2024   Expected End Date: 12/3/2024   This Visit's Progress: Met   Recent Progress: On track   Priority: High   Note:    9/4/24 -  will no longer hold a charge. Attempted to charge in clinic and noted that inner charging port appeared damaged. Contacted Dexcom customer support. Pt will receive new  in 4-5 days and will start new  sensor. Instructed that charging symbol on usb cord should be facing up along with  to avoid damage. Reminded pt that she should resume glucometer for daily blood glucose checks, contact Dexcom customer service with any issues with /sensors and provided 24-hr customer support #.    10/1/24 - Pt returned to clinic with new  and sensor to restart. Reviewed appropriate charging position. Started new sensor and  set-up. Low alert set to 70. High alert set to 300 per pt preference. Will return in 2 months for Dexcom  download. Pt states she has [employer] customer service # and will contact with any sensor/ issues. Reviewed precautions with CT scan and removal of sensor for MRI. Reviewed arrow symbols and when in doubt, get meter out.     12/3/24 - she was without sensor for several days however the 30 day avgs are similar to 14 days. Instructed to contact Dexcom for replacement sensor anytime they fall off or there is an error. Pt agrees.         Follow Up Plan     Follow up in about 6 months (around 6/3/2025) for Episode 4 - maintenance.    Today's care plan and follow up schedule was discussed with patient.  Linn verbalized understanding of the care plan, goals, and agrees to follow up plan.        The patient was encouraged to communicate with his/her health care provider/physician and care team regarding his/her condition(s) and treatment.  I provided the patient with my contact information today and encouraged to contact me via phone or [facility] Patient Portal as needed.     Length of Visit   Total Time: 30 Minutes

## 2024-12-09 DIAGNOSIS — E11.65 UNCONTROLLED TYPE 2 DIABETES MELLITUS WITH HYPERGLYCEMIA: ICD-10-CM

## 2024-12-09 RX ORDER — METFORMIN HYDROCHLORIDE 1000 MG/1
1000 TABLET ORAL 2 TIMES DAILY WITH MEALS
Qty: 60 TABLET | Refills: 5 | Status: SHIPPED | OUTPATIENT
Start: 2024-12-09

## 2024-12-10 ENCOUNTER — HOSPITAL ENCOUNTER (OUTPATIENT)
Dept: RADIOLOGY | Facility: HOSPITAL | Age: 59
Discharge: HOME | End: 2024-12-10
Payer: MEDICAID

## 2024-12-10 ENCOUNTER — OFFICE VISIT (OUTPATIENT)
Dept: PULMONOLOGY | Facility: CLINIC | Age: 59
End: 2024-12-10
Payer: MEDICAID

## 2024-12-10 VITALS
WEIGHT: 286 LBS | BODY MASS INDEX: 48.83 KG/M2 | OXYGEN SATURATION: 96 % | DIASTOLIC BLOOD PRESSURE: 62 MMHG | TEMPERATURE: 98 F | SYSTOLIC BLOOD PRESSURE: 94 MMHG | RESPIRATION RATE: 20 BRPM | HEIGHT: 64 IN | HEART RATE: 65 BPM

## 2024-12-10 DIAGNOSIS — F17.210 CIGARETTE NICOTINE DEPENDENCE WITHOUT COMPLICATION: Chronic | ICD-10-CM

## 2024-12-10 DIAGNOSIS — J43.1 PANLOBULAR EMPHYSEMA: Primary | Chronic | ICD-10-CM

## 2024-12-10 DIAGNOSIS — G47.33 OSA ON CPAP: Chronic | ICD-10-CM

## 2024-12-10 DIAGNOSIS — R06.02 SHORTNESS OF BREATH: ICD-10-CM

## 2024-12-10 LAB
FLUAV AG UPPER RESP QL IA.RAPID: NOT DETECTED
FLUBV AG UPPER RESP QL IA.RAPID: NOT DETECTED
RSV A 5' UTR RNA NPH QL NAA+PROBE: NOT DETECTED
SARS-COV-2 RNA RESP QL NAA+PROBE: NOT DETECTED

## 2024-12-10 PROCEDURE — 99215 OFFICE O/P EST HI 40 MIN: CPT | Mod: PBBFAC,25

## 2024-12-10 PROCEDURE — 71046 X-RAY EXAM CHEST 2 VIEWS: CPT | Mod: TC

## 2024-12-10 PROCEDURE — 0241U COVID/RSV/FLU A&B PCR: CPT

## 2024-12-10 NOTE — PROGRESS NOTES
6 minute walk test requested by Regina to recert patient for her oxygen. Notified Dr. Blackburn/Dr. Byrd. 6 minute walk test completed. O2 sat on RA 94%, O2 sat on exertion 87%, recovered to 93% on 2L oxygen via nasal cannula. Patient tolerated walk. Notified Dr. Blackburn/Dr. Byrd of result. No new orders at this time.

## 2024-12-10 NOTE — PROGRESS NOTES
Pulmonology Clinic Note      SUBJECTIVE:     Chief Complaint: COPD (Follow up)       History of Present Illness:  Linn Mckeon is a 58 y.o. female with PMH of COPD, HFrEF (EF 40%), HTN, HLD, DM, DAVION w/ hypercapnia on Trilogy machine who comes to Pulmonology clinic for follow up.    She is compliant with her oxygen via NC at 2 lpm and Trilogy machine and has noticed significant improvement of symptoms. She remains on Trelegy Ellipta 1 puff once daily. Chronic intermittent shortness of breath although she has been having 1 week history of productive whitish cough associated with 1 day history of worsening shortness of breath and fatigue. No fever, chills, sick contacts, rhinorrhea, sore throat.    She continues to smoke 10 cigs a day since she was 18 yo. No alcohol or illicit drug use.    Review of patient's allergies indicates:  No Known Allergies    Past Medical History:   Diagnosis Date    Callus of foot 07/07/2022    CHF (congestive heart failure)     Chronic heart failure with preserved ejection fraction 05/20/2022    COPD (chronic obstructive pulmonary disease)     Diabetes mellitus     GOODMAN (dyspnea on exertion) 05/20/2022    Dystrophic nail 07/07/2022    Hyperlipidemia     Hyperlipidemia LDL goal <70 05/20/2022    Hypertension     Morbid obesity with body mass index (BMI) greater than or equal to 50 07/07/2022    Non compliance w medication regimen 07/07/2022    Noncompliance with treatment plan 07/07/2022    DAVION on CPAP 05/20/2022    Peripheral edema 05/20/2022    Primary hypertension 05/20/2022    Type 2 diabetes mellitus with skin complication 05/20/2022    Xerosis of skin 07/07/2022     Past Surgical History:   Procedure Laterality Date    COLONOSCOPY, WITH POLYPECTOMY USING HOT BIOPSY FORCEPS N/A 01/25/2024    Procedure: COLONOSCOPY, WITH POLYPECTOMY USING HOT BIOPSY FORCEPS;  Surgeon: Yan Bashir MD;  Location: [facility] ENDOSCOPY;  Service: Endoscopy;  Laterality: N/A;  Cold forceps     "HYSTERECTOMY       Family History   Problem Relation Name Age of Onset    Hypertension Mother Rex Hood     Heart disease Mother Rex Hood     Diabetes Mother Rex Hood     Heart attack Father      Diabetes Sister Ellie Aguiar     Hypertension Sister Ellie Aguiar     Diabetes Brother Kushal Hood     Hypertension Brother Kushal Hood      Social History     Tobacco Use    Smoking status: Every Day     Current packs/day: 0.50     Average packs/day: 0.5 packs/day for 41.9 years (21.0 ttl pk-yrs)     Types: Cigarettes     Start date: 1983    Smokeless tobacco: Never   Substance Use Topics    Alcohol use: Not Currently    Drug use: Never       Current Outpatient Medications   Medication Sig    albuterol (PROVENTIL) 2.5 mg /3 mL (0.083 %) nebulizer solution Take 3 mLs (2.5 mg total) by nebulization every 6 (six) hours as needed for Wheezing. Rescue    ammonium lactate (LAC-HYDRIN) 12 % lotion APPLY TO DRY SKIN AND RUB IN WELL TWICE DAILY. NOTHING BETWEEN TOES    aspirin (ECOTRIN) 81 MG EC tablet Take 81 mg by mouth once daily.    atorvastatin (LIPITOR) 80 MG tablet Take 1 tablet (80 mg total) by mouth once daily.    BD ULTRA-FINE JORDON PEN NEEDLE 32 gauge x 5/32" Ndle 2 (two) times daily.    BD ULTRA-FINE JORDON PEN NEEDLE 32 gauge x 5/32" Ndle     blood sugar diagnostic Strp To check BG 2 times daily, to use with insurance preferred meter    blood-glucose meter,continuous (DEXCOM G7 ) Misc Dexcom G7  uses directed    blood-glucose sensor (DEXCOM G7 SENSOR) Norma Use every 10 days as directed    CICLOPIROX-ITRACONAZOLE-UREA TOP CLEAN NAILS, SHAKE BOTTLE WELL, APPLY TWICE DAILY TO ALL AFFECTED NAILS USING APPLICATOR. (UP TO 2 MLS/DAY)    empagliflozin (JARDIANCE) 25 mg tablet Take 1 tablet (25 mg total) by mouth once daily.    ergocalciferol (ERGOCALCIFEROL) 50,000 unit Cap Take 1 capsule (50,000 Units total) by mouth every 7 days.    ezetimibe (ZETIA) 10 mg tablet Take 1 " "tablet (10 mg total) by mouth once daily.    flash glucose scanning reader (FREESTYLE JOSE ALFREDO 2 READER) [facility] Freestyle Jose Alfredo 2 reader dispense 1. Use as directed    flash glucose sensor (FREESTYLE JOSE ALFREDO 2 SENSOR) Kit Jose Alfredo 2 sensor change every 14 days (Disp 6 each)(90 day)    fluticasone propionate (FLONASE) 50 mcg/actuation nasal spray 1 spray (50 mcg total) by Each Nostril route once daily.    fluticasone-umeclidin-vilanter (TRELEGY ELLIPTA) 100-62.5-25 mcg DsDv Inhale 1 puff into the lungs once daily.    furosemide (LASIX) 40 MG tablet Take 1 tablet (40 mg total) by mouth 2 (two) times a day.    insulin glargine (LANTUS U-100 INSULIN) 100 unit/mL injection Inject 30 Units into the skin 2 (two) times daily.    ipratropium-albuteroL (COMBIVENT RESPIMAT)  mcg/actuation inhaler Inhale 2 puffs into the lungs every 6 (six) hours as needed for Wheezing. Rescue    ketoconazole (NIZORAL) 2 % cream Apply topically once daily.    lancets (ACCU-CHEK FASTCLIX LANCET DRUM) [facility] USE ONE DAILY    [place]ANCETS [facility]     metFORMIN (GLUCOPHAGE) 1000 MG tablet TAKE 1 TABLET BY MOUTH TWICE DAILY WITH MEALS    metoprolol succinate (TOPROL-XL) 100 MG 24 hr tablet Take 1 tablet (100 mg total) by mouth once daily.    nystatin (MYCOSTATIN) ointment Apply topically 2 (two) times daily.    pen needle, diabetic (BD ULTRA-FINE JORDON PEN NEEDLE) 32 gauge x 5/32" Ndle USE TWICE DAILY    sacubitriL-valsartan (ENTRESTO) 49-51 mg per tablet Take 1 tablet by mouth 2 (two) times daily.    SITagliptin phosphate (JANUVIA) 100 MG Tab Take 1 tablet (100 mg total) by mouth once daily.    spironolactone (ALDACTONE) 25 MG tablet Take 1 tablet (25 mg total) by mouth once daily.    blood-glucose meter kit To check BG 2 times daily, to use with insurance preferred meter    insulin glargine-yfgn 100 unit/mL (3 mL) InPn INJECT 30 UNITS UNDER THE SKIN TWICE DAILY (Patient not taking: Reported on 12/10/2024)    latanoprost 0.005 % ophthalmic solution Apply 1 drop " to eye. (Patient not taking: Reported on 12/10/2024)     No current facility-administered medications for this visit.       Review of Systems   Constitutional:  Positive for malaise/fatigue. Negative for chills and fever.   HENT:  Negative for sore throat.    Respiratory:  Positive for shortness of breath. Negative for cough and sputum production.    Cardiovascular:  Negative for chest pain and palpitations.   Gastrointestinal:  Negative for diarrhea, nausea and vomiting.   Musculoskeletal:  Negative for falls.   Neurological:  Negative for dizziness and headaches.   Psychiatric/Behavioral:  Negative for depression.         OBJECTIVE:     Vitals:    12/10/24 1426   BP: 94/62   Pulse: 65   Resp: 20   Temp: 97.9 °F (36.6 °C)         Physical Exam  Constitutional:       General: She is not in acute distress.     Appearance: Normal appearance. She is obese. She is not ill-appearing.   HENT:      Head: Normocephalic and atraumatic.      Right Ear: External ear normal.      Left Ear: External ear normal.      Nose: Nose normal.   Eyes:      Extraocular Movements: Extraocular movements intact.   Cardiovascular:      Rate and Rhythm: Normal rate and regular rhythm.      Heart sounds: No murmur heard.     No friction rub. No gallop.   Pulmonary:      Breath sounds: No wheezing or rhonchi.      Comments: Decreased breath sounds all over  Abdominal:      General: There is no distension.      Palpations: Abdomen is soft.      Tenderness: There is no abdominal tenderness.   Musculoskeletal:      Right lower leg: No edema.      Left lower leg: No edema.   Skin:     General: Skin is warm and dry.   Neurological:      General: No focal deficit present.      Mental Status: She is alert and oriented to person, place, and time.           ASSESSMENT/PLAN:     Worsening SOB x 1 day  Productive cough x 1 week  -Blood work, covid/flu/rsv and CXR today  -ER precautions discussed  -Continue Trilogy and Trelegy    COPD  HFrEF with improved  EF 59%  Obesity  Chronic respiratory failure  -The patient has been compliant and benefiting from her home noninvasive volume ventilator.  -Recommend that she continues her home noninvasive volume ventilator during hours of sleep and as needed during the day when symptoms arise. A home CPAP/BiPAP is not appropriate for his ventilatory requirements   -Continue consistent use of Trilogy machine  -Continue Trelegy inhaler once daily  -Smoking cessation discussed  -Walk test done:  87% on exertion at RA, 93% at 2 lpm      RTC in 6 months        Galilea Blackburn MD  [facility] PGY-III

## 2024-12-11 ENCOUNTER — TELEPHONE (OUTPATIENT)
Dept: PULMONOLOGY | Facility: CLINIC | Age: 59
End: 2024-12-11
Payer: MEDICAID

## 2024-12-11 NOTE — TELEPHONE ENCOUNTER
"Messaged Dr. Blackburn to see if it is ok to call patient with results of swab and CXR. She stated "you can let her know her swab was negative. Her chest xray is not worrisome for an infection but it did show mild pulmonary edema, she should take her diuretics as instructed by her cardiologist and I already talked to her about ER precautions (worsening SOB, high fevers, severe fatigue)." Understanding voiced.    Called and spoke with patient. Informed her of results and recommendations per Dr. Blackburn. Patient voiced understanding.  "

## 2024-12-18 NOTE — PROGRESS NOTES
Priscilla Strickland, NOEMI   [facility]  [address] [address]      PATIENT NAME: Linn Mckeon  : 1965  DATE: 24  MRN: [id number]      Reason for Visit / Chief Complaint: Diabetes (hyper), Hyperlipidemia, Establish Care, and Congestive Heart Failure       History of Present Illness / Problem Focused Workflow     Linn Mckeon presents to the clinic with Diabetes (hyper), Hyperlipidemia, Establish Care, and Congestive Heart Failure     60 yo AAF presents to clinic. Medical problems include HTN, HLD, Class II HFpEF 50-55%, DM2, COPD with Oxygen Dependence, DAVION, Tobacco Use, Obesity. Followed by [facility] wound care, cardiology, endocrinology, pulmonology, DM education clinics.     24  Pt presents for routine follow up. Labs reviewed, renals stable. A1C remains elevated at 8. She is taking insulin differently than prescribed- she's taking 43U qAM because she was forgetful with QHS dosing. This was discussed in DM education. Lipids at goal. Mild leukocytosis noted- per pulmonology note pt with acute illness at time of labs. She declines vaccines other than pneumonia vaccine. She is reporting lump on right arm for the last 4 months, no associated pain or tenderness, consistent with lipoma. Will confirm with ultrasound, she is not interested in removal at this time. She is requesting diabetic shoes as she reports previously discussed with last provider. Will order today.           Review of Systems     Review of Systems   Constitutional:  Negative for fatigue, fever and unexpected weight change.   HENT:  Negative for ear pain, hearing loss, trouble swallowing and voice change.    Respiratory:  Negative for cough and shortness of breath.    Cardiovascular:  Negative for chest pain and palpitations.   Gastrointestinal:  Negative for abdominal pain, diarrhea and vomiting.   Genitourinary:  Negative for dysuria.  "  Musculoskeletal:  Negative for gait problem.   Skin:  Negative for rash and wound.        3cm lump to posterior forearm   Neurological:  Negative for weakness.   Psychiatric/Behavioral:  Negative for suicidal ideas.          Medications and Allergies     Medications  Medication List with Changes/Refills   Current Medications    ALBUTEROL (PROVENTIL) 2.5 MG /3 ML (0.083 %) NEBULIZER SOLUTION    Take 3 mLs (2.5 mg total) by nebulization every 6 (six) hours as needed for Wheezing. Rescue    AMMONIUM LACTATE (LAC-HYDRIN) 12 % LOTION    APPLY TO DRY SKIN AND RUB IN WELL TWICE DAILY. NOTHING BETWEEN TOES    ASPIRIN (ECOTRIN) 81 MG EC TABLET    Take 81 mg by mouth once daily.    ATORVASTATIN (LIPITOR) 80 MG TABLET    Take 1 tablet (80 mg total) by mouth once daily.    BD ULTRA-FINE JORDON PEN NEEDLE 32 GAUGE X 5/32" NDLE    2 (two) times daily.    BD ULTRA-FINE JORDON PEN NEEDLE 32 GAUGE X 5/32" NDLE        BLOOD SUGAR DIAGNOSTIC STRP    To check BG 2 times daily, to use with insurance preferred meter    BLOOD-GLUCOSE METER KIT    To check BG 2 times daily, to use with insurance preferred meter    BLOOD-GLUCOSE METER,CONTINUOUS (DEXCOM G7 ) MISC    Dexcom G7  uses directed    BLOOD-GLUCOSE SENSOR (DEXCOM G7 SENSOR) VAMSI    Use every 10 days as directed    CICLOPIROX-ITRACONAZOLE-UREA TOP    CLEAN NAILS, SHAKE BOTTLE WELL, APPLY TWICE DAILY TO ALL AFFECTED NAILS USING APPLICATOR. (UP TO 2 MLS/DAY)    EMPAGLIFLOZIN (JARDIANCE) 25 MG TABLET    Take 1 tablet (25 mg total) by mouth once daily.    ERGOCALCIFEROL (ERGOCALCIFEROL) 50,000 UNIT CAP    Take 1 capsule (50,000 Units total) by mouth every 7 days.    EZETIMIBE (ZETIA) 10 MG TABLET    Take 1 tablet (10 mg total) by mouth once daily.    FLASH GLUCOSE SCANNING READER (FREESTYLE JOSE ALFREDO 2 READER) MISC    Freestyle Jose Alfredo 2 reader dispense 1. Use as directed    FLASH GLUCOSE SENSOR (FREESTYLE JOSE ALFREDO 2 SENSOR) KIT    Jose Alfredo 2 sensor change every 14 days (Disp 6 " "each)(90 day)    FLUTICASONE PROPIONATE (FLONASE) 50 MCG/ACTUATION NASAL SPRAY    1 spray (50 mcg total) by Each Nostril route once daily.    FLUTICASONE-UMECLIDIN-VILANTER (TRELEGY ELLIPTA) 100-62.5-25 MCG DSDV    Inhale 1 puff into the lungs once daily.    FUROSEMIDE (LASIX) 40 MG TABLET    Take 1 tablet (40 mg total) by mouth 2 (two) times a day.    IPRATROPIUM-ALBUTEROL (COMBIVENT RESPIMAT)  MCG/ACTUATION INHALER    Inhale 2 puffs into the lungs every 6 (six) hours as needed for Wheezing. Rescue    KETOCONAZOLE (NIZORAL) 2 % CREAM    Apply topically once daily.    LANCETS (ACCU-CHEK FASTCLIX LANCET DRUM) MISC    USE ONE DAILY    LANCETS MISC        LATANOPROST 0.005 % OPHTHALMIC SOLUTION    Apply 1 drop to eye.    METFORMIN (GLUCOPHAGE) 1000 MG TABLET    TAKE 1 TABLET BY MOUTH TWICE DAILY WITH MEALS    METOPROLOL SUCCINATE (TOPROL-XL) 100 MG 24 HR TABLET    Take 1 tablet (100 mg total) by mouth once daily.    NYSTATIN (MYCOSTATIN) OINTMENT    Apply topically 2 (two) times daily.    PEN NEEDLE, DIABETIC (BD ULTRA-FINE JORDON PEN NEEDLE) 32 GAUGE X 5/32" NDLE    USE TWICE DAILY    SACUBITRIL-VALSARTAN (ENTRESTO) 49-51 MG PER TABLET    Take 1 tablet by mouth 2 (two) times daily.    SITAGLIPTIN PHOSPHATE (JANUVIA) 100 MG TAB    Take 1 tablet (100 mg total) by mouth once daily.    SPIRONOLACTONE (ALDACTONE) 25 MG TABLET    Take 1 tablet (25 mg total) by mouth once daily.   Changed and/or Refilled Medications    Modified Medication Previous Medication    INSULIN GLARGINE-YFGN 100 UNIT/ML (3 ML) INPN insulin glargine-yfgn 100 unit/mL (3 mL) InPn       Inject 43 Units into the skin once daily.    INJECT 30 UNITS UNDER THE SKIN TWICE DAILY   Discontinued Medications    INSULIN GLARGINE (LANTUS U-100 INSULIN) 100 UNIT/ML INJECTION    Inject 30 Units into the skin 2 (two) times daily.         Allergies  Review of patient's allergies indicates:  No Known Allergies    Physical Examination     Vitals:    12/23/24 0920 "   BP: 114/62   Pulse: 60   Resp: 18   Temp: 97.6 °F (36.4 °C)     Physical Exam  Constitutional:       Appearance: Normal appearance.   Cardiovascular:      Rate and Rhythm: Normal rate and regular rhythm.      Pulses:           Dorsalis pedis pulses are 2+ on the right side and 2+ on the left side.        Posterior tibial pulses are 2+ on the right side and 2+ on the left side.   Pulmonary:      Effort: Pulmonary effort is normal.      Breath sounds: Normal breath sounds.   Musculoskeletal:         General: Normal range of motion.      Cervical back: Normal range of motion.   Feet:      Right foot:      Protective Sensation: 9 sites tested.  9 sites sensed.      Skin integrity: Skin integrity normal.      Toenail Condition: Right toenails are normal.      Left foot:      Protective Sensation: 9 sites tested.  9 sites sensed.      Skin integrity: Skin integrity normal.      Toenail Condition: Left toenails are normal.   Skin:     General: Skin is warm and dry.   Neurological:      General: No focal deficit present.      Mental Status: She is alert and oriented to person, place, and time.   Psychiatric:         Mood and Affect: Mood normal.           Results     Lab Results   Component Value Date    WBC 12.03 (H) 12/10/2024    RBC 5.81 (H) 12/10/2024    HGB 15.2 12/10/2024    HCT 46.6 12/10/2024    MCV 80.2 12/10/2024    MCH 26.2 (L) 12/10/2024    MCHC 32.6 (L) 12/10/2024    RDW 17.1 (H) 12/10/2024     12/10/2024    MPV 10.4 12/10/2024     Sodium   Date Value Ref Range Status   12/10/2024 140 136 - 145 mmol/L Final     Potassium   Date Value Ref Range Status   12/10/2024 5.1 3.5 - 5.1 mmol/L Final     Chloride   Date Value Ref Range Status   12/10/2024 106 98 - 107 mmol/L Final     CO2   Date Value Ref Range Status   12/10/2024 25 22 - 29 mmol/L Final     Blood Urea Nitrogen   Date Value Ref Range Status   12/10/2024 25.5 (H) 9.8 - 20.1 mg/dL Final     Creatinine   Date Value Ref Range Status   12/10/2024 1.24  (H) 0.55 - 1.02 mg/dL Final     Calcium   Date Value Ref Range Status   12/10/2024 9.8 8.4 - 10.2 mg/dL Final     Albumin   Date Value Ref Range Status   12/10/2024 3.2 (L) 3.5 - 5.0 g/dL Final     Bilirubin Total   Date Value Ref Range Status   12/10/2024 0.9 <=1.5 mg/dL Final     ALP   Date Value Ref Range Status   12/10/2024 83 40 - 150 unit/L Final     AST   Date Value Ref Range Status   12/10/2024 20 5 - 34 unit/L Final     ALT   Date Value Ref Range Status   12/10/2024 11 0 - 55 unit/L Final     Estimated GFR-Non    Date Value Ref Range Status   12/06/2021 65 (L) >>=90 mL/min/1.73 m2 Final     Lab Results   Component Value Date    CHOL 95 12/10/2024     Lab Results   Component Value Date    HDL 28 (L) 12/10/2024     Lab Results   Component Value Date    TRIG 73 12/10/2024     Lab Results   Component Value Date    VLDL 15 12/10/2024     Lab Results   Component Value Date    LDL 52.00 12/10/2024     Lab Results   Component Value Date    TSH 1.084 03/18/2024     Lab Results   Component Value Date    PHUR 5.5 12/10/2024    PROTEINUA Negative 12/10/2024    GLUCUA 4+ (A) 12/10/2024    KETONESU Negative 05/14/2019    OCCULTUA Negative 12/10/2024    NITRITE Negative 12/10/2024    LEUKOCYTESUR Negative 12/10/2024     Lab Results   Component Value Date    HGBA1C 8.0 (H) 12/10/2024    HGBA1C 8.3 (H) 08/19/2024    HGBA1C 9.3 (H) 03/18/2024           Assessment         ICD-10-CM ICD-9-CM   1. Type 2 diabetes mellitus with hyperglycemia, with long-term current use of insulin  E11.65 250.00    Z79.4 790.29     V58.67   2. Need for vaccination with 20-polyvalent pneumococcal conjugate vaccine  Z23 V03.82   3. Cervical cancer screening  Z12.4 V76.2   4. Cigarette nicotine dependence with other nicotine-induced disorder  F17.218 292.89   5. Skin lump of arm, right  R22.31 782.2   6. Primary hypertension  I10 401.9   7. Hyperlipidemia LDL goal <70  E78.5 272.4   8. Panlobular emphysema  J43.1 492.8       Plan       Problem List Items Addressed This Visit       Primary hypertension (Chronic)    Current Assessment & Plan     At goal.  Continue meds  Low Sodium Diet (DASH Diet - Less than 2 grams of sodium per day).  Monitor blood pressure daily and log. Report consistent numbers greater than 140/90.  Maintain healthy weight with goal BMI <30. Exercise 30 minutes per day, 5 days per week.  Smoking cessation encouraged to aid in BP reduction.           Hyperlipidemia LDL goal <70 (Chronic)    Current Assessment & Plan     At goal  Continue current meds  Stressed importance of dietary modifications. Follow a low cholesterol, low saturated fat diet with less that 200mg of cholesterol a day.  Avoid fried foods and high saturated fats (high saturated fats less than 7% of calories).  Add Flax Seed/Fish Oil supplements to diet. Increase dietary fiber.  Regular exercise can reduce LDL and raise HDL. Stressed importance of physical activity 5 times per week for 30 minutes per day.            Type 2 diabetes mellitus with hyperglycemia, with long-term current use of insulin - Primary (Chronic)    Current Assessment & Plan     Followed by endo and DM education  A1C at goal:  Continue lantus 43U qAM, jardiance, januvia  Follow ADA Diet. Avoid soda, simple sweets, and limit rice/pasta/breads/starches (no more than 45-50 grams per meal).  Maintain healthy weight with goal BMI <30.  Exercise 5 times per week for 30 minutes per day.  Stressed importance of daily foot exams.  Stressed importance of annual dilated eye exam.           Relevant Medications    insulin glargine-yfgn 100 unit/mL (3 mL) InPn    Other Relevant Orders    DIABETIC SHOES FOR HOME USE    COPD (chronic obstructive pulmonary disease) (Chronic)    Current Assessment & Plan     Follow up with pulmonology  Continue Trellegy ellipta daily and respitmat PRN  On O2 all times  Use inhalers as prescribed (rinse mouth after use of steroid inhalers). Use long term inhalers daily and  rescue inhaler as needed.  Avoid triggers (high humidity, strong odors, chemical fumes).  Report signs of upper respiratory infection immediately for early treatment.  Flu shot recommended yearly.  Practice abdominal breathing. Eat smaller, more frequent meals.  Smoking Cessation encouraged.            Other Visit Diagnoses       Need for vaccination with 20-polyvalent pneumococcal conjugate vaccine        Relevant Medications    pneumoc 20-stu conj-dip cr(PF) (PREVNAR-20 (PF)) injection Syrg 0.5 mL    Cervical cancer screening        Relevant Orders    Ambulatory referral/consult to Gynecology    Cigarette nicotine dependence with other nicotine-induced disorder        Relevant Orders    CT Chest Lung Screening Low Dose    Skin lump of arm, right        Relevant Orders    US Soft Tissue Upper Extremity, Right            Future Appointments   Date Time Provider Department Center   1/29/2025  1:30 PM Parisa Gamez, NOEMI [facility] CATRINA [place] [facility]   2/3/2025 10:45 AM Sandra Patel PA-C [facility] CARD [place] [facility]   2/7/2025  7:30 AM PROVIDERS, STACI Ortega   2/21/2025  9:00 AM Jacqueline Urbina, NP [facility] ENDOCR [place] [facility]   6/3/2025  8:00 AM Lynda Paige, CELINA [facility] FMDEDU [place] [facility]   7/8/2025  2:30 PM PROVIDER, [facility] PULMONOLOGY [facility] PULM [place] [facility]            Signature:      [facility]  [address] [address]    Date of encounter: 12/23/24

## 2024-12-23 ENCOUNTER — OFFICE VISIT (OUTPATIENT)
Dept: INTERNAL MEDICINE | Facility: CLINIC | Age: 59
End: 2024-12-23
Payer: MEDICAID

## 2024-12-23 VITALS
HEART RATE: 60 BPM | RESPIRATION RATE: 18 BRPM | DIASTOLIC BLOOD PRESSURE: 62 MMHG | BODY MASS INDEX: 48.72 KG/M2 | HEIGHT: 64 IN | WEIGHT: 285.38 LBS | TEMPERATURE: 98 F | SYSTOLIC BLOOD PRESSURE: 114 MMHG | OXYGEN SATURATION: 96 %

## 2024-12-23 DIAGNOSIS — R22.31 SKIN LUMP OF ARM, RIGHT: ICD-10-CM

## 2024-12-23 DIAGNOSIS — Z79.4 TYPE 2 DIABETES MELLITUS WITH HYPERGLYCEMIA, WITH LONG-TERM CURRENT USE OF INSULIN: Primary | ICD-10-CM

## 2024-12-23 DIAGNOSIS — E78.5 HYPERLIPIDEMIA LDL GOAL <70: Chronic | ICD-10-CM

## 2024-12-23 DIAGNOSIS — E11.65 TYPE 2 DIABETES MELLITUS WITH HYPERGLYCEMIA, WITH LONG-TERM CURRENT USE OF INSULIN: Primary | ICD-10-CM

## 2024-12-23 DIAGNOSIS — F17.218 CIGARETTE NICOTINE DEPENDENCE WITH OTHER NICOTINE-INDUCED DISORDER: ICD-10-CM

## 2024-12-23 DIAGNOSIS — Z12.4 CERVICAL CANCER SCREENING: ICD-10-CM

## 2024-12-23 DIAGNOSIS — Z23 NEED FOR VACCINATION WITH 20-POLYVALENT PNEUMOCOCCAL CONJUGATE VACCINE: ICD-10-CM

## 2024-12-23 DIAGNOSIS — I10 PRIMARY HYPERTENSION: Chronic | ICD-10-CM

## 2024-12-23 DIAGNOSIS — J43.1 PANLOBULAR EMPHYSEMA: Chronic | ICD-10-CM

## 2024-12-23 PROCEDURE — 90677 PCV20 VACCINE IM: CPT | Mod: PBBFAC

## 2024-12-23 PROCEDURE — 1159F MED LIST DOCD IN RCRD: CPT | Mod: CPTII,,,

## 2024-12-23 PROCEDURE — 3078F DIAST BP <80 MM HG: CPT | Mod: CPTII,,,

## 2024-12-23 PROCEDURE — 3066F NEPHROPATHY DOC TX: CPT | Mod: CPTII,,,

## 2024-12-23 PROCEDURE — 3074F SYST BP LT 130 MM HG: CPT | Mod: CPTII,,,

## 2024-12-23 PROCEDURE — 4010F ACE/ARB THERAPY RXD/TAKEN: CPT | Mod: CPTII,,,

## 2024-12-23 PROCEDURE — 90471 IMMUNIZATION ADMIN: CPT | Mod: PBBFAC

## 2024-12-23 PROCEDURE — 99215 OFFICE O/P EST HI 40 MIN: CPT | Mod: PBBFAC

## 2024-12-23 PROCEDURE — G2211 COMPLEX E/M VISIT ADD ON: HCPCS | Mod: S$PBB,,,

## 2024-12-23 PROCEDURE — 3052F HG A1C>EQUAL 8.0%<EQUAL 9.0%: CPT | Mod: CPTII,,,

## 2024-12-23 PROCEDURE — 1160F RVW MEDS BY RX/DR IN RCRD: CPT | Mod: CPTII,,,

## 2024-12-23 PROCEDURE — 99214 OFFICE O/P EST MOD 30 MIN: CPT | Mod: S$PBB,,,

## 2024-12-23 PROCEDURE — 3061F NEG MICROALBUMINURIA REV: CPT | Mod: CPTII,,,

## 2024-12-23 PROCEDURE — 3008F BODY MASS INDEX DOCD: CPT | Mod: CPTII,,,

## 2024-12-23 RX ORDER — INSULIN GLARGINE-YFGN 100 [IU]/ML
43 INJECTION, SOLUTION SUBCUTANEOUS DAILY
Start: 2024-12-23

## 2024-12-23 RX ADMIN — PNEUMOCOCCAL 20-VALENT CONJUGATE VACCINE 0.5 ML
2.2; 2.2; 2.2; 2.2; 2.2; 2.2; 2.2; 2.2; 2.2; 2.2; 2.2; 2.2; 2.2; 2.2; 2.2; 2.2; 4.4; 2.2; 2.2; 2.2 INJECTION, SUSPENSION INTRAMUSCULAR at 09:12

## 2024-12-23 NOTE — ASSESSMENT & PLAN NOTE
At goal  Continue current meds  Stressed importance of dietary modifications. Follow a low cholesterol, low saturated fat diet with less that 200mg of cholesterol a day.  Avoid fried foods and high saturated fats (high saturated fats less than 7% of calories).  Add Flax Seed/Fish Oil supplements to diet. Increase dietary fiber.  Regular exercise can reduce LDL and raise HDL. Stressed importance of physical activity 5 times per week for 30 minutes per day.

## 2024-12-23 NOTE — ASSESSMENT & PLAN NOTE
Follow up with pulmonology  Continue Trellegy ellipta daily and respitmat PRN  On O2 all times  Use inhalers as prescribed (rinse mouth after use of steroid inhalers). Use long term inhalers daily and rescue inhaler as needed.  Avoid triggers (high humidity, strong odors, chemical fumes).  Report signs of upper respiratory infection immediately for early treatment.  Flu shot recommended yearly.  Practice abdominal breathing. Eat smaller, more frequent meals.  Smoking Cessation encouraged.

## 2024-12-23 NOTE — ASSESSMENT & PLAN NOTE
At goal.  Continue meds  Low Sodium Diet (DASH Diet - Less than 2 grams of sodium per day).  Monitor blood pressure daily and log. Report consistent numbers greater than 140/90.  Maintain healthy weight with goal BMI <30. Exercise 30 minutes per day, 5 days per week.  Smoking cessation encouraged to aid in BP reduction.

## 2024-12-23 NOTE — ASSESSMENT & PLAN NOTE
Followed by endo and DM education  A1C at goal:  Continue lantus 43U qAM, jaralexandrea, januvia  Follow ADA Diet. Avoid soda, simple sweets, and limit rice/pasta/breads/starches (no more than 45-50 grams per meal).  Maintain healthy weight with goal BMI <30.  Exercise 5 times per week for 30 minutes per day.  Stressed importance of daily foot exams.  Stressed importance of annual dilated eye exam.

## 2025-01-06 ENCOUNTER — OFFICE VISIT (OUTPATIENT)
Dept: GYNECOLOGY | Facility: CLINIC | Age: 60
End: 2025-01-06
Payer: MEDICAID

## 2025-01-06 VITALS
WEIGHT: 284.19 LBS | RESPIRATION RATE: 18 BRPM | HEART RATE: 84 BPM | DIASTOLIC BLOOD PRESSURE: 78 MMHG | OXYGEN SATURATION: 100 % | TEMPERATURE: 98 F | HEIGHT: 64 IN | SYSTOLIC BLOOD PRESSURE: 138 MMHG | BODY MASS INDEX: 48.52 KG/M2

## 2025-01-06 DIAGNOSIS — Z72.0 TOBACCO ABUSE: ICD-10-CM

## 2025-01-06 DIAGNOSIS — Z12.31 ENCOUNTER FOR SCREENING MAMMOGRAM FOR BREAST CANCER: ICD-10-CM

## 2025-01-06 DIAGNOSIS — Z12.4 ENCOUNTER FOR PAPANICOLAOU SMEAR FOR CERVICAL CANCER SCREENING: Primary | ICD-10-CM

## 2025-01-06 PROCEDURE — 88174 CYTOPATH C/V AUTO IN FLUID: CPT

## 2025-01-06 PROCEDURE — 99215 OFFICE O/P EST HI 40 MIN: CPT | Mod: PBBFAC

## 2025-01-06 PROCEDURE — 87624 HPV HI-RISK TYP POOLED RSLT: CPT

## 2025-01-06 NOTE — PROGRESS NOTES
[facility] -  Gynecology / Women's Health Clinic     Subjective:      Patient ID: Linn Mckeon is a 59 y.o. female.    Chief Complaint:  Well Woman      History of Present Illness:  The patient  here for annual exam. Pt Hysterectomy d/t AUB-L at 51yrs. Denies history of abnormal paps. MG- 24 & BIRADS 2. Denies breast or urinary complaints. Denies pelvic pain, abnormal bleeding or discharge. Admits not sexually active. Denies hot flashes. Pt reports no STIs in the past and no concerns. Admits tobacco use. Dep. screening 0. Denies fly hx of breast, ovarian, uterine or colon cancer. Colonoscopy  repeat 3 yrs.     GYN & OB History:  No LMP recorded. Patient has had a hysterectomy.     OB History    Para Term  AB Living   2 2 2         SAB IAB Ectopic Multiple Live Births                  # Outcome Date GA Lbr Kurtis/2nd Weight Sex Type Anes PTL Lv   2 Term      Vag-Spont      1 Term      Vag-Spont          Past Medical History:   Diagnosis Date    Callus of foot 2022    CHF (congestive heart failure)     Chronic heart failure with preserved ejection fraction 2022    COPD (chronic obstructive pulmonary disease)     Diabetes mellitus     GOODMAN (dyspnea on exertion) 2022    Dystrophic nail 2022    Hyperlipidemia     Hyperlipidemia LDL goal <70 2022    Hypertension     Morbid obesity with body mass index (BMI) greater than or equal to 50 2022    Non compliance w medication regimen 2022    Noncompliance with treatment plan 2022    DAVION on CPAP 2022    Peripheral edema 2022    Primary hypertension 2022    Type 2 diabetes mellitus with skin complication 2022    Xerosis of skin 2022        Past Surgical History:   Procedure Laterality Date    COLONOSCOPY, WITH POLYPECTOMY USING HOT BIOPSY FORCEPS N/A 2024    Procedure: COLONOSCOPY, WITH POLYPECTOMY USING HOT BIOPSY FORCEPS;  Surgeon: Person,  "Yan KATE MD;  Location: [facility] ENDOSCOPY;  Service: Endoscopy;  Laterality: N/A;  Cold forceps    HYSTERECTOMY          Social History     Tobacco Use    Smoking status: Every Day     Current packs/day: 0.50     Average packs/day: 0.5 packs/day for 42.0 years (21.0 ttl pk-yrs)     Types: Cigarettes     Start date: 1983    Smokeless tobacco: Never   Substance and Sexual Activity    Alcohol use: Not Currently    Drug use: Never    Sexual activity: Not Currently        Current Outpatient Medications   Medication Instructions    albuterol (PROVENTIL) 2.5 mg, Nebulization, Every 6 hours PRN, Rescue    ammonium lactate (LAC-HYDRIN) 12 % lotion APPLY TO DRY SKIN AND RUB IN WELL TWICE DAILY. NOTHING BETWEEN TOES    aspirin (ECOTRIN) 81 mg, Daily    atorvastatin (LIPITOR) 80 mg, Oral, Daily    BD ULTRA-FINE JORDON PEN NEEDLE 32 gauge x 5/32" Ndle 2 times daily    BD ULTRA-FINE JORDON PEN NEEDLE 32 gauge x 5/32" Ndle No dose, route, or frequency recorded.    blood sugar diagnostic Strp To check BG 2 times daily, to use with insurance preferred meter    blood-glucose meter kit To check BG 2 times daily, to use with insurance preferred meter    blood-glucose meter,continuous (DEXCOM G7 ) Misc Dexcom G7  uses directed    blood-glucose sensor (DEXCOM G7 SENSOR) Norma Use every 10 days as directed    CICLOPIROX-ITRACONAZOLE-UREA TOP CLEAN NAILS, SHAKE BOTTLE WELL, APPLY TWICE DAILY TO ALL AFFECTED NAILS USING APPLICATOR. (UP TO 2 MLS/DAY)    empagliflozin (JARDIANCE) 25 mg, Oral, Daily    ergocalciferol (ERGOCALCIFEROL) 50,000 Units, Oral, Every 7 days    ezetimibe (ZETIA) 10 mg, Oral, Daily    flash glucose scanning reader (FREESTYLE HARPER 2 READER) [facility] Freestyle Harper 2 reader dispense 1. Use as directed    flash glucose sensor (FREESTYLE HARPER 2 SENSOR) Kit Harper 2 sensor change every 14 days (Disp 6 each)(90 day)    fluticasone propionate (FLONASE) 50 mcg, Each Nostril, Daily    fluticasone-umeclidin-vilanter " "(TRELEGY ELLIPTA) 100-62.5-25 mcg DsDv 1 puff, Inhalation, Daily    furosemide (LASIX) 40 mg, Oral, 2 times daily    insulin glargine-yfgn 43 Units, Subcutaneous, Daily    ipratropium-albuteroL (COMBIVENT RESPIMAT)  mcg/actuation inhaler 2 puffs, Inhalation, Every 6 hours PRN, Rescue    ketoconazole (NIZORAL) 2 % cream Topical (Top), Daily    lancets (ACCU-CHEK FASTCLIX LANCET DRUM) Misc USE ONE DAILY    LANCETS MISC No dose, route, or frequency recorded.    latanoprost 0.005 % ophthalmic solution 1 drop    metFORMIN (GLUCOPHAGE) 1,000 mg, Oral, 2 times daily with meals    metoprolol succinate (TOPROL-XL) 100 mg, Oral, Daily    nystatin (MYCOSTATIN) ointment Topical (Top), 2 times daily    pen needle, diabetic (BD ULTRA-FINE JORDON PEN NEEDLE) 32 gauge x 5/32" Ndle 2 times daily    sacubitriL-valsartan (ENTRESTO) 49-51 mg per tablet 1 tablet, Oral, 2 times daily    SITagliptin phosphate (JANUVIA) 100 mg, Oral, Daily    spironolactone (ALDACTONE) 25 mg, Oral, Daily       Review of patient's allergies indicates:  No Known Allergies      Review of Systems:  Review of Systems  Negative except for pertinent findings for positives per HPI.     Objective:   Physical Exam   Visit Vitals  /78 (Patient Position: Sitting)   Pulse 84   Temp 98.4 °F (36.9 °C) (Oral)   Resp 18   Ht 5' 4" (1.626 m)   Wt 128.9 kg (284 lb 3.2 oz)   SpO2 100%   BMI 48.78 kg/m²       GENERAL: Well-developed female in no acute distress.  SKIN: Normal to inspection,warm, dry and intact.  BREASTS: No rashes or erythema. No masses, lumps, discharge, tenderness.  VULVA: General appearance WNL; external genitalia with no lesions or erythema.  BIMANUAL EXAM: Vaginal mucosa/vault atrophic. Appears to have cervix, parous os. Uterus surgically absent. Ovaries surgically absent. Bilateral adnexa reveal no tenderness.  PSYCHIATRIC: Patient is oriented to person, place, and time. Mood and affect are normal.    Assessment:       ICD-10-CM ICD-9-CM   1. " Encounter for Papanicolaou smear for cervical cancer screening  Z12.4 V76.2   2. Encounter for screening mammogram for breast cancer  Z12.31 V76.12   3. Tobacco abuse  Z72.0 305.1       Plan:     1. Encounter for Papanicolaou smear for cervical cancer screening  -     Ambulatory referral/consult to Gynecology  -     Liquid-Based Pap Smear, Screening    2. Encounter for screening mammogram for breast cancer  -     Mammo Digital Screening Bilat w/ Damian; Future; Expected date: 04/28/2025    3. Tobacco abuse    Pap today  MG ordered    Smoking cessation counseling provided. Instructed on smoking cessation program through [facility] and pharmacological interventions to aid in cessation.    Call with any GYN concerns  Follow up in about 1 year (around 1/6/2026) for Annual.

## 2025-01-08 ENCOUNTER — HOSPITAL ENCOUNTER (OUTPATIENT)
Dept: RADIOLOGY | Facility: HOSPITAL | Age: 60
Discharge: HOME | End: 2025-01-08
Payer: MEDICAID

## 2025-01-08 ENCOUNTER — TELEPHONE (OUTPATIENT)
Dept: INTERNAL MEDICINE | Facility: CLINIC | Age: 60
End: 2025-01-08
Payer: MEDICAID

## 2025-01-08 DIAGNOSIS — Z87.891 PERSONAL HISTORY OF SMOKING: ICD-10-CM

## 2025-01-08 DIAGNOSIS — R22.31 SKIN LUMP OF ARM, RIGHT: ICD-10-CM

## 2025-01-08 DIAGNOSIS — R22.31 LOCALIZED SWELLING, MASS, OR LUMP OF RIGHT UPPER EXTREMITY: Primary | ICD-10-CM

## 2025-01-08 PROCEDURE — 71271 CT THORAX LUNG CANCER SCR C-: CPT | Mod: TC

## 2025-01-08 PROCEDURE — 76882 US LMTD JT/FCL EVL NVASC XTR: CPT | Mod: TC,RT

## 2025-01-08 NOTE — PROGRESS NOTES
Ultrasound impression: Nonspecific soft tissue mass is seen with some scattered small echogenic foci which could reflect possible calcifications. This finding is indeterminate. Recommend correlation with pre and post-contrast MRI.      MRI ordered, pt to be contacted

## 2025-01-08 NOTE — TELEPHONE ENCOUNTER
----- Message from NOEMI Galvin sent at 1/8/2025  1:26 PM CST -----  Please inform pt that the ultrasound of the lump on her arm recommended more tests in order to get more detailed images. The additional imaging recommended is an MRI which I will place an order for and she will be called to schedule.     Thanks!

## 2025-01-09 ENCOUNTER — TELEPHONE (OUTPATIENT)
Dept: INTERNAL MEDICINE | Facility: CLINIC | Age: 60
End: 2025-01-09
Payer: MEDICAID

## 2025-01-09 DIAGNOSIS — R91.1 LUNG NODULE: Primary | ICD-10-CM

## 2025-01-09 LAB
HIGH RISK HPV: NEGATIVE
PSYCHE PATHOLOGY RESULT: NORMAL

## 2025-01-09 NOTE — PROGRESS NOTES
Please inform pt that her lung CT showed some nodules in her lungs that we will need to monitor, this does not mean she has cancer but we will watch it closely. We are going to repeat this scan in 3 months. If she has any questions she is welcome to schedule sooner follow up with me.

## 2025-01-09 NOTE — TELEPHONE ENCOUNTER
Returned call to patient and informed her that ultrasound recommends a MRI for more testing to get more detailed images and PCP placed order and someone will be calling her to schedule. Patient verbalized understanding.

## 2025-01-09 NOTE — TELEPHONE ENCOUNTER
Placed call to patient to give results of ultrasound no answer, call cannot be completed as dialed.

## 2025-01-10 ENCOUNTER — TELEPHONE (OUTPATIENT)
Dept: INTERNAL MEDICINE | Facility: CLINIC | Age: 60
End: 2025-01-10
Payer: MEDICAID

## 2025-01-10 NOTE — TELEPHONE ENCOUNTER
I spoke with patient and informed her that CT scan shows nodules in her lungs that PCP will monitor, this does not mean that she has cancer but will be watched closely. PCP will repeat scan in 3 months. Patient verbalize understanding.

## 2025-01-10 NOTE — TELEPHONE ENCOUNTER
----- Message from NOEMI Galvin sent at 1/9/2025  3:01 PM CST -----  Please inform pt that her lung CT showed some nodules in her lungs that we will need to monitor, this does not mean she has cancer but we will watch it closely. We are going to repeat this scan in 3 months. If she has any questions she is welcome to schedule sooner follow up with me.

## 2025-01-27 RX ORDER — NYSTATIN 100000 U/G
OINTMENT TOPICAL 2 TIMES DAILY
Qty: 15 G | Refills: 2 | Status: SHIPPED | OUTPATIENT
Start: 2025-01-27

## 2025-01-29 ENCOUNTER — HOSPITAL ENCOUNTER (OUTPATIENT)
Dept: WOUND CARE | Facility: HOSPITAL | Age: 60
Discharge: HOME | End: 2025-01-29
Attending: NURSE PRACTITIONER
Payer: MEDICAID

## 2025-01-29 VITALS
DIASTOLIC BLOOD PRESSURE: 56 MMHG | TEMPERATURE: 98 F | RESPIRATION RATE: 20 BRPM | OXYGEN SATURATION: 98 % | SYSTOLIC BLOOD PRESSURE: 87 MMHG | HEART RATE: 69 BPM

## 2025-01-29 DIAGNOSIS — Z91.199 NONCOMPLIANCE WITH TREATMENT PLAN: ICD-10-CM

## 2025-01-29 DIAGNOSIS — E11.9 ENCOUNTER FOR COMPREHENSIVE DIABETIC FOOT EXAMINATION, TYPE 2 DIABETES MELLITUS: Primary | ICD-10-CM

## 2025-01-29 DIAGNOSIS — L60.3 DYSTROPHIC NAIL: ICD-10-CM

## 2025-01-29 DIAGNOSIS — M79.672 BILATERAL FOOT PAIN: ICD-10-CM

## 2025-01-29 DIAGNOSIS — E11.628 TYPE 2 DIABETES MELLITUS WITH OTHER SKIN COMPLICATION, WITH LONG-TERM CURRENT USE OF INSULIN: ICD-10-CM

## 2025-01-29 DIAGNOSIS — L85.3 XEROSIS OF SKIN: ICD-10-CM

## 2025-01-29 DIAGNOSIS — L60.2 OVERGROWN NAIL: ICD-10-CM

## 2025-01-29 DIAGNOSIS — M79.671 BILATERAL FOOT PAIN: ICD-10-CM

## 2025-01-29 DIAGNOSIS — Z79.4 TYPE 2 DIABETES MELLITUS WITH OTHER SKIN COMPLICATION, WITH LONG-TERM CURRENT USE OF INSULIN: ICD-10-CM

## 2025-01-29 DIAGNOSIS — L84 CALLUS OF FOOT: ICD-10-CM

## 2025-01-29 PROCEDURE — 27000999 HC MEDICAL RECORD PHOTO DOCUMENTATION

## 2025-01-29 PROCEDURE — 11057 PARNG/CUTG B9 HYPRKR LES >4: CPT

## 2025-01-29 PROCEDURE — 11056 PARNG/CUTG B9 HYPRKR LES 2-4: CPT

## 2025-01-29 PROCEDURE — 99211 OFF/OP EST MAY X REQ PHY/QHP: CPT

## 2025-01-29 PROCEDURE — 11719 TRIM NAIL(S) ANY NUMBER: CPT

## 2025-01-29 NOTE — PATIENT INSTRUCTIONS
Pt seen today by:  NOEMI Dolan    self care DRESSING INSTRUCTIONS: AS OF TODAY'S VISIT PLEASE KEEP A  BLOOD PRESSURE LOG TO SHOW CARDIOLOGIST ON APPOINTMENT FEBRUARY 6TH, PRECAUTIONS GIVEN OF INCREASED DIZZINESS, WEAKNESS, SHORTNESS OF BREATH TO GO TO HE NEAREST ER        Wound location: BOTH FEET    APPLY LACHYDRIN LOTION TO BOTH FEET AS PRESCRIBED      Return visit: 3 MONTHS    Nutrition:  The current daily value (%DV) for protein is 50 grams per day and is meant as a general goal for most people. Further increasing your dietary protein intake is very important for wound healing. Typically one needs over 100g of protein per day to help with wound healing needs.  If you are a dialysis patient or have problems with your kidneys, talk to your Nephrologist about how much protein you can take in with your condition.  Examples of high protein items that can be added to your diet include: eggs, chicken, red meats, almonds, cottage cheese, Greek yogurt, beans, and peanut butter.  Fortified protein bars, shakes and drinks can add 15-30 additional grams of protein per serving.  Also add:   1 daily general multivitamin   Vitamin C : 500mg twice daily   Zinc 220 mg daily  Vit D : once daily                Call our [facility] wound clinic for questions/concerns a [phone] .

## 2025-01-30 NOTE — PROGRESS NOTES
[facility]   Outpatient Wound Care     Subjective:   Patient ID: Linn Mckeon is a 59 y.o. female.    Chief Complaint: DFC/Callus      History of Present Illness:   59 y.o. Black or  female presents to wound care clinic today for diabetic foot care, bilateral calluses, foot pain, and Xerosis of skin.  Ambulates without difficulty.  Presents to clinic alone.  Reviewed previous medical history since last visit of 10/21/2024.  Past medical history:  She was seen by Alejandra Junior NP in  May for diabetic foot care. She was a patient of Alejandra Junior NP.  Presents to the clinic alone difficulty walking and oxygen-dependent.  Wearing oxygen at 2 liters continuously.   Followed by Priscilla Strickland FNP for PCP  last appt 8/20/24. She refused podiatry in past due to transportation issues.      Today's visit 01/29/2025:  Reviewed previous progress since last visit of 10/21/2024.  Complains of bilateral foot pain.  Since last visit patient has not use Lac-Hytrin as directed for Xerosis skin for bilateral feet. Diabetic foot exam performed.  Monofilament test done decreased sensation noted in all areas.  Bilateral lower extremities cool to touch with absent hair growth. Trimmed all 10 dystrophic toenails using podiatry clippers, and filed with [place] board.  Bilateral plantar surface and great toe calluses.  Pared callus using #4 dermal curette. Rationale for paring today to decrease pressure, and prevent ulcers.  Instructed the importance of washing feet twice daily dry well between toes.  Apply Lac-Hytrin twice daily to dry feet and legs with the exception between toes follow up by a thin layer Vaseline.  Instructed the importance of checking feet daily due to decrease sensation high risk for diabetic foot ulcers.  Instructed on proper footwear,  nail care, and daily skin assessment.  Will have her return to the clinic in 3 months.  Instructed to call the office with any questions, concerns, or new skin issues.  Verbalized understanding of all instructions.     10/21/2024:  Diabetic foot exam performed.  Monofilament test done decreased sensation noted in all areas.  Bilateral lower extremities cool to touch with absent hair growth.  Trimmed all 10 toenails using podiatry clippers, and filed with [place] board.  Debride 4/6 nails dystrophic using [place].  Rationale for debridement to decrease pressure and prevent ulcers.  Calluses bilateral lower feet and tips of great toes. Pared callus using #4 dermal curette. Rationale for paring today to decrease pressure, and prevent ulcers.  Instructed the importance of checking feet daily due to decrease sensation high risk for diabetic foot ulcers.  Instructed on proper footwear, nail care, and daily skin assessment.  Instructed the importance of follow up with a PCP for diabetic shoes and inserts.  Will have her return to the clinic in 3 months.  Instructed to call the office with any questions, concerns, or new skin issues.  Verbalized understanding of all instructions.      07/19/2024:  During examination bilateral feet and lower legs Xerosis of skin.  Long discussion with the patient the importance of being compliant patient has not filed her Lac-Hytrin since January and is not applying twice daily with thin layer of Vaseline.  Bilateral plantar surface callus warts, and bilateral great toe callus pared using #4 dermal curette.  Paring performed to decreased pain with ambulation, prevent ulcers, in any skin breakdown.  Apply bilateral feet with Vaseline with the exception between toes.  Instructed will have to  Lac-Hytrin and start using twice daily with thin layer of Vaseline.  Agreed.  Tolerated well.  Relief with a paring.  Mycotic toenails did not need to be trimmed at this visit.  Patient is also  noncompliant with the apply compound topical for mycotic nails.  Will have her return to the clinic in 3 months for diabetic foot care.        History includes:      Past Medical History:   Diagnosis Date    Callus of foot 07/07/2022    CHF (congestive heart failure)     Chronic heart failure with preserved ejection fraction 05/20/2022    COPD (chronic obstructive pulmonary disease)     Diabetes mellitus     GOODMAN (dyspnea on exertion) 05/20/2022    Dystrophic nail 07/07/2022    Hyperlipidemia     Hyperlipidemia LDL goal <70 05/20/2022    Hypertension     Morbid obesity with body mass index (BMI) greater than or equal to 50 07/07/2022    Non compliance w medication regimen 07/07/2022    Noncompliance with treatment plan 07/07/2022    DAVION on CPAP 05/20/2022    Peripheral edema 05/20/2022    Primary hypertension 05/20/2022    Type 2 diabetes mellitus with skin complication 05/20/2022    Xerosis of skin 07/07/2022      Past Surgical History:   Procedure Laterality Date    COLONOSCOPY, WITH POLYPECTOMY USING HOT BIOPSY FORCEPS N/A 01/25/2024    Procedure: COLONOSCOPY, WITH POLYPECTOMY USING HOT BIOPSY FORCEPS;  Surgeon: Yan Bashir MD;  Location: [facility] ENDOSCOPY;  Service: Endoscopy;  Laterality: N/A;  Cold forceps    HYSTERECTOMY        Social History     Socioeconomic History    Marital status:     Number of children: 2   Tobacco Use    Smoking status: Every Day     Current packs/day: 0.50     Average packs/day: 0.5 packs/day for 42.1 years (21.0 ttl pk-yrs)     Types: Cigarettes     Start date: 1983    Smokeless tobacco: Never   Substance and Sexual Activity    Alcohol use: Not Currently    Drug use: Never    Sexual activity: Not Currently     Social Drivers of Health     Financial Resource Strain: Low Risk  (3/21/2024)    Overall Financial Resource Strain (CARDIA)     Difficulty of Paying Living Expenses: Not hard at all   Food Insecurity: No Food Insecurity (3/21/2024)    Hunger Vital Sign     Worried  "About Running Out of Food in the Last Year: Never true     Ran Out of Food in the Last Year: Never true   Transportation Needs: No Transportation Needs (9/4/2024)    TRANSPORTATION NEEDS     Transportation : No   Physical Activity: Inactive (9/4/2024)    Exercise Vital Sign     Days of Exercise per Week: 0 days     Minutes of Exercise per Session: 0 min   Stress: No Stress Concern Present (3/21/2024)    [employer] - Occupational Stress Questionnaire     Feeling of Stress : Not at all   Housing Stability: Low Risk  (9/4/2024)    Housing Stability Vital Sign     Unable to Pay for Housing in the Last Year: No     Homeless in the Last Year: No   .      Current Outpatient Medications   Medication Sig Dispense Refill    albuterol (PROVENTIL) 2.5 mg /3 mL (0.083 %) nebulizer solution Take 3 mLs (2.5 mg total) by nebulization every 6 (six) hours as needed for Wheezing. Rescue 90 mL 1    ammonium lactate (LAC-HYDRIN) 12 % lotion APPLY TO DRY SKIN AND RUB IN WELL TWICE DAILY. NOTHING BETWEEN TOES 226 g 11    aspirin (ECOTRIN) 81 MG EC tablet Take 81 mg by mouth once daily.      atorvastatin (LIPITOR) 80 MG tablet Take 1 tablet (80 mg total) by mouth once daily. 90 tablet 3    BD ULTRA-FINE JORDON PEN NEEDLE 32 gauge x 5/32" Ndle 2 (two) times daily.      BD ULTRA-FINE JORDON PEN NEEDLE 32 gauge x 5/32" Ndle       blood sugar diagnostic Strp To check BG 2 times daily, to use with insurance preferred meter 100 strip 11    blood-glucose meter kit To check BG 2 times daily, to use with insurance preferred meter 1 each 0    blood-glucose meter,continuous (DEXCOM G7 ) Misc Dexcom G7  uses directed 1 each 0    blood-glucose sensor (DEXCOM G7 SENSOR) Norma Use every 10 days as directed 3 each 11    CICLOPIROX-ITRACONAZOLE-UREA TOP CLEAN NAILS, SHAKE BOTTLE WELL, APPLY TWICE DAILY TO ALL AFFECTED NAILS USING APPLICATOR. (UP TO 2 MLS/DAY)      empagliflozin (JARDIANCE) 25 mg tablet Take 1 tablet " "(25 mg total) by mouth once daily. 90 tablet 2    ergocalciferol (ERGOCALCIFEROL) 50,000 unit Cap Take 1 capsule (50,000 Units total) by mouth every 7 days. 12 capsule 2    ezetimibe (ZETIA) 10 mg tablet Take 1 tablet (10 mg total) by mouth once daily. 90 tablet 3    flash glucose scanning reader (FREESTYLE JOSE ALFREDO 2 READER) [facility] Freestyle Jose Alfredo 2 reader dispense 1. Use as directed 1 each 0    flash glucose sensor (FREESTYLE JOSE ALFREDO 2 SENSOR) Kit Jose Alfredo 2 sensor change every 14 days (Disp 6 each)(90 day) 6 kit 11    fluticasone propionate (FLONASE) 50 mcg/actuation nasal spray 1 spray (50 mcg total) by Each Nostril route once daily. 11.1 mL 1    fluticasone-umeclidin-vilanter (TRELEGY ELLIPTA) 100-62.5-25 mcg DsDv Inhale 1 puff into the lungs once daily. 28 each 3    furosemide (LASIX) 40 MG tablet Take 1 tablet (40 mg total) by mouth 2 (two) times a day. 180 tablet 3    insulin glargine-yfgn 100 unit/mL (3 mL) InPn Inject 43 Units into the skin once daily.      ipratropium-albuteroL (COMBIVENT RESPIMAT)  mcg/actuation inhaler Inhale 2 puffs into the lungs every 6 (six) hours as needed for Wheezing. Rescue 4 g 1    ketoconazole (NIZORAL) 2 % cream Apply topically once daily. 30 g 1    lancets (ACCU-CHEK FASTCLIX LANCET DRUM) [facility] [place] USE ONE DAILY 100 each 3    LANCETS MISC       latanoprost 0.005 % ophthalmic solution Apply 1 drop to eye. (Patient not taking: Reported on 8/20/2024)      metFORMIN (GLUCOPHAGE) 1000 MG tablet TAKE 1 TABLET BY MOUTH TWICE DAILY WITH MEALS 60 tablet 5    metoprolol succinate (TOPROL-XL) 100 MG 24 hr tablet Take 1 tablet (100 mg total) by mouth once daily. 30 tablet 11    nystatin (MYCOSTATIN) ointment Apply topically 2 (two) times daily. 15 g 2    pen needle, diabetic (BD ULTRA-FINE JORDON PEN NEEDLE) 32 gauge x 5/32" Ndle USE TWICE DAILY 60 each 11    sacubitriL-valsartan (ENTRESTO) 49-51 mg per tablet Take 1 tablet by mouth 2 (two) times daily. 180 tablet 3    SITagliptin phosphate " "(JANUVIA) 100 MG Tab Take 1 tablet (100 mg total) by mouth once daily. 90 tablet 2    spironolactone (ALDACTONE) 25 MG tablet Take 1 tablet (25 mg total) by mouth once daily. 90 tablet 3     No current facility-administered medications for this encounter.       Review of Systems   All other systems reviewed and are negative.         Labs Reviewed:   Chemistry:  Lab Results   Component Value Date    BUN 25.5 (H) 12/10/2024    BUN 21.2 (H) 08/19/2024    CREATININE 1.24 (H) 12/10/2024    CREATININE 1.28 (H) 08/19/2024    EGFRNORACEVR 50 12/10/2024    EGFRNORACEVR 48 08/19/2024    GLUCOSE 99 12/10/2024    AST 20 12/10/2024    AST 13 08/19/2024    ALT 11 12/10/2024    ALT 14 08/19/2024    HGBA1C 8.0 (H) 12/10/2024        Hematology:  Lab Results   Component Value Date    WBC 12.03 (H) 12/10/2024    WBC 10.55 03/18/2024    HGB 15.2 12/10/2024    HGB 15.1 03/18/2024    HCT 46.6 12/10/2024    HCT 47.4 (H) 03/18/2024     12/10/2024     03/18/2024       Inflammatory Markers:  No results found for: "HSCRP", "SEDRATE"     Objective:        Physical Exam  Vitals reviewed.   Cardiovascular:      Pulses:           Dorsalis pedis pulses are detected w/ Doppler on the right side and detected w/ Doppler on the left side.        Posterior tibial pulses are detected w/ Doppler on the right side and detected w/ Doppler on the left side.   Pulmonary:      Comments: Wearing O2 @ 2 liters continuously.  Feet:      Right foot:      Skin integrity: Callus and dry skin present.      Toenail Condition: Right toenails are abnormally thick and long. Fungal disease present.     Left foot:      Skin integrity: Callus and dry skin present.      Toenail Condition: Left toenails are abnormally thick and long. Fungal disease present.     Comments: Monofilament test done decreased sensation noted in all areas.  Skin:     General: Skin is cool and dry.      Capillary Refill: Capillary refill takes less than 2 seconds.   Neurological:      " Mental Status: She is alert.                                  Assessment:         ICD-10-CM ICD-9-CM   1. Encounter for comprehensive diabetic foot examination, type 2 diabetes mellitus  E11.9 250.00   2. Overgrown nail  L60.2 703.8   3. Dystrophic nail  L60.3 703.8   4. Callus of foot  L84 700   5. Bilateral foot pain  M79.671 729.5    M79.672    6. Xerosis of skin  L85.3 706.8   7. Noncompliance with treatment plan  Z91.199 V15.81   8. Type 2 diabetes mellitus with other skin complication, with long-term current use of insulin  E11.628 250.80    Z79.4 V58.67   9. BMI 45.0-49.9, adult  Z68.42 V85.42         Plan:   Tissue pathology and/or culture taken:  [] Yes [x] No   Sharp debridement performed:   [] Yes [x] No   Labs ordered this visit:   [] Yes [x] No   Imaging ordered this visit:   [] Yes [x] No         1. Encounter for comprehensive diabetic foot examination, type 2 diabetes mellitus     Diabetic foot exam performed.  Instructed on proper foot care.   2. Overgrown nail     Trimmed.  Instructed on proper nail care.   3. Dystrophic nail     Debride.  Instructed on proper nail care.   4. Callus of foot     Pared.  Instructed on proper footwear.   5. Bilateral foot pain     Relief with a paring.   6. Xerosis of skin     Will apply Lac-Hytrin with a thin layer Vaseline with the exception between toes twice daily.   7. Noncompliance with treatment plan     Reinforced the importance of following treatment plan.   8. Type 2 diabetes mellitus with other skin complication, with long-term current use of insulin     Co-factor in wound development, and delayed wound healing.    Last A1c 8.0.    Must have a strict diabetic diet, take glucose lowering medications as prescribed and encourage lower HA1C.   9. BMI 45.0-49.9, adult     Co-factor in wound development.  Instructed the importance of diet and exercise.       The time spent including preparing to see the patient, obtaining patient history and assessment, evaluation  of the plan of care, patient/caregiver counseling and education, orders, documentation, coordination of care, and other professional medical management activities for today's encounter was 20 minute.    Time spent performing procedures during today's encounter was 20  minute.    Follow up in about 3 months (around 4/29/2025). Teaching provided on s/s to call wound clinic for promptly.  ER precautions taught for after hours and weekends.     NOEMI Mederos

## 2025-01-31 ENCOUNTER — HOSPITAL ENCOUNTER (OUTPATIENT)
Dept: RADIOLOGY | Facility: HOSPITAL | Age: 60
Discharge: HOME | End: 2025-01-31
Payer: MEDICAID

## 2025-01-31 DIAGNOSIS — R22.31 LOCALIZED SWELLING, MASS, OR LUMP OF RIGHT UPPER EXTREMITY: ICD-10-CM

## 2025-01-31 PROCEDURE — 73218 MRI UPPER EXTREMITY W/O DYE: CPT | Mod: TC,RT

## 2025-02-04 ENCOUNTER — TELEPHONE (OUTPATIENT)
Dept: INTERNAL MEDICINE | Facility: CLINIC | Age: 60
End: 2025-02-04
Payer: MEDICAID

## 2025-02-04 DIAGNOSIS — R22.31 SKIN LUMP OF ARM, RIGHT: Primary | ICD-10-CM

## 2025-02-04 NOTE — TELEPHONE ENCOUNTER
Spoke with pt and informed her of MRI results. She is agreeable to referral to [facility] minor procedure clinic for further work up/biopsy. Denies questions. Follow up with me as scheduled.

## 2025-02-05 NOTE — PROGRESS NOTES
CHIEF COMPLAINT:   No chief complaint on file.                                                 HPI:  Linn Mckeon 59 y.o. female  with a PMH significant for Diabetes, DAVION on BiPAP, HTN, HLD, Depression, COPD on home O2, and Tobacco Use  who presents to cardiology clinic for follow up.  She has baseline COPD that is severe which is the main cause of her shortness of breath- she uses supplemental O2, especially when out in public. Echo completed November 2023 revealed EF of 59%, mild RV enlargement, overall normal systolic function, mild PA, otherwise unremarkable.  See full report below.  At last office visit the patient stated that she felt improved overall. She continued to endorse SOB/GOODMAN secondary to severe COPD, otherwise she stated that she felt fine.  She uses supplemental O2 as needed.     Today the patient states that she feels well.  She states that she continues to improve.  She has stable SOB/GOODMAN but states it has actually gotten better.  She feels as though she is able to do more activity without experiencing symptoms.  She also states that she is able to move around better.  She has occasional headaches, but states that they are not extremely bothersome.  Strongly encouraged her to discuss this PCP.  Otherwise she denied complaints today such as chest pain, palpitations, PND, orthopnea, lightheadedness, dizziness, syncope, lower extremity edema, or claudication symptoms.  She has sleep apnea but reports compliance with Trilogy at night and she has greatly benefitting from use.  She does not do much formal physical activity or exercise.  Encouraged her to do as much as she is able to tolerate.  She follows a somewhat heart healthy diet.  She continues to smoke approximately 1/2 pack of cigarettes per day and is not ready to quit.  She states that she has tried smoking cessation in the past but it was unsuccessful ultimately because she is not ready.    Historical Information: Patient completed  Lexiscan stress test on September 7, 2022 which was normal.  See report below.  She also completed KWADWO testing on September 13, 2022 which revealed mild arterial flow reduction in the bilateral lower extremities.  See report below.  She completed an Echocardiogram on July 26, 2022 which revealed a reduced ejection fraction of 40% with Grade 1 diastolic dysfunction.  See report below.    She previously completed an Echocardiogram on May 10, 2021 which revealed mild concentric LVH, EF of approximately 50 to 55%, mild MR, and mild aortic valve sclerosis without stenosis.    Patient had a hospital admission on September 11, 2022 for COPD exacerbation and acute CHF exacerbation.  She was treated with IV Lasix, antibiotics, steroids, and neb treatments.                                                                                                                                                                                                                                                                                                                                                                                                                                                                                       CARDIAC TESTING:  Echo 11.3.23    Left Ventricle: There is mild concentric hypertrophy. There is normal systolic function. Biplane (2D) method of discs ejection fraction is 59%.    Right Ventricle: Mild right ventricular enlargement. Systolic function is normal.    Aortic Valve: The aortic valve is a trileaflet valve. There is mild aortic valve sclerosis. There is normal leaflet mobility.    Mitral Valve: The mitral valve is structurally normal.    Pulmonic Valve: There is mild regurgitation.    IVC/SVC: Normal venous pressure at 3 mmHg.    Echocardiogram  Results for orders placed during the hospital encounter of 12/06/22  Echo  Interpretation Summary  · The left ventricle is normal in size with mild concentric  hypertrophy and low normal systolic function.  · Normal right ventricular size with low normal right ventricular systolic function.  · The estimated ejection fraction is 50%.  · Grade I left ventricular diastolic dysfunction.  · Mild to moderate pulmonic regurgitation.        Echo - 7/26/2022   Interpretation Summary  · Concentric hypertrophy and mildly decreased systolic function.  · The estimated ejection fraction is 40%.  · Grade I left ventricular diastolic dysfunction.  · Severe right ventricular enlargement.  · Mild left atrial enlargement.  · Moderate right atrial enlargement.  · Mild mitral regurgitation.  · Mild tricuspid regurgitation.  · Elevated central venous pressure (15 mmHg).  · The estimated PA systolic pressure is 56 mmHg.  · There is pulmonary hypertension.  · IVC is dilated and collapses<50% with inspiration.  · Mild to moderate pulmonic regurgitation.     Stress Test  Results for orders placed during the hospital encounter of 08/30/22  Nuclear Stress - Cardiology Interpreted  Interpretation Summary    Normal myocardial perfusion scan. There is no evidence of myocardial ischemia or infarction.    The gated perfusion images showed an ejection fraction of 55% at rest. The gated perfusion images showed an ejection fraction of 51% post stress.    The EKG portion of this study is abnormal but not diagnostic.    The patient reported no chest pain during the stress test.    There were no arrhythmias during stress.  The patient has a low risk nuclear stress alex.  Nuclear stress test indicates no ischemia.     Lexiscan Stress Test 9.7.22:  Normal myocardial perfusion scan. There is no evidence of myocardial ischemia or infarction.  The gated perfusion images showed an ejection fraction of 55% at rest. The gated perfusion images showed an ejection fraction of 51% post stress.  The EKG portion of this study is abnormal but not diagnostic.  The patient reported no chest pain during the stress test.  There  were no arrhythmias during stress.        KAWDWO Testing 9.13.22:  The right lower extremity demonstrated multiphasic waveforms at all levels.   The KWADWO on the right was mildly abnormal.  The right lower extremity demonstrated mild arterial flow reduction.  The left lower extremity demonstrated multiphasic waveforms at all levels.   The KWADWO on the left was mildly abnormal.  The left lower extremity demonstrated mild arterial flow reduction.          Patient Active Problem List   Diagnosis    GOODMAN (dyspnea on exertion)    Chronic heart failure with preserved ejection fraction    Primary hypertension    Hyperlipidemia LDL goal <70    Type 2 diabetes mellitus with hyperglycemia, with long-term current use of insulin    Peripheral edema    DAVION on CPAP    Class 3 severe obesity due to excess calories with serious comorbidity and body mass index (BMI) of 45.0 to 49.9 in adult    Noncompliance with treatment plan    Xerosis of skin    Callus of foot    Dystrophic nail    Non compliance w medication regimen    Heart failure with reduced ejection fraction    Cigarette nicotine dependence without complication    COPD (chronic obstructive pulmonary disease)    Vitamin D deficiency    Mycotic toenails    Encounter for comprehensive diabetic foot examination, type 2 diabetes mellitus    BMI 45.0-49.9, adult    Type 2 diabetes mellitus with skin complication, with long-term current use of insulin    Right foot pain    Bilateral foot pain    Impaired gait    Diverticulosis large intestine w/o perforation or abscess w/o bleeding    Adenomatous polyp of descending colon    Adenomatous polyp of sigmoid colon    Adenomatous rectal polyp    Plantar warts    Oxygen dependent     Past Surgical History:   Procedure Laterality Date    COLONOSCOPY, WITH POLYPECTOMY USING HOT BIOPSY FORCEPS N/A 01/25/2024    Procedure: COLONOSCOPY, WITH POLYPECTOMY USING HOT BIOPSY FORCEPS;  Surgeon: Yan Bashir MD;  Location: [facility] ENDOSCOPY;  Service:  Endoscopy;  Laterality: N/A;  Cold forceps    HYSTERECTOMY       Social History     Socioeconomic History    Marital status:     Number of children: 2   Tobacco Use    Smoking status: Every Day     Current packs/day: 0.50     Average packs/day: 0.5 packs/day for 42.1 years (21.0 ttl pk-yrs)     Types: Cigarettes     Start date: 1983    Smokeless tobacco: Never   Substance and Sexual Activity    Alcohol use: Not Currently    Drug use: Never    Sexual activity: Not Currently     Social Drivers of Health     Financial Resource Strain: Low Risk  (3/21/2024)    Overall Financial Resource Strain (CARDIA)     Difficulty of Paying Living Expenses: Not hard at all   Food Insecurity: No Food Insecurity (3/21/2024)    Hunger Vital Sign     Worried About Running Out of Food in the Last Year: Never true     Ran Out of Food in the Last Year: Never true   Transportation Needs: No Transportation Needs (9/4/2024)    TRANSPORTATION NEEDS     Transportation : No   Physical Activity: Inactive (9/4/2024)    Exercise Vital Sign     Days of Exercise per Week: 0 days     Minutes of Exercise per Session: 0 min   Stress: No Stress Concern Present (3/21/2024)    [employer] - Occupational Stress Questionnaire     Feeling of Stress : Not at all   Housing Stability: Low Risk  (9/4/2024)    Housing Stability Vital Sign     Unable to Pay for Housing in the Last Year: No     Homeless in the Last Year: No        Family History   Problem Relation Name Age of Onset    Hypertension Mother Rex Hood     Heart disease Mother Rex Hood     Diabetes Mother Rex Hood     Heart attack Father      Diabetes Sister Ellie Aguiar     Hypertension Sister Ellie Aguiar     Diabetes Brother Kushal Hood     Hypertension Brother Kushal Hood      Review of patient's allergies indicates:  No Known Allergies      ROS:  Review of Systems   Constitutional: Negative.    HENT: Negative.     Eyes:  Negative.    Respiratory: Negative.  Negative for shortness of breath (Occasionally).    Cardiovascular: Negative.  Negative for chest pain, palpitations, orthopnea, claudication, leg swelling and PND.   Gastrointestinal: Negative.    Genitourinary: Negative.    Musculoskeletal: Negative.    Skin: Negative.    Neurological: Negative.    Endo/Heme/Allergies: Negative.    Psychiatric/Behavioral: Negative.                                                                                                                                                                                  Negative except as stated in the history of present illness. See HPI for details.    PHYSICAL EXAM:  There were no vitals taken for this visit.          Physical Exam  Constitutional:       Appearance: She is obese.   HENT:      Head: Normocephalic.      Nose: Nose normal.   Eyes:      Pupils: Pupils are equal, round, and reactive to light.   Cardiovascular:      Rate and Rhythm: Normal rate and regular rhythm.      Pulses: Normal pulses.      Heart sounds: No murmur heard.  Pulmonary:      Effort: Pulmonary effort is normal. No respiratory distress.   Abdominal:      General: There is no distension.   Musculoskeletal:         General: Normal range of motion.      Cervical back: Normal range of motion.      Right lower leg: No edema.      Left lower leg: No edema.   Skin:     General: Skin is warm.   Neurological:      General: No focal deficit present.      Mental Status: She is alert and oriented to person, place, and time.   Psychiatric:         Mood and Affect: Mood normal.       Current Outpatient Medications   Medication Instructions    albuterol (PROVENTIL) 2.5 mg, Nebulization, Every 6 hours PRN, Rescue    ammonium lactate (LAC-HYDRIN) 12 % lotion APPLY TO DRY SKIN AND RUB IN WELL TWICE DAILY. NOTHING BETWEEN TOES    aspirin (ECOTRIN) 81 mg, Daily    atorvastatin (LIPITOR) 80 mg, Oral, Daily    BD ULTRA-FINE JORDON PEN NEEDLE 32 gauge x  "5/32" Ndle 2 times daily    BD ULTRA-FINE JORDON PEN NEEDLE 32 gauge x 5/32" Ndle No dose, route, or frequency recorded.    blood sugar diagnostic Strp To check BG 2 times daily, to use with insurance preferred meter    blood-glucose meter kit To check BG 2 times daily, to use with insurance preferred meter    blood-glucose meter,continuous (DEXCOM G7 ) Misc Dexcom G7  uses directed    blood-glucose sensor (DEXCOM G7 SENSOR) Norma Use every 10 days as directed    CICLOPIROX-ITRACONAZOLE-UREA TOP CLEAN NAILS, SHAKE BOTTLE WELL, APPLY TWICE DAILY TO ALL AFFECTED NAILS USING APPLICATOR. (UP TO 2 MLS/DAY)    empagliflozin (JARDIANCE) 25 mg, Oral, Daily    ergocalciferol (ERGOCALCIFEROL) 50,000 Units, Oral, Every 7 days    ezetimibe (ZETIA) 10 mg, Oral, Daily    flash glucose scanning reader (FREESTYLE JOSE ALFREDO 2 READER) [facility] Freestyle Jose Alfredo 2 reader dispense 1. Use as directed    flash glucose sensor (FREESTYLE JOSE ALFREDO 2 SENSOR) Kit Jose Alfredo 2 sensor change every 14 days (Disp 6 each)(90 day)    fluticasone propionate (FLONASE) 50 mcg, Each Nostril, Daily    fluticasone-umeclidin-vilanter (TRELEGY ELLIPTA) 100-62.5-25 mcg DsDv 1 puff, Inhalation, Daily    furosemide (LASIX) 40 mg, Oral, 2 times daily    insulin glargine-yfgn 43 Units, Subcutaneous, Daily    ipratropium-albuteroL (COMBIVENT RESPIMAT)  mcg/actuation inhaler 2 puffs, Inhalation, Every 6 hours PRN, Rescue    ketoconazole (NIZORAL) 2 % cream Topical (Top), Daily    lancets (ACCU-CHEK FASTCLIX LANCET DRUM) [facility] USE ONE DAILY    LANCETS MISC No dose, route, or frequency recorded.    latanoprost 0.005 % ophthalmic solution 1 drop    metFORMIN (GLUCOPHAGE) 1,000 mg, Oral, 2 times daily with meals    metoprolol succinate (TOPROL-XL) 100 mg, Oral, Daily    nystatin (MYCOSTATIN) ointment Topical (Top), 2 times daily    pen needle, diabetic (BD ULTRA-FINE JORDON PEN NEEDLE) 32 gauge x 5/32" Ndle 2 times daily    sacubitriL-valsartan (ENTRESTO) 49-51 mg per " "tablet 1 tablet, Oral, 2 times daily    SITagliptin phosphate (JANUVIA) 100 mg, Oral, Daily    spironolactone (ALDACTONE) 25 mg, Oral, Daily        All medications, laboratory studies, cardiac diagnostic imaging reviewed.     Lab Results   Component Value Date    LDL 52.00 12/10/2024    LDL 61.00 03/18/2024    TRIG 73 12/10/2024    TRIG 58 03/18/2024    CREATININE 1.24 (H) 12/10/2024    MG 2.20 09/12/2022    K 5.1 12/10/2024        ASSESSMENT/PLAN:  Heart Failure with Improved Ejection Fraction   - Stable from last office visit, patient states that " I feel much better that before" - see HPI - ongoing SOB/GOODMAN  - Denies any chest pain or palpitations.   - Reports occasional shortness of breath but states that overall it has greatly improved.  Still requires supplemental O2 on occasion, specifically when ambulating far distances or when she is out in public.  She attributes majority of her shortness of breath to her severe, longstanding COPD  - No progression in symptoms   - Etiology: NiCM  - HF Status: NYHA Class II, ACC/AHA Stage C  - LVEF: 59% per TTE on 11.3.23 which is improved from 50% TTE on 12.6.22  - Current GDMT: Entresto 49mg/51mg, Metoprolol Succinate 100 MG, Spironolactone 25, Jardiance 10mg- Continue for now, tolerating well  - Diuresis: Lasix 40mg BID  - Continue low Na Diet   - Strict Is/Os and daily weights  - Much improved on now that on GDMT   - No further testing needed at this time  - EKG today with normal sinus rhythm, left axis deviation, low-voltage QRS, septal infarct, age undetermined, vent rate 67 beats per minute    HTN  BP at goal - 130/82  Continue Spironolactone, Entresto, Metoprolol and and Lasix  Counseled on low salt diet and exercise as tolerated    HLD  LDL goal <70  LDL 52 per labs 12.10.24  Continue Atorvastatin 80mg daily and Zetia 10mg daily  FLP/CMP ordered per PCP, patient advised to complete this prior to her next office visit  Counseled on medication compliance    DM  A1c " 8.0  Management per PCP    Tobacco Abuse  Smokes approximately 1/2 pack of cigarettes  Counseled on smoking cessation today   Patient is trying to quit    DAVION on Trilogy Machine   She reports nightly Trilogy compliance and feels she is benefitting from use  Recommended continued nightly Trilogy use     BLE pain - improved  No complaints today   KWADWO testing 9.13.22: mild arterial flow reduction in the bilateral lower extremities.       EKG today   Follow up in cardiology clinic in 6 months or sooner if needed   Follow up with PCP as directed   Please notify clinic if any new concerns or any change in symptoms

## 2025-02-06 ENCOUNTER — OFFICE VISIT (OUTPATIENT)
Dept: CARDIOLOGY | Facility: CLINIC | Age: 60
End: 2025-02-06
Payer: MEDICAID

## 2025-02-06 VITALS
OXYGEN SATURATION: 95 % | TEMPERATURE: 99 F | HEIGHT: 64 IN | DIASTOLIC BLOOD PRESSURE: 82 MMHG | RESPIRATION RATE: 18 BRPM | WEIGHT: 280 LBS | BODY MASS INDEX: 47.8 KG/M2 | HEART RATE: 72 BPM | SYSTOLIC BLOOD PRESSURE: 130 MMHG

## 2025-02-06 DIAGNOSIS — E66.813 CLASS 3 SEVERE OBESITY DUE TO EXCESS CALORIES WITH SERIOUS COMORBIDITY AND BODY MASS INDEX (BMI) OF 45.0 TO 49.9 IN ADULT: Chronic | ICD-10-CM

## 2025-02-06 DIAGNOSIS — I50.32 CHRONIC HEART FAILURE WITH PRESERVED EJECTION FRACTION: ICD-10-CM

## 2025-02-06 DIAGNOSIS — I50.20 HEART FAILURE WITH REDUCED EJECTION FRACTION: Chronic | ICD-10-CM

## 2025-02-06 DIAGNOSIS — E11.628 TYPE 2 DIABETES MELLITUS WITH OTHER SKIN COMPLICATION, WITH LONG-TERM CURRENT USE OF INSULIN: ICD-10-CM

## 2025-02-06 DIAGNOSIS — G47.33 OSA ON CPAP: Chronic | ICD-10-CM

## 2025-02-06 DIAGNOSIS — R06.09 DOE (DYSPNEA ON EXERTION): ICD-10-CM

## 2025-02-06 DIAGNOSIS — I10 PRIMARY HYPERTENSION: Primary | Chronic | ICD-10-CM

## 2025-02-06 DIAGNOSIS — E78.5 HYPERLIPIDEMIA LDL GOAL <70: Chronic | ICD-10-CM

## 2025-02-06 DIAGNOSIS — F17.210 CIGARETTE NICOTINE DEPENDENCE WITHOUT COMPLICATION: Chronic | ICD-10-CM

## 2025-02-06 DIAGNOSIS — E66.01 CLASS 3 SEVERE OBESITY DUE TO EXCESS CALORIES WITH SERIOUS COMORBIDITY AND BODY MASS INDEX (BMI) OF 45.0 TO 49.9 IN ADULT: Chronic | ICD-10-CM

## 2025-02-06 DIAGNOSIS — Z79.4 TYPE 2 DIABETES MELLITUS WITH OTHER SKIN COMPLICATION, WITH LONG-TERM CURRENT USE OF INSULIN: ICD-10-CM

## 2025-02-06 PROCEDURE — 3075F SYST BP GE 130 - 139MM HG: CPT | Mod: CPTII,,,

## 2025-02-06 PROCEDURE — 99214 OFFICE O/P EST MOD 30 MIN: CPT | Mod: S$PBB,,,

## 2025-02-06 PROCEDURE — 1159F MED LIST DOCD IN RCRD: CPT | Mod: CPTII,,,

## 2025-02-06 PROCEDURE — 99215 OFFICE O/P EST HI 40 MIN: CPT | Mod: PBBFAC

## 2025-02-06 PROCEDURE — 3008F BODY MASS INDEX DOCD: CPT | Mod: CPTII,,,

## 2025-02-06 PROCEDURE — 93005 ELECTROCARDIOGRAM TRACING: CPT

## 2025-02-06 PROCEDURE — 3079F DIAST BP 80-89 MM HG: CPT | Mod: CPTII,,,

## 2025-02-06 PROCEDURE — 1160F RVW MEDS BY RX/DR IN RCRD: CPT | Mod: CPTII,,,

## 2025-02-06 NOTE — PATIENT INSTRUCTIONS
EKG today   Follow up in cardiology clinic in 6 months or sooner if needed   Follow up with PCP as directed   Please notify clinic if any new concerns or any change in symptoms

## 2025-02-10 LAB
OHS QRS DURATION: 84 MS
OHS QTC CALCULATION: 452 MS

## 2025-02-18 DIAGNOSIS — E11.65 UNCONTROLLED TYPE 2 DIABETES MELLITUS WITH HYPERGLYCEMIA: ICD-10-CM

## 2025-02-18 RX ORDER — CALCIUM CITRATE/VITAMIN D3 200MG-6.25
TABLET ORAL 2 TIMES DAILY
Qty: 100 STRIP | Refills: 11 | Status: SHIPPED | OUTPATIENT
Start: 2025-02-18 | End: 2025-02-21 | Stop reason: SDUPTHER

## 2025-02-21 ENCOUNTER — OFFICE VISIT (OUTPATIENT)
Dept: ENDOCRINOLOGY | Facility: CLINIC | Age: 60
End: 2025-02-21
Payer: MEDICAID

## 2025-02-21 VITALS
HEIGHT: 64 IN | SYSTOLIC BLOOD PRESSURE: 125 MMHG | RESPIRATION RATE: 18 BRPM | WEIGHT: 286.63 LBS | BODY MASS INDEX: 48.93 KG/M2 | DIASTOLIC BLOOD PRESSURE: 79 MMHG | TEMPERATURE: 98 F | HEART RATE: 62 BPM

## 2025-02-21 DIAGNOSIS — E11.9 TYPE 2 DIABETES MELLITUS WITHOUT COMPLICATIONS: ICD-10-CM

## 2025-02-21 DIAGNOSIS — E11.65 UNCONTROLLED TYPE 2 DIABETES MELLITUS WITH HYPERGLYCEMIA: Primary | ICD-10-CM

## 2025-02-21 LAB — HBA1C MFR BLD: 8.5 %

## 2025-02-21 PROCEDURE — 99213 OFFICE O/P EST LOW 20 MIN: CPT | Mod: PBBFAC | Performed by: NURSE PRACTITIONER

## 2025-02-21 RX ORDER — LANCETS
EACH MISCELLANEOUS
Qty: 100 EACH | Refills: 11 | Status: SHIPPED | OUTPATIENT
Start: 2025-02-21

## 2025-02-21 RX ORDER — INSULIN GLARGINE 100 [IU]/ML
INJECTION, SOLUTION SUBCUTANEOUS
COMMUNITY
Start: 2025-01-13

## 2025-02-21 NOTE — PROGRESS NOTES
Subjective     Patient ID: Linn Mckeon is a 59 y.o. female.    Chief Complaint: Follow-up (Type 2 diabetes)     08/11/2021 telemedicine visit- endocrine Clinic Note: 55-year-old female scheduled today for endocrine clinic follow-up.  Due to recent surge and Covid and patient's comorbid conditions visit was changed to telemedicine visit.  History of uncontrolled type 2 diabetes, hypertension, hyperlipidemia, obstructive sleep apnea, noncompliance due to history of depression.  Uncontrolled type 2 diabetes current A1c improved to 8.8 from previous 9.6 and 10.4.  A1C improving not at goal.  Patient reports checking CBGs occasionally and states most of her CBGs are in the 100s and 200s and she is no longer seeing 300s she denies any hypoglycemia.  Patient was recently changed to Trulicity and denies any side effects.  Discussed with patient ADA diet and making dietary changes along with increasing Trulicity.  Nephropathy urine micro was elevated 12/3/2020 normal on 8/10/2021 patient is currently on ACE with improved A1c. [1]     11/18/2021 endocrine clinic note : 55-year-old female scheduled today for endocrine clinic follow-up.  History of uncontrolled type 2 diabetes, hypertension, hyperlipidemia, obstructive sleep apnea and depression.  Uncontrolled type 2 diabetes current A1C 8.6 Previous A1c 8.3, 8.8, 9.6 and 10.4.  Patient has been improving over the last year.  Patient checks CBGs occasionally ranges 100s and 200s. Patient states she forgets her morning medications about 2 to 3 days/week. When discussed with patient she does not use a pillbox. Patient was given a pillbox today with a.m. and p.m. slots. Patient will work on compliance with morning medications. Instructed patient this will help improve her A1c. Hypertension mild elevation today. Patient did not take her medication prior to her visit. History of nephropathy Urine Micro Creatine/ratio: 12/3/2020 Elevated normal 08/10/2021. [1]     3/21/2022  endocrine clinic note: 56-year-old female scheduled today for endocrine clinic follow-up.  History of uncontrolled type 2 diabetes, hypertension, hyperlipidemia, obstructive sleep apnea and depression.  Type 2 diabetes current A1C 10.3 Previous A1c 8.6, 8.3, 8.8, 9.6 and 10.4.  On patient's previous visit she was given medication pillbox to help with compliance due to forgetting medications. pt checks CBG's occasionally not recently last CBG log  check Feb 17th no CBG's in 1 month. Pt states she had pneumonia (chart shows Dec 6th) and had a hard time swallowing and was not taking her medications at that time. pt has not been checking CBG's in over a month. Shes denies hypoglycemic symptoms. Pt attended DM education Dec 2020 until March 2021 with medication changes at that time with improved A1C. Nephropathy Urine Micro Creatine/ratio: 12/03/2020 Elevated, normal 08/10/2021 we will recheck urine micro today. HTN at goal.     Endocrine clinic note 09/21/2022:  56 year female scheduled today for endocrine clinic follow-up.  History of uncontrolled type 2 diabetes, hypertension, hyperlipidemia obstructive sleep apnea and depression.  Patient was recently hospitalized with congestive heart failure and returns to clinic today with medication changes for type 2 diabetes.  Uncontrolled type 2 diabetes recent A1c 11.7 previous 10.0 and 8.3.  Patient has a history of nonadherence to medications today her sisters with her and states that the patient has been taking her medications and now has a pillbox.  Patient restarted her metformin, glipizide, Januvia and was started on Lantus at the hospital patient states she is been taking 30 units twice a day hospital instructions were written for once a day but needles were written for twice a day but patient clarified that she is been taking twice a day.  Patient had not restarted her Trulicity yet but states since she started her regimen back in the last 2 days her blood sugars have  been in the 80s in the morning and she is been feeling weak.  Patient restarted her regimen about a week and a half ago and states in the last 2 days with consistently taking medication she is been having hypoglycemia in the morning.  She states yesterday her sister held her 30 units of Lantus at night due to blood sugars being too low.  Instructed patient she did not hold Lantus but will decrease her glipizide and she is return to clinic in 1 month with CBGS to titrate her medications.  She is currently hold Trulicity and when she returns to clinic in 1 month with glucose numbers will restart Trulicity at 0.75 mg and titrate patient's regimen.      Endocrine clinic note 11/02/2022:  56 year female scheduled today has follow-up to Endocrine Clinic.  Patient was scheduled to have 1 month follow-up for history of uncontrolled type 2 diabetes.  Regimen was adjusted patient was started on a Haprer.  Patient returns to clinic today.  Patient was started on a Harper. Harper interpretation 10/20/2022-11/02/2022.  Average glucose 162 GMI indicater 7.2.  6% very high, 19% high, 75% target range, 0% low, 0% very low patient's scans in average of 2-11 times per day patient occasionally has prandial spikes in the evening indicating a high carb meal which is only occasionally, only 2 hypoglycemic episode is noted after lunch at 69 and prior to lunch at 63.  In the last week patient has had increasing hypoglycemic episodes mid day. Patient states she does not eat breakfast at times will decrease morning insulin patient is to call clinic if any further hypoglycemia occurs.  On patient's Dexcom report patient is 75% at target range and predicted A1c is 7.2.  Nephropathy urine micro elevated 05/06/2022 patient is on On Entresto which contains ACE.  Patient was offered flu vaccine today patient does not take flu vaccine and declined her vaccinations today.     Endocrine clinic note 06/29/2023:  57 year female scheduled today has  follow-up to Endocrine Clinic.  History of uncontrolled type 2 diabetes, hypertension, hyperlipidemia obstructive sleep apnea and depression. Uncontrolled type 2 diabetes recent A1c increased to 10.2 from previous 8.9. Patient has a Harper interpretation 06/16/2023-06/29/2023.  Time CGM active 47%.  Average glucose 185.  GM I 7.7.  10% very high, 36% high, 54% target range, 0% low, 0% very low.  Patient's scans an average of 4-7 times per day patient has a majority of her blood glucoses in the 200s occasionally patient will decrease down to the 140s at other times patient has spikes in the evening indicating she is eating a high high sugary meal. Patient's GMI averaging 7.7 patient's PCP one-month ago restarted her Januvia 100 mg and increased Jardiance to 25 mg.  Patient has no significant hypoglycemia.      Endocrine clinic 8/20/2024:  58 year female scheduled today for endocrine clinic follow-up.  History of uncontrolled type 2 diabetes, hypertension hyperlipidemia and depression.  Uncontrolled type 2 diabetes current A1c improved to 8.3 from previous 10.2 and 8.9.  Patient has a Harper    Harper interpretation 08/08/2024-08/21/2024.  Time CGM active 51%.  Average glucose 155, GM I 7.0.  Time in range 2% very high, 23% high, target range 75%, low 0%, very low 0%.  Patient has 75% within target range within through GM I 7.0.  Patient was attending Diabetes Education with improvement in her diet and increasing Jardiance with improving A1c.  Patient was occasional prandial spikes about every 4th night in the evening indicating she is eating a heavy meal at other times she is in the 100s.  Patient occasionally forgets her nighttime Lantus elevation. Patient was on a Harper 2 has a Dexcom supplies today we will start patient's Dexcom G7 for continuous monitoring.  Patient will continue ADA diet current GM I at goal if not improved by next follow-up will add another oral medication.       Endocrine clinic note 02/21/2025:   59 year female scheduled today for clinic follow-up.  History of type 2 diabetes, hypertension, hyperlipidemia and depression.  Type 2 diabetes current A1c 8.5.  Patient has a Dexcom    Dexcom interpretation 02/08/2025-02/21/2025.  Days with CGM data 13/14 days.  Average glucose 147, GM I 6.8, time in range 1 point 0% very high, 16% high, 83% in range, 0% low, 0% very low.  On patient's best day average glucose 126 100% in range.  Patient has over 80% of her glucose within range with an occasional prandial spikes at times at lunch occasionally in the evening in the mid 200s.  No significant prandial spikes no hypoglycemia.  According to patient's Dexcom report GM I 6.8 with 83% in range patient has had improvement in her glucose compared to her A1cs.    Patient states she has improved her eating and has not been eating as much.  No hypoglycemia is noted.  Patient is up-to-date with her eye exam and foot exam.  Patient received her pneumonia shot about 2 months ago        Review of Systems   Constitutional:  Negative for activity change, appetite change and fatigue.   HENT:  Negative for dental problem, hearing loss, tinnitus, trouble swallowing and goiter.    Eyes:  Negative for photophobia, pain and visual disturbance.   Respiratory:  Negative for cough, chest tightness and wheezing.    Cardiovascular:  Negative for chest pain, palpitations and leg swelling.   Gastrointestinal:  Negative for abdominal pain, constipation, diarrhea, nausea and reflux.   Endocrine: Negative for cold intolerance, heat intolerance, polydipsia and polyphagia.   Genitourinary:  Negative for difficulty urinating, flank pain, hematuria, hot flashes, menstrual irregularity, menstrual problem, nocturia and urgency.   Musculoskeletal:  Negative for back pain, gait problem, joint swelling, leg pain and joint deformity.   Integumentary:  Negative for color change, pallor, rash and breast discharge.   Allergic/Immunologic: Negative for  environmental allergies, food allergies and immunocompromised state.   Neurological:  Negative for tremors, seizures, headaches, memory loss and coordination difficulties.   Psychiatric/Behavioral:  Negative for agitation, behavioral problems and sleep disturbance. The patient is not nervous/anxious.           Objective     Physical Exam  Constitutional:       General: She is not in acute distress.     Appearance: Normal appearance. She is not ill-appearing.   HENT:      Head: Normocephalic and atraumatic.      Right Ear: External ear normal.      Left Ear: External ear normal.      Nose: Nose normal. No congestion or rhinorrhea.      Mouth/Throat:      Mouth: Mucous membranes are moist.      Pharynx: Oropharynx is clear. No oropharyngeal exudate.   Eyes:      General:         Right eye: No discharge.         Left eye: No discharge.      Conjunctiva/sclera: Conjunctivae normal.      Pupils: Pupils are equal, round, and reactive to light.   Neck:      Thyroid: No thyroid mass, thyromegaly or thyroid tenderness.   Cardiovascular:      Rate and Rhythm: Normal rate and regular rhythm.      Pulses: Normal pulses.      Heart sounds: Normal heart sounds. No murmur heard.  Pulmonary:      Effort: Pulmonary effort is normal. No respiratory distress.      Breath sounds: Normal breath sounds.   Abdominal:      General: Abdomen is flat. Bowel sounds are normal. There is no distension.      Palpations: Abdomen is soft.      Tenderness: There is no abdominal tenderness.   Musculoskeletal:         General: No swelling or tenderness. Normal range of motion.      Cervical back: Normal range of motion and neck supple. No tenderness.      Right lower leg: No edema.      Left lower leg: No edema.   Feet:      Right foot:      Skin integrity: Skin integrity normal.      Left foot:      Skin integrity: Skin integrity normal.   Lymphadenopathy:      Cervical: No cervical adenopathy.   Skin:     General: Skin is warm and dry.       Coloration: Skin is not jaundiced or pale.   Neurological:      General: No focal deficit present.      Mental Status: She is alert and oriented to person, place, and time. Mental status is at baseline.      Coordination: Coordination normal.      Gait: Gait normal.   Psychiatric:         Mood and Affect: Mood normal.         Behavior: Behavior normal.         Thought Content: Thought content normal.            Assessment and Plan     1. Uncontrolled type 2 diabetes mellitus with hyperglycemia  Recent A1C 8.3 improving   Medications: Metformin 1000 mg twice daily, Januvia 100 mg, Jardiance 25 mg, Lantus 30 units BID  Changes:  Current GMI 6.8 at goal continue ADA diet   Home Glucometer Use:  Dexcom G7  Diabetic yearly Eye Exam:  08/21/2024  Diabetic yearly Foot Exam:  12/23/2024  -     Hemoglobin A1C, POCT  -     blood sugar diagnostic (TRUE METRIX GLUCOSE TEST STRIP) Strp; Test Blood Sugar 2 times a day  Dispense: 100 strip; Refill: 11  -     SITagliptin phosphate (JANUVIA) 100 MG Tab; Take 1 tablet (100 mg total) by mouth once daily.  Dispense: 90 tablet; Refill: 3  -     lancets (ACCU-CHEK FASTCLIX LANCET DRUM) Misc; USE ONE DAILY  Dispense: 100 each; Refill: 11            No follow-ups on file.

## 2025-02-25 DIAGNOSIS — R91.1 LUNG NODULE: Primary | ICD-10-CM

## 2025-03-11 ENCOUNTER — OFFICE VISIT (OUTPATIENT)
Dept: FAMILY MEDICINE | Facility: CLINIC | Age: 60
End: 2025-03-11
Payer: MEDICAID

## 2025-03-11 VITALS
RESPIRATION RATE: 18 BRPM | HEART RATE: 62 BPM | BODY MASS INDEX: 48.65 KG/M2 | HEIGHT: 64 IN | SYSTOLIC BLOOD PRESSURE: 104 MMHG | TEMPERATURE: 99 F | OXYGEN SATURATION: 99 % | DIASTOLIC BLOOD PRESSURE: 64 MMHG | WEIGHT: 285 LBS

## 2025-03-11 DIAGNOSIS — R22.31 SKIN LUMP OF ARM, RIGHT: Primary | ICD-10-CM

## 2025-03-11 PROCEDURE — 99214 OFFICE O/P EST MOD 30 MIN: CPT | Mod: PBBFAC

## 2025-03-11 NOTE — PROGRESS NOTES
"[facility] MIN[facility] SURGERY OFFICE VISIT NOTE  MRN: [id number]  Date: 03/11/2025    Chief Complaint: arm lesion      Subjective:    HPI  Linn Mckeon is a 59 y.o. female  presenting to [facility] minor surgery FM for mass was located right that was noted to have first appeared about 5-6 months ago per patient, maybe longer. Patient denies any exponential growth and notable changes to the skin lesions since it first appeared. Patient denies having previously biopsied the area in question and has not used any creams or oral medications for this issue.  Patient denies any unintentional weight loss over the last 6 months, fevers, chills, pain to the skin lesion or right arm, numbness to bilateral arms/hands.     Of note, patient did have soft tissue ultrasound of the upper extremity performed on 01/08/2025 which warranted further imaging.  MRI of right forearm without contrast was performed on 01/31/2025 showing a 24 mm x 11 mm x 20 mm fusiform subcutaneous mass disorganized internal architecture with a small cystic component.  These ultrasound and MRI results discussed with the patient in depth.    ROS per HPI above.      Current Medications:   Outpatient Medications as of 3/11/2025   Medication Sig Dispense Refill    albuterol (PROVENTIL) 2.5 mg /3 mL (0.083 %) nebulizer solution Take 3 mLs (2.5 mg total) by nebulization every 6 (six) hours as needed for Wheezing. Rescue 90 mL 1    ammonium lactate (LAC-HYDRIN) 12 % lotion APPLY TO DRY SKIN AND RUB IN WELL TWICE DAILY. NOTHING BETWEEN TOES 226 g 11    aspirin (ECOTRIN) 81 MG EC tablet Take 81 mg by mouth once daily.      atorvastatin (LIPITOR) 80 MG tablet Take 1 tablet (80 mg total) by mouth once daily. 90 tablet 3    BD ULTRA-FINE JORDON PEN NEEDLE 32 gauge x 5/32" Ndle 2 (two) times daily.      BD ULTRA-FINE JORDON PEN NEEDLE 32 gauge x 5/32" Ndle       blood sugar diagnostic (TRUE METRIX GLUCOSE TEST STRIP) Strp Test Blood Sugar 2 times a day 100 strip 11    blood-glucose meter " kit To check BG 2 times daily, to use with insurance preferred meter 1 each 0    blood-glucose meter,continuous (DEXCOM G7 ) Misc Dexcom G7  uses directed 1 each 0    blood-glucose sensor (DEXCOM G7 SENSOR) Norma Use every 10 days as directed 3 each 11    CICLOPIROX-ITRACONAZOLE-UREA TOP CLEAN NAILS, SHAKE BOTTLE WELL, APPLY TWICE DAILY TO ALL AFFECTED NAILS USING APPLICATOR. (UP TO 2 MLS/DAY)      empagliflozin (JARDIANCE) 25 mg tablet Take 1 tablet (25 mg total) by mouth once daily. 90 tablet 2    ergocalciferol (ERGOCALCIFEROL) 50,000 unit Cap Take 1 capsule (50,000 Units total) by mouth every 7 days. 12 capsule 2    ezetimibe (ZETIA) 10 mg tablet Take 1 tablet (10 mg total) by mouth once daily. 90 tablet 3    fluticasone propionate (FLONASE) 50 mcg/actuation nasal spray 1 spray (50 mcg total) by Each Nostril route once daily. 11.1 mL 1    fluticasone-umeclidin-vilanter (TRELEGY ELLIPTA) 100-62.5-25 mcg DsDv Inhale 1 puff into the lungs once daily. 28 each 3    furosemide (LASIX) 40 MG tablet Take 1 tablet (40 mg total) by mouth 2 (two) times a day. 180 tablet 3    insulin glargine-yfgn 100 unit/mL (3 mL) InPn Inject 43 Units into the skin once daily.      ipratropium-albuteroL (COMBIVENT RESPIMAT)  mcg/actuation inhaler Inhale 2 puffs into the lungs every 6 (six) hours as needed for Wheezing. Rescue 4 g 1    ketoconazole (NIZORAL) 2 % cream Apply topically once daily. 30 g 1    lancets (ACCU-CHEK FASTCLIX LANCET DRUM) Misc USE ONE DAILY 100 each 11    LANCETS MISC  (Patient not taking: Reported on 2025)      LANTUS SOLOSTAR U-100 INSULIN 100 unit/mL (3 mL) InPn pen SMARTSI Unit(s) SUB-Q Twice Daily      latanoprost 0.005 % ophthalmic solution Apply 1 drop to eye. (Patient not taking: Reported on 2024)      metFORMIN (GLUCOPHAGE) 1000 MG tablet TAKE 1 TABLET BY MOUTH TWICE DAILY WITH MEALS 60 tablet 5    metoprolol succinate (TOPROL-XL) 100 MG 24 hr tablet Take 1 tablet (100 mg  "total) by mouth once daily. 30 tablet 11    nystatin (MYCOSTATIN) ointment Apply topically 2 (two) times daily. 15 g 2    pen needle, diabetic (BD ULTRA-FINE JORDON PEN NEEDLE) 32 gauge x 5/32" Ndle USE TWICE DAILY 60 each 11    sacubitriL-valsartan (ENTRESTO) 49-51 mg per tablet Take 1 tablet by mouth 2 (two) times daily. 180 tablet 3    SITagliptin phosphate (JANUVIA) 100 MG Tab Take 1 tablet (100 mg total) by mouth once daily. 90 tablet 3    spironolactone (ALDACTONE) 25 MG tablet Take 1 tablet (25 mg total) by mouth once daily. 90 tablet 3     No current facility-administered medications on file as of 3/11/2025.        Objective:  Vitals:    25 1419   BP: 104/64   Patient Position: Sitting   Pulse: 62   Resp: 18   Temp: 98.6 °F (37 °C)   SpO2: 99%   Weight: 129.3 kg (285 lb)   Height: 5' 3.78" (1.62 m)       Physical Exam  Constitutional:       General: She is not in acute distress.     Appearance: She is not ill-appearing.   Cardiovascular:      Comments: 2+ radial pulses palpable B/L.  Pulmonary:      Effort: Pulmonary effort is normal. No respiratory distress.      Comments: On 2 L home oxygen via nasal cannula  Skin:     Comments: Approximately 2 cm x 2 cm well circumscribed, rounded palpable elevated lesion noted to right dorsal forearm. Skin lesion is nontender to palpation and is firm but not solid and immobile on examination. No noted skin color changes, bleeding, or bruising to this area.        [id number]  EXAMINATION:  US SOFT TISSUE, UPPER EXTREMITY, RIGHT     CLINICAL HISTORY:  Localized swelling, mass and lump, right upper limb     COMPARISON:  None     FINDINGS:  Multiple grayscale and color Doppler sonographic images were acquired in the patient indicated area of palpable abnormality.  There is a solid-appearing subcutaneous mass measuring 1.8 x 0.9 x 3.0 cm in the area of palpable abnormality.  This finding appears heterogeneous with small internal echogenic foci which may " reflect calcifications.     Impression:     Nonspecific soft tissue mass is seen with some scattered small echogenic foci which could reflect possible calcifications.  This finding is indeterminate.  Recommend correlation with pre and post-contrast MRI.      01/31/2025  EXAMINATION:  MRI FOREARM WITHOUT CONTRAST RIGHT     CLINICAL HISTORY:  Soft tissue mass, upper arm, superficial;ultrasound rec: pre and post-contrast MRI. see ultrasound impression;  Localized swelling, mass and lump, right upper limb     TECHNIQUE:  Unenhanced, multiplanar, multisequence MR imaging of the right forearm was obtained     COMPARISON:  Soft tissue ultrasound used for comparison dated 01/08/2025     FINDINGS:  A 24 mm x 11 mm by 20 mm fusiform subcutaneous mass is present to the lateral volar aspect of the proximal forearm and demonstrates a disorganized internal architecture with a small cystic component.  The signal characteristics are primarily hyperintense on fluid sensitive sequences and isointense on T1 weighted sequences.  The mass is not clearly contiguous with an adjacent vessel or nerve and does not appear to invade the underlying muscles.     Bone marrow signal is unremarkable.  No periosteal reaction.  No fluid is seen tracking along the intermuscular septa.  Contents of the carpal tunnel are unremarkable.  No olecranon bursitis.     Impression:     A 24 mm x 11 mm by 20 mm fusiform subcutaneous mass is present to the lateral volar aspect of the proximal forearm and demonstrates a disorganized internal architecture with a small cystic component. The signal characteristics are primarily hyperintense on fluid sensitive sequences and isointense on T1 weighted sequences. The mass is not clearly contiguous with an adjacent vessel or nerve and does not appear to invade the underlying muscles.  The appearance is not characteristic of a classically benign subcutaneous lesion (although peripheral nerve sheath tumors and other benign  lesions could have this appearance).  Malignant soft tissue neoplasm (pleomorphic sarcoma) cannot be excluded and should be evaluated before a benign process is considered.      Assessment/ Plan:    Skin lump of arm, right  - Reviewed U/S and MRI results as noted above and discussed with patient; risks of delayed treatment and delay in likely need for biopsy were discussed with the patient.  - Risks and benefits of surgical intervention and biopsy discussed with the patient who is in agreement with general surgery referral at this time.  - Ambulatory referral/consult to [facility] was placed at today's visit for further assessment and likely need for surgical biopsy of right arm lesion.  - ER return to clinic precautions discussed with the patient in depth; patient voices her understanding at this time.       Follow up if symptoms worsen or fail to improve.         Arlene Quinn DO  [employer] [facility] Resident, HO-[facility]

## 2025-03-18 ENCOUNTER — HOSPITAL ENCOUNTER (OUTPATIENT)
Dept: RADIOLOGY | Facility: HOSPITAL | Age: 60
Discharge: HOME | End: 2025-03-18
Payer: MEDICAID

## 2025-03-18 DIAGNOSIS — R91.1 LUNG NODULE: ICD-10-CM

## 2025-03-18 PROCEDURE — 71250 CT THORAX DX C-: CPT | Mod: TC

## 2025-03-19 ENCOUNTER — RESULTS FOLLOW-UP (OUTPATIENT)
Dept: INTERNAL MEDICINE | Facility: CLINIC | Age: 60
End: 2025-03-19
Payer: MEDICAID

## 2025-03-19 DIAGNOSIS — R91.1 LUNG NODULE: Primary | ICD-10-CM

## 2025-03-19 NOTE — TELEPHONE ENCOUNTER
I spoke with patient and informed her that the nodules that PCP is unchanged and will repeat CT in 6 months. Patient verbalized understanding.

## 2025-03-19 NOTE — PROGRESS NOTES
Please inform patient that her lung CT showed that the nodules we are monitoring have not changed in size. Our plan is to repeat the scan in 6 months, if the nodules remain stable at that time we will resume annual monitoring.     Thanks

## 2025-03-25 ENCOUNTER — OFFICE VISIT (OUTPATIENT)
Dept: INTERNAL MEDICINE | Facility: CLINIC | Age: 60
End: 2025-03-25
Payer: MEDICAID

## 2025-03-25 VITALS
RESPIRATION RATE: 18 BRPM | BODY MASS INDEX: 50.71 KG/M2 | HEIGHT: 63 IN | SYSTOLIC BLOOD PRESSURE: 104 MMHG | DIASTOLIC BLOOD PRESSURE: 67 MMHG | HEART RATE: 60 BPM | WEIGHT: 286.19 LBS | OXYGEN SATURATION: 98 %

## 2025-03-25 DIAGNOSIS — Z23 NEED FOR SHINGLES VACCINE: ICD-10-CM

## 2025-03-25 DIAGNOSIS — Z79.4 TYPE 2 DIABETES MELLITUS WITH HYPERGLYCEMIA, WITH LONG-TERM CURRENT USE OF INSULIN: Primary | Chronic | ICD-10-CM

## 2025-03-25 DIAGNOSIS — E11.65 TYPE 2 DIABETES MELLITUS WITH HYPERGLYCEMIA, WITH LONG-TERM CURRENT USE OF INSULIN: Primary | Chronic | ICD-10-CM

## 2025-03-25 LAB — HBA1C MFR BLD: 9 %

## 2025-03-25 PROCEDURE — 4010F ACE/ARB THERAPY RXD/TAKEN: CPT | Mod: CPTII,,,

## 2025-03-25 PROCEDURE — 99214 OFFICE O/P EST MOD 30 MIN: CPT | Mod: S$PBB,,,

## 2025-03-25 PROCEDURE — 3008F BODY MASS INDEX DOCD: CPT | Mod: CPTII,,,

## 2025-03-25 PROCEDURE — 3052F HG A1C>EQUAL 8.0%<EQUAL 9.0%: CPT | Mod: CPTII,,,

## 2025-03-25 PROCEDURE — 90471 IMMUNIZATION ADMIN: CPT | Mod: PBBFAC

## 2025-03-25 PROCEDURE — 99215 OFFICE O/P EST HI 40 MIN: CPT | Mod: PBBFAC

## 2025-03-25 PROCEDURE — 1160F RVW MEDS BY RX/DR IN RCRD: CPT | Mod: CPTII,,,

## 2025-03-25 PROCEDURE — 3074F SYST BP LT 130 MM HG: CPT | Mod: CPTII,,,

## 2025-03-25 PROCEDURE — 90750 HZV VACC RECOMBINANT IM: CPT | Mod: PBBFAC

## 2025-03-25 PROCEDURE — 83036 HEMOGLOBIN GLYCOSYLATED A1C: CPT | Mod: PBBFAC

## 2025-03-25 PROCEDURE — 1159F MED LIST DOCD IN RCRD: CPT | Mod: CPTII,,,

## 2025-03-25 PROCEDURE — 3078F DIAST BP <80 MM HG: CPT | Mod: CPTII,,,

## 2025-03-25 RX ORDER — INSULIN GLARGINE 100 [IU]/ML
INJECTION, SOLUTION SUBCUTANEOUS
Start: 2025-03-25

## 2025-03-25 RX ORDER — LANCETS 33 GAUGE
EACH MISCELLANEOUS 2 TIMES DAILY
COMMUNITY
Start: 2025-02-21

## 2025-03-25 RX ADMIN — Medication 0.5 ML: at 09:03

## 2025-03-25 NOTE — ASSESSMENT & PLAN NOTE
Followed by endo and DM education  A1C increased to 9, previously 8  Continue metformin, jardiance, januvia  Change lantus to 40U qAM and 20U QHS- RTC 6 weeks  Follow ADA Diet. Avoid soda, simple sweets, and limit rice/pasta/breads/starches (no more than 45-50 grams per meal).  Maintain healthy weight with goal BMI <30.  Exercise 5 times per week for 30 minutes per day.  Stressed importance of daily foot exams.  Stressed importance of annual dilated eye exam.

## 2025-03-25 NOTE — PROGRESS NOTES
Priscilla Strickland, NOEMI   [facility]  [address] [facility] [id number]      PATIENT NAME: Linn Mckeon  : 1965  DATE: 3/25/25  MRN: [id number]      Reason for Visit / Chief Complaint: Diabetes (Pt is here for diabetes f/u)       History of Present Illness / Problem Focused Workflow     Linn Mckeon presents to the clinic with Diabetes (Pt is here for diabetes f/u)     58 yo AAF presents to clinic. Medical problems include HTN, HLD, Class II HFpEF 50-55%, DM2, COPD with Oxygen Dependence, DAVION, Tobacco Use, Obesity. Followed by [facility] wound care, cardiology, endocrinology, pulmonology, DM education clinics.     24  Pt presents for routine follow up. Labs reviewed, renals stable. A1C remains elevated at 8. She is taking insulin differently than prescribed- she's taking 43U qAM because she was forgetful with QHS dosing. This was discussed in DM education. Lipids at goal. Mild leukocytosis noted- per pulmonology note pt with acute illness at time of labs. She declines vaccines other than pneumonia vaccine. She is reporting lump on right arm for the last 4 months, no associated pain or tenderness, consistent with lipoma. Will confirm with ultrasound, she is not interested in removal at this time. She is requesting diabetic shoes as she reports previously discussed with last provider. Will order today.     3/25/25  Pt presents for DM follow up. POC A1C 9.0- she admits diet is poor. She is still taking Lantus 43U qAM, she reports compliance with oral medications. FBG in 150-190. Day time readings vary greatly per pt. Denies lows. Agreeable to add back nighttime dose, will do 40U qAM and 20U QHS. Since LOV, imaging on lump on pt arm warranted work up and she has been referred to general surgery for biopsy of the lesion,apt scheduled next week. Denies acute complaints. RTC 6 weeks with POC A1C           Review of Systems     Review of  "Systems   Constitutional:  Negative for fatigue, fever and unexpected weight change.   HENT:  Negative for ear pain, hearing loss, trouble swallowing and voice change.    Respiratory:  Negative for cough and shortness of breath.    Cardiovascular:  Negative for chest pain and palpitations.   Gastrointestinal:  Negative for abdominal pain, diarrhea and vomiting.   Genitourinary:  Negative for dysuria.   Musculoskeletal:  Negative for gait problem.   Skin:  Negative for rash and wound.   Neurological:  Negative for weakness.   Psychiatric/Behavioral:  Negative for suicidal ideas.          Medications and Allergies     Medications  Medication List with Changes/Refills   Current Medications    ALBUTEROL (PROVENTIL) 2.5 MG /3 ML (0.083 %) NEBULIZER SOLUTION    Take 3 mLs (2.5 mg total) by nebulization every 6 (six) hours as needed for Wheezing. Rescue    AMMONIUM LACTATE (LAC-HYDRIN) 12 % LOTION    APPLY TO DRY SKIN AND RUB IN WELL TWICE DAILY. NOTHING BETWEEN TOES    ASPIRIN (ECOTRIN) 81 MG EC TABLET    Take 81 mg by mouth once daily.    ATORVASTATIN (LIPITOR) 80 MG TABLET    Take 1 tablet (80 mg total) by mouth once daily.    BD ULTRA-FINE JORDON PEN NEEDLE 32 GAUGE X 5/32" NDLE    2 (two) times daily.    BD ULTRA-FINE JORDON PEN NEEDLE 32 GAUGE X 5/32" NDLE        BLOOD SUGAR DIAGNOSTIC (TRUE METRIX GLUCOSE TEST STRIP) STRP    Test Blood Sugar 2 times a day    BLOOD-GLUCOSE METER KIT    To check BG 2 times daily, to use with insurance preferred meter    BLOOD-GLUCOSE METER,CONTINUOUS (DEXCOM G7 ) MISC    Dexcom G7  uses directed    BLOOD-GLUCOSE SENSOR (DEXCOM G7 SENSOR) VAMSI    Use every 10 days as directed    CICLOPIROX-ITRACONAZOLE-UREA TOP    CLEAN NAILS, SHAKE BOTTLE WELL, APPLY TWICE DAILY TO ALL AFFECTED NAILS USING APPLICATOR. (UP TO 2 MLS/DAY)    EMPAGLIFLOZIN (JARDIANCE) 25 MG TABLET    Take 1 tablet (25 mg total) by mouth once daily.    ERGOCALCIFEROL (ERGOCALCIFEROL) 50,000 UNIT CAP    Take 1 " "capsule (50,000 Units total) by mouth every 7 days.    EZETIMIBE (ZETIA) 10 MG TABLET    Take 1 tablet (10 mg total) by mouth once daily.    FLUTICASONE PROPIONATE (FLONASE) 50 MCG/ACTUATION NASAL SPRAY    1 spray (50 mcg total) by Each Nostril route once daily.    FLUTICASONE-UMECLIDIN-VILANTER (TRELEGY ELLIPTA) 100-62.5-25 MCG DSDV    Inhale 1 puff into the lungs once daily.    FUROSEMIDE (LASIX) 40 MG TABLET    Take 1 tablet (40 mg total) by mouth 2 (two) times a day.    INSULIN GLARGINE-YFGN 100 UNIT/ML (3 ML) INPN    Inject 43 Units into the skin once daily.    IPRATROPIUM-ALBUTEROL (COMBIVENT RESPIMAT)  MCG/ACTUATION INHALER    Inhale 2 puffs into the lungs every 6 (six) hours as needed for Wheezing. Rescue    KETOCONAZOLE (NIZORAL) 2 % CREAM    Apply topically once daily.    LANCETS (ACCU-CHEK FASTCLIX LANCET DRUM) MISC    USE ONE DAILY    LANCETS MISC        LATANOPROST 0.005 % OPHTHALMIC SOLUTION    Apply 1 drop to eye.    METFORMIN (GLUCOPHAGE) 1000 MG TABLET    TAKE 1 TABLET BY MOUTH TWICE DAILY WITH MEALS    METOPROLOL SUCCINATE (TOPROL-XL) 100 MG 24 HR TABLET    Take 1 tablet (100 mg total) by mouth once daily.    NYSTATIN (MYCOSTATIN) OINTMENT    Apply topically 2 (two) times daily.    PEN NEEDLE, DIABETIC (BD ULTRA-FINE JORDON PEN NEEDLE) 32 GAUGE X 5/32" NDLE    USE TWICE DAILY    SACUBITRIL-VALSARTAN (ENTRESTO) 49-51 MG PER TABLET    Take 1 tablet by mouth 2 (two) times daily.    SITAGLIPTIN PHOSPHATE (JANUVIA) 100 MG TAB    Take 1 tablet (100 mg total) by mouth once daily.    SPIRONOLACTONE (ALDACTONE) 25 MG TABLET    Take 1 tablet (25 mg total) by mouth once daily.    TRUEPLUS LANCETS 33 GAUGE MISC    Apply topically 2 (two) times daily.   Changed and/or Refilled Medications    Modified Medication Previous Medication    LANTUS SOLOSTAR U-100 INSULIN 100 UNIT/ML (3 ML) INPN PEN LANTUS SOLOSTAR U-100 INSULIN 100 unit/mL (3 mL) InPn pen       Inject 40U at breakfast and 20U at bedtime " subcutaneously    SMARTSI Unit(s) SUB-Q Twice Daily         Allergies  Review of patient's allergies indicates:  No Known Allergies    Physical Examination     Vitals:    25 0835   BP: 104/67   Pulse: 60   Resp: 18     Physical Exam  Vitals and nursing note reviewed.   Constitutional:       General: She is not in acute distress.     Appearance: She is not ill-appearing.   HENT:      Head: Normocephalic and atraumatic.      Mouth/Throat:      Mouth: Mucous membranes are moist.      Pharynx: Oropharynx is clear.   Eyes:      General: No scleral icterus.     Extraocular Movements: Extraocular movements intact.      Conjunctiva/sclera: Conjunctivae normal.      Pupils: Pupils are equal, round, and reactive to light.   Neck:      Vascular: No carotid bruit.   Cardiovascular:      Rate and Rhythm: Normal rate and regular rhythm.      Heart sounds: No murmur heard.     No friction rub. No gallop.   Pulmonary:      Effort: Pulmonary effort is normal. No respiratory distress.      Breath sounds: Normal breath sounds. No wheezing, rhonchi or rales.   Abdominal:      General: Abdomen is flat. Bowel sounds are normal. There is no distension.      Palpations: Abdomen is soft. There is no mass.      Tenderness: There is no abdominal tenderness.   Musculoskeletal:         General: Normal range of motion.      Cervical back: Normal range of motion and neck supple.   Skin:     General: Skin is warm and dry.   Neurological:      General: No focal deficit present.      Mental Status: She is alert.   Psychiatric:         Mood and Affect: Mood normal.           Results     Lab Results   Component Value Date    WBC 12.03 (H) 12/10/2024    RBC 5.81 (H) 12/10/2024    HGB 15.2 12/10/2024    HCT 46.6 12/10/2024    MCV 80.2 12/10/2024    MCH 26.2 (L) 12/10/2024    MCHC 32.6 (L) 12/10/2024    RDW 17.1 (H) 12/10/2024     12/10/2024    MPV 10.4 12/10/2024     Sodium   Date Value Ref Range Status   12/10/2024 140 136 - 145 mmol/L  Final     Potassium   Date Value Ref Range Status   12/10/2024 5.1 3.5 - 5.1 mmol/L Final     Chloride   Date Value Ref Range Status   12/10/2024 106 98 - 107 mmol/L Final     CO2   Date Value Ref Range Status   12/10/2024 25 22 - 29 mmol/L Final     Blood Urea Nitrogen   Date Value Ref Range Status   12/10/2024 25.5 (H) 9.8 - 20.1 mg/dL Final     Creatinine   Date Value Ref Range Status   12/10/2024 1.24 (H) 0.55 - 1.02 mg/dL Final     Calcium   Date Value Ref Range Status   12/10/2024 9.8 8.4 - 10.2 mg/dL Final     Albumin   Date Value Ref Range Status   12/10/2024 3.2 (L) 3.5 - 5.0 g/dL Final     Bilirubin Total   Date Value Ref Range Status   12/10/2024 0.9 <=1.5 mg/dL Final     ALP   Date Value Ref Range Status   12/10/2024 83 40 - 150 unit/L Final     AST   Date Value Ref Range Status   12/10/2024 20 5 - 34 unit/L Final     ALT   Date Value Ref Range Status   12/10/2024 11 0 - 55 unit/L Final     Estimated GFR-Non    Date Value Ref Range Status   12/06/2021 65 (L) >>=90 mL/min/1.73 m2 Final     Lab Results   Component Value Date    CHOL 95 12/10/2024     Lab Results   Component Value Date    HDL 28 (L) 12/10/2024     Lab Results   Component Value Date    TRIG 73 12/10/2024     Lab Results   Component Value Date    VLDL 15 12/10/2024     Lab Results   Component Value Date    LDL 52.00 12/10/2024     Lab Results   Component Value Date    TSH 1.084 03/18/2024     Lab Results   Component Value Date    PHUR 5.5 12/10/2024    PROTEINUA Negative 12/10/2024    GLUCUA 4+ (A) 12/10/2024    KETONESU Negative 05/14/2019    OCCULTUA Negative 12/10/2024    NITRITE Negative 12/10/2024    LEUKOCYTESUR Negative 12/10/2024     Lab Results   Component Value Date    HGBA1C 8.0 (H) 12/10/2024    HGBA1C 8.3 (H) 08/19/2024    HGBA1C 9.3 (H) 03/18/2024           Assessment         ICD-10-CM ICD-9-CM   1. Type 2 diabetes mellitus with hyperglycemia, with long-term current use of insulin  E11.65 250.00    Z79.4 790.29      V58.67   2. Need for shingles vaccine  Z23 V04.89       Plan      Problem List Items Addressed This Visit       Type 2 diabetes mellitus with hyperglycemia, with long-term current use of insulin - Primary (Chronic)    Current Assessment & Plan   Followed by endo and DM education  A1C increased to 9, previously 8  Continue metformin, jardiance, januvia  Change lantus to 40U qAM and 20U QHS- RTC 6 weeks  Follow ADA Diet. Avoid soda, simple sweets, and limit rice/pasta/breads/starches (no more than 45-50 grams per meal).  Maintain healthy weight with goal BMI <30.  Exercise 5 times per week for 30 minutes per day.  Stressed importance of daily foot exams.  Stressed importance of annual dilated eye exam.           Relevant Medications    LANTUS SOLOSTAR U-100 INSULIN 100 unit/mL (3 mL) InPn pen    Other Relevant Orders    POCT HEMOGLOBIN A1C (Completed)     Other Visit Diagnoses         Need for shingles vaccine        Relevant Medications    varicella zoster (Shingrix) IM vaccine (>/= 49 yo)            Future Appointments   Date Time Provider Department Center   4/1/2025 10:30 AM SURGERY CLINIC, [facility] GENERAL SURGERY [facility] GENSUR [place]   5/1/2025  8:30 AM [facility] MAMMO1 [facility] MAMMO [place]   5/6/2025  1:00 PM Priscilla Strickland, [facility] INTMED [place]   5/7/2025  1:30 PM Parisa Gamez FNP [facility] OPWND [place]   6/3/2025  8:00 AM Lynda Paige RD, [facility] FMDEDU [place]   6/16/2025  9:10 AM PROVIDERS, [facility] OPHTH [place]   7/8/2025  2:30 PM PROVIDER, [facility] PULMONOLOGY [facility] PULM [place]   8/7/2025 10:15 AM Sandra Patel PA-C [facility] CARD [place]   8/21/2025  8:00 AM Jacqueline Urbina, NP [facility] ENDOCR [place]   1/9/2026  8:30 AM Zully Ang, NOEMI [facility] GYN [place]            Signature:      [facility]  [address] [facility] [address]    Date of encounter: 3/25/25

## 2025-04-01 ENCOUNTER — OFFICE VISIT (OUTPATIENT)
Dept: SURGERY | Facility: CLINIC | Age: 60
End: 2025-04-01
Payer: MEDICAID

## 2025-04-01 VITALS
DIASTOLIC BLOOD PRESSURE: 74 MMHG | HEIGHT: 63 IN | SYSTOLIC BLOOD PRESSURE: 110 MMHG | WEIGHT: 284 LBS | TEMPERATURE: 98 F | RESPIRATION RATE: 24 BRPM | OXYGEN SATURATION: 94 % | BODY MASS INDEX: 50.32 KG/M2 | HEART RATE: 71 BPM

## 2025-04-01 DIAGNOSIS — R22.31 SKIN LUMP OF ARM, RIGHT: Primary | ICD-10-CM

## 2025-04-01 PROCEDURE — 99215 OFFICE O/P EST HI 40 MIN: CPT | Mod: PBBFAC

## 2025-04-01 RX ORDER — CEFAZOLIN SODIUM 2 G/50ML
2 SOLUTION INTRAVENOUS
OUTPATIENT
Start: 2025-04-01

## 2025-04-01 RX ORDER — SODIUM CHLORIDE 9 MG/ML
INJECTION, SOLUTION INTRAVENOUS CONTINUOUS
OUTPATIENT
Start: 2025-04-01

## 2025-04-01 RX ORDER — HEPARIN SODIUM 5000 [USP'U]/ML
5000 INJECTION, SOLUTION INTRAVENOUS; SUBCUTANEOUS
OUTPATIENT
Start: 2025-04-01 | End: 2025-04-01

## 2025-04-01 NOTE — PROGRESS NOTES
Pt in Surgery clinic today for referral.  Evaluated per Dr. ZOHAIB Grant.  Surgery scheduled for 4/25/2025.  Consents signed and witnessed.  Surgery instructions given to pt orally and written.  Pt voicing understanding.  Appt given for 2 week post-op.  Message sent to Dr. DEVAN Blackburn and Dr. SANDY Byrd concerning surgery clearance (Pulmonology) and order in for consultation.  Cardiology faxed for surgery clearance and recommendation on Aspirin and consult order in.

## 2025-04-01 NOTE — PROGRESS NOTES
I have reviewed the notes, assessments, and/or procedures performed by Dr Toledo, I concur with her/his documentation of Linn Mckeon.  Date of Service: 4/1/2025    [facility]

## 2025-04-01 NOTE — PROGRESS NOTES
[facility] General Surgery Clinic Note    HPI:   Linn Mckeon is a 59y.o female w/ PMHx of T2DM, HTN, Class II HFpEF, COPD, DAVION referred for mass of distal right arm for the past year. PT denies any growth or notable changes to the mass, fever, pain, or edema. Not associated with weakness or numbness in upper extremity. No history of skin masses. Denies h/o cancer. Family history positive for thyroid cancer in mother and pancreatic cancer in sister.     US (12/24) showed nonspecific soft tissue mass with some scattered small echogenic foci.   MRI (1/25) remarkable for fusiform subcutaneous mass of proximal forearm     PMH:   Past Medical History:   Diagnosis Date    Callus of foot 07/07/2022    CHF (congestive heart failure)     Chronic heart failure with preserved ejection fraction 05/20/2022    COPD (chronic obstructive pulmonary disease)     Diabetes mellitus     GOODMAN (dyspnea on exertion) 05/20/2022    Dystrophic nail 07/07/2022    Hyperlipidemia     Hyperlipidemia LDL goal <70 05/20/2022    Hypertension     Morbid obesity with body mass index (BMI) greater than or equal to 50 07/07/2022    Non compliance w medication regimen 07/07/2022    Noncompliance with treatment plan 07/07/2022    DAVION on CPAP 05/20/2022    Peripheral edema 05/20/2022    Primary hypertension 05/20/2022    Type 2 diabetes mellitus with skin complication 05/20/2022    Xerosis of skin 07/07/2022      Meds: Current Medications[1]  Allergies: Review of patient's allergies indicates:  No Known Allergies  Social History: Social History[2]  Family History:   Family History   Problem Relation Name Age of Onset    Hypertension Mother Daishagiorgi Hood     Heart disease Mother Rafaelverena Hood     Diabetes Mother Jesseniadonna Hood     Heart attack Father      Diabetes Sister Ellie Aguiar     Hypertension Sister Ellie Aguiar     Diabetes Brother Kushal Hood     Hypertension Brother Kushal Hood      Surgical History:   Past Surgical History:  "  Procedure Laterality Date    COLONOSCOPY, WITH POLYPECTOMY USING HOT BIOPSY FORCEPS N/A 01/25/2024    Procedure: COLONOSCOPY, WITH POLYPECTOMY USING HOT BIOPSY FORCEPS;  Surgeon: Yan Bashir MD;  Location: [facility] ENDOSCOPY;  Service: Endoscopy;  Laterality: N/A;  Cold forceps    HYSTERECTOMY       Review of Systems:  Skin: No rashes or itching.  Head: Denies headache or recent trauma.  Eyes: Denies eye pain or double vision.  Neck: Denies swelling or hoarseness of voice.  Respiratory: No respiratory distress. On 2L home oxygen via nasal cannula.  Cardiac: Denies palpitations or swelling in hands/feet.  Gastrointestinal: Denies nausea, denies vomiting.   Urinary: Denies dysuria or hematuria.  Vascular: Denies claudication or leg swelling.  Neuro: Denies motor deficits. Denies weakness.  Endocrine: Denies excessive sweating or cold intolerance.  Psych: Denies memory problems. Denies anxiety.  Skin: 2" x 1" firm, immobile skin mass on dorsal distal forearm. Mass is non tender. No noted skin color changes, edema, or bruising in this area.     Objective:    Vitals:  Vitals:    04/01/25 1026   BP: 110/74   Pulse: 71   Resp: (!) 24   Temp: 98 °F (36.7 °C)        Physical Exam:  Gen: NAD  Neuro: awake, alert, answering questions appropriately  CV: RRR  Resp: non-labored breathing, KWAKU  Abd: soft, ND, NT  : deferred  Ext: moves all 4 spontaneously and purposefully  Skin: warm, well perfused    Pertinent Labs:  N/A    Imaging:  MRI FOREARM WITHOUT CONTRAST RIGHT (1/25)  Impression:     A 24 mm x 11 mm by 20 mm fusiform subcutaneous mass is present to the lateral volar aspect of the proximal forearm and demonstrates a disorganized internal architecture with a small cystic component. The signal characteristics are primarily hyperintense on fluid sensitive sequences and isointense on T1 weighted sequences. The mass is not clearly contiguous with an adjacent vessel or nerve and does not appear to invade the underlying " "muscles.  The appearance is not characteristic of a classically benign subcutaneous lesion (although peripheral nerve sheath tumors and other benign lesions could have this appearance).  Malignant soft tissue neoplasm (pleomorphic sarcoma) cannot be excluded and should be evaluated before a benign process is considered.     US SOFT TISSUE, UPPER EXTREMITY, RIGHT (12/24)  Impression:     Nonspecific soft tissue mass is seen with some scattered small echogenic foci which could reflect possible calcifications.  This finding is indeterminate.  Recommend correlation with pre and post-contrast MRI.    Micro/Path/Other:  N/A    Assessment/Plan:  Linn Mckeon is a 59y.o female w/ PMHx of T2DM, HTN, Class II HFpEF, COPD, DAVION referred for 2-3" lipoma of dorsal distal right arm for the past year.     - Request clearance from pulmonology and cardiology prior to scheduling lipoma excision on 4/25  - Return to clinic 2 weeks post op    Jesica Dominguez, MS3  [facility] Surgery     Note reviewed and edited as needed, patient with multiple comorbidities with a right dorsal forearm mass, we will seek clearance by Pulmonary and Cardiology teams prior to excision likely under local in the OR on 04/25.    Katty Toledo MD   [facility] General surgery PGY 3         [1]   Current Outpatient Medications:     albuterol (PROVENTIL) 2.5 mg /3 mL (0.083 %) nebulizer solution, Take 3 mLs (2.5 mg total) by nebulization every 6 (six) hours as needed for Wheezing. Rescue, Disp: 90 mL, Rfl: 1    ammonium lactate (LAC-HYDRIN) 12 % lotion, APPLY TO DRY SKIN AND RUB IN WELL TWICE DAILY. NOTHING BETWEEN TOES, Disp: 226 g, Rfl: 11    aspirin (ECOTRIN) 81 MG EC tablet, Take 81 mg by mouth once daily., Disp: , Rfl:     atorvastatin (LIPITOR) 80 MG tablet, Take 1 tablet (80 mg total) by mouth once daily., Disp: 90 tablet, Rfl: 3    BD ULTRA-FINE JORDON PEN NEEDLE 32 gauge x 5/32" Ndle, 2 (two) times daily., Disp: , Rfl:     BD ULTRA-FINE JORDON PEN NEEDLE " "32 gauge x 5/32" Raza, , Disp: , Rfl:     blood sugar diagnostic (TRUE METRIX GLUCOSE TEST STRIP) Strp, Test Blood Sugar 2 times a day, Disp: 100 strip, Rfl: 11    blood-glucose meter,continuous (DEXCOM G7 ) Misc, Dexcom G7  uses directed, Disp: 1 each, Rfl: 0    blood-glucose sensor (DEXCOM G7 SENSOR) Norma, Use every 10 days as directed, Disp: 3 each, Rfl: 11    CICLOPIROX-ITRACONAZOLE-UREA TOP, CLEAN NAILS, SHAKE BOTTLE WELL, APPLY TWICE DAILY TO ALL AFFECTED NAILS USING APPLICATOR. (UP TO 2 MLS/DAY), Disp: , Rfl:     empagliflozin (JARDIANCE) 25 mg tablet, Take 1 tablet (25 mg total) by mouth once daily., Disp: 90 tablet, Rfl: 2    ergocalciferol (ERGOCALCIFEROL) 50,000 unit Cap, Take 1 capsule (50,000 Units total) by mouth every 7 days., Disp: 12 capsule, Rfl: 2    ezetimibe (ZETIA) 10 mg tablet, Take 1 tablet (10 mg total) by mouth once daily., Disp: 90 tablet, Rfl: 3    fluticasone-umeclidin-vilanter (TRELEGY ELLIPTA) 100-62.5-25 mcg DsDv, Inhale 1 puff into the lungs once daily., Disp: 28 each, Rfl: 3    furosemide (LASIX) 40 MG tablet, Take 1 tablet (40 mg total) by mouth 2 (two) times a day., Disp: 180 tablet, Rfl: 3    insulin glargine-yfgn 100 unit/mL (3 mL) InPn, Inject 43 Units into the skin once daily., Disp: , Rfl:     ipratropium-albuteroL (COMBIVENT RESPIMAT)  mcg/actuation inhaler, Inhale 2 puffs into the lungs every 6 (six) hours as needed for Wheezing. Rescue, Disp: 4 g, Rfl: 1    ketoconazole (NIZORAL) 2 % cream, Apply topically once daily., Disp: 30 g, Rfl: 1    lancets (ACCU-CHEK FASTCLIX LANCET DRUM) Misc, USE ONE DAILY, Disp: 100 each, Rfl: 11    LANCETS MISC, , Disp: , Rfl:     LANTUS SOLOSTAR U-100 INSULIN 100 unit/mL (3 mL) InPn pen, Inject 40U at breakfast and 20U at bedtime subcutaneously, Disp: , Rfl:     latanoprost 0.005 % ophthalmic solution, Apply 1 drop to eye., Disp: , Rfl:     metFORMIN (GLUCOPHAGE) 1000 MG tablet, TAKE 1 TABLET BY MOUTH TWICE DAILY WITH " "MEALS, Disp: 60 tablet, Rfl: 5    metoprolol succinate (TOPROL-XL) 100 MG 24 hr tablet, Take 1 tablet (100 mg total) by mouth once daily., Disp: 30 tablet, Rfl: 11    nystatin (MYCOSTATIN) ointment, Apply topically 2 (two) times daily., Disp: 15 g, Rfl: 2    pen needle, diabetic (BD ULTRA-FINE JORDON PEN NEEDLE) 32 gauge x 5/32" Ndle, USE TWICE DAILY, Disp: 60 each, Rfl: 11    sacubitriL-valsartan (ENTRESTO) 49-51 mg per tablet, Take 1 tablet by mouth 2 (two) times daily., Disp: 180 tablet, Rfl: 3    SITagliptin phosphate (JANUVIA) 100 MG Tab, Take 1 tablet (100 mg total) by mouth once daily., Disp: 90 tablet, Rfl: 3    spironolactone (ALDACTONE) 25 MG tablet, Take 1 tablet (25 mg total) by mouth once daily., Disp: 90 tablet, Rfl: 3    TRUEPLUS LANCETS 33 gauge Misc, Apply topically 2 (two) times daily., Disp: , Rfl:     blood-glucose meter kit, To check BG 2 times daily, to use with insurance preferred meter, Disp: 1 each, Rfl: 0    fluticasone propionate (FLONASE) 50 mcg/actuation nasal spray, 1 spray (50 mcg total) by Each Nostril route once daily., Disp: 11.1 mL, Rfl: 1  [2]   Social History  Tobacco Use    Smoking status: Every Day     Current packs/day: 0.50     Average packs/day: 0.5 packs/day for 42.2 years (21.1 ttl pk-yrs)     Types: Cigarettes     Start date: 1983    Smokeless tobacco: Never   Substance Use Topics    Alcohol use: Not Currently    Drug use: Never     "

## 2025-04-01 NOTE — H&P (VIEW-ONLY)
[facility] General Surgery Clinic Note    HPI:   Linn Mckeon is a 59y.o female w/ PMHx of T2DM, HTN, Class II HFpEF, COPD, DAVION referred for mass of distal right arm for the past year. PT denies any growth or notable changes to the mass, fever, pain, or edema. Not associated with weakness or numbness in upper extremity. No history of skin masses. Denies h/o cancer. Family history positive for thyroid cancer in mother and pancreatic cancer in sister.     US (12/24) showed nonspecific soft tissue mass with some scattered small echogenic foci.   MRI (1/25) remarkable for fusiform subcutaneous mass of proximal forearm     PMH:   Past Medical History:   Diagnosis Date    Callus of foot 07/07/2022    CHF (congestive heart failure)     Chronic heart failure with preserved ejection fraction 05/20/2022    COPD (chronic obstructive pulmonary disease)     Diabetes mellitus     GOODMAN (dyspnea on exertion) 05/20/2022    Dystrophic nail 07/07/2022    Hyperlipidemia     Hyperlipidemia LDL goal <70 05/20/2022    Hypertension     Morbid obesity with body mass index (BMI) greater than or equal to 50 07/07/2022    Non compliance w medication regimen 07/07/2022    Noncompliance with treatment plan 07/07/2022    DAVION on CPAP 05/20/2022    Peripheral edema 05/20/2022    Primary hypertension 05/20/2022    Type 2 diabetes mellitus with skin complication 05/20/2022    Xerosis of skin 07/07/2022      Meds: Current Medications[1]  Allergies: Review of patient's allergies indicates:  No Known Allergies  Social History: Social History[2]  Family History:   Family History   Problem Relation Name Age of Onset    Hypertension Mother Daishagiorgi Hood     Heart disease Mother Rafaelverena Hood     Diabetes Mother Jesseniadonna Hood     Heart attack Father      Diabetes Sister Ellie Aguiar     Hypertension Sister Ellie Aguiar     Diabetes Brother Kushal Hood     Hypertension Brother Kushal Hood      Surgical History:   Past Surgical History:  "  Procedure Laterality Date    COLONOSCOPY, WITH POLYPECTOMY USING HOT BIOPSY FORCEPS N/A 01/25/2024    Procedure: COLONOSCOPY, WITH POLYPECTOMY USING HOT BIOPSY FORCEPS;  Surgeon: Yan Bashir MD;  Location: [facility] ENDOSCOPY;  Service: Endoscopy;  Laterality: N/A;  Cold forceps    HYSTERECTOMY       Review of Systems:  Skin: No rashes or itching.  Head: Denies headache or recent trauma.  Eyes: Denies eye pain or double vision.  Neck: Denies swelling or hoarseness of voice.  Respiratory: No respiratory distress. On 2L home oxygen via nasal cannula.  Cardiac: Denies palpitations or swelling in hands/feet.  Gastrointestinal: Denies nausea, denies vomiting.   Urinary: Denies dysuria or hematuria.  Vascular: Denies claudication or leg swelling.  Neuro: Denies motor deficits. Denies weakness.  Endocrine: Denies excessive sweating or cold intolerance.  Psych: Denies memory problems. Denies anxiety.  Skin: 2" x 1" firm, immobile skin mass on dorsal distal forearm. Mass is non tender. No noted skin color changes, edema, or bruising in this area.     Objective:    Vitals:  Vitals:    04/01/25 1026   BP: 110/74   Pulse: 71   Resp: (!) 24   Temp: 98 °F (36.7 °C)        Physical Exam:  Gen: NAD  Neuro: awake, alert, answering questions appropriately  CV: RRR  Resp: non-labored breathing, KWAKU  Abd: soft, ND, NT  : deferred  Ext: moves all 4 spontaneously and purposefully  Skin: warm, well perfused    Pertinent Labs:  N/A    Imaging:  MRI FOREARM WITHOUT CONTRAST RIGHT (1/25)  Impression:     A 24 mm x 11 mm by 20 mm fusiform subcutaneous mass is present to the lateral volar aspect of the proximal forearm and demonstrates a disorganized internal architecture with a small cystic component. The signal characteristics are primarily hyperintense on fluid sensitive sequences and isointense on T1 weighted sequences. The mass is not clearly contiguous with an adjacent vessel or nerve and does not appear to invade the underlying " "muscles.  The appearance is not characteristic of a classically benign subcutaneous lesion (although peripheral nerve sheath tumors and other benign lesions could have this appearance).  Malignant soft tissue neoplasm (pleomorphic sarcoma) cannot be excluded and should be evaluated before a benign process is considered.     US SOFT TISSUE, UPPER EXTREMITY, RIGHT (12/24)  Impression:     Nonspecific soft tissue mass is seen with some scattered small echogenic foci which could reflect possible calcifications.  This finding is indeterminate.  Recommend correlation with pre and post-contrast MRI.    Micro/Path/Other:  N/A    Assessment/Plan:  Linn Mckeon is a 59y.o female w/ PMHx of T2DM, HTN, Class II HFpEF, COPD, DAVION referred for 2-3" lipoma of dorsal distal right arm for the past year.     - Request clearance from pulmonology and cardiology prior to scheduling lipoma excision on 4/25  - Return to clinic 2 weeks post op    Jesica Dominguez, MS3  [facility]     Note reviewed and edited as needed, patient with multiple comorbidities with a right dorsal forearm mass, we will seek clearance by Pulmonary and Cardiology teams prior to excision likely under local in the OR on 04/25.    Katty Toledo MD   [facility] General surgery PGY 3         [1]   Current Outpatient Medications:     albuterol (PROVENTIL) 2.5 mg /3 mL (0.083 %) nebulizer solution, Take 3 mLs (2.5 mg total) by nebulization every 6 (six) hours as needed for Wheezing. Rescue, Disp: 90 mL, Rfl: 1    ammonium lactate (LAC-HYDRIN) 12 % lotion, APPLY TO DRY SKIN AND RUB IN WELL TWICE DAILY. NOTHING BETWEEN TOES, Disp: 226 g, Rfl: 11    aspirin (ECOTRIN) 81 MG EC tablet, Take 81 mg by mouth once daily., Disp: , Rfl:     atorvastatin (LIPITOR) 80 MG tablet, Take 1 tablet (80 mg total) by mouth once daily., Disp: 90 tablet, Rfl: 3    BD ULTRA-FINE JORDON PEN NEEDLE 32 gauge x 5/32" Ndle, 2 (two) times daily., Disp: , Rfl:     BD ULTRA-FINE JORDON PEN NEEDLE " "32 gauge x 5/32" Raza, , Disp: , Rfl:     blood sugar diagnostic (TRUE METRIX GLUCOSE TEST STRIP) Strp, Test Blood Sugar 2 times a day, Disp: 100 strip, Rfl: 11    blood-glucose meter,continuous (DEXCOM G7 ) Misc, Dexcom G7  uses directed, Disp: 1 each, Rfl: 0    blood-glucose sensor (DEXCOM G7 SENSOR) Norma, Use every 10 days as directed, Disp: 3 each, Rfl: 11    CICLOPIROX-ITRACONAZOLE-UREA TOP, CLEAN NAILS, SHAKE BOTTLE WELL, APPLY TWICE DAILY TO ALL AFFECTED NAILS USING APPLICATOR. (UP TO 2 MLS/DAY), Disp: , Rfl:     empagliflozin (JARDIANCE) 25 mg tablet, Take 1 tablet (25 mg total) by mouth once daily., Disp: 90 tablet, Rfl: 2    ergocalciferol (ERGOCALCIFEROL) 50,000 unit Cap, Take 1 capsule (50,000 Units total) by mouth every 7 days., Disp: 12 capsule, Rfl: 2    ezetimibe (ZETIA) 10 mg tablet, Take 1 tablet (10 mg total) by mouth once daily., Disp: 90 tablet, Rfl: 3    fluticasone-umeclidin-vilanter (TRELEGY ELLIPTA) 100-62.5-25 mcg DsDv, Inhale 1 puff into the lungs once daily., Disp: 28 each, Rfl: 3    furosemide (LASIX) 40 MG tablet, Take 1 tablet (40 mg total) by mouth 2 (two) times a day., Disp: 180 tablet, Rfl: 3    insulin glargine-yfgn 100 unit/mL (3 mL) InPn, Inject 43 Units into the skin once daily., Disp: , Rfl:     ipratropium-albuteroL (COMBIVENT RESPIMAT)  mcg/actuation inhaler, Inhale 2 puffs into the lungs every 6 (six) hours as needed for Wheezing. Rescue, Disp: 4 g, Rfl: 1    ketoconazole (NIZORAL) 2 % cream, Apply topically once daily., Disp: 30 g, Rfl: 1    lancets (ACCU-CHEK FASTCLIX LANCET DRUM) Misc, USE ONE DAILY, Disp: 100 each, Rfl: 11    LANCETS MISC, , Disp: , Rfl:     LANTUS SOLOSTAR U-100 INSULIN 100 unit/mL (3 mL) InPn pen, Inject 40U at breakfast and 20U at bedtime subcutaneously, Disp: , Rfl:     latanoprost 0.005 % ophthalmic solution, Apply 1 drop to eye., Disp: , Rfl:     metFORMIN (GLUCOPHAGE) 1000 MG tablet, TAKE 1 TABLET BY MOUTH TWICE DAILY WITH " "MEALS, Disp: 60 tablet, Rfl: 5    metoprolol succinate (TOPROL-XL) 100 MG 24 hr tablet, Take 1 tablet (100 mg total) by mouth once daily., Disp: 30 tablet, Rfl: 11    nystatin (MYCOSTATIN) ointment, Apply topically 2 (two) times daily., Disp: 15 g, Rfl: 2    pen needle, diabetic (BD ULTRA-FINE JORDON PEN NEEDLE) 32 gauge x 5/32" Ndle, USE TWICE DAILY, Disp: 60 each, Rfl: 11    sacubitriL-valsartan (ENTRESTO) 49-51 mg per tablet, Take 1 tablet by mouth 2 (two) times daily., Disp: 180 tablet, Rfl: 3    SITagliptin phosphate (JANUVIA) 100 MG Tab, Take 1 tablet (100 mg total) by mouth once daily., Disp: 90 tablet, Rfl: 3    spironolactone (ALDACTONE) 25 MG tablet, Take 1 tablet (25 mg total) by mouth once daily., Disp: 90 tablet, Rfl: 3    TRUEPLUS LANCETS 33 gauge Misc, Apply topically 2 (two) times daily., Disp: , Rfl:     blood-glucose meter kit, To check BG 2 times daily, to use with insurance preferred meter, Disp: 1 each, Rfl: 0    fluticasone propionate (FLONASE) 50 mcg/actuation nasal spray, 1 spray (50 mcg total) by Each Nostril route once daily., Disp: 11.1 mL, Rfl: 1  [2]   Social History  Tobacco Use    Smoking status: Every Day     Current packs/day: 0.50     Average packs/day: 0.5 packs/day for 42.2 years (21.1 ttl pk-yrs)     Types: Cigarettes     Start date: 1983    Smokeless tobacco: Never   Substance Use Topics    Alcohol use: Not Currently    Drug use: Never     "

## 2025-04-08 ENCOUNTER — OFFICE VISIT (OUTPATIENT)
Dept: CARDIOLOGY | Facility: CLINIC | Age: 60
End: 2025-04-08
Payer: MEDICAID

## 2025-04-08 VITALS
HEART RATE: 66 BPM | HEIGHT: 64 IN | BODY MASS INDEX: 47.97 KG/M2 | OXYGEN SATURATION: 98 % | RESPIRATION RATE: 18 BRPM | SYSTOLIC BLOOD PRESSURE: 104 MMHG | WEIGHT: 281 LBS | TEMPERATURE: 98 F | DIASTOLIC BLOOD PRESSURE: 60 MMHG

## 2025-04-08 DIAGNOSIS — E78.5 HYPERLIPIDEMIA LDL GOAL <70: Chronic | ICD-10-CM

## 2025-04-08 DIAGNOSIS — R06.09 DOE (DYSPNEA ON EXERTION): ICD-10-CM

## 2025-04-08 DIAGNOSIS — E66.813 CLASS 3 SEVERE OBESITY DUE TO EXCESS CALORIES WITH SERIOUS COMORBIDITY AND BODY MASS INDEX (BMI) OF 45.0 TO 49.9 IN ADULT: Chronic | ICD-10-CM

## 2025-04-08 DIAGNOSIS — Z99.81 OXYGEN DEPENDENT: ICD-10-CM

## 2025-04-08 DIAGNOSIS — I10 PRIMARY HYPERTENSION: Chronic | ICD-10-CM

## 2025-04-08 DIAGNOSIS — E66.01 CLASS 3 SEVERE OBESITY DUE TO EXCESS CALORIES WITH SERIOUS COMORBIDITY AND BODY MASS INDEX (BMI) OF 45.0 TO 49.9 IN ADULT: Chronic | ICD-10-CM

## 2025-04-08 DIAGNOSIS — J43.1 PANLOBULAR EMPHYSEMA: Primary | Chronic | ICD-10-CM

## 2025-04-08 DIAGNOSIS — E11.65 TYPE 2 DIABETES MELLITUS WITH HYPERGLYCEMIA, WITH LONG-TERM CURRENT USE OF INSULIN: Chronic | ICD-10-CM

## 2025-04-08 DIAGNOSIS — Z79.4 TYPE 2 DIABETES MELLITUS WITH HYPERGLYCEMIA, WITH LONG-TERM CURRENT USE OF INSULIN: Chronic | ICD-10-CM

## 2025-04-08 DIAGNOSIS — G47.33 OSA ON CPAP: Chronic | ICD-10-CM

## 2025-04-08 DIAGNOSIS — F17.210 CIGARETTE NICOTINE DEPENDENCE WITHOUT COMPLICATION: Chronic | ICD-10-CM

## 2025-04-08 DIAGNOSIS — I50.20 HEART FAILURE WITH REDUCED EJECTION FRACTION: Chronic | ICD-10-CM

## 2025-04-08 PROCEDURE — 99215 OFFICE O/P EST HI 40 MIN: CPT | Mod: PBBFAC | Performed by: INTERNAL MEDICINE

## 2025-04-08 RX ORDER — METOPROLOL SUCCINATE 50 MG/1
50 TABLET, EXTENDED RELEASE ORAL DAILY
Qty: 90 TABLET | Refills: 3 | Status: SHIPPED | OUTPATIENT
Start: 2025-04-08 | End: 2026-04-08

## 2025-04-08 RX ORDER — ALBUTEROL SULFATE 90 UG/1
2 INHALANT RESPIRATORY (INHALATION) EVERY 6 HOURS PRN
Qty: 18 G | Refills: 2 | Status: SHIPPED | OUTPATIENT
Start: 2025-04-08 | End: 2026-04-08

## 2025-04-08 NOTE — PROGRESS NOTES
CHIEF COMPLAINT:   Chief Complaint   Patient presents with    Clearance for rt arm mass removal. Pt denies CV complaints.                                     HPI:  Linn Mckeon 59 y.o. female  with a PMH significant for HFrecEF (40% in 2022), DM II (A1c 8.5), DAVION on BiPAP, HTN, HLD, Depression, Chronic hypoxic respirtory failure/COPD on home 2L O2, and current tobacco use who presents to cardiology clinic for preoperative evaluation. She is planned to have lipoma removal on her right arm under general anesthesia.    She has baseline COPD that is severe which is the main cause of her shortness of breath- she uses supplemental O2, especially when out in public. Echo completed November 2023 revealed EF of 59%, mild RV enlargement, overall normal systolic function, mild ID, otherwise unremarkable.  See full report below.      She is currently still smoking, 1/2 ppd. Able to walk without assistive devices. Not able to walk 1 block due to dyspnea from her COPD which is chronic. Not needing rescue inhaler. Denies any chest pain. Sleeps on 3 pillows for orthopnea which is chronic.  Symptoms of volume overload is swelling to face, legs, arms, abdomen which she is denying now that her lasix 40 mg BID was increased.     Historical Information: Patient had echocardiogram 11/2023 with normal EF 60% and not other significant abnormalities. Patient completed Lexiscan stress test on September 7, 2022 which was normal.  She also completed KWADWO testing on September 13, 2022 which revealed mild arterial flow reduction in the bilateral lower extremities.                                                                                                                                                                                                                                                                                                                                                                                                                                                                                      CARDIAC TESTING:  Echo 11.3.23    Left Ventricle: There is mild concentric hypertrophy. There is normal systolic function. Biplane (2D) method of discs ejection fraction is 59%.    Right Ventricle: Mild right ventricular enlargement. Systolic function is normal.    Aortic Valve: The aortic valve is a trileaflet valve. There is mild aortic valve sclerosis. There is normal leaflet mobility.    Mitral Valve: The mitral valve is structurally normal.    Pulmonic Valve: There is mild regurgitation.    IVC/SVC: Normal venous pressure at 3 mmHg.    Echo - 7/26/2022   Interpretation Summary  · Concentric hypertrophy and mildly decreased systolic function.  · The estimated ejection fraction is 40%.  · Grade I left ventricular diastolic dysfunction.  · Severe right ventricular enlargement.  · Mild left atrial enlargement.  · Moderate right atrial enlargement.  · Mild mitral regurgitation.  · Mild tricuspid regurgitation.  · Elevated central venous pressure (15 mmHg).  · The estimated PA systolic pressure is 56 mmHg.  · There is pulmonary hypertension.  · IVC is dilated and collapses<50% with inspiration.  · Mild to moderate pulmonic regurgitation.     Stress Test  Results for orders placed during the hospital encounter of 08/30/22  Nuclear Stress - Cardiology Interpreted  Interpretation Summary    Normal myocardial perfusion scan. There is no evidence of myocardial ischemia or infarction.    The gated perfusion images showed an ejection fraction of 55% at rest. The gated perfusion images showed an ejection fraction of 51% post stress.    The EKG portion of this study is abnormal but not diagnostic.    The patient reported no chest pain during the stress test.    There were no arrhythmias during stress.  The patient has a low risk nuclear stress alex.  Nuclear stress test indicates no ischemia.     Lexiscan Stress Test 9.7.22:  Normal myocardial  perfusion scan. There is no evidence of myocardial ischemia or infarction.  The gated perfusion images showed an ejection fraction of 55% at rest. The gated perfusion images showed an ejection fraction of 51% post stress.  The EKG portion of this study is abnormal but not diagnostic.  The patient reported no chest pain during the stress test.  There were no arrhythmias during stress.     KWADWO Testing 9.13.22:  The right lower extremity demonstrated multiphasic waveforms at all levels.   The KWADWO on the right was mildly abnormal.  The right lower extremity demonstrated mild arterial flow reduction.  The left lower extremity demonstrated multiphasic waveforms at all levels.   The KWADWO on the left was mildly abnormal.  The left lower extremity demonstrated mild arterial flow reduction.          Patient Active Problem List   Diagnosis    GOODMAN (dyspnea on exertion)    Chronic heart failure with preserved ejection fraction    Primary hypertension    Hyperlipidemia LDL goal <70    Type 2 diabetes mellitus with hyperglycemia, with long-term current use of insulin    Peripheral edema    DAVION on CPAP    Class 3 severe obesity due to excess calories with serious comorbidity and body mass index (BMI) of 45.0 to 49.9 in adult    Noncompliance with treatment plan    Xerosis of skin    Callus of foot    Dystrophic nail    Non compliance w medication regimen    Heart failure with reduced ejection fraction    Cigarette nicotine dependence without complication    COPD (chronic obstructive pulmonary disease)    Vitamin D deficiency    Mycotic toenails    Encounter for comprehensive diabetic foot examination, type 2 diabetes mellitus    BMI 45.0-49.9, adult    Type 2 diabetes mellitus with skin complication, with long-term current use of insulin    Right foot pain    Bilateral foot pain    Impaired gait    Diverticulosis large intestine w/o perforation or abscess w/o bleeding    Adenomatous polyp of descending colon    Adenomatous polyp of  sigmoid colon    Adenomatous rectal polyp    Plantar warts    Oxygen dependent     Past Surgical History:   Procedure Laterality Date    COLONOSCOPY, WITH POLYPECTOMY USING HOT BIOPSY FORCEPS N/A 01/25/2024    Procedure: COLONOSCOPY, WITH POLYPECTOMY USING HOT BIOPSY FORCEPS;  Surgeon: Mckay, Yan KATE MD;  Location: [facility] ENDOSCOPY;  Service: Endoscopy;  Laterality: N/A;  Cold forceps    HYSTERECTOMY       Social History     Socioeconomic History    Marital status:     Number of children: 2   Tobacco Use    Smoking status: Every Day     Current packs/day: 0.50     Average packs/day: 0.5 packs/day for 42.3 years (21.1 ttl pk-yrs)     Types: Cigarettes     Start date: 1983    Smokeless tobacco: Never   Substance and Sexual Activity    Alcohol use: Not Currently    Drug use: Never    Sexual activity: Not Currently     Social Drivers of Health     Financial Resource Strain: Low Risk  (3/21/2024)    Overall Financial Resource Strain (CARDIA)     Difficulty of Paying Living Expenses: Not hard at all   Food Insecurity: No Food Insecurity (3/21/2024)    Hunger Vital Sign     Worried About Running Out of Food in the Last Year: Never true     Ran Out of Food in the Last Year: Never true   Transportation Needs: No Transportation Needs (9/4/2024)    TRANSPORTATION NEEDS     Transportation : No   Physical Activity: Inactive (9/4/2024)    Exercise Vital Sign     Days of Exercise per Week: 0 days     Minutes of Exercise per Session: 0 min   Stress: No Stress Concern Present (3/21/2024)    [employer] [place] [employer] - Occupational Stress Questionnaire     Feeling of Stress : Not at all   Housing Stability: Unknown (9/4/2024)    Housing Stability Vital Sign     Unable to Pay for Housing in the Last Year: No     Homeless in the Last Year: No        Family History   Problem Relation Name Age of Onset    Hypertension Mother Rex Hood     Heart disease Mother Rex Hood     Diabetes Mother Rex  "Erwin     Heart attack Father      Diabetes Sister Ellie Aguiar     Hypertension Sister Ellie Aguiar     Diabetes Brother Kushal Hood     Hypertension Brother Kushal Hood      Review of patient's allergies indicates:  No Known Allergies      ROS:                                                                                                                                                                             Negative except as stated in the history of present illness. See HPI for details.    PHYSICAL EXAM:  Visit Vitals  /60 (BP Location: Left arm, Patient Position: Sitting)   Pulse 66   Temp 98.1 °F (36.7 °C)   Resp 18   Ht 5' 3.78" (1.62 m)   Wt 127.5 kg (281 lb)   SpO2 98%   BMI 48.57 kg/m²       Physical Exam  Constitutional:       Appearance: She is obese.   HENT:      Head: Normocephalic and atraumatic.      Nose: Nose normal.   Eyes:      General: No scleral icterus.     Extraocular Movements: Extraocular movements intact.   Cardiovascular:      Rate and Rhythm: Normal rate and regular rhythm.      Pulses: Normal pulses.      Heart sounds: No murmur heard.  Pulmonary:      Effort: No respiratory distress.      Breath sounds: No wheezing.      Comments: On home oxygen  Abdominal:      General: There is no distension.      Palpations: Abdomen is soft.      Tenderness: There is no abdominal tenderness.   Musculoskeletal:         General: Normal range of motion.      Cervical back: Normal range of motion.      Right lower leg: No edema.      Left lower leg: No edema.   Skin:     General: Skin is warm and dry.   Neurological:      General: No focal deficit present.      Mental Status: She is alert and oriented to person, place, and time.   Psychiatric:         Mood and Affect: Mood normal.         Current Outpatient Medications   Medication Instructions    albuterol (PROVENTIL) 2.5 mg, Nebulization, Every 6 hours PRN, Rescue    ammonium lactate (LAC-HYDRIN) 12 % lotion APPLY TO DRY " "SKIN AND RUB IN WELL TWICE DAILY. NOTHING BETWEEN TOES    aspirin (ECOTRIN) 81 mg, Daily    atorvastatin (LIPITOR) 80 mg, Oral, Daily    BD ULTRA-FINE JORDON PEN NEEDLE 32 gauge x 5/32" Ndle 2 times daily    BD ULTRA-FINE JORDON PEN NEEDLE 32 gauge x 5/32" Ndle No dose, route, or frequency recorded.    blood sugar diagnostic (TRUE METRIX GLUCOSE TEST STRIP) Strp Test Blood Sugar 2 times a day    blood-glucose meter kit To check BG 2 times daily, to use with insurance preferred meter    blood-glucose meter,continuous (DEXCOM G7 ) Misc Dexcom G7  uses directed    blood-glucose sensor (DEXCOM G7 SENSOR) Norma Use every 10 days as directed    CICLOPIROX-ITRACONAZOLE-UREA TOP CLEAN NAILS, SHAKE BOTTLE WELL, APPLY TWICE DAILY TO ALL AFFECTED NAILS USING APPLICATOR. (UP TO 2 MLS/DAY)    empagliflozin (JARDIANCE) 25 mg, Oral, Daily    ergocalciferol (ERGOCALCIFEROL) 50,000 Units, Oral, Every 7 days    ezetimibe (ZETIA) 10 mg, Oral, Daily    fluticasone propionate (FLONASE) 50 mcg, Each Nostril, Daily    fluticasone-umeclidin-vilanter (TRELEGY ELLIPTA) 100-62.5-25 mcg DsDv 1 puff, Inhalation, Daily    furosemide (LASIX) 40 mg, Oral, 2 times daily    insulin glargine-yfgn 43 Units, Subcutaneous, Daily    ipratropium-albuteroL (COMBIVENT RESPIMAT)  mcg/actuation inhaler 2 puffs, Inhalation, Every 6 hours PRN, Rescue    ketoconazole (NIZORAL) 2 % cream Topical (Top), Daily    lancets (ACCU-CHEK FASTCLIX LANCET DRUM) [facility] USE ONE DAILY    LANCETS MISC No dose, route, or frequency recorded.    LANTUS SOLOSTAR U-100 INSULIN 100 unit/mL (3 mL) InPn pen Inject 40U at breakfast and 20U at bedtime subcutaneously    latanoprost 0.005 % ophthalmic solution 1 drop    metFORMIN (GLUCOPHAGE) 1,000 mg, Oral, 2 times daily with meals    metoprolol succinate (TOPROL-XL) 100 mg, Oral, Daily    nystatin (MYCOSTATIN) ointment Topical (Top), 2 times daily    pen needle, diabetic (BD ULTRA-FINE JORDON PEN NEEDLE) 32 gauge x 5/32" " Ndle 2 times daily    sacubitriL-valsartan (ENTRESTO) 49-51 mg per tablet 1 tablet, Oral, 2 times daily    SITagliptin phosphate (JANUVIA) 100 mg, Oral, Daily    spironolactone (ALDACTONE) 25 mg, Oral, Daily    TRUEPLUS LANCETS 33 gauge Misc 2 times daily        All medications, laboratory studies, cardiac diagnostic imaging reviewed.     Lab Results   Component Value Date    LDL 52.00 12/10/2024    LDL 61.00 03/18/2024    TRIG 73 12/10/2024    TRIG 58 03/18/2024    CREATININE 1.24 (H) 12/10/2024    MG 2.20 09/12/2022    K 5.1 12/10/2024        ASSESSMENT/PLAN:  Linn Mckeon 59 y.o. female  with a PMH significant for HFrecEF (40% in 2022), DM II (A1c 8.5), DAVION on BiPAP, HTN, HLD, Depression, Chronic hypoxic respirtory failure/COPD on home 2L O2, and current tobacco use who presents to cardiology clinic for preoperative evaluation. She is planned to have lipoma removal on her right arm under general anesthesia.    Preoperative risk assessment  - RCRI 2, 10% risk of major cardiac event  - METS < 4, limited by GOODMAN / COPD. Prior cardiac stress test done for evaluation was negative for reversible ischemia. Recent TTE with recovered EF.  - Patient is moderate-high risk for low risk procedure. Her risk factors include uncontrolled diabetes, current smoker, and chronic home oxygen supplementation. From a cardiac standpoint, patient is medically optimized with no further cardiac work up indicated prior to procedure. However, would recommend patient be seen by her pulmonologist prior to her procedure. If general anesthesia could be avoided, that would be the preferable route of sedation.   - counseled extensively on dietary discretion, smoking cessation    Heart Failure with Recovered Ejection Fraction   - NYHA Class IV symptoms (likely from COPD), ACC/AHA Stage C  - Presumed nonischemic cardiomyopathy. Nuclear stress test negative for reversible ischemia   - Current GDMT: Entresto 49mg/51mg, Metoprolol Succinate 100 MG  (will decrease to 50 mg daily), Spironolactone 25, Jardiance 10mg  - Diuresis: Lasix 40mg BID  - no changes to medications this visit    HTN  - BP well controlled  - use GDMT as tolerated   - Counseled on low salt diet and exercise as tolerated    HLD  - LDL goal <70, LDL 52 per labs 12.10.24  - Continue Atorvastatin 80mg daily and Zetia 10mg daily    DM II on chronic insulin  - A1c 8.5, uncontrolled  - Management per PCP    COPD on home O2  Tobacco Abuse  DAVION on Trilogy  - Smokes approximately 1/2 pack of cigarettes  - Counseled on smoking cessation today   - Patient is trying to quit  - follow up with pulmonology      Larissa Bedoya MD  [facility] Cardiology Fellow, PGY 4

## 2025-04-09 ENCOUNTER — TELEPHONE (OUTPATIENT)
Dept: PULMONOLOGY | Facility: CLINIC | Age: 60
End: 2025-04-09
Payer: MEDICAID

## 2025-04-09 NOTE — TELEPHONE ENCOUNTER
"Informed Dr. Byrd that surgery clearance was requested by surgery clinic for an excision of R arm subcutaneous mass scheduled for 4/25/25. He stated "Ok to proceed with surgery". Understanding voiced. Will message surgery clinic.  "

## 2025-04-21 NOTE — PRE-PROCEDURE INSTRUCTIONS
As per Dr Katty Toledo:  No need to hold aspirin prior to surgery on 4/25/025.  Patient called and informed of this information.

## 2025-04-23 ENCOUNTER — ANESTHESIA EVENT (OUTPATIENT)
Dept: SURGERY | Facility: HOSPITAL | Age: 60
End: 2025-04-23
Payer: MEDICAID

## 2025-04-23 RX ORDER — ACETAMINOPHEN 10 MG/ML
1000 INJECTION, SOLUTION INTRAVENOUS
OUTPATIENT
Start: 2025-04-23 | End: 2025-04-23

## 2025-04-23 RX ORDER — MEPERIDINE HYDROCHLORIDE 25 MG/ML
12.5 INJECTION INTRAMUSCULAR; INTRAVENOUS; SUBCUTANEOUS EVERY 10 MIN PRN
Refills: 0 | OUTPATIENT
Start: 2025-04-23 | End: 2025-04-24

## 2025-04-23 RX ORDER — ONDANSETRON HYDROCHLORIDE 2 MG/ML
4 INJECTION, SOLUTION INTRAVENOUS ONCE AS NEEDED
OUTPATIENT
Start: 2025-04-23 | End: 2036-09-19

## 2025-04-23 RX ORDER — SODIUM CHLORIDE, SODIUM LACTATE, POTASSIUM CHLORIDE, CALCIUM CHLORIDE 600; 310; 30; 20 MG/100ML; MG/100ML; MG/100ML; MG/100ML
INJECTION, SOLUTION INTRAVENOUS CONTINUOUS
OUTPATIENT
Start: 2025-04-24 | End: 2025-04-24

## 2025-04-23 RX ORDER — HYDROMORPHONE HYDROCHLORIDE 1 MG/ML
0.4 INJECTION, SOLUTION INTRAMUSCULAR; INTRAVENOUS; SUBCUTANEOUS EVERY 10 MIN PRN
Refills: 0 | OUTPATIENT
Start: 2025-04-23

## 2025-04-23 RX ORDER — GLUCAGON 1 MG
1 KIT INJECTION
OUTPATIENT
Start: 2025-04-23

## 2025-04-23 NOTE — ANESTHESIA PREPROCEDURE EVALUATION
"                                                                                                             04/23/2025  Linn Mckeon is a 59 y.o., female   w/ PMHx of T2DM, HTN, Class II HFpEF, COPD, DAVION referred for mass of distal right arm for the past year.       Vitals:    04/21/25 1259 04/25/25 0608 04/25/25 0619   BP:   126/78   Pulse:   67   Temp:   36.9 °C (98.4 °F)   TempSrc:   Oral   SpO2:   98%   Weight: 127 kg (280 lb) 124.5 kg (274 lb 6.4 oz)    Height: 5' 4" (1.626 m)         Pre-operative evaluation for Procedure(s) (LRB):  EXCISION, LIPOMA (Right) distal arm    Linn Mckeon is a 59 y.o. female     Problem List[1]    Review of patient's allergies indicates:  No Known Allergies    Medications Ordered Prior to Encounter[2]    Past Surgical History:   Procedure Laterality Date    COLONOSCOPY, WITH POLYPECTOMY USING HOT BIOPSY FORCEPS N/A 01/25/2024    Procedure: COLONOSCOPY, WITH POLYPECTOMY USING HOT BIOPSY FORCEPS;  Surgeon: Yan Bashir MD;  Location: [facility] ENDOSCOPY;  Service: Endoscopy;  Laterality: N/A;  Cold forceps    HYSTERECTOMY         Social History[3]      CBC: No results for input(s): "WBC", "RBC", "HGB", "HCT", "PLT", "MCV", "MCH", "MCHC" in the last 72 hours.    CMP: No results for input(s): "NA", "K", "CL", "CO2", "BUN", "CREATININE", "GLU", "MG", "PHOS", "CALCIUM", "ALBUMIN", "PROT", "ALKPHOS", "ALT", "AST", "BILITOT" in the last 72 hours.    Diagnostic Studies:  CT Chest 3/2025: mm lobulated noncalcified nodule laterally in the left upper lobe. no new focal pulmonary parenchymal finding. Centrilobular emphysema is present. Dependent ground-glass opacities probably reflect atelectasis. There may be a component of air trapping. There is minimal basilar predominant bronchiectasis and bronchial wall thickening which is likely related to COPD. PHTN   CXR : none to rev    EKG 4/2025: SR. 1AVB.  2/6/2025: NSR=67. Septal infarct, age?      2D Echo 11/3/2023 : EF=59%. Mild " RVE.  Mild PI. No AS. Mild Ao SCLEROSIS.  12/6/2022 PASP>29.   Pre-op Assessment    I have reviewed the Patient Summary Reports.    I have reviewed the NPO Status.      Review of Systems  Anesthesia Hx:  No problems with previous Anesthesia   History of prior surgery of interest to airway management or planning:  Previous anesthesia: General Hysterecvtomy with general anesthesia.       Airway issues documented on chart review include GETA     Denies Family Hx of Anesthesia complications.    Denies Personal Hx of Anesthesia complications.                    Social:  Smoker, No Alcohol Use 1/2PPD x 42 yrs.      Cardiovascular:  Exercise tolerance: poor   Denies Pacemaker. Hypertension, well controlled   Denies MI.     Denies CABG/stent.    CHF    hyperlipidemia GOODMAN   Echo 11/3/2023 : EF=59%. Mild RVE.  Mild PI. No AS. Mild Ao SCLEROSIS.  12/6/2022 PASP>29.   Sedentary. Ambulates without assist.   BB MOS. Patient on beta blockers Beta blocker taken in last 24 hours      Shortness of Breath Dyspnea On Extertion      Congestive Heart Failure (CHF)                Hypertension         Pulmonary:   COPD, severe   Shortness of breath  Sleep Apnea PHTN by Ct Chest 3/2025.  Uses Trelegy machine nightly w Oxygen.  Home O2 NC sec SOB.  Was hospitalized bec of CHF. Not COPD.   Chronic Obstructive Pulmonary Disease (COPD):           Obstructive Sleep Apnea (DAVION).           Renal/:  Chronic Renal Disease, CKD                Hepatic/GI:  Hepatic/GI Normal                    OB/GYN/PEDS:  Hysterectomy           Neurological:  Neurology Normal                                      Endocrine:  Diabetes, poorly controlled, type 2, using insulin    Diabetes                    Morbid Obesity / BMI > 40  Psych:  Psychiatric History  depression                Physical Exam  General: Alert and Cooperative    Airway:  Mallampati: II / II  Mouth Opening: Normal  TM Distance: 4 - 6 cm  Tongue: Normal  Neck ROM: Normal ROM  Neck: Girth  Increased  Short Neck. Mhyoid NIL. ANT.  Dental:  Intact  Denies loose. Top remaining set protruding.  Very poor dental hygiene: severely calcif plaques.      Anesthesia Plan  Type of Anesthesia, risks & benefits discussed:    Anesthesia Type: Gen Natural Airway  Intra-op Monitoring Plan: Standard ASA Monitors  Post Op Pain Control Plan: multimodal analgesia  Induction:  IV  Airway Plan: Video  Informed Consent: Informed consent signed with the Patient and all parties understand the risks and agree with anesthesia plan.  All questions answered.   ASA Score: 4  Day of Surgery Review of History & Physical: H&P Update referred to the surgeon/provider.I have interviewed and examined the patient. I have reviewed the patient's H&P dated:   Anesthesia Plan Notes: VIDEOSCOPE if needed to intubate. Prefers to have GA not regional.    Ready For Surgery From Anesthesia Perspective.     .           [1]   Patient Active Problem List  Diagnosis    GOODMAN (dyspnea on exertion)    Chronic heart failure with preserved ejection fraction    Primary hypertension    Hyperlipidemia LDL goal <70    Type 2 diabetes mellitus with hyperglycemia, with long-term current use of insulin    Peripheral edema    DAVION on CPAP    Class 3 severe obesity due to excess calories with serious comorbidity and body mass index (BMI) of 45.0 to 49.9 in adult    Noncompliance with treatment plan    Xerosis of skin    Callus of foot    Dystrophic nail    Non compliance w medication regimen    Heart failure with reduced ejection fraction    Cigarette nicotine dependence without complication    COPD (chronic obstructive pulmonary disease)    Vitamin D deficiency    Mycotic toenails    Encounter for comprehensive diabetic foot examination, type 2 diabetes mellitus    BMI 45.0-49.9, adult    Type 2 diabetes mellitus with skin complication, with long-term current use of insulin    Right foot pain    Bilateral foot pain    Impaired gait    Diverticulosis large intestine  w/o perforation or abscess w/o bleeding    Adenomatous polyp of descending colon    Adenomatous polyp of sigmoid colon    Adenomatous rectal polyp    Plantar warts    Oxygen dependent   [2]   No current facility-administered medications on file prior to encounter.     Current Outpatient Medications on File Prior to Encounter   Medication Sig Dispense Refill    albuterol (PROVENTIL) 2.5 mg /3 mL (0.083 %) nebulizer solution Take 3 mLs (2.5 mg total) by nebulization every 6 (six) hours as needed for Wheezing. Rescue 90 mL 1    ammonium lactate (LAC-HYDRIN) 12 % lotion APPLY TO DRY SKIN AND RUB IN WELL TWICE DAILY. NOTHING BETWEEN TOES 226 g 11    aspirin (ECOTRIN) 81 MG EC tablet Take 81 mg by mouth once daily.      atorvastatin (LIPITOR) 80 MG tablet Take 1 tablet (80 mg total) by mouth once daily. 90 tablet 3    blood-glucose sensor (DEXCOM G7 SENSOR) Norma Use every 10 days as directed 3 each 11    CICLOPIROX-ITRACONAZOLE-UREA TOP CLEAN NAILS, SHAKE BOTTLE WELL, APPLY TWICE DAILY TO ALL AFFECTED NAILS USING APPLICATOR. (UP TO 2 MLS/DAY)      empagliflozin (JARDIANCE) 25 mg tablet Take 1 tablet (25 mg total) by mouth once daily. 90 tablet 2    ergocalciferol (ERGOCALCIFEROL) 50,000 unit Cap Take 1 capsule (50,000 Units total) by mouth every 7 days. 12 capsule 2    ezetimibe (ZETIA) 10 mg tablet Take 1 tablet (10 mg total) by mouth once daily. 90 tablet 3    fluticasone propionate (FLONASE) 50 mcg/actuation nasal spray 1 spray (50 mcg total) by Each Nostril route once daily. 11.1 mL 1    fluticasone-umeclidin-vilanter (TRELEGY ELLIPTA) 100-62.5-25 mcg DsDv Inhale 1 puff into the lungs once daily. 28 each 3    furosemide (LASIX) 40 MG tablet Take 1 tablet (40 mg total) by mouth 2 (two) times a day. 180 tablet 3    insulin glargine-yfgn 100 unit/mL (3 mL) InPn Inject 43 Units into the skin once daily.      ketoconazole (NIZORAL) 2 % cream Apply topically once daily. 30 g 1    metFORMIN (GLUCOPHAGE) 1000 MG tablet TAKE 1  "TABLET BY MOUTH TWICE DAILY WITH MEALS 60 tablet 5    sacubitriL-valsartan (ENTRESTO) 49-51 mg per tablet Take 1 tablet by mouth 2 (two) times daily. 180 tablet 3    SITagliptin phosphate (JANUVIA) 100 MG Tab Take 1 tablet (100 mg total) by mouth once daily. 90 tablet 3    spironolactone (ALDACTONE) 25 MG tablet Take 1 tablet (25 mg total) by mouth once daily. 90 tablet 3    BD ULTRA-FINE JORDON PEN NEEDLE 32 gauge x 5/32" Ndle 2 (two) times daily.      BD ULTRA-FINE JORDON PEN NEEDLE 32 gauge x 5/32" Ndle       blood sugar diagnostic (TRUE METRIX GLUCOSE TEST STRIP) Strp Test Blood Sugar 2 times a day 100 strip 11    blood-glucose meter kit To check BG 2 times daily, to use with insurance preferred meter 1 each 0    blood-glucose meter,continuous (DEXCOM G7 ) Misc Dexcom G7  uses directed 1 each 0    ipratropium-albuteroL (COMBIVENT RESPIMAT)  mcg/actuation inhaler Inhale 2 puffs into the lungs every 6 (six) hours as needed for Wheezing. Rescue 4 g 1    lancets (ACCU-CHEK FASTCLIX LANCET DRUM) Misc USE ONE DAILY 100 each 11    LANCETS MISC       LANTUS SOLOSTAR U-100 INSULIN 100 unit/mL (3 mL) InPn pen Inject 40U at breakfast and 20U at bedtime subcutaneously      latanoprost 0.005 % ophthalmic solution Apply 1 drop to eye.      nystatin (MYCOSTATIN) ointment Apply topically 2 (two) times daily. (Patient not taking: Reported on 4/8/2025) 15 g 2    pen needle, diabetic (BD ULTRA-FINE JORDON PEN NEEDLE) 32 gauge x 5/32" Ndle USE TWICE DAILY 60 each 11    TRUEPLUS LANCETS 33 gauge Misc Apply topically 2 (two) times daily.     [3]   Social History  Socioeconomic History    Marital status:     Number of children: 2   Tobacco Use    Smoking status: Every Day     Current packs/day: 0.50     Average packs/day: 0.5 packs/day for 42.3 years (21.2 ttl pk-yrs)     Types: Cigarettes     Start date: 1983    Smokeless tobacco: Never   Substance and Sexual Activity    Alcohol use: Not Currently    Drug use: " Never    Sexual activity: Not Currently     Social Drivers of Health     Financial Resource Strain: Low Risk  (3/21/2024)    Overall Financial Resource Strain (CARDIA)     Difficulty of Paying Living Expenses: Not hard at all   Food Insecurity: No Food Insecurity (3/21/2024)    Hunger Vital Sign     Worried About Running Out of Food in the Last Year: Never true     Ran Out of Food in the Last Year: Never true   Transportation Needs: No Transportation Needs (9/4/2024)    TRANSPORTATION NEEDS     Transportation : No   Physical Activity: Inactive (9/4/2024)    Exercise Vital Sign     Days of Exercise per Week: 0 days     Minutes of Exercise per Session: 0 min   Stress: No Stress Concern Present (3/21/2024)    [employer] - Occupational Stress Questionnaire     Feeling of Stress : Not at all   Housing Stability: Unknown (9/4/2024)    Housing Stability Vital Sign     Unable to Pay for Housing in the Last Year: No     Homeless in the Last Year: No

## 2025-04-25 ENCOUNTER — HOSPITAL ENCOUNTER (OUTPATIENT)
Facility: HOSPITAL | Age: 60
Discharge: HOME | End: 2025-04-25
Attending: SURGERY | Admitting: SURGERY
Payer: MEDICAID

## 2025-04-25 ENCOUNTER — ANESTHESIA (OUTPATIENT)
Dept: SURGERY | Facility: HOSPITAL | Age: 60
End: 2025-04-25
Payer: MEDICAID

## 2025-04-25 DIAGNOSIS — R22.31 SKIN LUMP OF ARM, RIGHT: ICD-10-CM

## 2025-04-25 DIAGNOSIS — Z01.818 PREOP TESTING: ICD-10-CM

## 2025-04-25 LAB
ANION GAP SERPL CALC-SCNC: 8 MEQ/L
BASOPHILS # BLD AUTO: 0.09 X10(3)/MCL
BASOPHILS NFR BLD AUTO: 0.9 %
BUN SERPL-MCNC: 36.6 MG/DL (ref 9.8–20.1)
CALCIUM SERPL-MCNC: 9 MG/DL (ref 8.4–10.2)
CHLORIDE SERPL-SCNC: 108 MMOL/L (ref 98–107)
CO2 SERPL-SCNC: 22 MMOL/L (ref 23–31)
CREAT SERPL-MCNC: 1.06 MG/DL (ref 0.55–1.02)
CREAT/UREA NIT SERPL: 35
EOSINOPHIL # BLD AUTO: 0.16 X10(3)/MCL (ref 0–0.9)
EOSINOPHIL NFR BLD AUTO: 1.7 %
ERYTHROCYTE [DISTWIDTH] IN BLOOD BY AUTOMATED COUNT: 16.4 % (ref 11.5–17)
GFR SERPLBLD CREATININE-BSD FMLA CKD-EPI: 60 ML/MIN/1.73/M2
GLUCOSE SERPL-MCNC: 116 MG/DL (ref 82–115)
HCT VFR BLD AUTO: 45.5 % (ref 37–47)
HGB BLD-MCNC: 14.7 G/DL (ref 12–16)
IMM GRANULOCYTES # BLD AUTO: 0.04 X10(3)/MCL (ref 0–0.04)
IMM GRANULOCYTES NFR BLD AUTO: 0.4 %
LYMPHOCYTES # BLD AUTO: 1.58 X10(3)/MCL (ref 0.6–4.6)
LYMPHOCYTES NFR BLD AUTO: 16.4 %
MCH RBC QN AUTO: 25.7 PG (ref 27–31)
MCHC RBC AUTO-ENTMCNC: 32.3 G/DL (ref 33–36)
MCV RBC AUTO: 79.5 FL (ref 80–94)
MONOCYTES # BLD AUTO: 0.82 X10(3)/MCL (ref 0.1–1.3)
MONOCYTES NFR BLD AUTO: 8.5 %
NEUTROPHILS # BLD AUTO: 6.92 X10(3)/MCL (ref 2.1–9.2)
NEUTROPHILS NFR BLD AUTO: 72.1 %
NRBC BLD AUTO-RTO: 0 %
OHS QRS DURATION: 96 MS
OHS QTC CALCULATION: 461 MS
PLATELET # BLD AUTO: 285 X10(3)/MCL (ref 130–400)
PMV BLD AUTO: 10.7 FL (ref 7.4–10.4)
POCT GLUCOSE: 120 MG/DL (ref 70–110)
POCT GLUCOSE: 127 MG/DL (ref 70–110)
POTASSIUM SERPL-SCNC: 4.7 MMOL/L (ref 3.5–5.1)
RBC # BLD AUTO: 5.72 X10(6)/MCL (ref 4.2–5.4)
SODIUM SERPL-SCNC: 138 MMOL/L (ref 136–145)
WBC # BLD AUTO: 9.61 X10(3)/MCL (ref 4.5–11.5)

## 2025-04-25 PROCEDURE — 71000015 HC POSTOP RECOV 1ST HR: Performed by: SURGERY

## 2025-04-25 PROCEDURE — 25000003 PHARM REV CODE 250: Performed by: NURSE ANESTHETIST, CERTIFIED REGISTERED

## 2025-04-25 PROCEDURE — 36000707: Performed by: SURGERY

## 2025-04-25 PROCEDURE — 37000008 HC ANESTHESIA 1ST 15 MINUTES: Performed by: SURGERY

## 2025-04-25 PROCEDURE — 82962 GLUCOSE BLOOD TEST: CPT | Performed by: SURGERY

## 2025-04-25 PROCEDURE — 36000706: Performed by: SURGERY

## 2025-04-25 PROCEDURE — 25000242 PHARM REV CODE 250 ALT 637 W/ HCPCS: Performed by: NURSE ANESTHETIST, CERTIFIED REGISTERED

## 2025-04-25 PROCEDURE — 63600175 PHARM REV CODE 636 W HCPCS

## 2025-04-25 PROCEDURE — 25075 EXC FOREARM LES SC < 3 CM: CPT | Mod: RT,,, | Performed by: SURGERY

## 2025-04-25 PROCEDURE — 80048 BASIC METABOLIC PNL TOTAL CA: CPT

## 2025-04-25 PROCEDURE — 37000009 HC ANESTHESIA EA ADD 15 MINS: Performed by: SURGERY

## 2025-04-25 PROCEDURE — 88307 TISSUE EXAM BY PATHOLOGIST: CPT | Mod: TC,59 | Performed by: SURGERY

## 2025-04-25 PROCEDURE — 25000003 PHARM REV CODE 250: Performed by: ANESTHESIOLOGY

## 2025-04-25 PROCEDURE — 85025 COMPLETE CBC W/AUTO DIFF WBC: CPT

## 2025-04-25 PROCEDURE — 63600175 PHARM REV CODE 636 W HCPCS: Performed by: NURSE ANESTHETIST, CERTIFIED REGISTERED

## 2025-04-25 PROCEDURE — 63600175 PHARM REV CODE 636 W HCPCS: Performed by: SPECIALIST

## 2025-04-25 PROCEDURE — 63600175 PHARM REV CODE 636 W HCPCS: Performed by: SURGERY

## 2025-04-25 PROCEDURE — 93005 ELECTROCARDIOGRAM TRACING: CPT

## 2025-04-25 PROCEDURE — 63600175 PHARM REV CODE 636 W HCPCS: Performed by: ANESTHESIOLOGY

## 2025-04-25 PROCEDURE — 25000003 PHARM REV CODE 250: Performed by: SPECIALIST

## 2025-04-25 RX ORDER — SODIUM CHLORIDE, SODIUM LACTATE, POTASSIUM CHLORIDE, CALCIUM CHLORIDE 600; 310; 30; 20 MG/100ML; MG/100ML; MG/100ML; MG/100ML
INJECTION, SOLUTION INTRAVENOUS CONTINUOUS
Status: DISCONTINUED | OUTPATIENT
Start: 2025-04-25 | End: 2025-04-25 | Stop reason: HOSPADM

## 2025-04-25 RX ORDER — DEXMEDETOMIDINE HYDROCHLORIDE 100 UG/ML
INJECTION, SOLUTION INTRAVENOUS
Status: DISCONTINUED | OUTPATIENT
Start: 2025-04-25 | End: 2025-04-25

## 2025-04-25 RX ORDER — ONDANSETRON HYDROCHLORIDE 2 MG/ML
4 INJECTION, SOLUTION INTRAVENOUS ONCE
Status: COMPLETED | OUTPATIENT
Start: 2025-04-25 | End: 2025-04-25

## 2025-04-25 RX ORDER — FAMOTIDINE 20 MG/1
40 TABLET, FILM COATED ORAL ONCE
Status: COMPLETED | OUTPATIENT
Start: 2025-04-25 | End: 2025-04-25

## 2025-04-25 RX ORDER — METOPROLOL SUCCINATE 25 MG/1
50 TABLET, EXTENDED RELEASE ORAL ONCE
Status: COMPLETED | OUTPATIENT
Start: 2025-04-25 | End: 2025-04-25

## 2025-04-25 RX ORDER — OXYCODONE AND ACETAMINOPHEN 5; 325 MG/1; MG/1
1 TABLET ORAL EVERY 4 HOURS PRN
Qty: 8 TABLET | Refills: 0 | Status: SHIPPED | OUTPATIENT
Start: 2025-04-25

## 2025-04-25 RX ORDER — ACETAMINOPHEN 325 MG/1
650 TABLET ORAL
Status: COMPLETED | OUTPATIENT
Start: 2025-04-25 | End: 2025-04-25

## 2025-04-25 RX ORDER — PROPOFOL 10 MG/ML
VIAL (ML) INTRAVENOUS CONTINUOUS PRN
Status: DISCONTINUED | OUTPATIENT
Start: 2025-04-25 | End: 2025-04-25

## 2025-04-25 RX ORDER — BUPIVACAINE HYDROCHLORIDE 2.5 MG/ML
INJECTION, SOLUTION EPIDURAL; INFILTRATION; INTRACAUDAL; PERINEURAL
Status: DISCONTINUED | OUTPATIENT
Start: 2025-04-25 | End: 2025-04-25 | Stop reason: HOSPADM

## 2025-04-25 RX ORDER — CEFAZOLIN SODIUM 1 G/3ML
2 INJECTION, POWDER, FOR SOLUTION INTRAMUSCULAR; INTRAVENOUS
Status: COMPLETED | OUTPATIENT
Start: 2025-04-25 | End: 2025-04-25

## 2025-04-25 RX ORDER — INSULIN ASPART 100 [IU]/ML
0-5 INJECTION, SOLUTION INTRAVENOUS; SUBCUTANEOUS
Status: DISCONTINUED | OUTPATIENT
Start: 2025-04-25 | End: 2025-04-25 | Stop reason: HOSPADM

## 2025-04-25 RX ORDER — ALBUTEROL SULFATE 90 UG/1
INHALANT RESPIRATORY (INHALATION)
Status: DISCONTINUED | OUTPATIENT
Start: 2025-04-25 | End: 2025-04-25

## 2025-04-25 RX ORDER — KETAMINE HCL IN 0.9 % NACL 50 MG/5 ML
SYRINGE (ML) INTRAVENOUS
Status: DISCONTINUED | OUTPATIENT
Start: 2025-04-25 | End: 2025-04-25

## 2025-04-25 RX ORDER — GLYCOPYRROLATE 0.2 MG/ML
INJECTION INTRAMUSCULAR; INTRAVENOUS
Status: DISCONTINUED | OUTPATIENT
Start: 2025-04-25 | End: 2025-04-25

## 2025-04-25 RX ORDER — HEPARIN SODIUM 5000 [USP'U]/ML
5000 INJECTION, SOLUTION INTRAVENOUS; SUBCUTANEOUS
Status: COMPLETED | OUTPATIENT
Start: 2025-04-25 | End: 2025-04-25

## 2025-04-25 RX ORDER — SODIUM CHLORIDE 9 MG/ML
INJECTION, SOLUTION INTRAVENOUS CONTINUOUS
Status: DISCONTINUED | OUTPATIENT
Start: 2025-04-25 | End: 2025-04-25 | Stop reason: HOSPADM

## 2025-04-25 RX ORDER — LIDOCAINE HYDROCHLORIDE 20 MG/ML
INJECTION INTRAVENOUS
Status: DISCONTINUED | OUTPATIENT
Start: 2025-04-25 | End: 2025-04-25

## 2025-04-25 RX ADMIN — FAMOTIDINE 40 MG: 20 TABLET, FILM COATED ORAL at 06:04

## 2025-04-25 RX ADMIN — METOPROLOL SUCCINATE 50 MG: 25 TABLET, FILM COATED, EXTENDED RELEASE ORAL at 06:04

## 2025-04-25 RX ADMIN — CEFAZOLIN 2 G: 330 INJECTION, POWDER, FOR SOLUTION INTRAMUSCULAR; INTRAVENOUS at 09:04

## 2025-04-25 RX ADMIN — ACETAMINOPHEN 650 MG: 325 TABLET, FILM COATED ORAL at 06:04

## 2025-04-25 RX ADMIN — SODIUM CHLORIDE, POTASSIUM CHLORIDE, SODIUM LACTATE AND CALCIUM CHLORIDE: 600; 310; 30; 20 INJECTION, SOLUTION INTRAVENOUS at 08:04

## 2025-04-25 RX ADMIN — HEPARIN SODIUM 5000 UNITS: 5000 INJECTION, SOLUTION INTRAVENOUS; SUBCUTANEOUS at 06:04

## 2025-04-25 RX ADMIN — PROPOFOL 75 MCG/KG/MIN: 10 INJECTION, EMULSION INTRAVENOUS at 09:04

## 2025-04-25 RX ADMIN — DEXMEDETOMIDINE 20 MCG: 200 INJECTION, SOLUTION INTRAVENOUS at 09:04

## 2025-04-25 RX ADMIN — ALBUTEROL SULFATE 4 PUFF: 90 AEROSOL, METERED RESPIRATORY (INHALATION) at 09:04

## 2025-04-25 RX ADMIN — LIDOCAINE HYDROCHLORIDE 50 MG: 20 INJECTION INTRAVENOUS at 09:04

## 2025-04-25 RX ADMIN — ONDANSETRON 4 MG: 2 INJECTION INTRAMUSCULAR; INTRAVENOUS at 06:04

## 2025-04-25 RX ADMIN — GLYCOPYRROLATE 0.2 MG: 0.2 INJECTION INTRAMUSCULAR; INTRAVENOUS at 09:04

## 2025-04-25 RX ADMIN — Medication 10 MG: at 09:04

## 2025-04-25 NOTE — DISCHARGE SUMMARY
"[facility]  Short Stay  Discharge Summary    Admit Date: 4/25/2025    Discharge Date and Time: 04/25/2025 10:09 AM      Discharge Attending Physician: Manfred Parham MD     Hospital Course (synopsis of major diagnoses, care, treatment, and services provided during the course of the hospital stay):     Linn Mckeon is a 59y.o female w/ PMHx of T2DM, HTN, Class II HFpEF, COPD, DAVION. S/p excision of Right dorsal forearm mass today under MAC. Tolerated procedure well and met all milestones for discharge. Will follow-up in clinic 5/8/25 @11:00.    Final Diagnoses:    Principal Problem: Skin lump of arm, right   Secondary Diagnoses:   Active Hospital Problems    Diagnosis  POA    *Skin lump of arm, right [R22.31]  Yes      Resolved Hospital Problems   No resolved problems to display.       Discharged Condition: stable    Disposition: Home or Self Care    Follow up/Patient Instructions:    Medications:  Reconciled Home Medications:      Medication List        START taking these medications      oxyCODONE-acetaminophen 5-325 mg per tablet  Commonly known as: PERCOCET  Take 1 tablet by mouth every 4 (four) hours as needed for Pain.            CONTINUE taking these medications      * albuterol 2.5 mg /3 mL (0.083 %) nebulizer solution  Commonly known as: PROVENTIL  Take 3 mLs (2.5 mg total) by nebulization every 6 (six) hours as needed for Wheezing. Rescue     * albuterol 90 mcg/actuation inhaler  Commonly known as: VENTOLIN HFA  Inhale 2 puffs into the lungs every 6 (six) hours as needed for Wheezing. Rescue     ammonium lactate 12 % lotion  Commonly known as: LAC-HYDRIN  APPLY TO DRY SKIN AND RUB IN WELL TWICE DAILY. NOTHING BETWEEN TOES     aspirin 81 MG EC tablet  Commonly known as: ECOTRIN  Take 81 mg by mouth once daily.     atorvastatin 80 MG tablet  Commonly known as: LIPITOR  Take 1 tablet (80 mg total) by mouth once daily.     * BD ULTRA-FINE JORDON PEN NEEDLE 32 gauge x 5/32" " "Ndle  Generic drug: pen needle, diabetic  2 (two) times daily.     * BD ULTRA-FINE JORDON PEN NEEDLE 32 gauge x 5/32" Ndle  Generic drug: pen needle, diabetic     * BD ULTRA-FINE JORDON PEN NEEDLE 32 gauge x 5/32" Ndle  Generic drug: pen needle, diabetic  USE TWICE DAILY     blood sugar diagnostic Strp  Commonly known as: TRUE METRIX GLUCOSE TEST STRIP  Test Blood Sugar 2 times a day     blood-glucose meter kit  To check BG 2 times daily, to use with insurance preferred meter     CICLOPIROX-ITRACONAZOLE-UREA TOP  CLEAN NAILS, SHAKE BOTTLE WELL, APPLY TWICE DAILY TO ALL AFFECTED NAILS USING APPLICATOR. (UP TO 2 MLS/DAY)     CombiVENT RESPIMAT  mcg/actuation inhaler  Generic drug: ipratropium-albuteroL  Inhale 2 puffs into the lungs every 6 (six) hours as needed for Wheezing. Rescue     DEXCOM G7  Misc  Generic drug: blood-glucose,,cont  Dexcom G7  uses directed     DEXCOM G7 SENSOR Norma  Generic drug: blood-glucose sensor  Use every 10 days as directed     empagliflozin 25 mg tablet  Commonly known as: Jardiance  Take 1 tablet (25 mg total) by mouth once daily.     ergocalciferol 50,000 unit Cap  Commonly known as: ERGOCALCIFEROL  Take 1 capsule (50,000 Units total) by mouth every 7 days.     ezetimibe 10 mg tablet  Commonly known as: ZETIA  Take 1 tablet (10 mg total) by mouth once daily.     fluticasone propionate 50 mcg/actuation nasal spray  Commonly known as: FLONASE  1 spray (50 mcg total) by Each Nostril route once daily.     fluticasone-umeclidin-vilanter 100-62.5-25 mcg Dsdv  Commonly known as: TRELEGY ELLIPTA  Inhale 1 puff into the lungs once daily.     furosemide 40 MG tablet  Commonly known as: LASIX  Take 1 tablet (40 mg total) by mouth 2 (two) times a day.     insulin glargine-yfgn 100 unit/mL (3 mL) Inpn  Inject 43 Units into the skin once daily.     ketoconazole 2 % cream  Commonly known as: NIZORAL  Apply topically once daily.     * LANCETS MISC     * lancets Misc  Commonly " known as: ACCU-CHEK FASTCLIX LANCET DRUM  USE ONE DAILY     * TRUEPLUS LANCETS 33 gauge Misc  Generic drug: lancets  Apply topically 2 (two) times daily.     LANTUS SOLOSTAR U-100 INSULIN 100 unit/mL (3 mL) Inpn pen  Generic drug: insulin glargine U-100 (Lantus)  Inject 40U at breakfast and 20U at bedtime subcutaneously     latanoprost 0.005 % ophthalmic solution  Apply 1 drop to eye.     metFORMIN 1000 MG tablet  Commonly known as: GLUCOPHAGE  TAKE 1 TABLET BY MOUTH TWICE DAILY WITH MEALS     metoprolol succinate 50 MG 24 hr tablet  Commonly known as: TOPROL-XL  Take 1 tablet (50 mg total) by mouth once daily.     sacubitriL-valsartan 49-51 mg per tablet  Commonly known as: ENTRESTO  Take 1 tablet by mouth 2 (two) times daily.     SITagliptin phosphate 100 MG Tab  Commonly known as: JANUVIA  Take 1 tablet (100 mg total) by mouth once daily.     spironolactone 25 MG tablet  Commonly known as: ALDACTONE  Take 1 tablet (25 mg total) by mouth once daily.           * This list has 8 medication(s) that are the same as other medications prescribed for you. Read the directions carefully, and ask your doctor or other care provider to review them with you.                ASK your doctor about these medications      nystatin ointment  Commonly known as: MYCOSTATIN  Apply topically 2 (two) times daily.            Discharge Procedure Orders   Diet Adult Regular     Notify your health care provider if you experience any of the following:  temperature >100.4     Notify your health care provider if you experience any of the following:  persistent nausea and vomiting or diarrhea     Notify your health care provider if you experience any of the following:  severe uncontrolled pain     Notify your health care provider if you experience any of the following:  redness, tenderness, or signs of infection (pain, swelling, redness, odor or green/yellow discharge around incision site)     No dressing needed     Activity as tolerated      Mil Balbuena MD  [facility] Surgery PGY-1

## 2025-04-25 NOTE — INTERVAL H&P NOTE
The patient has been examined and the H&P has been reviewed. I concur with the findings and no changes have occurred since H&P was written. Patient confirmed NPO since midnight. Denies CP or SOB, n/v/d, or issues voiding. Denies blood thinner use. She has seen Pulmonology and Cardiology since last seen in clinic. R arm marked. Ready for OR     Surgery risks, benefits and alternative options previously discussed and understood by patient/family.        Samy Phipps MD  [employer] General Surgery PGY1

## 2025-04-25 NOTE — ANESTHESIA POSTPROCEDURE EVALUATION
Anesthesia Post Evaluation    Patient: Linn Mckeon    Procedure(s) Performed: Procedure(s) (LRB):  EXCISION, LIPOMA (Right)    Final Anesthesia Type: general      Patient location during evaluation: GI PACU  Patient participation: Yes- Able to Participate  Level of consciousness: awake and alert  Post-procedure vital signs: reviewed and stable  Pain management: adequate  Airway patency: patent    PONV status at discharge: No PONV  Anesthetic complications: no      Cardiovascular status: hemodynamically stable  Respiratory status: unassisted and room air  Follow-up not needed.              Vitals Value Taken Time   /78 04/25/25 08:28   Temp 36.3 °C (97.3 °F) 04/25/25 08:28   Pulse 68 04/25/25 08:28   Resp 22 04/25/25 08:28   SpO2 100 % 04/25/25 08:28         No case tracking events are documented in the log.      Pain/Jose Score: Pain Rating Prior to Med Admin: 0 (4/25/2025  6:37 AM)  Jose Score: 9 (4/25/2025 10:00 AM)

## 2025-04-25 NOTE — OP NOTE
PATIENT NAME: Linn Mckeon  MRN: [id number]  ADMIT DATE: 4/25/2025  PROCEDURE DATE: 04/25/2025    SURGEON: Gary Parham MD    RESIDENT: aPt Johnson MD; Yuni Cornejo MD; Mil Balbuena MD; Rebekah Sabillon, L3    PREOPERATIVE DIAGNOSES:  Right dorsal forearm mass  T2DM  HTN  Class II HFpEF  COPD  DAVION    POSTOPERATIVE DIAGNOSES:  Same as preop    PROCEDURE PERFORMED:  Subcutaneous mass excision of Right forearm    INDICATION: Elective removal    FINDINGS: Approximately 2x2 blue-colored mass with fatty lobules attached    ANESTHESIA: MAC and local anesthesia    DRAINS: None    SPECIMEN: Right forearm mass    ESTIMATED BLOOD LOSS: 5 mL    COMPLICATIONS: None    TECHNIQUE:   Informed consented was obtained prior to the procedure. The Right forearm mass was identified preoperatively and markings were made in preoperative holding. All risks, benefits, alternatives were explained in detail to the patient. The patient was wheeled into the operating theatre. Anesthesia induced the patient and performed MAC. The patient was placed in the supine position and the bed was turned 45º. The Right hand and forearm was prepped and draped in the standard fashion. A formal timeout was held confirming correct patient, procedure, site, preoperative antibiotics, VTE prophylaxis, and all operating room staff.     The Right forearm mass was remarked to circumscribe the mass and for a diagonal incision following resting skin tension lines. Local anesthesia was applied. A #15 blade was used to make an incision followed by bovie electrocautery to the subcutaneous tissues. The mass was easily identified deep to the cephalic vein. The mass consisted of a solid, dark blue component surrounded by fatty lobules. Using blunt dissection with a Tonia and Metzenbaum scissors, the mass was freed circumferentially from its fibrous attachments and sent for pathology. The pocket was irrigated with ~30 mLs sterile saline. Hemostasis was  ensured. Deep tissues were closed with 3-0 Vicryl deep dermals followed by a 4-0 Monocryl running subcutaneous for the skin. Dermabond was applied to the final incision site.     At the conclusion of the procedure all counts were correct x 2. The patient tolerated the procedure well and was transferred to PACU in stable condition.    Staff Gary Parham MD was present for all critical portions of the procedure.    Mil Balbuena MD  [facility] Surgery PGY-1

## 2025-04-25 NOTE — DISCHARGE INSTRUCTIONS
SKIN LESION REMOVAL    · Keep follow up appointment at [facility] on _May 8. 2025 @ 11:00 AM_  Resume home medications unless otherwise instructed by your doctor.    · No heavy lifting  greater th 10 pounds with the surgical arm.    · You may take a shower tomorrow. You may let water run over incision, but leave the white bandage tape strips (steristrips) on your incision until they fall off on their own- do not scrub or peel.    · Do not soak your wound in water until cleared by MD. Do not take baths, swim, or use a hot tub until your doctor says it is okay.    · Use pain medication as instructed. Do not take Tylenol (acetaminophen) with your PERCOCET, since PERCOCET contains Tylenol as well. If you are experiencing severe pain even with your pain medications, please call the SURGERY clinic at [phone] to notify your doctor. The surgeon prefers that you take Ibuprofen and plain tylenol for your discomfort, use the Percocet for the most severe pain    · You may use an ice pack as needed for 20 minutes at a time over your incision site to minimize swelling and help relieve pain.    · Do not drink alcohol or drive today, or as long as you are on pain medication.    · Notify MD of any moderate to severe pain unrelieved by pain medicine, if your incision opens and/or bleeds, or for any signs of infection including fever above 100.4, excessive redness or swelling, yellow/green foul- smelling drainage, nausea or vomiting. Clinics number is [phone]. If it is after business hours or emergency call [phone] and state Im having post op complications and need to speak to the general  surgeon on call.    · Thanks for choosing [facility]! Have a smooth recovery!

## 2025-04-25 NOTE — ANESTHESIA POSTPROCEDURE EVALUATION
Anesthesia Post Evaluation    Patient: Linn Mckeon    Procedure(s) Performed: Procedure(s) (LRB):  EXCISION, LIPOMA (Right)    Final Anesthesia Type: general (-TIVA Marci AW)      Patient location during evaluation: [facility]  Patient participation: Yes- Able to Participate  Level of consciousness: awake and alert, awake and oriented  Post-procedure vital signs: reviewed and stable  Pain management: adequate  Airway patency: patent    PONV status at discharge: No PONV  Anesthetic complications: no      Cardiovascular status: blood pressure returned to baseline, hemodynamically stable and stable  Respiratory status: unassisted, spontaneous ventilation and room air  Hydration status: euvolemic  Follow-up not needed.              Vitals Value Taken Time   BP 99/67 04/25/25 10:02   Temp * 04/25/25 10:08   Pulse 59 04/25/25 10:08   Resp * 04/25/25 10:08   SpO2 97 % 04/25/25 10:08   Vitals shown include unfiled device data.      No case tracking events are documented in the log.      Pain/Jose Score: Pain Rating Prior to Med Admin: 0 (4/25/2025  6:37 AM)

## 2025-04-25 NOTE — TRANSFER OF CARE
"Anesthesia Transfer of Care Note    Patient: Linn Mckeon    Procedure(s) Performed: Procedure(s) (LRB):  EXCISION, LIPOMA (Right)    Patient location: OPS    Anesthesia Type: general    Transport from OR: Transported from OR on room air with adequate spontaneous ventilation    Post pain: adequate analgesia    Post assessment: no apparent anesthetic complications    Post vital signs: stable    Level of consciousness: awake    Nausea/Vomiting: no nausea/vomiting    Complications: none    Transfer of care protocol was followed      Last vitals: Visit Vitals  /78   Pulse 68   Temp 36.3 °C (97.3 °F) (Temporal)   Resp (!) 22   Ht 5' 4" (1.626 m)   Wt 124.5 kg (274 lb 6.4 oz)   SpO2 100%   Breastfeeding No   BMI 47.10 kg/m²     "

## 2025-04-28 VITALS
DIASTOLIC BLOOD PRESSURE: 81 MMHG | HEART RATE: 57 BPM | WEIGHT: 274.38 LBS | RESPIRATION RATE: 20 BRPM | TEMPERATURE: 98 F | BODY MASS INDEX: 46.84 KG/M2 | SYSTOLIC BLOOD PRESSURE: 122 MMHG | OXYGEN SATURATION: 97 % | HEIGHT: 64 IN

## 2025-04-28 LAB
ESTROGEN SERPL-MCNC: NORMAL PG/ML
INSULIN SERPL-ACNC: NORMAL U[IU]/ML
LAB AP CLINICAL INFORMATION: NORMAL
LAB AP GROSS DESCRIPTION: NORMAL
LAB AP REPORT FOOTNOTES: NORMAL

## 2025-04-29 ENCOUNTER — RESULTS FOLLOW-UP (OUTPATIENT)
Dept: SURGERY | Facility: HOSPITAL | Age: 60
End: 2025-04-29

## 2025-05-01 ENCOUNTER — HOSPITAL ENCOUNTER (OUTPATIENT)
Dept: RADIOLOGY | Facility: HOSPITAL | Age: 60
Discharge: HOME | End: 2025-05-01
Payer: MEDICAID

## 2025-05-01 DIAGNOSIS — Z12.31 ENCOUNTER FOR SCREENING MAMMOGRAM FOR BREAST CANCER: ICD-10-CM

## 2025-05-01 PROCEDURE — 77063 BREAST TOMOSYNTHESIS BI: CPT | Mod: 26,,, | Performed by: RADIOLOGY

## 2025-05-01 PROCEDURE — 77063 BREAST TOMOSYNTHESIS BI: CPT | Mod: TC

## 2025-05-01 PROCEDURE — 77067 SCR MAMMO BI INCL CAD: CPT | Mod: 26,,, | Performed by: RADIOLOGY

## 2025-05-07 ENCOUNTER — HOSPITAL ENCOUNTER (OUTPATIENT)
Dept: WOUND CARE | Facility: HOSPITAL | Age: 60
Discharge: HOME | End: 2025-05-07
Attending: NURSE PRACTITIONER
Payer: MEDICAID

## 2025-05-07 VITALS
OXYGEN SATURATION: 94 % | SYSTOLIC BLOOD PRESSURE: 131 MMHG | RESPIRATION RATE: 18 BRPM | HEART RATE: 86 BPM | TEMPERATURE: 98 F | DIASTOLIC BLOOD PRESSURE: 80 MMHG

## 2025-05-07 DIAGNOSIS — E11.628 TYPE 2 DIABETES MELLITUS WITH OTHER SKIN COMPLICATION, WITH LONG-TERM CURRENT USE OF INSULIN: ICD-10-CM

## 2025-05-07 DIAGNOSIS — M79.671 BILATERAL FOOT PAIN: ICD-10-CM

## 2025-05-07 DIAGNOSIS — E11.9 ENCOUNTER FOR COMPREHENSIVE DIABETIC FOOT EXAMINATION, TYPE 2 DIABETES MELLITUS: Primary | ICD-10-CM

## 2025-05-07 DIAGNOSIS — M79.672 BILATERAL FOOT PAIN: ICD-10-CM

## 2025-05-07 DIAGNOSIS — Z91.199 NONCOMPLIANCE WITH TREATMENT PLAN: ICD-10-CM

## 2025-05-07 DIAGNOSIS — L85.3 XEROSIS OF SKIN: ICD-10-CM

## 2025-05-07 DIAGNOSIS — Z79.4 TYPE 2 DIABETES MELLITUS WITH OTHER SKIN COMPLICATION, WITH LONG-TERM CURRENT USE OF INSULIN: ICD-10-CM

## 2025-05-07 DIAGNOSIS — L84 CALLUS OF FOOT: ICD-10-CM

## 2025-05-07 DIAGNOSIS — B07.0 PLANTAR WARTS: ICD-10-CM

## 2025-05-07 DIAGNOSIS — L60.3 DYSTROPHIC NAIL: ICD-10-CM

## 2025-05-07 DIAGNOSIS — L60.2 OVERGROWN NAIL: ICD-10-CM

## 2025-05-07 PROCEDURE — 99211 OFF/OP EST MAY X REQ PHY/QHP: CPT

## 2025-05-07 PROCEDURE — 27000999 HC MEDICAL RECORD PHOTO DOCUMENTATION

## 2025-05-07 PROCEDURE — 11721 DEBRIDE NAIL 6 OR MORE: CPT | Mod: 59,,, | Performed by: NURSE PRACTITIONER

## 2025-05-07 PROCEDURE — 11057 PARNG/CUTG B9 HYPRKR LES >4: CPT | Mod: ,,, | Performed by: NURSE PRACTITIONER

## 2025-05-07 PROCEDURE — 99214 OFFICE O/P EST MOD 30 MIN: CPT | Mod: 25,,, | Performed by: NURSE PRACTITIONER

## 2025-05-07 PROCEDURE — 11719 TRIM NAIL(S) ANY NUMBER: CPT | Mod: ,,, | Performed by: NURSE PRACTITIONER

## 2025-05-07 PROCEDURE — 11057 PARNG/CUTG B9 HYPRKR LES >4: CPT

## 2025-05-07 PROCEDURE — 11721 DEBRIDE NAIL 6 OR MORE: CPT

## 2025-05-07 NOTE — PROGRESS NOTES
[facility]   Outpatient Wound Care     Subjective:   Patient ID: Linn Mckeon is a 59 y.o. female.    Chief Complaint: Nail Care      History of Present Illness:   59 y.o. Black or  female presents to wound care clinic today for diabetic foot care, foot calluses, plantar warts, and Xerosis of skin.  Ambulates without difficulty.  Ambulates wearing O2 continuously.Presents to clinic alone.  Reviewed previous medical history since last visit of 01/29/2025.   Followed by Priscilla Strickland FNP for PCP last appointment 03/25/2025.  She refused podiatry in past due to transportation issues.      Today's visit 05/07/2025:  Reviewed previous progress notes since last visit of 01/29/2025.  Presents to clinic calluses, plantar, thick dystrophic nails.  Noncompliance with use Lac-Hytrin for Xerosis of skin.   Diabetic foot exam performed.  Monofilament test done decreased sensation noted in all areas.  Bilateral lower extremities cool to touch with absent hair growth.  Trimmed all 10 dystrophic toenails using podiatry clippers, and filed with [place] board.  Debride dystrophic nails using Dremmel.  Rationale for debridement to decrease pressure and alleviate pain.  Pared bilateral plantar surface plantar warts, and bilateral great toe calluses using #4 dermal curette.  Rationale for paring to decrease pressure, alleviate pain, and prevent ulcers.  We will have her return to the clinic in 1 month due to severity of calluses and plantar high risk for ulcers.  Reinforced the importance of using Lac-Hytrin twice daily follow up by a thin layer Vaseline to all dry areas of feet with the exception between toes.  Instructed the importance of checking feet daily due to decrease sensation high risk for diabetic foot ulcers.  Instructed on proper footwear, nail  care, and daily skin assessment.   Instructed to call the office with any questions, concerns, or new skin issues.  Verbalized understanding of all instructions.     01/29/2025:  Reviewed previous progress since last visit of 10/21/2024.  Complains of bilateral foot pain.  Since last visit patient has not use Lac-Hytrin as directed for Xerosis skin for bilateral feet. Diabetic foot exam performed.  Monofilament test done decreased sensation noted in all areas.  Bilateral lower extremities cool to touch with absent hair growth. Trimmed all 10 dystrophic toenails using podiatry clippers, and filed with [employer] board.  Bilateral plantar surface and great toe calluses.  Pared callus using #4 dermal curette. Rationale for paring today to decrease pressure, and prevent ulcers.  Instructed the importance of washing feet twice daily dry well between toes.  Apply Lac-Hytrin twice daily to dry feet and legs with the exception between toes follow up by a thin layer Vaseline.  Instructed the importance of checking feet daily due to decrease sensation high risk for diabetic foot ulcers.  Instructed on proper footwear, nail care, and daily skin assessment.  Will have her return to the clinic in 3 months.  Instructed to call the office with any questions, concerns, or new skin issues.  Verbalized understanding of all instructions.     10/21/2024:  Diabetic foot exam performed.  Monofilament test done decreased sensation noted in all areas.  Bilateral lower extremities cool to touch with absent hair growth.  Trimmed all 10 toenails using podiatry clippers, and filed with [employer] board.  Debride 4/6 nails dystrophic using Dremmel.  Rationale for debridement to decrease pressure and prevent ulcers.  Calluses bilateral lower feet and tips of great toes. Pared callus using #4 dermal curette. Rationale for paring today to decrease pressure, and prevent ulcers.  Instructed the importance of checking feet daily due to decrease sensation high  risk for diabetic foot ulcers.  Instructed on proper footwear, nail care, and daily skin assessment.  Instructed the importance of follow up with a PCP for diabetic shoes and inserts.  Will have her return to the clinic in 3 months.  Instructed to call the office with any questions, concerns, or new skin issues.  Verbalized understanding of all instructions.      07/19/2024:  During examination bilateral feet and lower legs Xerosis of skin.  Long discussion with the patient the importance of being compliant patient has not filed her Lac-Hytrin since January and is not applying twice daily with thin layer of Vaseline.  Bilateral plantar surface callus warts, and bilateral great toe callus pared using #4 dermal curette.  Paring performed to decreased pain with ambulation, prevent ulcers, in any skin breakdown.  Apply bilateral feet with Vaseline with the exception between toes.  Instructed will have to  Lac-Hytrin and start using twice daily with thin layer of Vaseline.  Agreed.  Tolerated well.  Relief with a paring.  Mycotic toenails did not need to be trimmed at this visit.  Patient is also noncompliant with the apply compound topical for mycotic nails.  Will have her return to the clinic in 3 months for diabetic foot care.        History includes:      Past Medical History:   Diagnosis Date    Callus of foot 07/07/2022    CHF (congestive heart failure)     Chronic heart failure with preserved ejection fraction 05/20/2022    COPD (chronic obstructive pulmonary disease)     Diabetes mellitus     GOODMAN (dyspnea on exertion) 05/20/2022    Dystrophic nail 07/07/2022    Hyperlipidemia     Hyperlipidemia LDL goal <70 05/20/2022    Hypertension     Morbid obesity with body mass index (BMI) greater than or equal to 50 07/07/2022    Non compliance w medication regimen 07/07/2022    Noncompliance with treatment plan 07/07/2022    DAVION on CPAP 05/20/2022    Peripheral edema 05/20/2022    Primary hypertension 05/20/2022     Type 2 diabetes mellitus with skin complication 05/20/2022    Xerosis of skin 07/07/2022      Past Surgical History:   Procedure Laterality Date    COLONOSCOPY, WITH POLYPECTOMY USING HOT BIOPSY FORCEPS N/A 01/25/2024    Procedure: COLONOSCOPY, WITH POLYPECTOMY USING HOT BIOPSY FORCEPS;  Surgeon: Yan Bashir MD;  Location: [facility] ENDOSCOPY;  Service: Endoscopy;  Laterality: N/A;  Cold forceps    HYSTERECTOMY      SURGICAL REMOVAL OF MASS OF UPPER EXTREMITY Right 4/25/2025    Procedure: EXCISION, MASS, UPPER EXTREMITY;  Surgeon: Manfred Parham MD;  Location: [facility] OR;  Service: General;  Laterality: Right;  Arm      Social History     Socioeconomic History    Marital status:     Number of children: 2   Tobacco Use    Smoking status: Every Day     Current packs/day: 0.50     Average packs/day: 0.5 packs/day for 42.3 years (21.2 ttl pk-yrs)     Types: Cigarettes     Start date: 1983    Smokeless tobacco: Never   Substance and Sexual Activity    Alcohol use: Not Currently    Drug use: Never    Sexual activity: Not Currently     Social Drivers of Health     Financial Resource Strain: Low Risk  (3/21/2024)    Overall Financial Resource Strain (CARDIA)     Difficulty of Paying Living Expenses: Not hard at all   Food Insecurity: No Food Insecurity (3/21/2024)    Hunger Vital Sign     Worried About Running Out of Food in the Last Year: Never true     Ran Out of Food in the Last Year: Never true   Transportation Needs: No Transportation Needs (9/4/2024)    TRANSPORTATION NEEDS     Transportation : No   Physical Activity: Inactive (9/4/2024)    Exercise Vital Sign     Days of Exercise per Week: 0 days     Minutes of Exercise per Session: 0 min   Stress: No Stress Concern Present (3/21/2024)    [employer] - Occupational Stress Questionnaire     Feeling of Stress : Not at all   Housing Stability: Unknown (9/4/2024)    Housing Stability Vital Sign     Unable to Pay for Housing in the  "Last Year: No     Homeless in the Last Year: No   .      Current Outpatient Medications   Medication Sig Dispense Refill    albuterol (PROVENTIL) 2.5 mg /3 mL (0.083 %) nebulizer solution Take 3 mLs (2.5 mg total) by nebulization every 6 (six) hours as needed for Wheezing. Rescue 90 mL 1    albuterol (VENTOLIN HFA) 90 mcg/actuation inhaler Inhale 2 puffs into the lungs every 6 (six) hours as needed for Wheezing. Rescue 18 g 2    ammonium lactate (LAC-HYDRIN) 12 % lotion APPLY TO DRY SKIN AND RUB IN WELL TWICE DAILY. NOTHING BETWEEN TOES 226 g 11    aspirin (ECOTRIN) 81 MG EC tablet Take 81 mg by mouth once daily.      atorvastatin (LIPITOR) 80 MG tablet Take 1 tablet (80 mg total) by mouth once daily. 90 tablet 3    BD ULTRA-FINE JORDON PEN NEEDLE 32 gauge x 5/32" Ndle 2 (two) times daily.      BD ULTRA-FINE JORDON PEN NEEDLE 32 gauge x 5/32" Ndle       blood sugar diagnostic (TRUE METRIX GLUCOSE TEST STRIP) Strp Test Blood Sugar 2 times a day 100 strip 11    blood-glucose meter kit To check BG 2 times daily, to use with insurance preferred meter 1 each 0    blood-glucose meter,continuous (DEXCOM G7 ) Misc Dexcom G7  uses directed 1 each 0    blood-glucose sensor (DEXCOM G7 SENSOR) Norma Use every 10 days as directed 3 each 11    CICLOPIROX-ITRACONAZOLE-UREA TOP CLEAN NAILS, SHAKE BOTTLE WELL, APPLY TWICE DAILY TO ALL AFFECTED NAILS USING APPLICATOR. (UP TO 2 MLS/DAY)      empagliflozin (JARDIANCE) 25 mg tablet Take 1 tablet (25 mg total) by mouth once daily. 90 tablet 2    ergocalciferol (ERGOCALCIFEROL) 50,000 unit Cap Take 1 capsule (50,000 Units total) by mouth every 7 days. 12 capsule 2    ezetimibe (ZETIA) 10 mg tablet Take 1 tablet (10 mg total) by mouth once daily. 90 tablet 3    fluticasone propionate (FLONASE) 50 mcg/actuation nasal spray 1 spray (50 mcg total) by Each Nostril route once daily. 11.1 mL 1    fluticasone-umeclidin-vilanter (TRELEGY ELLIPTA) 100-62.5-25 mcg DsDv Inhale 1 puff into " "the lungs once daily. 28 each 3    furosemide (LASIX) 40 MG tablet Take 1 tablet (40 mg total) by mouth 2 (two) times a day. 180 tablet 3    insulin glargine-yfgn 100 unit/mL (3 mL) InPn Inject 43 Units into the skin once daily.      ipratropium-albuteroL (COMBIVENT RESPIMAT)  mcg/actuation inhaler Inhale 2 puffs into the lungs every 6 (six) hours as needed for Wheezing. Rescue 4 g 1    ketoconazole (NIZORAL) 2 % cream Apply topically once daily. 30 g 1    lancets (ACCU-CHEK FASTCLIX LANCET DRUM) Misc USE ONE DAILY 100 each 11    LANCETS MISC       LANTUS SOLOSTAR U-100 INSULIN 100 unit/mL (3 mL) InPn pen Inject 40U at breakfast and 20U at bedtime subcutaneously      latanoprost 0.005 % ophthalmic solution Apply 1 drop to eye.      metFORMIN (GLUCOPHAGE) 1000 MG tablet TAKE 1 TABLET BY MOUTH TWICE DAILY WITH MEALS 60 tablet 5    metoprolol succinate (TOPROL-XL) 50 MG 24 hr tablet Take 1 tablet (50 mg total) by mouth once daily. 90 tablet 3    nystatin (MYCOSTATIN) ointment Apply topically 2 (two) times daily. (Patient not taking: Reported on 4/8/2025) 15 g 2    oxyCODONE-acetaminophen (PERCOCET) 5-325 mg per tablet Take 1 tablet by mouth every 4 (four) hours as needed for Pain. 8 tablet 0    pen needle, diabetic (BD ULTRA-FINE JORDON PEN NEEDLE) 32 gauge x 5/32" Ndle USE TWICE DAILY 60 each 11    sacubitriL-valsartan (ENTRESTO) 49-51 mg per tablet Take 1 tablet by mouth 2 (two) times daily. 180 tablet 3    SITagliptin phosphate (JANUVIA) 100 MG Tab Take 1 tablet (100 mg total) by mouth once daily. 90 tablet 3    spironolactone (ALDACTONE) 25 MG tablet Take 1 tablet (25 mg total) by mouth once daily. 90 tablet 3    TRUEPLUS LANCETS 33 gauge Misc Apply topically 2 (two) times daily.       No current facility-administered medications for this encounter.       Review of Systems   All other systems reviewed and are negative.         Labs Reviewed:   Chemistry:  Lab Results   Component Value Date    BUN 36.6 (H) " "04/25/2025    BUN 25.5 (H) 12/10/2024    CREATININE 1.06 (H) 04/25/2025    CREATININE 1.24 (H) 12/10/2024    EGFRNORACEVR 60 04/25/2025    EGFRNORACEVR 50 12/10/2024    AST 20 12/10/2024    AST 13 08/19/2024    ALT 11 12/10/2024    ALT 14 08/19/2024    HGBA1C 8.0 (H) 12/10/2024        Hematology:  Lab Results   Component Value Date    WBC 9.61 04/25/2025    WBC 12.03 (H) 12/10/2024    HGB 14.7 04/25/2025    HGB 15.2 12/10/2024    HCT 45.5 04/25/2025    HCT 46.6 12/10/2024     04/25/2025     12/10/2024       Inflammatory Markers:  No results found for: "HSCRP", "SEDRATE"     Objective:        Physical Exam  Vitals reviewed.   Cardiovascular:      Pulses:           Dorsalis pedis pulses are detected w/ Doppler on the right side and detected w/ Doppler on the left side.        Posterior tibial pulses are detected w/ Doppler on the right side and detected w/ Doppler on the left side.   Pulmonary:      Comments: Wearing O2 @ 2 liters continuously.  Musculoskeletal:        Feet:    Feet:      Right foot:      Skin integrity: Callus and dry skin present.      Toenail Condition: Right toenails are abnormally thick and long. Fungal disease present.     Left foot:      Skin integrity: Callus and dry skin present.      Toenail Condition: Left toenails are abnormally thick and long. Fungal disease present.     Comments: Monofilament test done decreased sensation noted in all areas.  Skin:     General: Skin is cool and dry.      Capillary Refill: Capillary refill takes less than 2 seconds.   Neurological:      Mental Status: She is alert.                                          Assessment:         ICD-10-CM ICD-9-CM   1. Encounter for comprehensive diabetic foot examination, type 2 diabetes mellitus  E11.9 250.00   2. Overgrown nail  L60.2 703.8   3. Dystrophic nail  L60.3 703.8   4. Callus of foot  L84 700   5. Plantar warts  B07.0 078.12   6. Bilateral foot pain  M79.671 729.5    M79.672    7. Xerosis of skin  " L85.3 706.8   8. Noncompliance with treatment plan  Z91.199 V15.81   9. Type 2 diabetes mellitus with other skin complication, with long-term current use of insulin  E11.628 250.80    Z79.4 V58.67   10. BMI 45.0-49.9, adult  Z68.42 V85.42         Plan:   Tissue pathology and/or culture taken:  [] Yes [x] No   Sharp debridement performed:   [] Yes [x] No   Labs ordered this visit:   [] Yes [x] No   Imaging ordered this visit:   [] Yes [x] No         1. Encounter for comprehensive diabetic foot examination, type 2 diabetes mellitus     Diabetic foot exam performed.  Instructed on proper foot care.   2. Overgrown nail     Trimmed.  Instructed on proper nail care.   3. Dystrophic nail     Debride.  Instructed on proper nail care.   4. Callus of foot     Pared.  Instructed on proper footwear.   5. Plantar warts     Pared.  Instructed on proper footwear.   6. Bilateral foot pain     Relief with a paring.      7. Xerosis of skin     Reinforced compliance with the use Lac-Hytrin twice daily follow up by thin layer Vaseline with the exception between toes.   8. Noncompliance with treatment plan     Reinforced the importance of foot care, and treatment with Lac-Hytrin follow up by a thin layer Vaseline with the exception between toes   9. Type 2 diabetes mellitus with other skin complication, with long-term current use of insulin     Last A1c 9.0.    Reinforced the importance of diet and medication compliance.    Must have a strict diabetic diet, take glucose lowering medications as prescribed and encourage lower HA1C.      10. BMI 45.0-49.9, adult     Co-factor in wound development.  Instructed the importance of diet and exercise as tolerated.     The time spent including preparing to see the patient, obtaining patient history and assessment, evaluation of the plan of care, patient/caregiver counseling and education, orders, documentation, coordination of care, and other professional medical management activities for today's  encounter was 25 minute.    Time spent performing procedures during today's encounter was 30 minute.    Follow up in about 1 month (around 6/7/2025). Teaching provided on s/s to call wound clinic for promptly.  ER precautions taught for after hours and weekends.     NOEMI Mederos

## 2025-05-07 NOTE — PATIENT INSTRUCTIONS
Pt seen today by:  NOEMI Dolan    self care DRESSING INSTRUCTIONS:         Wound location: BOTH FEET    APPLY LACHYDRIN LOTION TO BOTH FEET AS PRESCRIBED      Return visit: 1 MONTH    Nutrition:  The current daily value (%DV) for protein is 50 grams per day and is meant as a general goal for most people. Further increasing your dietary protein intake is very important for wound healing. Typically one needs over 100g of protein per day to help with wound healing needs.  If you are a dialysis patient or have problems with your kidneys, talk to your Nephrologist about how much protein you can take in with your condition.  Examples of high protein items that can be added to your diet include: eggs, chicken, red meats, almonds, cottage cheese, Greek yogurt, beans, and peanut butter.  Fortified protein bars, shakes and drinks can add 15-30 additional grams of protein per serving.  Also add:   1 daily general multivitamin   Vitamin C : 500mg twice daily   Zinc 220 mg daily  Vit D : once daily                Call our [facility] wound clinic for questions/concerns a [phone] .

## 2025-05-08 ENCOUNTER — OFFICE VISIT (OUTPATIENT)
Dept: SURGERY | Facility: CLINIC | Age: 60
End: 2025-05-08
Payer: MEDICAID

## 2025-05-08 VITALS
DIASTOLIC BLOOD PRESSURE: 70 MMHG | BODY MASS INDEX: 47.46 KG/M2 | RESPIRATION RATE: 20 BRPM | TEMPERATURE: 98 F | SYSTOLIC BLOOD PRESSURE: 107 MMHG | WEIGHT: 278 LBS | HEIGHT: 64 IN | HEART RATE: 76 BPM | OXYGEN SATURATION: 96 %

## 2025-05-08 DIAGNOSIS — D18.00 HEMANGIOMA, UNSPECIFIED SITE: Primary | ICD-10-CM

## 2025-05-08 PROCEDURE — 99215 OFFICE O/P EST HI 40 MIN: CPT | Mod: PBBFAC

## 2025-05-08 NOTE — PROGRESS NOTES
----- Message from Reshma Mcdermott sent at 8/23/2017 12:40 PM CDT -----  Contact: [place], [facility], [phone]  States patient is there, states she was just seen and in confused about what medications she is supposed to get. Please advise.   I have reviewed the notes, assessments, and/or procedures performed by the resident, I concur with her/his documentation of Linn Mckeon.     Leslie Mills MD

## 2025-05-08 NOTE — PROGRESS NOTES
[facility] General Surgery Clinic Note    HPI:   Linn Mckeon is a 59y.o female w/ PMHx of T2DM, HTN, Class II HFpEF, COPD, DAVION s/p mass excision on 4/25/25 located on dorsal right forearm.  Denies fever, chills, pain, redness.  She says the wound is healing well.  No other symptoms reported.    Patient with no acute concerns. Pain well controlled, ambulating, tolerating food w/o n/v, voiding, and having bowel movements.     Denies wound edema, erythema, or drainage.     PMH:   Past Medical History:   Diagnosis Date    Callus of foot 07/07/2022    CHF (congestive heart failure)     Chronic heart failure with preserved ejection fraction 05/20/2022    COPD (chronic obstructive pulmonary disease)     Diabetes mellitus     GOODMAN (dyspnea on exertion) 05/20/2022    Dystrophic nail 07/07/2022    Hyperlipidemia     Hyperlipidemia LDL goal <70 05/20/2022    Hypertension     Morbid obesity with body mass index (BMI) greater than or equal to 50 07/07/2022    Non compliance w medication regimen 07/07/2022    Noncompliance with treatment plan 07/07/2022    DAVION on CPAP 05/20/2022    Peripheral edema 05/20/2022    Primary hypertension 05/20/2022    Type 2 diabetes mellitus with skin complication 05/20/2022    Xerosis of skin 07/07/2022      Meds: Current Medications[1]  Allergies: Review of patient's allergies indicates:  No Known Allergies  Social History: Social History[2]  Family History:   Family History   Problem Relation Name Age of Onset    Hypertension Mother Rex Hood     Heart disease Mother Rex Hood     Diabetes Mother Rex Hood     Heart attack Father      Diabetes Sister Ellie Aguiar     Hypertension Sister Ellie Aguiar     Diabetes Brother Kushal Hood     Hypertension Brother Kushal Hood      Surgical History:   Past Surgical History:   Procedure Laterality Date    COLONOSCOPY, WITH POLYPECTOMY USING HOT BIOPSY FORCEPS N/A 01/25/2024    Procedure: COLONOSCOPY, WITH POLYPECTOMY  USING HOT BIOPSY FORCEPS;  Surgeon: Yan Bashir MD;  Location: [facility] ENDOSCOPY;  Service: Endoscopy;  Laterality: N/A;  Cold forceps    HYSTERECTOMY      SURGICAL REMOVAL OF MASS OF UPPER EXTREMITY Right 4/25/2025    Procedure: EXCISION, MASS, UPPER EXTREMITY;  Surgeon: Manfred Parham MD;  Location: [facility] OR;  Service: General;  Laterality: Right;  Arm     Review of Systems:  Skin: No rashes or itching.  Head: Denies headache or recent trauma.  Eyes: Denies eye pain or double vision.  Neck: Denies swelling or hoarseness of voice.  Respiratory: Denies shortness of breath or chest pain  Cardiac: Denies palpitations or swelling in hands/feet.  Gastrointestinal: Denies nausea, denies vomiting.   Urinary: Denies dysuria or hematuria.  Vascular: Denies claudication or leg swelling.  Neuro: Denies motor deficits. Denies weakness.  Endocrine: Denies excessive sweating or cold intolerance.  Psych: Denies memory problems. Denies anxiety.    Objective:    Vitals:  Vitals:    05/08/25 1100   BP: 107/70   Pulse: 76   Resp: 20   Temp: 97.5 °F (36.4 °C)        Physical Exam:  Gen: NAD  Neuro: awake, alert, answering questions appropriately  CV: RRR  Resp: non-labored breathing, KWAKU  Abd: soft, ND, NT  Ext: moves all 4 spontaneously and purposefully.  Healing incisional wound of the right forearm.  Skin: warm, well perfused. Right dorsal distal forearm with clean, dry, and intact incision. No erythema, drainage, or drainage.    Pertinent Labs:  N/A    Imaging:  N/A    Micro/Path/Other:  Right forearm mass, excisional biopsy:   - Hemangioma.     Assessment/Plan:  Linn Mckeon is a 59y.o female w/ PMHx of T2DM, HTN, Class II HFpEF, COPD, DAVION s/p mass excision on 4/25/25 located on dorsal distal right arm.   Mass pathology resulted as hemangioma. Patient with no acute concerns and wound healing appropriately    - RTC prn    Jesica Dominguez, MS3  [facility]    I saw the patient with the medical student and  "agree with the documentation. I have altered the note as needed.    Carlitos Tesfaye MD. PhD  [facility], PGY1           [1]   Current Outpatient Medications:     albuterol (PROVENTIL) 2.5 mg /3 mL (0.083 %) nebulizer solution, Take 3 mLs (2.5 mg total) by nebulization every 6 (six) hours as needed for Wheezing. Rescue, Disp: 90 mL, Rfl: 1    albuterol (VENTOLIN HFA) 90 mcg/actuation inhaler, Inhale 2 puffs into the lungs every 6 (six) hours as needed for Wheezing. Rescue, Disp: 18 g, Rfl: 2    ammonium lactate (LAC-HYDRIN) 12 % lotion, APPLY TO DRY SKIN AND RUB IN WELL TWICE DAILY. NOTHING BETWEEN TOES, Disp: 226 g, Rfl: 11    aspirin (ECOTRIN) 81 MG EC tablet, Take 81 mg by mouth once daily., Disp: , Rfl:     atorvastatin (LIPITOR) 80 MG tablet, Take 1 tablet (80 mg total) by mouth once daily., Disp: 90 tablet, Rfl: 3    BD ULTRA-FINE JORDON PEN NEEDLE 32 gauge x 5/32" Ndle, 2 (two) times daily., Disp: , Rfl:     BD ULTRA-FINE JORDON PEN NEEDLE 32 gauge x 5/32" Ndle, , Disp: , Rfl:     blood sugar diagnostic (TRUE METRIX GLUCOSE TEST STRIP) Strp, Test Blood Sugar 2 times a day, Disp: 100 strip, Rfl: 11    blood-glucose meter,continuous (DEXCOM G7 ) Misc, Dexcom G7  uses directed, Disp: 1 each, Rfl: 0    blood-glucose sensor (DEXCOM G7 SENSOR) Norma, Use every 10 days as directed, Disp: 3 each, Rfl: 11    CICLOPIROX-ITRACONAZOLE-UREA TOP, CLEAN NAILS, SHAKE BOTTLE WELL, APPLY TWICE DAILY TO ALL AFFECTED NAILS USING APPLICATOR. (UP TO 2 MLS/DAY), Disp: , Rfl:     empagliflozin (JARDIANCE) 25 mg tablet, Take 1 tablet (25 mg total) by mouth once daily., Disp: 90 tablet, Rfl: 2    ergocalciferol (ERGOCALCIFEROL) 50,000 unit Cap, Take 1 capsule (50,000 Units total) by mouth every 7 days., Disp: 12 capsule, Rfl: 2    ezetimibe (ZETIA) 10 mg tablet, Take 1 tablet (10 mg total) by mouth once daily., Disp: 90 tablet, Rfl: 3    fluticasone propionate (FLONASE) 50 mcg/actuation nasal spray, 1 spray (50 " "mcg total) by Each Nostril route once daily., Disp: 11.1 mL, Rfl: 1    fluticasone-umeclidin-vilanter (TRELEGY ELLIPTA) 100-62.5-25 mcg DsDv, Inhale 1 puff into the lungs once daily., Disp: 28 each, Rfl: 3    furosemide (LASIX) 40 MG tablet, Take 1 tablet (40 mg total) by mouth 2 (two) times a day., Disp: 180 tablet, Rfl: 3    insulin glargine-yfgn 100 unit/mL (3 mL) InPn, Inject 43 Units into the skin once daily., Disp: , Rfl:     ipratropium-albuteroL (COMBIVENT RESPIMAT)  mcg/actuation inhaler, Inhale 2 puffs into the lungs every 6 (six) hours as needed for Wheezing. Rescue, Disp: 4 g, Rfl: 1    ketoconazole (NIZORAL) 2 % cream, Apply topically once daily., Disp: 30 g, Rfl: 1    lancets (ACCU-CHEK FASTCLIX LANCET DRUM) Misc, USE ONE DAILY, Disp: 100 each, Rfl: 11    LANCETS MISC, , Disp: , Rfl:     LANTUS SOLOSTAR U-100 INSULIN 100 unit/mL (3 mL) InPn pen, Inject 40U at breakfast and 20U at bedtime subcutaneously, Disp: , Rfl:     latanoprost 0.005 % ophthalmic solution, Apply 1 drop to eye., Disp: , Rfl:     metFORMIN (GLUCOPHAGE) 1000 MG tablet, TAKE 1 TABLET BY MOUTH TWICE DAILY WITH MEALS, Disp: 60 tablet, Rfl: 5    metoprolol succinate (TOPROL-XL) 50 MG 24 hr tablet, Take 1 tablet (50 mg total) by mouth once daily., Disp: 90 tablet, Rfl: 3    oxyCODONE-acetaminophen (PERCOCET) 5-325 mg per tablet, Take 1 tablet by mouth every 4 (four) hours as needed for Pain., Disp: 8 tablet, Rfl: 0    pen needle, diabetic (BD ULTRA-FINE JORDON PEN NEEDLE) 32 gauge x 5/32" Ndle, USE TWICE DAILY, Disp: 60 each, Rfl: 11    sacubitriL-valsartan (ENTRESTO) 49-51 mg per tablet, Take 1 tablet by mouth 2 (two) times daily., Disp: 180 tablet, Rfl: 3    SITagliptin phosphate (JANUVIA) 100 MG Tab, Take 1 tablet (100 mg total) by mouth once daily., Disp: 90 tablet, Rfl: 3    spironolactone (ALDACTONE) 25 MG tablet, Take 1 tablet (25 mg total) by mouth once daily., Disp: 90 tablet, Rfl: 3    TRUEPLUS LANCETS 33 gauge Misc, Apply " topically 2 (two) times daily., Disp: , Rfl:     blood-glucose meter kit, To check BG 2 times daily, to use with insurance preferred meter, Disp: 1 each, Rfl: 0    nystatin (MYCOSTATIN) ointment, Apply topically 2 (two) times daily. (Patient not taking: Reported on 5/8/2025), Disp: 15 g, Rfl: 2  [2]   Social History  Tobacco Use    Smoking status: Every Day     Current packs/day: 0.50     Average packs/day: 0.5 packs/day for 42.3 years (21.2 ttl pk-yrs)     Types: Cigarettes     Start date: 1983    Smokeless tobacco: Never   Substance Use Topics    Alcohol use: Not Currently    Drug use: Never

## 2025-05-15 ENCOUNTER — OFFICE VISIT (OUTPATIENT)
Dept: INTERNAL MEDICINE | Facility: CLINIC | Age: 60
End: 2025-05-15
Payer: MEDICAID

## 2025-05-15 VITALS
HEART RATE: 72 BPM | TEMPERATURE: 98 F | DIASTOLIC BLOOD PRESSURE: 68 MMHG | WEIGHT: 274.5 LBS | BODY MASS INDEX: 46.86 KG/M2 | SYSTOLIC BLOOD PRESSURE: 125 MMHG | RESPIRATION RATE: 16 BRPM | HEIGHT: 64 IN | OXYGEN SATURATION: 98 %

## 2025-05-15 DIAGNOSIS — I10 PRIMARY HYPERTENSION: Chronic | ICD-10-CM

## 2025-05-15 DIAGNOSIS — E11.65 TYPE 2 DIABETES MELLITUS WITH HYPERGLYCEMIA, WITH LONG-TERM CURRENT USE OF INSULIN: Primary | Chronic | ICD-10-CM

## 2025-05-15 DIAGNOSIS — Z79.4 TYPE 2 DIABETES MELLITUS WITH HYPERGLYCEMIA, WITH LONG-TERM CURRENT USE OF INSULIN: Primary | Chronic | ICD-10-CM

## 2025-05-15 PROBLEM — E11.9 ENCOUNTER FOR COMPREHENSIVE DIABETIC FOOT EXAMINATION, TYPE 2 DIABETES MELLITUS: Status: RESOLVED | Noted: 2023-04-13 | Resolved: 2025-05-15

## 2025-05-15 PROBLEM — M79.671 RIGHT FOOT PAIN: Status: RESOLVED | Noted: 2023-04-13 | Resolved: 2025-05-15

## 2025-05-15 PROCEDURE — 99213 OFFICE O/P EST LOW 20 MIN: CPT | Mod: PBBFAC

## 2025-05-15 PROCEDURE — 1159F MED LIST DOCD IN RCRD: CPT | Mod: CPTII,,,

## 2025-05-15 PROCEDURE — 3008F BODY MASS INDEX DOCD: CPT | Mod: CPTII,,,

## 2025-05-15 PROCEDURE — 1160F RVW MEDS BY RX/DR IN RCRD: CPT | Mod: CPTII,,,

## 2025-05-15 PROCEDURE — 3074F SYST BP LT 130 MM HG: CPT | Mod: CPTII,,,

## 2025-05-15 PROCEDURE — 3052F HG A1C>EQUAL 8.0%<EQUAL 9.0%: CPT | Mod: CPTII,,,

## 2025-05-15 PROCEDURE — 3078F DIAST BP <80 MM HG: CPT | Mod: CPTII,,,

## 2025-05-15 PROCEDURE — 4010F ACE/ARB THERAPY RXD/TAKEN: CPT | Mod: CPTII,,,

## 2025-05-15 PROCEDURE — 99214 OFFICE O/P EST MOD 30 MIN: CPT | Mod: S$PBB,,,

## 2025-05-15 RX ORDER — INSULIN GLARGINE 100 [IU]/ML
INJECTION, SOLUTION SUBCUTANEOUS
Start: 2025-05-15

## 2025-05-15 NOTE — PROGRESS NOTES
Priscilla Strickland, NOEMI   [facility]  [address] [facility] [address]      PATIENT NAME: Linn Mckeon  : 1965  DATE: 5/15/25  MRN: [id number]      Reason for Visit / Chief Complaint: Diabetes and Hypertension       History of Present Illness / Problem Focused Workflow     Linn Mckeon presents to the clinic with Diabetes and Hypertension     60 yo AAF presents to clinic. Medical problems include HTN, HLD, Class II HFpEF 50-55%, DM2, COPD with Oxygen Dependence, DAVION, Tobacco Use, Obesity. Followed by [facility] wound care, cardiology, endocrinology, pulmonology, DM education clinics.     24  Pt presents for routine follow up. Labs reviewed, renals stable. A1C remains elevated at 8. She is taking insulin differently than prescribed- she's taking 43U qAM because she was forgetful with QHS dosing. This was discussed in DM education. Lipids at goal. Mild leukocytosis noted- per pulmonology note pt with acute illness at time of labs. She declines vaccines other than pneumonia vaccine. She is reporting lump on right arm for the last 4 months, no associated pain or tenderness, consistent with lipoma. Will confirm with ultrasound, she is not interested in removal at this time. She is requesting diabetic shoes as she reports previously discussed with last provider. Will order today.     3/25/25  Pt presents for DM follow up. POC A1C 9.0- she admits diet is poor. She is still taking Lantus 43U qAM, she reports compliance with oral medications. FBG in 150-190. Day time readings vary greatly per pt. Denies lows. Agreeable to add back nighttime dose, will do 40U qAM and 20U QHS. Since LOV, imaging on lump on pt arm warranted work up and she has been referred to general surgery for biopsy of the lesion,apt scheduled next week. Denies acute complaints. RTC 6 weeks with POC A1C     5/15/25  Pt presents for DM follow up. A1C 8.9. she reports she has  "been compliant with medications, including increased insulin dosage. She continues to be non compliant with diet. Reinforced risk of sustained hyperglycemia and need for mindfulness with diet. Denies any hypoglycemia episodes. Increase lantus to 50qAM and 30 QHS. Pt scheduled for follow up with endo in 3 months. RTC 6 months with labs          Review of Systems     Review of Systems   Constitutional:  Negative for fatigue, fever and unexpected weight change.   HENT:  Negative for ear pain, hearing loss, trouble swallowing and voice change.    Respiratory:  Negative for cough and shortness of breath.    Cardiovascular:  Negative for chest pain and palpitations.   Gastrointestinal:  Negative for abdominal pain, diarrhea and vomiting.   Genitourinary:  Negative for dysuria.   Musculoskeletal:  Negative for gait problem.   Skin:  Negative for rash and wound.   Neurological:  Negative for weakness.   Psychiatric/Behavioral:  Negative for suicidal ideas.          Medications and Allergies     Medications  Medication List with Changes/Refills   Current Medications    ALBUTEROL (PROVENTIL) 2.5 MG /3 ML (0.083 %) NEBULIZER SOLUTION    Take 3 mLs (2.5 mg total) by nebulization every 6 (six) hours as needed for Wheezing. Rescue    ALBUTEROL (VENTOLIN HFA) 90 MCG/ACTUATION INHALER    Inhale 2 puffs into the lungs every 6 (six) hours as needed for Wheezing. Rescue    AMMONIUM LACTATE (LAC-HYDRIN) 12 % LOTION    APPLY TO DRY SKIN AND RUB IN WELL TWICE DAILY. NOTHING BETWEEN TOES    ASPIRIN (ECOTRIN) 81 MG EC TABLET    Take 81 mg by mouth once daily.    ATORVASTATIN (LIPITOR) 80 MG TABLET    Take 1 tablet (80 mg total) by mouth once daily.    BD ULTRA-FINE JORDON PEN NEEDLE 32 GAUGE X 5/32" NDLE    2 (two) times daily.    BD ULTRA-FINE JORDON PEN NEEDLE 32 GAUGE X 5/32" NDLE        BLOOD SUGAR DIAGNOSTIC (TRUE METRIX GLUCOSE TEST STRIP) STRP    Test Blood Sugar 2 times a day    BLOOD-GLUCOSE METER KIT    To check BG 2 times daily, to " "use with insurance preferred meter    BLOOD-GLUCOSE METER,CONTINUOUS (DEXCOM G7 ) MISC    Dexcom G7  uses directed    BLOOD-GLUCOSE SENSOR (DEXCOM G7 SENSOR) VAMSI    Use every 10 days as directed    CICLOPIROX-ITRACONAZOLE-UREA TOP    CLEAN NAILS, SHAKE BOTTLE WELL, APPLY TWICE DAILY TO ALL AFFECTED NAILS USING APPLICATOR. (UP TO 2 MLS/DAY)    EMPAGLIFLOZIN (JARDIANCE) 25 MG TABLET    Take 1 tablet (25 mg total) by mouth once daily.    ERGOCALCIFEROL (ERGOCALCIFEROL) 50,000 UNIT CAP    Take 1 capsule (50,000 Units total) by mouth every 7 days.    EZETIMIBE (ZETIA) 10 MG TABLET    Take 1 tablet (10 mg total) by mouth once daily.    FLUTICASONE PROPIONATE (FLONASE) 50 MCG/ACTUATION NASAL SPRAY    1 spray (50 mcg total) by Each Nostril route once daily.    FLUTICASONE-UMECLIDIN-VILANTER (TRELEGY ELLIPTA) 100-62.5-25 MCG DSDV    Inhale 1 puff into the lungs once daily.    FUROSEMIDE (LASIX) 40 MG TABLET    Take 1 tablet (40 mg total) by mouth 2 (two) times a day.    INSULIN GLARGINE-YFGN 100 UNIT/ML (3 ML) INPN    Inject 43 Units into the skin once daily.    IPRATROPIUM-ALBUTEROL (COMBIVENT RESPIMAT)  MCG/ACTUATION INHALER    Inhale 2 puffs into the lungs every 6 (six) hours as needed for Wheezing. Rescue    KETOCONAZOLE (NIZORAL) 2 % CREAM    Apply topically once daily.    LANCETS (ACCU-CHEK FASTCLIX LANCET DRUM) [facility]    USE ONE DAILY    LANCETS [facility]        LATANOPROST 0.005 % OPHTHALMIC SOLUTION    Apply 1 drop to eye.    METFORMIN (GLUCOPHAGE) 1000 MG TABLET    TAKE 1 TABLET BY MOUTH TWICE DAILY WITH MEALS    METOPROLOL SUCCINATE (TOPROL-XL) 50 MG 24 HR TABLET    Take 1 tablet (50 mg total) by mouth once daily.    NYSTATIN (MYCOSTATIN) OINTMENT    Apply topically 2 (two) times daily.    OXYCODONE-ACETAMINOPHEN (PERCOCET) 5-325 MG PER TABLET    Take 1 tablet by mouth every 4 (four) hours as needed for Pain.    PEN NEEDLE, DIABETIC (BD ULTRA-FINE JORDON PEN NEEDLE) 32 GAUGE X 5/32" NDLE    USE TWICE " DAILY    SACUBITRIL-VALSARTAN (ENTRESTO) 49-51 MG PER TABLET    Take 1 tablet by mouth 2 (two) times daily.    SITAGLIPTIN PHOSPHATE (JANUVIA) 100 MG TAB    Take 1 tablet (100 mg total) by mouth once daily.    SPIRONOLACTONE (ALDACTONE) 25 MG TABLET    Take 1 tablet (25 mg total) by mouth once daily.    TRUEPLUS LANCETS 33 GAUGE MISC    Apply topically 2 (two) times daily.   Changed and/or Refilled Medications    Modified Medication Previous Medication    LANTUS SOLOSTAR U-100 INSULIN 100 UNIT/ML (3 ML) INPN PEN LANTUS SOLOSTAR U-100 INSULIN 100 unit/mL (3 mL) InPn pen       Inject 50U at breakfast and 30U at bedtime subcutaneously    Inject 40U at breakfast and 20U at bedtime subcutaneously         Allergies  Review of patient's allergies indicates:  No Known Allergies    Physical Examination     Vitals:    05/15/25 0908   BP: 125/68   Pulse: 72   Resp: 16   Temp: 97.8 °F (36.6 °C)     Physical Exam  Vitals and nursing note reviewed.   Constitutional:       General: She is not in acute distress.     Appearance: She is not ill-appearing.   HENT:      Head: Normocephalic and atraumatic.      Mouth/Throat:      Mouth: Mucous membranes are moist.      Pharynx: Oropharynx is clear.   Eyes:      General: No scleral icterus.     Extraocular Movements: Extraocular movements intact.      Conjunctiva/sclera: Conjunctivae normal.      Pupils: Pupils are equal, round, and reactive to light.   Neck:      Vascular: No carotid bruit.   Cardiovascular:      Rate and Rhythm: Normal rate and regular rhythm.      Heart sounds: No murmur heard.     No friction rub. No gallop.   Pulmonary:      Effort: Pulmonary effort is normal. No respiratory distress.      Breath sounds: Normal breath sounds. No wheezing, rhonchi or rales.   Abdominal:      General: Abdomen is flat. Bowel sounds are normal. There is no distension.      Palpations: Abdomen is soft. There is no mass.      Tenderness: There is no abdominal tenderness.    Musculoskeletal:         General: Normal range of motion.      Cervical back: Normal range of motion and neck supple.   Skin:     General: Skin is warm and dry.   Neurological:      General: No focal deficit present.      Mental Status: She is alert.   Psychiatric:         Mood and Affect: Mood normal.           Results     Lab Results   Component Value Date    WBC 9.61 04/25/2025    RBC 5.72 (H) 04/25/2025    HGB 14.7 04/25/2025    HCT 45.5 04/25/2025    MCV 79.5 (L) 04/25/2025    MCH 25.7 (L) 04/25/2025    MCHC 32.3 (L) 04/25/2025    RDW 16.4 04/25/2025     04/25/2025    MPV 10.7 (H) 04/25/2025     Sodium   Date Value Ref Range Status   04/25/2025 138 136 - 145 mmol/L Final     Potassium   Date Value Ref Range Status   04/25/2025 4.7 3.5 - 5.1 mmol/L Final     Chloride   Date Value Ref Range Status   04/25/2025 108 (H) 98 - 107 mmol/L Final     CO2   Date Value Ref Range Status   04/25/2025 22 (L) 23 - 31 mmol/L Final     Glucose   Date Value Ref Range Status   04/25/2025 116 (H) 82 - 115 mg/dL Final     Blood Urea Nitrogen   Date Value Ref Range Status   04/25/2025 36.6 (H) 9.8 - 20.1 mg/dL Final     Creatinine   Date Value Ref Range Status   04/25/2025 1.06 (H) 0.55 - 1.02 mg/dL Final     Calcium   Date Value Ref Range Status   04/25/2025 9.0 8.4 - 10.2 mg/dL Final     Protein Total   Date Value Ref Range Status   12/10/2024 7.5 6.4 - 8.3 gm/dL Final     Albumin   Date Value Ref Range Status   12/10/2024 3.2 (L) 3.5 - 5.0 g/dL Final     Bilirubin Total   Date Value Ref Range Status   12/10/2024 0.9 <=1.5 mg/dL Final     ALP   Date Value Ref Range Status   12/10/2024 83 40 - 150 unit/L Final     AST   Date Value Ref Range Status   12/10/2024 20 5 - 34 unit/L Final     ALT   Date Value Ref Range Status   12/10/2024 11 0 - 55 unit/L Final     Estimated GFR-Non    Date Value Ref Range Status   12/06/2021 65 (L) >>=90 mL/min/1.73 m2 Final     Lab Results   Component Value Date    CHOL 95  12/10/2024     Lab Results   Component Value Date    HDL 28 (L) 12/10/2024     Lab Results   Component Value Date    TRIG 73 12/10/2024     Lab Results   Component Value Date    VLDL 15 12/10/2024     Lab Results   Component Value Date    LDL 52.00 12/10/2024     Lab Results   Component Value Date    TSH 1.084 03/18/2024     Lab Results   Component Value Date    PHUR 5.5 12/10/2024    PROTEINUA Negative 12/10/2024    GLUCUA 4+ (A) 12/10/2024    KETONESU Negative 05/14/2019    OCCULTUA Negative 12/10/2024    NITRITE Negative 12/10/2024    LEUKOCYTESUR Negative 12/10/2024     Lab Results   Component Value Date    HGBA1C 8.0 (H) 12/10/2024    HGBA1C 8.3 (H) 08/19/2024    HGBA1C 9.3 (H) 03/18/2024           Assessment         ICD-10-CM ICD-9-CM   1. Type 2 diabetes mellitus with hyperglycemia, with long-term current use of insulin  E11.65 250.00    Z79.4 790.29     V58.67   2. Primary hypertension  I10 401.9       Plan      Problem List Items Addressed This Visit       Primary hypertension (Chronic)    Relevant Orders    CBC Auto Differential    TSH    T4, Free    Urinalysis, Reflex to Urine Culture    Type 2 diabetes mellitus with hyperglycemia, with long-term current use of insulin - Primary (Chronic)    Current Assessment & Plan   Followed by mat and DM education  A1C 8.9, previously 9  Continue metformin, jardiance, januvia  Change lantus to 50U qAM and 30U QHS- follow up with endo in aug as scheduled  Follow ADA Diet. Avoid soda, simple sweets, and limit rice/pasta/breads/starches (no more than 45-50 grams per meal).  Maintain healthy weight with goal BMI <30.  Exercise 5 times per week for 30 minutes per day.  Stressed importance of daily foot exams.  Stressed importance of annual dilated eye exam.           Relevant Medications    LANTUS SOLOSTAR U-100 INSULIN 100 unit/mL (3 mL) InPn pen    Other Relevant Orders    POCT HEMOGLOBIN A1C    Comprehensive Metabolic Panel    Hemoglobin A1C    Lipid Panel     Microalbumin/Creatinine Ratio, Urine       Future Appointments   Date Time Provider Department Center   5/15/2025  9:40 AM Priscilla Strickland, NOEMI [facility] INTMED [place]   6/10/2025  1:00 PM Parisa Gamez FNP [facility] OPWND [place]   6/10/2025  2:30 PM Lynda Paige, CELINA, [facility] FMDEDU [place]   6/16/2025  9:10 AM PROVIDERS, [facility] [place]   7/8/2025  2:30 PM PROVIDER, [facility] PULMONOLOGY [facility] PULM [place]   8/7/2025 10:15 AM Sandra Patel, EMMA [facility] CARD [place]   8/21/2025  8:00 AM Jacqueline Urbina, NP [facility] ENDOCR [place]   11/20/2025  8:20 AM Priscilla Strickland FNP [facility] INTMED [place]   1/9/2026  8:30 AM Zully Ang FNP [facility] GYN [place]            Signature:      [facility]  [address] [facility] [address]    Date of encounter: 5/15/25

## 2025-05-15 NOTE — ASSESSMENT & PLAN NOTE
Followed by endo and DM education  A1C 8.9, previously 9  Continue metformin, jardiance, januvia  Change lantus to 50U qAM and 30U QHS- follow up with endo in aug as scheduled  Follow ADA Diet. Avoid soda, simple sweets, and limit rice/pasta/breads/starches (no more than 45-50 grams per meal).  Maintain healthy weight with goal BMI <30.  Exercise 5 times per week for 30 minutes per day.  Stressed importance of daily foot exams.  Stressed importance of annual dilated eye exam.

## 2025-05-26 DIAGNOSIS — I10 PRIMARY HYPERTENSION: Chronic | ICD-10-CM

## 2025-05-26 RX ORDER — FUROSEMIDE 40 MG/1
40 TABLET ORAL 2 TIMES DAILY
Qty: 180 TABLET | Refills: 0 | Status: SHIPPED | OUTPATIENT
Start: 2025-05-26

## 2025-06-05 DIAGNOSIS — E11.65 UNCONTROLLED TYPE 2 DIABETES MELLITUS WITH HYPERGLYCEMIA: ICD-10-CM

## 2025-06-05 RX ORDER — PEN NEEDLE, DIABETIC 30 GX3/16"
NEEDLE, DISPOSABLE MISCELLANEOUS
Qty: 60 EACH | Refills: 11 | Status: SHIPPED | OUTPATIENT
Start: 2025-06-05

## 2025-06-06 DIAGNOSIS — I10 PRIMARY HYPERTENSION: Chronic | ICD-10-CM

## 2025-06-06 DIAGNOSIS — E78.5 HYPERLIPIDEMIA LDL GOAL <70: Chronic | ICD-10-CM

## 2025-06-06 RX ORDER — EZETIMIBE 10 MG/1
10 TABLET ORAL
Qty: 90 TABLET | Refills: 3 | Status: SHIPPED | OUTPATIENT
Start: 2025-06-06

## 2025-06-06 RX ORDER — SPIRONOLACTONE 25 MG/1
25 TABLET ORAL
Qty: 90 TABLET | Refills: 3 | Status: SHIPPED | OUTPATIENT
Start: 2025-06-06

## 2025-06-06 RX ORDER — ATORVASTATIN CALCIUM 80 MG/1
80 TABLET, FILM COATED ORAL
Qty: 90 TABLET | Refills: 3 | Status: SHIPPED | OUTPATIENT
Start: 2025-06-06

## 2025-06-10 ENCOUNTER — HOSPITAL ENCOUNTER (OUTPATIENT)
Dept: WOUND CARE | Facility: HOSPITAL | Age: 60
Discharge: HOME | End: 2025-06-10
Attending: NURSE PRACTITIONER
Payer: MEDICAID

## 2025-06-10 VITALS
HEART RATE: 65 BPM | SYSTOLIC BLOOD PRESSURE: 126 MMHG | TEMPERATURE: 98 F | OXYGEN SATURATION: 90 % | RESPIRATION RATE: 22 BRPM | DIASTOLIC BLOOD PRESSURE: 76 MMHG

## 2025-06-10 DIAGNOSIS — L85.3 XEROSIS OF SKIN: ICD-10-CM

## 2025-06-10 DIAGNOSIS — B07.0 PLANTAR WARTS: ICD-10-CM

## 2025-06-10 DIAGNOSIS — E11.628 TYPE 2 DIABETES MELLITUS WITH OTHER SKIN COMPLICATION, WITH LONG-TERM CURRENT USE OF INSULIN: ICD-10-CM

## 2025-06-10 DIAGNOSIS — M79.672 BILATERAL FOOT PAIN: ICD-10-CM

## 2025-06-10 DIAGNOSIS — L84 CALLUS OF FOOT: Primary | ICD-10-CM

## 2025-06-10 DIAGNOSIS — Z79.4 TYPE 2 DIABETES MELLITUS WITH OTHER SKIN COMPLICATION, WITH LONG-TERM CURRENT USE OF INSULIN: ICD-10-CM

## 2025-06-10 DIAGNOSIS — M79.671 BILATERAL FOOT PAIN: ICD-10-CM

## 2025-06-10 DIAGNOSIS — Z91.199 NONCOMPLIANCE WITH TREATMENT PLAN: ICD-10-CM

## 2025-06-10 PROCEDURE — 11057 PARNG/CUTG B9 HYPRKR LES >4: CPT

## 2025-06-10 PROCEDURE — 27000999 HC MEDICAL RECORD PHOTO DOCUMENTATION

## 2025-06-10 PROCEDURE — 99211 OFF/OP EST MAY X REQ PHY/QHP: CPT

## 2025-06-10 PROCEDURE — 99213 OFFICE O/P EST LOW 20 MIN: CPT | Mod: 25,,, | Performed by: NURSE PRACTITIONER

## 2025-06-10 NOTE — PATIENT INSTRUCTIONS
Pt seen today by:  NOEMI Dolan    self care DRESSING INSTRUCTIONS:         Wound location: BOTH FEET    APPLY LACHYDRIN LOTION TO BOTH FEET AS PRESCRIBED      Return visit: 1 MONTH    Nutrition:  The current daily value (%DV) for protein is 50 grams per day and is meant as a general goal for most people. Further increasing your dietary protein intake is very important for wound healing. Typically one needs over 100g of protein per day to help with wound healing needs.  If you are a dialysis patient or have problems with your kidneys, talk to your Nephrologist about how much protein you can take in with your condition.  Examples of high protein items that can be added to your diet include: eggs, chicken, red meats, almonds, cottage cheese, Greek yogurt, beans, and peanut butter.  Fortified protein bars, shakes and drinks can add 15-30 additional grams of protein per serving.  Also add:   1 daily general multivitamin   Vitamin C : 500mg twice daily   Zinc 220 mg daily  Vit D : once daily                Call our [facility] wound clinic for questions/concerns a [phone] .

## 2025-06-12 NOTE — PROGRESS NOTES
[facility]   Outpatient Wound Care     Subjective:   Patient ID: Linn Mckeon is a 59 y.o. female.    Chief Complaint: Callouses      History of Present Illness:   59 y.o. Black or  female presents to wound care clinic today for bilateral foot pain, callus care, Xerosis of skin, and plantar warts.  Ambulates without difficulty.  Ambulates wearing O2 continuously.Presents to clinic alone.  Reviewed previous medical history since last visit of 05/07/2025.   Followed by Priscilla Strickland FNP for PCP last appointment 03/25/2025.  She refused podiatry in past due to transportation issues.      Today's visit 06/10/2025:  Reviewed previous progress notes since last visit of 05/07/2025.  Presents to clinic with bilateral foot pain.  Pared bilateral plantar surface in toe calluses using #4 dermal curette.  Rationale for paring to decrease pressure alleviate pain, and prevent ulcers.  Reinforced the importance of using Lac-Hytrin follow up by a thin layer Vaseline with the exception between toes.  We will have him return in 1 month to re-evaluate.  Instructed to call the office with any questions, concerns, or new skin issues.  Verbalized understanding of all instructions.      05/07/2025:  Reviewed previous progress notes since last visit of 01/29/2025.  Presents to clinic calluses, plantar, thick dystrophic nails.  Noncompliance with use Lac-Hytrin for Xerosis of skin.   Diabetic foot exam performed.  Monofilament test done decreased sensation noted in all areas.  Bilateral lower extremities cool to touch with absent hair growth.  Trimmed all 10 dystrophic toenails using podiatry clippers, and filed with [place] board.  Debride dystrophic nails using Dremmel.  Rationale for debridement to decrease pressure and alleviate pain.  Pared bilateral  plantar surface plantar warts, and bilateral great toe calluses using #4 dermal curette.  Rationale for paring to decrease pressure, alleviate pain, and prevent ulcers.  We will have her return to the clinic in 1 month due to severity of calluses and plantar high risk for ulcers.  Reinforced the importance of using Lac-Hytrin twice daily follow up by a thin layer Vaseline to all dry areas of feet with the exception between toes.  Instructed the importance of checking feet daily due to decrease sensation high risk for diabetic foot ulcers.  Instructed on proper footwear, nail care, and daily skin assessment.   Instructed to call the office with any questions, concerns, or new skin issues.  Verbalized understanding of all instructions.     01/29/2025:  Reviewed previous progress since last visit of 10/21/2024.  Complains of bilateral foot pain.  Since last visit patient has not use Lac-Hytrin as directed for Xerosis skin for bilateral feet. Diabetic foot exam performed.  Monofilament test done decreased sensation noted in all areas.  Bilateral lower extremities cool to touch with absent hair growth. Trimmed all 10 dystrophic toenails using podiatry clippers, and filed with [place] board.  Bilateral plantar surface and great toe calluses.  Pared callus using #4 dermal curette. Rationale for paring today to decrease pressure, and prevent ulcers.  Instructed the importance of washing feet twice daily dry well between toes.  Apply Lac-Hytrin twice daily to dry feet and legs with the exception between toes follow up by a thin layer Vaseline.  Instructed the importance of checking feet daily due to decrease sensation high risk for diabetic foot ulcers.  Instructed on proper footwear, nail care, and daily skin assessment.  Will have her return to the clinic in 3 months.  Instructed to call the office with any questions, concerns, or new skin issues.  Verbalized understanding of all instructions.     10/21/2024:  Diabetic foot  exam performed.  Monofilament test done decreased sensation noted in all areas.  Bilateral lower extremities cool to touch with absent hair growth.  Trimmed all 10 toenails using podiatry clippers, and filed with [place] board.  Debride 4/6 nails dystrophic using Dremmel.  Rationale for debridement to decrease pressure and prevent ulcers.  Calluses bilateral lower feet and tips of great toes. Pared callus using #4 dermal curette. Rationale for paring today to decrease pressure, and prevent ulcers.  Instructed the importance of checking feet daily due to decrease sensation high risk for diabetic foot ulcers.  Instructed on proper footwear, nail care, and daily skin assessment.  Instructed the importance of follow up with a PCP for diabetic shoes and inserts.  Will have her return to the clinic in 3 months.  Instructed to call the office with any questions, concerns, or new skin issues.  Verbalized understanding of all instructions.      07/19/2024:  During examination bilateral feet and lower legs Xerosis of skin.  Long discussion with the patient the importance of being compliant patient has not filed her Lac-Hytrin since January and is not applying twice daily with thin layer of Vaseline.  Bilateral plantar surface callus warts, and bilateral great toe callus pared using #4 dermal curette.  Paring performed to decreased pain with ambulation, prevent ulcers, in any skin breakdown.  Apply bilateral feet with Vaseline with the exception between toes.  Instructed will have to  Lac-Hytrin and start using twice daily with thin layer of Vaseline.  Agreed.  Tolerated well.  Relief with a paring.  Mycotic toenails did not need to be trimmed at this visit.  Patient is also noncompliant with the apply compound topical for mycotic nails.  Will have her return to the clinic in 3 months for diabetic foot care.        History includes:      Past Medical History:   Diagnosis Date    Callus of foot 07/07/2022    CHF  (congestive heart failure)     Chronic heart failure with preserved ejection fraction 05/20/2022    COPD (chronic obstructive pulmonary disease)     Diabetes mellitus     GOODMAN (dyspnea on exertion) 05/20/2022    Dystrophic nail 07/07/2022    Hyperlipidemia     Hyperlipidemia LDL goal <70 05/20/2022    Hypertension     Morbid obesity with body mass index (BMI) greater than or equal to 50 07/07/2022    Non compliance w medication regimen 07/07/2022    Noncompliance with treatment plan 07/07/2022    DAVION on CPAP 05/20/2022    Peripheral edema 05/20/2022    Primary hypertension 05/20/2022    Type 2 diabetes mellitus with skin complication 05/20/2022    Xerosis of skin 07/07/2022      Past Surgical History:   Procedure Laterality Date    COLONOSCOPY, WITH POLYPECTOMY USING HOT BIOPSY FORCEPS N/A 01/25/2024    Procedure: COLONOSCOPY, WITH POLYPECTOMY USING HOT BIOPSY FORCEPS;  Surgeon: Yan Bashir MD;  Location: [facility] ENDOSCOPY;  Service: Endoscopy;  Laterality: N/A;  Cold forceps    HYSTERECTOMY      SURGICAL REMOVAL OF MASS OF UPPER EXTREMITY Right 4/25/2025    Procedure: EXCISION, MASS, UPPER EXTREMITY;  Surgeon: Manfred Parham MD;  Location: [facility] OR;  Service: General;  Laterality: Right;  Arm      Social History     Socioeconomic History    Marital status:     Number of children: 2   Tobacco Use    Smoking status: Every Day     Current packs/day: 0.50     Average packs/day: 0.5 packs/day for 42.4 years (21.2 ttl pk-yrs)     Types: Cigarettes     Start date: 1983    Smokeless tobacco: Never   Substance and Sexual Activity    Alcohol use: Not Currently    Drug use: Never    Sexual activity: Not Currently     Social Drivers of Health     Financial Resource Strain: Low Risk  (3/21/2024)    Overall Financial Resource Strain (CARDIA)     Difficulty of Paying Living Expenses: Not hard at all   Food Insecurity: No Food Insecurity (3/21/2024)    Hunger Vital Sign     Worried About Running Out of Food in the  "Last Year: Never true     Ran Out of Food in the Last Year: Never true   Transportation Needs: No Transportation Needs (9/4/2024)    TRANSPORTATION NEEDS     Transportation : No   Physical Activity: Inactive (9/4/2024)    Exercise Vital Sign     Days of Exercise per Week: 0 days     Minutes of Exercise per Session: 0 min   Stress: No Stress Concern Present (3/21/2024)    [employer] - Occupational Stress Questionnaire     Feeling of Stress : Not at all   Housing Stability: Unknown (9/4/2024)    Housing Stability Vital Sign     Unable to Pay for Housing in the Last Year: No     Homeless in the Last Year: No   .      Current Outpatient Medications   Medication Sig Dispense Refill    albuterol (PROVENTIL) 2.5 mg /3 mL (0.083 %) nebulizer solution Take 3 mLs (2.5 mg total) by nebulization every 6 (six) hours as needed for Wheezing. Rescue 90 mL 1    albuterol (VENTOLIN HFA) 90 mcg/actuation inhaler Inhale 2 puffs into the lungs every 6 (six) hours as needed for Wheezing. Rescue 18 g 2    ammonium lactate (LAC-HYDRIN) 12 % lotion APPLY TO DRY SKIN AND RUB IN WELL TWICE DAILY. NOTHING BETWEEN TOES 226 g 11    aspirin (ECOTRIN) 81 MG EC tablet Take 81 mg by mouth once daily.      atorvastatin (LIPITOR) 80 MG tablet TAKE 1 TABLET BY MOUTH ONCE DAILY 90 tablet 3    BD ULTRA-FINE JORDON PEN NEEDLE 32 gauge x 5/32" Ndle 2 (two) times daily.      BD ULTRA-FINE JORDON PEN NEEDLE 32 gauge x 5/32" Ndle       blood sugar diagnostic (TRUE METRIX GLUCOSE TEST STRIP) Strp Test Blood Sugar 2 times a day 100 strip 11    blood-glucose meter kit To check BG 2 times daily, to use with insurance preferred meter 1 each 0    blood-glucose meter,continuous (DEXCOM G7 ) Misc Dexcom G7  uses directed 1 each 0    blood-glucose sensor (DEXCOM G7 SENSOR) Norma Use every 10 days as directed 3 each 11    CICLOPIROX-ITRACONAZOLE-UREA TOP CLEAN NAILS, SHAKE BOTTLE WELL, APPLY TWICE DAILY TO ALL AFFECTED NAILS " "USING APPLICATOR. (UP TO 2 MLS/DAY)      empagliflozin (JARDIANCE) 25 mg tablet Take 1 tablet (25 mg total) by mouth once daily. 90 tablet 2    ergocalciferol (ERGOCALCIFEROL) 50,000 unit Cap Take 1 capsule (50,000 Units total) by mouth every 7 days. 12 capsule 2    ezetimibe (ZETIA) 10 mg tablet TAKE 1 TABLET BY MOUTH ONCE DAILY 90 tablet 3    fluticasone propionate (FLONASE) 50 mcg/actuation nasal spray 1 spray (50 mcg total) by Each Nostril route once daily. 11.1 mL 1    fluticasone-umeclidin-vilanter (TRELEGY ELLIPTA) 100-62.5-25 mcg DsDv Inhale 1 puff into the lungs once daily. 28 each 3    furosemide (LASIX) 40 MG tablet TAKE 1 TABLET BY MOUTH TWICE DAILY 180 tablet 0    insulin glargine-yfgn 100 unit/mL (3 mL) InPn Inject 43 Units into the skin once daily.      ipratropium-albuteroL (COMBIVENT RESPIMAT)  mcg/actuation inhaler Inhale 2 puffs into the lungs every 6 (six) hours as needed for Wheezing. Rescue (Patient not taking: Reported on 5/15/2025) 4 g 1    ketoconazole (NIZORAL) 2 % cream Apply topically once daily. 30 g 1    lancets (ACCU-CHEK FASTCLIX LANCET DRUM) Misc USE ONE DAILY 100 each 11    LANCETS MISC       LANTUS SOLOSTAR U-100 INSULIN 100 unit/mL (3 mL) InPn pen Inject 50U at breakfast and 30U at bedtime subcutaneously      latanoprost 0.005 % ophthalmic solution Apply 1 drop to eye.      metFORMIN (GLUCOPHAGE) 1000 MG tablet TAKE 1 TABLET BY MOUTH TWICE DAILY WITH MEALS 60 tablet 5    metoprolol succinate (TOPROL-XL) 50 MG 24 hr tablet Take 1 tablet (50 mg total) by mouth once daily. 90 tablet 3    nystatin (MYCOSTATIN) ointment Apply topically 2 (two) times daily. (Patient not taking: Reported on 5/8/2025) 15 g 2    oxyCODONE-acetaminophen (PERCOCET) 5-325 mg per tablet Take 1 tablet by mouth every 4 (four) hours as needed for Pain. 8 tablet 0    pen needle, diabetic (BD ULTRA-FINE JORDON PEN NEEDLE) 32 gauge x 5/32" Ndle Use Twice a day to checks CBGS 60 each 11    sacubitriL-valsartan " "(ENTRESTO) 49-51 mg per tablet Take 1 tablet by mouth 2 (two) times daily. 180 tablet 3    SITagliptin phosphate (JANUVIA) 100 MG Tab Take 1 tablet (100 mg total) by mouth once daily. 90 tablet 3    spironolactone (ALDACTONE) 25 MG tablet TAKE 1 TABLET BY MOUTH ONCE DAILY 90 tablet 3    TRUEPLUS LANCETS 33 gauge Misc Apply topically 2 (two) times daily.       No current facility-administered medications for this encounter.       Review of Systems   All other systems reviewed and are negative.         Labs Reviewed:   Chemistry:  Lab Results   Component Value Date    BUN 36.6 (H) 04/25/2025    BUN 25.5 (H) 12/10/2024    CREATININE 1.06 (H) 04/25/2025    CREATININE 1.24 (H) 12/10/2024    EGFRNORACEVR 60 04/25/2025    EGFRNORACEVR 50 12/10/2024    AST 20 12/10/2024    AST 13 08/19/2024    ALT 11 12/10/2024    ALT 14 08/19/2024    HGBA1C 8.0 (H) 12/10/2024        Hematology:  Lab Results   Component Value Date    WBC 9.61 04/25/2025    WBC 12.03 (H) 12/10/2024    HGB 14.7 04/25/2025    HGB 15.2 12/10/2024    HCT 45.5 04/25/2025    HCT 46.6 12/10/2024     04/25/2025     12/10/2024       Inflammatory Markers:  No results found for: "HSCRP", "SEDRATE"     Objective:        Physical Exam  Vitals reviewed.   Cardiovascular:      Pulses:           Dorsalis pedis pulses are detected w/ Doppler on the right side and detected w/ Doppler on the left side.        Posterior tibial pulses are detected w/ Doppler on the right side and detected w/ Doppler on the left side.   Pulmonary:      Comments: Wearing O2 @ 2 liters continuously.  Musculoskeletal:        Feet:    Feet:      Right foot:      Skin integrity: Callus and dry skin present.      Toenail Condition: Right toenails are abnormally thick. Fungal disease present.     Left foot:      Skin integrity: Callus and dry skin present.      Toenail Condition: Left toenails are abnormally thick. Fungal disease present.  Skin:     General: Skin is cool and dry.      " Capillary Refill: Capillary refill takes less than 2 seconds.   Neurological:      Mental Status: She is alert.                              Assessment:         ICD-10-CM ICD-9-CM   1. Callus of foot  L84 700   2. Plantar warts  B07.0 078.12   3. Bilateral foot pain  M79.671 729.5    M79.672    4. Xerosis of skin  L85.3 706.8   5. Noncompliance with treatment plan  Z91.199 V15.81   6. Type 2 diabetes mellitus with other skin complication, with long-term current use of insulin  E11.628 250.80    Z79.4 V58.67         Plan:   Tissue pathology and/or culture taken:  [] Yes [x] No   Sharp debridement performed:   [] Yes [x] No   Labs ordered this visit:   [] Yes [x] No   Imaging ordered this visit:   [] Yes [x] No           1. Callus of foot     Pared.  Instructed on proper footwear.   2. Plantar warts     Pared.  Instructed on proper footwear   3. Bilateral foot pain     Relief with paring.   4. Xerosis of skin     Reinforced compliance with the use Lac-Hytrin twice daily follow up by thin layer Vaseline with the exception between toes.   5. Noncompliance with treatment plan     Reinforced the importance of foot care, and treatment with Lac-Hytrin follow up by a thin layer Vaseline with the exception between toes.   6. Type 2 diabetes mellitus with other skin complication, with long-term current use of insulin     Last A1c 9.0.    Reinforced the importance of diet and medication compliance.    Must have a strict diabetic diet, take glucose lowering medications as prescribed and encourage lower HA1C.        The time spent including preparing to see the patient, obtaining patient history and assessment, evaluation of the plan of care, patient/caregiver counseling and education, orders, documentation, coordination of care, and other professional medical management activities for today's encounter was 25 minute.    Time spent performing procedures during today's encounter was 30 minute.    Follow up in about 1 month (around  7/10/2025) for callus. Teaching provided on s/s to call wound clinic for promptly.  ER precautions taught for after hours and weekends.     NOEMI Mederos

## 2025-06-16 ENCOUNTER — OFFICE VISIT (OUTPATIENT)
Dept: OPHTHALMOLOGY | Facility: CLINIC | Age: 60
End: 2025-06-16
Payer: MEDICAID

## 2025-06-16 VITALS — BODY MASS INDEX: 47.12 KG/M2 | HEIGHT: 64 IN | WEIGHT: 276 LBS

## 2025-06-16 DIAGNOSIS — H40.053 BILATERAL OCULAR HYPERTENSION: ICD-10-CM

## 2025-06-16 DIAGNOSIS — H52.203 MYOPIA WITH ASTIGMATISM AND PRESBYOPIA, BILATERAL: ICD-10-CM

## 2025-06-16 DIAGNOSIS — H43.822 VITREOMACULAR ADHESION OF LEFT EYE: ICD-10-CM

## 2025-06-16 DIAGNOSIS — H35.033 HYPERTENSIVE RETINOPATHY OF BOTH EYES: Primary | ICD-10-CM

## 2025-06-16 DIAGNOSIS — H52.4 MYOPIA WITH ASTIGMATISM AND PRESBYOPIA, BILATERAL: ICD-10-CM

## 2025-06-16 DIAGNOSIS — H35.039 HYPERTENSIVE RETINOPATHY, UNSPECIFIED LATERALITY: ICD-10-CM

## 2025-06-16 DIAGNOSIS — E11.9 DIABETES MELLITUS TYPE 2 WITHOUT RETINOPATHY: ICD-10-CM

## 2025-06-16 DIAGNOSIS — H52.13 MYOPIA WITH ASTIGMATISM AND PRESBYOPIA, BILATERAL: ICD-10-CM

## 2025-06-16 PROCEDURE — 92133 CPTRZD OPH DX IMG PST SGM ON: CPT | Mod: 59,PBBFAC,PN

## 2025-06-16 PROCEDURE — 92133 CPTRZD OPH DX IMG PST SGM ON: CPT | Mod: PBBFAC,PN | Performed by: OPHTHALMOLOGY

## 2025-06-16 PROCEDURE — 99214 OFFICE O/P EST MOD 30 MIN: CPT | Mod: PBBFAC,PN

## 2025-06-16 NOTE — PROGRESS NOTES
HPI     Diabetes     Additional comments: 12/10/24 HGBA1C 8.0            Glaucoma     Additional comments: - Was put on Latanoprost during her last eye   examination. Has not used in at least a year.            Comments    Here for DFE/Eval for Hypertensive Retinopathy  - DM for a couple of years now. Still trying to get under control but not   as bad as in the beginning. Had an A1C drawn a few weeks ago and was still   in the 8's.  - States BP is under good control.  - Only complaint is that +2.00 Readers is not strong enough at this time.           Last edited by Zenobia Zelaya LPN on 6/16/2025  9:21 AM.        TESTING    OCT mac (06/16/2025)  OD: , intact foveal contour, no IRF/SRF, some chronic atrophy  OS: , intact foveal contour, no IRF/SRF, some chronic atrophy    Fundus photos  - 06/16/2025   OD: media clear, retina without lesions  OS: media clear, retina without lesions    OCT RNFL (06/16/2025)  OD: G 92, poor scan but appears to not have thinning   OS: G 96, poor scan but appears to not have thinning       ASSESSMENT / PLAN    1. T2DM without ophthalmologic manifestations  - Last A1c (9):  03/25/2025  - No signs of retinopathy on exam  - No signs of DME on OCT mac   - Encouraged good BS/BP/Cholesterol control, f/u with PCP regularly  - Monitor with annual DFE (next due 6/2026)    2. Hypertensive retinopathy, grade 2, both eyes  - Patient with history of known HTN, most recent SBP 130s per patient report  - Patient does also have diagnosis of diabeties  - Last A1c (9):  03/25/2025  - DFE with bilateral arteriolar narrowing, AV nicking, silver wiring, tortuous vessels  - No signs of DME on OCT mac   - Encouraged good BS/BP/Cholesterol control, f/u with PCP regularly  - Monitor with serial DFE (next due 6/2026)    3. Ocular hypertension vs NTG, both eyes  - Family history: none  - History:  - Surgeries: none  - Lasers: none  - Tmax: 16//16  - Tbase (before treatment): 16//16  - Drop  intolerance: none  - Target: mid-teens ou  - Current regimen:   No drops  - 6/2025: Used to be on latanoprost years ago for glaucoma but no signs of glaucomatous nerve cupping or RNFL thinning. Continue to monitor off of drops and can consider SLT first line if there is increased thinning or high IOP.    4. Myopia with astigmatism and presbyopia, both eyes  - BCVA 20/20 OU  - Prescription dispensed    5. Vitreomacular adhesion, left eye  - No evidence of retinal tear, hole, or detachment on dilated fundoscopic exam  - Max sign negative  - RD precautions given (increase in floaters or flashing lights, worsening vision, appearance of curtain or shadow- patient to return immediately)      RTC 1 year for DFE, Oct mac, and fundus photos

## 2025-06-17 PROBLEM — H35.033 HYPERTENSIVE RETINOPATHY OF BOTH EYES: Status: ACTIVE | Noted: 2025-06-17

## 2025-06-17 PROBLEM — H40.053 BILATERAL OCULAR HYPERTENSION: Status: ACTIVE | Noted: 2025-06-17

## 2025-06-24 ENCOUNTER — CLINICAL SUPPORT (OUTPATIENT)
Dept: DIABETES | Facility: CLINIC | Age: 60
End: 2025-06-24
Payer: MEDICAID

## 2025-06-24 VITALS — WEIGHT: 282 LBS | BODY MASS INDEX: 48.41 KG/M2

## 2025-06-24 DIAGNOSIS — E11.9 TYPE 2 DIABETES MELLITUS WITHOUT COMPLICATION, WITH LONG-TERM CURRENT USE OF INSULIN: ICD-10-CM

## 2025-06-24 DIAGNOSIS — Z79.4 TYPE 2 DIABETES MELLITUS WITHOUT COMPLICATION, WITH LONG-TERM CURRENT USE OF INSULIN: ICD-10-CM

## 2025-06-24 PROCEDURE — G0108 DIAB MANAGE TRN  PER INDIV: HCPCS | Mod: PBBFAC | Performed by: DIETITIAN, REGISTERED

## 2025-06-24 PROCEDURE — 99212 OFFICE O/P EST SF 10 MIN: CPT | Mod: PBBFAC | Performed by: DIETITIAN, REGISTERED

## 2025-06-24 NOTE — PROGRESS NOTES
Diabetes Care Specialist Progress Note  Author: Lynda Paige RD, [facility]  Date: 6/24/2025    Intake    Program Intake  Reason for Diabetes Program Visit:: Post Program Follow-Up  Type of Follow-Up: 6 month  Current diabetes risk level:: moderate    Current Diabetes Treatment: Oral Medications, Insulin  Oral Medication Type/Dose: Metformin 1000mg BID, Januvia 100mg and Jardiance 25 mg  Method of insulin delivery?: Injections  Injection Type: Pens  Pen Type/Dose: Lanuts 50 units AM and 43 PM    Continuous Glucose Monitoring  Patient has CGM: Yes  Personal CGM type:: dexcom g7 with   GMI Date: 06/24/25  GMI Value: 6.4 %      Lab Results   Component Value Date    HGBA1C 8.0 (H) 12/10/2024       Weight: 127.9 kg (282 lb)       Body mass index is 48.41 kg/m².    Lifestyle Coping Support & Clinical    Lifestyle/Coping/Support  Psychosocial/Coping Skills Assessment Completed: : No  Deffered due to:: Time  Area of need?: Deferred         Diabetes Self-Management Skills Assessment    Medication Skills Assessment  Patient is able to identify current diabetes medications, dosages, and appropriate timing of medications.: yes  Patient reports problems or concerns with current medication regimen.: yes  Medication regimen problems/concerns:: concerned about side effects  Patient is  aware that some diabetes medications can cause low blood sugar?: Yes  Medication Skills Assessment Completed:: Yes  Assessment indicates:: Instruction Needed  Area of need?: Yes    Diabetes Disease Process/Treatment Options  Diabetes Disease Process/Treatment Options: Skills Assessment Completed: No  Deferred due to:: Time  Area of need?: Deferred    Nutrition/Healthy Eating  Meal Plan 24 Hour Recall - Lunch: 2 hotdogs with chili  Meal Plan 24 Hour Recall - Dinner: salsbury steak with turnips and cornbread dressing  Meal Plan 24 Hour Recall - Beverage: water or zero soda  Who shops/cooks?: self  Nutrition/Healthy Eating Skills Assessment  Completed:: Yes  Assessment indicates:: Adequate understanding  Area of need?: No    Physical Activity/Exercise  Patient's daily activity level:: lightly active  Physical Activity/Exercise Skills Assessment Completed: : Yes  Assessment indicates:: Instruction Needed  Area of need?: Yes    Home Blood Glucose Monitoring  Patient states that blood sugar is checked at home daily.: yes  Personal CGM type:: dexcom g7 with   What is your A1c Target?: <7  Home Blood Glucose Monitoring Skills Assessment Completed: : Yes  Assessment indicates:: Instruction Needed  Area of need?: Yes    Acute Complications  Have you ever had hypoglycemia (low BG 70 or less)?: yes  How often and what are your symptoms?: no symptoms  Acute Complications Skills Assessment Completed: : Yes  Assessment indicates:: Instruction Needed  Area of need?: Yes    Chronic Complications  Reviewed health maintenance: yes  Have you completed your annual diabetes maintenance labwork? : yes  Do you examine your feet daily?: yes  Has your doctor examined your feet?: yes  Do you see a Dentist?: no  Do you see an eye doctor?: yes  Eye doctor date of last visit:: last week  Chronic Complications Skills Assessment Completed: : Yes  Assessment indicates:: Instruction Needed  Area of need?: Yes      Assessment Summary and Plan    Based on today's diabetes care assessment, the following areas of need were identified:      Identified Areas of Need      Medication/Current Diabetes Treatment: Yes see care plan   Lifestyle Coping/Support: Deferred   Diabetes Disease Process/Treatment Options: Deferred   Nutrition/Healthy Eating: No    Physical Activity/Exercise: Yes Discussed physical activity as it relates to insulin resistance and weight loss.    Home Blood Glucose Monitoring: Yes reviewed Dexcom report. GMI improved to 6.4% with 91% TIR, 1% low and <1% very low. Some afternoon lows noted. See care plan for insulin goal. Pt was taking 50 units 10am and 43 units  3-4pm.    Acute Complications: Yes Reviewed blood glucose goals, prevention, detection, signs and symptoms, and treatment of hypoglycemia and hyperglycemia, and when to contact the clinic.   Chronic Complications: Yes see care plan       Today's interventions were provided through individual discussion, instruction, and written materials were provided.      Patient verbalized understanding of instruction and written materials.  Pt was able to return back demonstration of instructions today. Patient understood key points, needs reinforcement and further instruction.     Diabetes Self-Management Care Plan:    Today's Diabetes Self-Management Care Plan was developed with Linn's input. Linn has agreed to work toward the following goal(s) to improve his/her overall diabetes control.      Care Plan: Diabetes Management   Updates made since 6/24/2024 12:00 AM        Problem: Medications         Goal: Patient Agrees to take Diabetes Medication(s) as prescribed.    Start Date: 6/24/2025   Expected End Date: 8/29/2025   Priority: High   Barriers: No Barriers Identified   Note:    Pt will take 30 units Lantus BID/10am and 10pm as directed per last Endo provider visit. Pt will report if hypoglycemia continues.       Task: Reviewed with patient all current diabetes medications and provided basic review of the purpose, dosage, frequency, side effects, and storage of both oral and injectable diabetes medications. Completed 6/24/2025        Task: Discussed guidelines for preventing, detecting and treating hypoglycemia and hyperglycemia and reviewed the importance of meal and medication timing with diabetes mediations for prevention of hypoglycemia and maximum drug benefit. Completed 6/24/2025        Problem: Chronic Complications         Goal: Patient agrees to have the following diabetes reji maintenance screenings/appointments: dental exam/cleaning completed in the next 6 months.    Start Date: 6/24/2025   Expected End  Date: 8/29/2025   Priority: Medium   Barriers: No Barriers Identified   Note:    Made appt request online at [facility] dental services and printed registration packet. Pt states will answer phone when contacted.       Task: Discussed current A1c, Blood Pressure, and Cholesterol results (ABCs of Diabetes) and reviewed targets of each. Completed 6/24/2025        Task: Discussed importance of annual microalbumin urine test to monitor kidney function. Completed 6/24/2025        Task: Discussed importance of annual dilated eye exam for prevention of retinopathy. Completed 6/24/2025        Task: Discussed the importance of routine dental exams for the prevention of gum disease and gingivitis and encouraged dental exam every 6 months. Completed 6/24/2025        Task: Instructed on basic daily foot care and encouraged inspecting feet daily. Completed 6/24/2025        Task: Discussed importance of the Flu and Pneumonia Vaccination. Completed 6/24/2025          Follow Up Plan     Follow up in about 6 months (around 12/24/2025) for Psychosocial/Coping.    Today's care plan and follow up schedule was discussed with patient.  Linn verbalized understanding of the care plan, goals, and agrees to follow up plan.        The patient was encouraged to communicate with his/her health care provider/physician and care team regarding his/her condition(s) and treatment.  I provided the patient with my contact information today and encouraged to contact me via phone or [facility] Patient Portal as needed.     Length of Visit   Total Time: 50 Minutes

## 2025-07-08 ENCOUNTER — OFFICE VISIT (OUTPATIENT)
Dept: PULMONOLOGY | Facility: CLINIC | Age: 60
End: 2025-07-08
Payer: MEDICAID

## 2025-07-08 VITALS
SYSTOLIC BLOOD PRESSURE: 119 MMHG | HEIGHT: 64 IN | OXYGEN SATURATION: 93 % | RESPIRATION RATE: 20 BRPM | BODY MASS INDEX: 47.66 KG/M2 | TEMPERATURE: 98 F | HEART RATE: 70 BPM | DIASTOLIC BLOOD PRESSURE: 65 MMHG | WEIGHT: 279.19 LBS

## 2025-07-08 DIAGNOSIS — G47.33 OSA ON CPAP: Chronic | ICD-10-CM

## 2025-07-08 DIAGNOSIS — E66.01 CLASS 3 SEVERE OBESITY DUE TO EXCESS CALORIES WITH SERIOUS COMORBIDITY AND BODY MASS INDEX (BMI) OF 45.0 TO 49.9 IN ADULT: Chronic | ICD-10-CM

## 2025-07-08 DIAGNOSIS — R91.8 PULMONARY NODULES/LESIONS, MULTIPLE: ICD-10-CM

## 2025-07-08 DIAGNOSIS — J43.2 CENTRILOBULAR EMPHYSEMA: Chronic | ICD-10-CM

## 2025-07-08 DIAGNOSIS — F17.210 CIGARETTE NICOTINE DEPENDENCE WITHOUT COMPLICATION: Chronic | ICD-10-CM

## 2025-07-08 DIAGNOSIS — J96.12 CHRONIC HYPERCAPNIC RESPIRATORY FAILURE: Primary | ICD-10-CM

## 2025-07-08 DIAGNOSIS — E66.813 CLASS 3 SEVERE OBESITY DUE TO EXCESS CALORIES WITH SERIOUS COMORBIDITY AND BODY MASS INDEX (BMI) OF 45.0 TO 49.9 IN ADULT: Chronic | ICD-10-CM

## 2025-07-08 PROCEDURE — 99215 OFFICE O/P EST HI 40 MIN: CPT | Mod: PBBFAC

## 2025-07-08 NOTE — PROGRESS NOTES
Pulmonology Clinic Note      SUBJECTIVE:     Chief Complaint: emphysema/DAVION/COPD/RF (Follow up)       History of Present Illness:  Linn Mckeon is a 59 y.o. female with PMH of COPD/centrilobular emphysema, chronic hypercapnic respiratory failure on home 2L NC, HFrEF (EF 40%), HTN, HLD, DM, and DAVION w/ hypercapnia on Trilogy machine who comes to Pulmonology clinic for follow up.    She states that she has been having some worsening shortness of breath over the last several weeks. She reports that she has been forgetting to take her Trelegy inhaler for several days of the week. However, she reports improvement of dyspnea when she remembers to take it. She endorses that she has not required any albuterol MDI/nebulizations since last clinci visit. Otherwise, she states that she's been compliant with her continuous oxygen supplementation via 2L NC and Trilogy machine nightly. She reports significant improvement of symptoms with their use, including improvement of shortness of breath and improved sleep/fatigue. Otherwise, she denies any new fever, chills, chest pain, nasal congestion, sore throat, productive cough, nausea, vomiting, abdominal pain, and peripheral edema at this time.    She continues to smoke 10 cigs a day since she was 16 yo. Previously had smoking cessation counseling and trialed NRT without benefit. No alcohol or illicit drug use.    Review of patient's allergies indicates:  No Known Allergies    Past Medical History:   Diagnosis Date    Callus of foot 07/07/2022    CHF (congestive heart failure)     Chronic heart failure with preserved ejection fraction 05/20/2022    COPD (chronic obstructive pulmonary disease)     Diabetes mellitus     GOODMAN (dyspnea on exertion) 05/20/2022    Dystrophic nail 07/07/2022    Hyperlipidemia     Hyperlipidemia LDL goal <70 05/20/2022    Hypertension     Morbid obesity with body mass index (BMI) greater than or equal to 50 07/07/2022    Non compliance w medication  regimen 07/07/2022    Noncompliance with treatment plan 07/07/2022    DAVION on CPAP 05/20/2022    Peripheral edema 05/20/2022    Primary hypertension 05/20/2022    Type 2 diabetes mellitus with skin complication 05/20/2022    Xerosis of skin 07/07/2022     Past Surgical History:   Procedure Laterality Date    COLONOSCOPY, WITH POLYPECTOMY USING HOT BIOPSY FORCEPS N/A 01/25/2024    Procedure: COLONOSCOPY, WITH POLYPECTOMY USING HOT BIOPSY FORCEPS;  Surgeon: Yan Bashir MD;  Location: [facility] ENDOSCOPY;  Service: Endoscopy;  Laterality: N/A;  Cold forceps    HYSTERECTOMY      SURGICAL REMOVAL OF MASS OF UPPER EXTREMITY Right 4/25/2025    Procedure: EXCISION, MASS, UPPER EXTREMITY;  Surgeon: Manfred Parham MD;  Location: [facility] OR;  Service: General;  Laterality: Right;  Arm     Family History   Problem Relation Name Age of Onset    Hypertension Mother Rex Hood     Heart disease Mother Rex Hood     Diabetes Mother Rex Hood     Heart attack Father      Diabetes Sister Ellie Aguiar     Hypertension Sister Ellie Aguiar     Diabetes Brother Kushal Hood     Hypertension Brother Kushal Hood      Social History     Tobacco Use    Smoking status: Every Day     Current packs/day: 0.50     Average packs/day: 0.5 packs/day for 42.5 years (21.3 ttl pk-yrs)     Types: Cigarettes     Start date: 1983     Passive exposure: Never    Smokeless tobacco: Never   Substance Use Topics    Alcohol use: Not Currently    Drug use: Never       Current Outpatient Medications   Medication Sig    albuterol (PROVENTIL) 2.5 mg /3 mL (0.083 %) nebulizer solution Take 3 mLs (2.5 mg total) by nebulization every 6 (six) hours as needed for Wheezing. Rescue    albuterol (VENTOLIN HFA) 90 mcg/actuation inhaler Inhale 2 puffs into the lungs every 6 (six) hours as needed for Wheezing. Rescue    ammonium lactate (LAC-HYDRIN) 12 % lotion APPLY TO DRY SKIN AND RUB IN WELL TWICE DAILY. NOTHING BETWEEN TOES    aspirin  "(ECOTRIN) 81 MG EC tablet Take 81 mg by mouth once daily.    atorvastatin (LIPITOR) 80 MG tablet TAKE 1 TABLET BY MOUTH ONCE DAILY    BD ULTRA-FINE JORDON PEN NEEDLE 32 gauge x 5/32" Ndle 2 (two) times daily.    BD ULTRA-FINE JORDON PEN NEEDLE 32 gauge x 5/32" Ndle     blood sugar diagnostic (TRUE METRIX GLUCOSE TEST STRIP) Strp Test Blood Sugar 2 times a day    blood-glucose meter,continuous (DEXCOM G7 ) Misc Dexcom G7  uses directed    blood-glucose sensor (DEXCOM G7 SENSOR) Norma Use every 10 days as directed    CICLOPIROX-ITRACONAZOLE-UREA TOP CLEAN NAILS, SHAKE BOTTLE WELL, APPLY TWICE DAILY TO ALL AFFECTED NAILS USING APPLICATOR. (UP TO 2 MLS/DAY)    empagliflozin (JARDIANCE) 25 mg tablet Take 1 tablet (25 mg total) by mouth once daily.    ergocalciferol (ERGOCALCIFEROL) 50,000 unit Cap Take 1 capsule (50,000 Units total) by mouth every 7 days.    ezetimibe (ZETIA) 10 mg tablet TAKE 1 TABLET BY MOUTH ONCE DAILY    fluticasone propionate (FLONASE) 50 mcg/actuation nasal spray 1 spray (50 mcg total) by Each Nostril route once daily.    fluticasone-umeclidin-vilanter (TRELEGY ELLIPTA) 100-62.5-25 mcg DsDv Inhale 1 puff into the lungs once daily.    furosemide (LASIX) 40 MG tablet TAKE 1 TABLET BY MOUTH TWICE DAILY    ketoconazole (NIZORAL) 2 % cream Apply topically once daily.    lancets (ACCU-CHEK FASTCLIX LANCET DRUM) [facility] [place] ONE DAILY    LANCETS [facility] [place]     LANTUS SOLOSTAR U-100 INSULIN 100 unit/mL (3 mL) InPn pen Inject 50U at breakfast and 30U at bedtime subcutaneously    metFORMIN (GLUCOPHAGE) 1000 MG tablet TAKE 1 TABLET BY MOUTH TWICE DAILY WITH MEALS    metoprolol succinate (TOPROL-XL) 50 MG 24 hr tablet Take 1 tablet (50 mg total) by mouth once daily.    pen needle, diabetic (BD ULTRA-FINE JORDON PEN NEEDLE) 32 gauge x 5/32" Ndle Use Twice a day to checks CBGS    sacubitriL-valsartan (ENTRESTO) 49-51 mg per tablet Take 1 tablet by mouth 2 (two) times daily.    SITagliptin phosphate (JANUVIA) 100 " MG Tab Take 1 tablet (100 mg total) by mouth once daily.    spironolactone (ALDACTONE) 25 MG tablet TAKE 1 TABLET BY MOUTH ONCE DAILY    TRUEPLUS LANCETS 33 gauge Misc Apply topically 2 (two) times daily.    blood-glucose meter kit To check BG 2 times daily, to use with insurance preferred meter    latanoprost 0.005 % ophthalmic solution Apply 1 drop to eye. (Patient not taking: Reported on 7/8/2025)     No current facility-administered medications for this visit.       Review of Systems   Constitutional:  Negative for chills and fever.   HENT:  Negative for sore throat.    Respiratory:  Positive for shortness of breath. Negative for cough and sputum production.    Cardiovascular:  Negative for chest pain and palpitations.   Gastrointestinal:  Negative for diarrhea, nausea and vomiting.   Musculoskeletal:  Negative for falls.   Neurological:  Negative for dizziness and headaches.   Psychiatric/Behavioral:  Negative for depression.         OBJECTIVE:     Vitals:    07/08/25 1432   BP: 119/65   Pulse: 70   Resp: 20   Temp: 98 °F (36.7 °C)       Physical Exam  Constitutional:       General: She is not in acute distress.     Appearance: Normal appearance. She is obese. She is not ill-appearing.   HENT:      Head: Normocephalic and atraumatic.      Right Ear: External ear normal.      Left Ear: External ear normal.      Nose: Nose normal.   Eyes:      Extraocular Movements: Extraocular movements intact.   Cardiovascular:      Rate and Rhythm: Normal rate and regular rhythm.      Heart sounds: No murmur heard.     No friction rub. No gallop.   Pulmonary:      Breath sounds: No wheezing or rhonchi.      Comments: Decreased breath sounds all over  Abdominal:      General: There is no distension.      Palpations: Abdomen is soft.      Tenderness: There is no abdominal tenderness.   Musculoskeletal:      Right lower leg: No edema.      Left lower leg: No edema.   Skin:     General: Skin is warm and dry.   Neurological:       General: No focal deficit present.      Mental Status: She is alert and oriented to person, place, and time.       ASSESSMENT/PLAN:     COPD/emphysema  HFrEF with improved EF 59% (11/2023)  Obesity  Chronic hypercapnic respiratory failure on home oxygen supplementation  DAVION on Trilogy NIPPV  - PFTs in 10/2022 revealed FEV1 50%, FEV1/FVC ratio 89%, positive bronchodilator response, and decreased DLCO.  -The patient has been compliant and benefiting from her home noninvasive volume ventilator and home oxygen supplementation.  -Recommend that she continues her home noninvasive volume ventilator during hours of sleep and as needed during the day when symptoms arise. A home CPAP/BiPAP is not appropriate for his ventilatory requirements.  -Recommend continuing oxygen supplementation at this time.  -Continue Trelegy inhaler once daily.  -Smoking cessation discussed. Patient is not amenable to smoking cessation at this time.    Multiple left upper lobe nodules  - CT chest on 3/18/2025 notable for 9 mm lobulated noncalcified nodule of the left upper lobe and 5 mm nodule lateral in the left upper lobe.  - Pulmonary nodules is currently being followed by PCP.      RTC in 6 months    Javy Mayes MD  [facility] Internal Medicine PGY-II  [facility] - Pulmonology  07/08/2025

## 2025-07-10 ENCOUNTER — HOSPITAL ENCOUNTER (OUTPATIENT)
Dept: WOUND CARE | Facility: HOSPITAL | Age: 60
Discharge: HOME | End: 2025-07-10
Attending: NURSE PRACTITIONER
Payer: MEDICAID

## 2025-07-10 DIAGNOSIS — Z79.4 TYPE 2 DIABETES MELLITUS WITH OTHER SKIN COMPLICATION, WITH LONG-TERM CURRENT USE OF INSULIN: ICD-10-CM

## 2025-07-10 DIAGNOSIS — Z91.199 NONCOMPLIANCE WITH TREATMENT PLAN: ICD-10-CM

## 2025-07-10 DIAGNOSIS — E11.628 TYPE 2 DIABETES MELLITUS WITH OTHER SKIN COMPLICATION, WITH LONG-TERM CURRENT USE OF INSULIN: ICD-10-CM

## 2025-07-10 DIAGNOSIS — L85.3 XEROSIS OF SKIN: ICD-10-CM

## 2025-07-10 DIAGNOSIS — M79.672 BILATERAL FOOT PAIN: ICD-10-CM

## 2025-07-10 DIAGNOSIS — M79.671 BILATERAL FOOT PAIN: ICD-10-CM

## 2025-07-10 DIAGNOSIS — L84 CALLUS OF FOOT: Primary | ICD-10-CM

## 2025-07-10 PROCEDURE — 11057 PARNG/CUTG B9 HYPRKR LES >4: CPT

## 2025-07-10 PROCEDURE — 99211 OFF/OP EST MAY X REQ PHY/QHP: CPT

## 2025-07-10 PROCEDURE — 11057 PARNG/CUTG B9 HYPRKR LES >4: CPT | Mod: ,,, | Performed by: NURSE PRACTITIONER

## 2025-07-10 PROCEDURE — 27000999 HC MEDICAL RECORD PHOTO DOCUMENTATION

## 2025-07-10 RX ORDER — AMMONIUM LACTATE 12 G/100G
LOTION TOPICAL
Qty: 225 G | Refills: 11 | Status: SHIPPED | OUTPATIENT
Start: 2025-07-10

## 2025-07-10 NOTE — PATIENT INSTRUCTIONS
Pt seen today by:  NOEMI Dolan    self care DRESSING INSTRUCTIONS:         Wound location: BOTH FEET    APPLY LACHYDRIN LOTION TO BOTH FEET AS PRESCRIBED      Return visit: 1 MONTH    Nutrition:  The current daily value (%DV) for protein is 50 grams per day and is meant as a general goal for most people. Further increasing your dietary protein intake is very important for wound healing. Typically one needs over 100g of protein per day to help with wound healing needs.  If you are a dialysis patient or have problems with your kidneys, talk to your Nephrologist about how much protein you can take in with your condition.  Examples of high protein items that can be added to your diet include: eggs, chicken, red meats, almonds, cottage cheese, Greek yogurt, beans, and peanut butter.  Fortified protein bars, shakes and drinks can add 15-30 additional grams of protein per serving.  Also add:   1 daily general multivitamin   Vitamin C : 500mg twice daily   Zinc 220 mg daily  Vit D : once daily                Call our [facility] wound clinic for questions/concerns a [phone] .

## 2025-07-11 NOTE — PROGRESS NOTES
[facility]   Outpatient Wound Care     Subjective:   Patient ID: Linn Mckeon is a 59 y.o. female.    Chief Complaint: Callouses      History of Present Illness:   59 y.o. Black or  female presents to wound care clinic today for bilateral foot pain, removal of calluses.  Presents to clinic alone.  Ambulates without difficulty.  Wearing oxygen continuously.   Reviewed previous medical history since last visit of 06/10/2025.   Followed by Priscilla Strickland FNP for PCP last appointment 05/15/2025.  She refused podiatry in past due to transportation issues.      Today's visit 07/10/2025:  Reviewed previous progress notes since last visit of 06/10/2025.  Pared bilateral plantar surface foot calluses using #4 dermal curette.  Rationale for paring to decrease pressure, alleviate pain, and prevent ulcers.  Instructed the importance of her utilizing Lac-Hytrin follow up by a thin layer Vaseline with the exception between toes.  Provided new script today.  Will have her return to the clinic in 1 month to re-evaluate calluses and diabetic foot care.  Instructed to call the office with any questions, concerns, or new skin issues.  Instructed on proper foot care.  Verbalized understanding of all instructions.      06/10/2025:  Reviewed previous progress notes since last visit of 05/07/2025.  Presents to clinic with bilateral foot pain.  Pared bilateral plantar surface in toe calluses using #4 dermal curette.  Rationale for paring to decrease pressure alleviate pain, and prevent ulcers.  Reinforced the importance of using Lac-Hytrin follow up by a thin layer Vaseline with the exception between toes.  Will have him return in 1 month to re-evaluate.  Instructed to call the office with any questions, concerns, or new skin issues.  Verbalized understanding  of all instructions.      05/07/2025:  Reviewed previous progress notes since last visit of 01/29/2025.  Presents to clinic calluses, plantar, thick dystrophic nails.  Noncompliance with use Lac-Hytrin for Xerosis of skin.   Diabetic foot exam performed.  Monofilament test done decreased sensation noted in all areas.  Bilateral lower extremities cool to touch with absent hair growth.  Trimmed all 10 dystrophic toenails using podiatry clippers, and filed with [employer] board.  Debride dystrophic nails using Dremmel.  Rationale for debridement to decrease pressure and alleviate pain.  Pared bilateral plantar surface plantar warts, and bilateral great toe calluses using #4 dermal curette.  Rationale for paring to decrease pressure, alleviate pain, and prevent ulcers.  We will have her return to the clinic in 1 month due to severity of calluses and plantar high risk for ulcers.  Reinforced the importance of using Lac-Hytrin twice daily follow up by a thin layer Vaseline to all dry areas of feet with the exception between toes.  Instructed the importance of checking feet daily due to decrease sensation high risk for diabetic foot ulcers.  Instructed on proper footwear, nail care, and daily skin assessment.   Instructed to call the office with any questions, concerns, or new skin issues.  Verbalized understanding of all instructions.     01/29/2025:  Reviewed previous progress since last visit of 10/21/2024.  Complains of bilateral foot pain.  Since last visit patient has not use Lac-Hytrin as directed for Xerosis skin for bilateral feet. Diabetic foot exam performed.  Monofilament test done decreased sensation noted in all areas.  Bilateral lower extremities cool to touch with absent hair growth. Trimmed all 10 dystrophic toenails using podiatry clippers, and filed with [employer] board.  Bilateral plantar surface and great toe calluses.  Pared callus using #4 dermal curette. Rationale for paring today to decrease pressure, and  prevent ulcers.  Instructed the importance of washing feet twice daily dry well between toes.  Apply Lac-Hytrin twice daily to dry feet and legs with the exception between toes follow up by a thin layer Vaseline.  Instructed the importance of checking feet daily due to decrease sensation high risk for diabetic foot ulcers.  Instructed on proper footwear, nail care, and daily skin assessment.  Will have her return to the clinic in 3 months.  Instructed to call the office with any questions, concerns, or new skin issues.  Verbalized understanding of all instructions.     10/21/2024:  Diabetic foot exam performed.  Monofilament test done decreased sensation noted in all areas.  Bilateral lower extremities cool to touch with absent hair growth.  Trimmed all 10 toenails using podiatry clippers, and filed with [facility].  Debride 4/6 nails dystrophic using Dremmel.  Rationale for debridement to decrease pressure and prevent ulcers.  Calluses bilateral lower feet and tips of great toes. Pared callus using #4 dermal curette. Rationale for paring today to decrease pressure, and prevent ulcers.  Instructed the importance of checking feet daily due to decrease sensation high risk for diabetic foot ulcers.  Instructed on proper footwear, nail care, and daily skin assessment.  Instructed the importance of follow up with a PCP for diabetic shoes and inserts.  Will have her return to the clinic in 3 months.  Instructed to call the office with any questions, concerns, or new skin issues.  Verbalized understanding of all instructions.      07/19/2024:  During examination bilateral feet and lower legs Xerosis of skin.  Long discussion with the patient the importance of being compliant patient has not filed her Lac-Hytrin since January and is not applying twice daily with thin layer of Vaseline.  Bilateral plantar surface callus warts, and bilateral great toe callus pared using #4 dermal curette.  Paring performed to decreased pain  with ambulation, prevent ulcers, in any skin breakdown.  Apply bilateral feet with Vaseline with the exception between toes.  Instructed will have to  Lac-Hytrin and start using twice daily with thin layer of Vaseline.  Agreed.  Tolerated well.  Relief with a paring.  Mycotic toenails did not need to be trimmed at this visit.  Patient is also noncompliant with the apply compound topical for mycotic nails.  Will have her return to the clinic in 3 months for diabetic foot care.        History includes:      Past Medical History:   Diagnosis Date    Callus of foot 07/07/2022    CHF (congestive heart failure)     Chronic heart failure with preserved ejection fraction 05/20/2022    COPD (chronic obstructive pulmonary disease)     Diabetes mellitus     GOODMAN (dyspnea on exertion) 05/20/2022    Dystrophic nail 07/07/2022    Hyperlipidemia     Hyperlipidemia LDL goal <70 05/20/2022    Hypertension     Morbid obesity with body mass index (BMI) greater than or equal to 50 07/07/2022    Non compliance w medication regimen 07/07/2022    Noncompliance with treatment plan 07/07/2022    DAVION on CPAP 05/20/2022    Peripheral edema 05/20/2022    Primary hypertension 05/20/2022    Type 2 diabetes mellitus with skin complication 05/20/2022    Xerosis of skin 07/07/2022      Past Surgical History:   Procedure Laterality Date    COLONOSCOPY, WITH POLYPECTOMY USING HOT BIOPSY FORCEPS N/A 01/25/2024    Procedure: COLONOSCOPY, WITH POLYPECTOMY USING HOT BIOPSY FORCEPS;  Surgeon: Yan Bashir MD;  Location: [facility] ENDOSCOPY;  Service: Endoscopy;  Laterality: N/A;  Cold forceps    HYSTERECTOMY      SURGICAL REMOVAL OF MASS OF UPPER EXTREMITY Right 4/25/2025    Procedure: EXCISION, MASS, UPPER EXTREMITY;  Surgeon: Manfred Parham MD;  Location: [facility] OR;  Service: General;  Laterality: Right;  Arm      Social History     Socioeconomic History    Marital status:     Number of children: 2   Tobacco Use    Smoking status:  Every Day     Current packs/day: 0.50     Average packs/day: 0.5 packs/day for 42.5 years (21.3 ttl pk-yrs)     Types: Cigarettes     Start date: 1983     Passive exposure: Never    Smokeless tobacco: Never   Substance and Sexual Activity    Alcohol use: Not Currently    Drug use: Never    Sexual activity: Not Currently     Social Drivers of Health     Financial Resource Strain: Low Risk  (3/21/2024)    Overall Financial Resource Strain (CARDIA)     Difficulty of Paying Living Expenses: Not hard at all   Food Insecurity: No Food Insecurity (3/21/2024)    Hunger Vital Sign     Worried About Running Out of Food in the Last Year: Never true     Ran Out of Food in the Last Year: Never true   Transportation Needs: No Transportation Needs (6/24/2025)    PRAPARE - Transportation     Lack of Transportation (Medical): No     Lack of Transportation (Non-Medical): No   Physical Activity: Inactive (6/24/2025)    Exercise Vital Sign     Days of Exercise per Week: 0 days     Minutes of Exercise per Session: 0 min   Stress: No Stress Concern Present (3/21/2024)    [employer] [place] [employer] - Occupational Stress Questionnaire     Feeling of Stress : Not at all   Housing Stability: Unknown (9/4/2024)    Housing Stability Vital Sign     Unable to Pay for Housing in the Last Year: No     Homeless in the Last Year: No   .      Current Outpatient Medications   Medication Sig Dispense Refill    albuterol (PROVENTIL) 2.5 mg /3 mL (0.083 %) nebulizer solution Take 3 mLs (2.5 mg total) by nebulization every 6 (six) hours as needed for Wheezing. Rescue 90 mL 1    albuterol (VENTOLIN HFA) 90 mcg/actuation inhaler Inhale 2 puffs into the lungs every 6 (six) hours as needed for Wheezing. Rescue 18 g 2    ammonium lactate (LAC-HYDRIN) 12 % lotion APPLY TO DRY SKIN AND RUB IN WELL TWICE DAILY. NOTHING BETWEEN TOES 226 g 11    aspirin (ECOTRIN) 81 MG EC tablet Take 81 mg by mouth once daily.      atorvastatin (LIPITOR) 80 MG tablet  "TAKE 1 TABLET BY MOUTH ONCE DAILY 90 tablet 3    BD ULTRA-FINE JORDON PEN NEEDLE 32 gauge x 5/32" Ndle 2 (two) times daily.      BD ULTRA-FINE JORDON PEN NEEDLE 32 gauge x 5/32" Ndle       blood sugar diagnostic (TRUE METRIX GLUCOSE TEST STRIP) Strp Test Blood Sugar 2 times a day 100 strip 11    blood-glucose meter,continuous (DEXCOM G7 ) Misc Dexcom G7  uses directed 1 each 0    blood-glucose sensor (DEXCOM G7 SENSOR) Norma Use every 10 days as directed 3 each 11    CICLOPIROX-ITRACONAZOLE-UREA TOP CLEAN NAILS, SHAKE BOTTLE WELL, APPLY TWICE DAILY TO ALL AFFECTED NAILS USING APPLICATOR. (UP TO 2 MLS/DAY)      empagliflozin (JARDIANCE) 25 mg tablet Take 1 tablet (25 mg total) by mouth once daily. 90 tablet 2    ergocalciferol (ERGOCALCIFEROL) 50,000 unit Cap Take 1 capsule (50,000 Units total) by mouth every 7 days. 12 capsule 2    ezetimibe (ZETIA) 10 mg tablet TAKE 1 TABLET BY MOUTH ONCE DAILY 90 tablet 3    fluticasone propionate (FLONASE) 50 mcg/actuation nasal spray 1 spray (50 mcg total) by Each Nostril route once daily. 11.1 mL 1    fluticasone-umeclidin-vilanter (TRELEGY ELLIPTA) 100-62.5-25 mcg DsDv Inhale 1 puff into the lungs once daily. 28 each 3    furosemide (LASIX) 40 MG tablet TAKE 1 TABLET BY MOUTH TWICE DAILY 180 tablet 0    ketoconazole (NIZORAL) 2 % cream Apply topically once daily. 30 g 1    lancets (ACCU-CHEK FASTCLIX LANCET DRUM) [facility] ONE DAILY 100 each 11    LANCETS MISC       LANTUS SOLOSTAR U-100 INSULIN 100 unit/mL (3 mL) InPn pen Inject 50U at breakfast and 30U at bedtime subcutaneously      latanoprost 0.005 % ophthalmic solution Apply 1 drop to eye.      metFORMIN (GLUCOPHAGE) 1000 MG tablet TAKE 1 TABLET BY MOUTH TWICE DAILY WITH MEALS 60 tablet 5    metoprolol succinate (TOPROL-XL) 50 MG 24 hr tablet Take 1 tablet (50 mg total) by mouth once daily. 90 tablet 3    pen needle, diabetic (BD ULTRA-FINE JORDON PEN NEEDLE) 32 gauge x 5/32" Ndle Use Twice a day to checks CBGS 60 " "each 11    sacubitriL-valsartan (ENTRESTO) 49-51 mg per tablet Take 1 tablet by mouth 2 (two) times daily. 180 tablet 3    SITagliptin phosphate (JANUVIA) 100 MG Tab Take 1 tablet (100 mg total) by mouth once daily. 90 tablet 3    spironolactone (ALDACTONE) 25 MG tablet TAKE 1 TABLET BY MOUTH ONCE DAILY 90 tablet 3    TRUEPLUS LANCETS 33 gauge Misc Apply topically 2 (two) times daily.      ammonium lactate (LAC-HYDRIN) 12 % lotion Apply topically as needed for Dry Skin. Apply to dry areas followed by thin layer of Vaseline with exception between toes. 225 g 11    blood-glucose meter kit To check BG 2 times daily, to use with insurance preferred meter 1 each 0     No current facility-administered medications for this encounter.       Review of Systems   All other systems reviewed and are negative.         Labs Reviewed:   Chemistry:  Lab Results   Component Value Date    BUN 36.6 (H) 04/25/2025    BUN 25.5 (H) 12/10/2024    CREATININE 1.06 (H) 04/25/2025    CREATININE 1.24 (H) 12/10/2024    EGFRNORACEVR 60 04/25/2025    EGFRNORACEVR 50 12/10/2024    AST 20 12/10/2024    AST 13 08/19/2024    ALT 11 12/10/2024    ALT 14 08/19/2024    HGBA1C 8.0 (H) 12/10/2024        Hematology:  Lab Results   Component Value Date    WBC 9.61 04/25/2025    WBC 12.03 (H) 12/10/2024    HGB 14.7 04/25/2025    HGB 15.2 12/10/2024    HCT 45.5 04/25/2025    HCT 46.6 12/10/2024     04/25/2025     12/10/2024       Inflammatory Markers:  No results found for: "HSCRP", "SEDRATE"     Objective:        Physical Exam  Vitals reviewed.   Cardiovascular:      Pulses:           Dorsalis pedis pulses are detected w/ Doppler on the right side and detected w/ Doppler on the left side.        Posterior tibial pulses are detected w/ Doppler on the right side and detected w/ Doppler on the left side.   Pulmonary:      Comments: Wearing O2 @ 2 liters continuously.  Musculoskeletal:        Feet:    Feet:      Right foot:      Skin integrity: " Callus and dry skin present.      Toenail Condition: Right toenails are abnormally thick. Fungal disease present.     Left foot:      Skin integrity: Callus and dry skin present.      Toenail Condition: Left toenails are abnormally thick. Fungal disease present.  Skin:     General: Skin is cool and dry.      Capillary Refill: Capillary refill takes less than 2 seconds.   Neurological:      Mental Status: She is alert.                              Assessment:         ICD-10-CM ICD-9-CM   1. Callus of foot  L84 700   2. Bilateral foot pain  M79.671 729.5    M79.672    3. Xerosis of skin  L85.3 706.8   4. Noncompliance with treatment plan  Z91.199 V15.81   5. Type 2 diabetes mellitus with other skin complication, with long-term current use of insulin  E11.628 250.80    Z79.4 V58.67         Plan:   Tissue pathology and/or culture taken:  [] Yes [x] No   Sharp debridement performed:   [] Yes [x] No   Labs ordered this visit:   [] Yes [x] No   Imaging ordered this visit:   [] Yes [x] No         1. Callus of foot     Pared.  Instructed on proper foot care.   2. Bilateral foot pain     Relief after paring.   3. Xerosis of skin     Reinforced the importance of apply Lac-Hytrin follow up by a thin layer Vaseline with the exception between toes.   4. Noncompliance with treatment plan     Reinforced the importance of foot care, and treatment with Lac-Hytrin follow up by a thin layer Vaseline with the exception between toes.   5. Type 2 diabetes mellitus with other skin complication, with long-term current use of insulin     Last A1c 9.0.    Reinforced the importance of diet and medication compliance.    Must have a strict diabetic diet, take glucose lowering medications as prescribed and encourage lower HA1C.        The time spent including preparing to see the patient, obtaining patient history and assessment, evaluation of the plan of care, patient/caregiver counseling and education, orders, documentation, coordination of  care, and other professional medical management activities for today's encounter was 15 minute.    Time spent performing procedures during today's encounter was 20 minute.    Follow up in about 4 weeks (around 8/7/2025) for callus. Teaching provided on s/s to call wound clinic for promptly.  ER precautions taught for after hours and weekends.     NOEMI Mederos

## 2025-07-31 NOTE — TELEPHONE ENCOUNTER
----- Message from Ina sent at 7/31/2025  8:12 AM CDT -----  Pt calling in about her prescription for trilogy. It was supposed to be sent to [facility] pharmacy.    Callback [phone]

## 2025-08-05 RX ORDER — FLUTICASONE FUROATE, UMECLIDINIUM BROMIDE AND VILANTEROL TRIFENATATE 100; 62.5; 25 UG/1; UG/1; UG/1
1 POWDER RESPIRATORY (INHALATION) DAILY
Qty: 28 EACH | Refills: 3 | Status: SHIPPED | OUTPATIENT
Start: 2025-08-05

## 2025-08-07 ENCOUNTER — HOSPITAL ENCOUNTER (OUTPATIENT)
Dept: WOUND CARE | Facility: HOSPITAL | Age: 60
Discharge: HOME | End: 2025-08-07
Attending: NURSE PRACTITIONER
Payer: MEDICAID

## 2025-08-07 ENCOUNTER — OFFICE VISIT (OUTPATIENT)
Dept: CARDIOLOGY | Facility: CLINIC | Age: 60
End: 2025-08-07
Payer: MEDICAID

## 2025-08-07 VITALS
SYSTOLIC BLOOD PRESSURE: 139 MMHG | HEART RATE: 71 BPM | WEIGHT: 280.81 LBS | DIASTOLIC BLOOD PRESSURE: 73 MMHG | TEMPERATURE: 98 F | DIASTOLIC BLOOD PRESSURE: 75 MMHG | HEIGHT: 64 IN | TEMPERATURE: 98 F | HEART RATE: 79 BPM | RESPIRATION RATE: 20 BRPM | BODY MASS INDEX: 47.94 KG/M2 | RESPIRATION RATE: 18 BRPM | OXYGEN SATURATION: 99 % | OXYGEN SATURATION: 94 % | SYSTOLIC BLOOD PRESSURE: 130 MMHG

## 2025-08-07 DIAGNOSIS — E11.628 TYPE 2 DIABETES MELLITUS WITH OTHER SKIN COMPLICATION, WITH LONG-TERM CURRENT USE OF INSULIN: ICD-10-CM

## 2025-08-07 DIAGNOSIS — L60.3 DYSTROPHIC NAIL: ICD-10-CM

## 2025-08-07 DIAGNOSIS — I10 PRIMARY HYPERTENSION: Primary | Chronic | ICD-10-CM

## 2025-08-07 DIAGNOSIS — M79.672 BILATERAL FOOT PAIN: ICD-10-CM

## 2025-08-07 DIAGNOSIS — Z99.81 OXYGEN DEPENDENT: ICD-10-CM

## 2025-08-07 DIAGNOSIS — E11.9 ENCOUNTER FOR COMPREHENSIVE DIABETIC FOOT EXAMINATION, TYPE 2 DIABETES MELLITUS: Primary | ICD-10-CM

## 2025-08-07 DIAGNOSIS — I50.20 HEART FAILURE WITH REDUCED EJECTION FRACTION: Chronic | ICD-10-CM

## 2025-08-07 DIAGNOSIS — M79.671 BILATERAL FOOT PAIN: ICD-10-CM

## 2025-08-07 DIAGNOSIS — E78.5 HYPERLIPIDEMIA LDL GOAL <70: Chronic | ICD-10-CM

## 2025-08-07 DIAGNOSIS — L60.2 OVERGROWN NAIL: ICD-10-CM

## 2025-08-07 DIAGNOSIS — R06.09 DOE (DYSPNEA ON EXERTION): ICD-10-CM

## 2025-08-07 DIAGNOSIS — Z79.4 TYPE 2 DIABETES MELLITUS WITH OTHER SKIN COMPLICATION, WITH LONG-TERM CURRENT USE OF INSULIN: ICD-10-CM

## 2025-08-07 DIAGNOSIS — I50.32 CHRONIC HEART FAILURE WITH PRESERVED EJECTION FRACTION: ICD-10-CM

## 2025-08-07 DIAGNOSIS — F17.210 CIGARETTE NICOTINE DEPENDENCE WITHOUT COMPLICATION: Chronic | ICD-10-CM

## 2025-08-07 DIAGNOSIS — L84 CALLUS OF FOOT: ICD-10-CM

## 2025-08-07 DIAGNOSIS — G47.33 OSA ON CPAP: Chronic | ICD-10-CM

## 2025-08-07 PROCEDURE — 1160F RVW MEDS BY RX/DR IN RCRD: CPT | Mod: CPTII,,,

## 2025-08-07 PROCEDURE — 11721 DEBRIDE NAIL 6 OR MORE: CPT | Mod: 59,,, | Performed by: NURSE PRACTITIONER

## 2025-08-07 PROCEDURE — 99215 OFFICE O/P EST HI 40 MIN: CPT | Mod: PBBFAC,25

## 2025-08-07 PROCEDURE — 4010F ACE/ARB THERAPY RXD/TAKEN: CPT | Mod: CPTII,,,

## 2025-08-07 PROCEDURE — 3052F HG A1C>EQUAL 8.0%<EQUAL 9.0%: CPT | Mod: CPTII,,,

## 2025-08-07 PROCEDURE — 99211 OFF/OP EST MAY X REQ PHY/QHP: CPT | Mod: 27

## 2025-08-07 PROCEDURE — 3075F SYST BP GE 130 - 139MM HG: CPT | Mod: CPTII,,,

## 2025-08-07 PROCEDURE — 11721 DEBRIDE NAIL 6 OR MORE: CPT

## 2025-08-07 PROCEDURE — 11057 PARNG/CUTG B9 HYPRKR LES >4: CPT

## 2025-08-07 PROCEDURE — 99214 OFFICE O/P EST MOD 30 MIN: CPT | Mod: S$PBB,,,

## 2025-08-07 PROCEDURE — 1159F MED LIST DOCD IN RCRD: CPT | Mod: CPTII,,,

## 2025-08-07 PROCEDURE — 27000999 HC MEDICAL RECORD PHOTO DOCUMENTATION

## 2025-08-07 PROCEDURE — 3078F DIAST BP <80 MM HG: CPT | Mod: CPTII,,,

## 2025-08-07 PROCEDURE — 3008F BODY MASS INDEX DOCD: CPT | Mod: CPTII,,,

## 2025-08-07 PROCEDURE — 11057 PARNG/CUTG B9 HYPRKR LES >4: CPT | Mod: ,,, | Performed by: NURSE PRACTITIONER

## 2025-08-07 NOTE — PROGRESS NOTES
[facility]   Outpatient Wound Care     Subjective:   Patient ID: Linn Mckeon is a 59 y.o. female.    Chief Complaint: Callouses      History of Present Illness:   59 y.o. Black or  female presents to wound care clinic today for diabetic foot care, and callus care.  Presents to clinic alone.  Ambulates without difficulty.  Wearing oxygen continuously.   Reviewed previous medical history since last visit of 07/10/2025.   Followed by Priscilla Strickland FNP for PCP last appointment 05/15/2025.  She refused podiatry in past due to transportation issues.      Today's visit 08/07/2025:  Reviewed previous progress notes since last visit of 07/10/2025. Diabetic foot exam performed.  Monofilament test done decreased sensation noted in all areas.  Bilateral lower extremities cool to touch with absent hair growth.  Trimmed all 10 dystrophic toenails using podiatry clippers, and filed with [place] board.  Debride all 10 dystrophic nails using Dremmel.  Rationale for debridement to decrease pressure, and prevent ulcers.  Bilateral foot calluses x7. Pared calluses using #4 dermal curette. Rationale for paring today to decrease pressure, and prevent ulcers.  Apply Vaseline to bilateral feet with the exception between toes.  Instructed the importance of checking feet daily due to decrease sensation high risk for diabetic foot ulcers.  Instructed on proper footwear, nail care, and daily skin assessment.  Will have her return to the clinic in 3 months.  Instructed to call the office with any questions, concerns, or new skin issues.  Verbalized understanding of all instructions.    07/10/2025:  Reviewed previous progress notes since last visit of 06/10/2025.  Pared bilateral plantar surface foot calluses using #4 dermal curette.  Rationale for paring to  decrease pressure, alleviate pain, and prevent ulcers.  Instructed the importance of her utilizing Lac-Hytrin follow up by a thin layer Vaseline with the exception between toes.  Provided new script today.  Will have her return to the clinic in 1 month to re-evaluate calluses and diabetic foot care.  Instructed to call the office with any questions, concerns, or new skin issues.  Instructed on proper foot care.  Verbalized understanding of all instructions.      06/10/2025:  Reviewed previous progress notes since last visit of 05/07/2025.  Presents to clinic with bilateral foot pain.  Pared bilateral plantar surface in toe calluses using #4 dermal curette.  Rationale for paring to decrease pressure alleviate pain, and prevent ulcers.  Reinforced the importance of using Lac-Hytrin follow up by a thin layer Vaseline with the exception between toes.  Will have him return in 1 month to re-evaluate.  Instructed to call the office with any questions, concerns, or new skin issues.  Verbalized understanding of all instructions.      05/07/2025:  Reviewed previous progress notes since last visit of 01/29/2025.  Presents to clinic calluses, plantar, thick dystrophic nails.  Noncompliance with use Lac-Hytrin for Xerosis of skin.   Diabetic foot exam performed.  Monofilament test done decreased sensation noted in all areas.  Bilateral lower extremities cool to touch with absent hair growth.  Trimmed all 10 dystrophic toenails using podiatry clippers, and filed with [place] board.  Debride dystrophic nails using Dremmel.  Rationale for debridement to decrease pressure and alleviate pain.  Pared bilateral plantar surface plantar warts, and bilateral great toe calluses using #4 dermal curette.  Rationale for paring to decrease pressure, alleviate pain, and prevent ulcers.  We will have her return to the clinic in 1 month due to severity of calluses and plantar high risk for ulcers.  Reinforced the importance of using Lac-Hytrin  twice daily follow up by a thin layer Vaseline to all dry areas of feet with the exception between toes.  Instructed the importance of checking feet daily due to decrease sensation high risk for diabetic foot ulcers.  Instructed on proper footwear, nail care, and daily skin assessment.   Instructed to call the office with any questions, concerns, or new skin issues.  Verbalized understanding of all instructions.     01/29/2025:  Reviewed previous progress since last visit of 10/21/2024.  Complains of bilateral foot pain.  Since last visit patient has not use Lac-Hytrin as directed for Xerosis skin for bilateral feet. Diabetic foot exam performed.  Monofilament test done decreased sensation noted in all areas.  Bilateral lower extremities cool to touch with absent hair growth. Trimmed all 10 dystrophic toenails using podiatry clippers, and filed with [place] board.  Bilateral plantar surface and great toe calluses.  Pared callus using #4 dermal curette. Rationale for paring today to decrease pressure, and prevent ulcers.  Instructed the importance of washing feet twice daily dry well between toes.  Apply Lac-Hytrin twice daily to dry feet and legs with the exception between toes follow up by a thin layer Vaseline.  Instructed the importance of checking feet daily due to decrease sensation high risk for diabetic foot ulcers.  Instructed on proper footwear, nail care, and daily skin assessment.  Will have her return to the clinic in 3 months.  Instructed to call the office with any questions, concerns, or new skin issues.  Verbalized understanding of all instructions.     10/21/2024:  Diabetic foot exam performed.  Monofilament test done decreased sensation noted in all areas.  Bilateral lower extremities cool to touch with absent hair growth.  Trimmed all 10 toenails using podiatry clippers, and filed with [place] board.  Debride 4/6 nails dystrophic using Dremmel.  Rationale for debridement to decrease pressure and  prevent ulcers.  Calluses bilateral lower feet and tips of great toes. Pared callus using #4 dermal curette. Rationale for paring today to decrease pressure, and prevent ulcers.  Instructed the importance of checking feet daily due to decrease sensation high risk for diabetic foot ulcers.  Instructed on proper footwear, nail care, and daily skin assessment.  Instructed the importance of follow up with a PCP for diabetic shoes and inserts.  Will have her return to the clinic in 3 months.  Instructed to call the office with any questions, concerns, or new skin issues.  Verbalized understanding of all instructions.      07/19/2024:  During examination bilateral feet and lower legs Xerosis of skin.  Long discussion with the patient the importance of being compliant patient has not filed her Lac-Hytrin since January and is not applying twice daily with thin layer of Vaseline.  Bilateral plantar surface callus warts, and bilateral great toe callus pared using #4 dermal curette.  Paring performed to decreased pain with ambulation, prevent ulcers, in any skin breakdown.  Apply bilateral feet with Vaseline with the exception between toes.  Instructed will have to  Lac-Hytrin and start using twice daily with thin layer of Vaseline.  Agreed.  Tolerated well.  Relief with a paring.  Mycotic toenails did not need to be trimmed at this visit.  Patient is also noncompliant with the apply compound topical for mycotic nails.  Will have her return to the clinic in 3 months for diabetic foot care.        History includes:      Past Medical History:   Diagnosis Date    Callus of foot 07/07/2022    CHF (congestive heart failure)     Chronic heart failure with preserved ejection fraction 05/20/2022    COPD (chronic obstructive pulmonary disease)     Diabetes mellitus     GOODMAN (dyspnea on exertion) 05/20/2022    Dystrophic nail 07/07/2022    Hyperlipidemia     Hyperlipidemia LDL goal <70 05/20/2022    Hypertension     Morbid obesity  with body mass index (BMI) greater than or equal to 50 07/07/2022    Non compliance w medication regimen 07/07/2022    Noncompliance with treatment plan 07/07/2022    DAVION on CPAP 05/20/2022    Peripheral edema 05/20/2022    Primary hypertension 05/20/2022    Type 2 diabetes mellitus with skin complication 05/20/2022    Xerosis of skin 07/07/2022      Past Surgical History:   Procedure Laterality Date    COLONOSCOPY, WITH POLYPECTOMY USING HOT BIOPSY FORCEPS N/A 01/25/2024    Procedure: COLONOSCOPY, WITH POLYPECTOMY USING HOT BIOPSY FORCEPS;  Surgeon: Yan Bashir MD;  Location: [facility] ENDOSCOPY;  Service: Endoscopy;  Laterality: N/A;  Cold forceps    HYSTERECTOMY      SURGICAL REMOVAL OF MASS OF UPPER EXTREMITY Right 4/25/2025    Procedure: EXCISION, MASS, UPPER EXTREMITY;  Surgeon: Manfred Parham MD;  Location: [facility] OR;  Service: General;  Laterality: Right;  Arm      Social History     Socioeconomic History    Marital status:     Number of children: 2   Tobacco Use    Smoking status: Every Day     Current packs/day: 0.50     Average packs/day: 0.5 packs/day for 42.6 years (21.3 ttl pk-yrs)     Types: Cigarettes     Start date: 1983     Passive exposure: Never    Smokeless tobacco: Never   Substance and Sexual Activity    Alcohol use: Not Currently    Drug use: Never    Sexual activity: Not Currently     Social Drivers of Health     Financial Resource Strain: Low Risk  (3/21/2024)    Overall Financial Resource Strain (CARDIA)     Difficulty of Paying Living Expenses: Not hard at all   Food Insecurity: No Food Insecurity (3/21/2024)    Hunger Vital Sign     Worried About Running Out of Food in the Last Year: Never true     Ran Out of Food in the Last Year: Never true   Transportation Needs: No Transportation Needs (6/24/2025)    PRAPARE - Transportation     Lack of Transportation (Medical): No     Lack of Transportation (Non-Medical): No   Physical Activity: Inactive (6/24/2025)    Exercise Vital  "Sign     Days of Exercise per Week: 0 days     Minutes of Exercise per Session: 0 min   Stress: No Stress Concern Present (3/21/2024)    [employer] [place] [employer] - Occupational Stress Questionnaire     Feeling of Stress : Not at all   Housing Stability: Unknown (9/4/2024)    Housing Stability Vital Sign     Unable to Pay for Housing in the Last Year: No     Homeless in the Last Year: No   .      Current Outpatient Medications   Medication Sig Dispense Refill    albuterol (PROVENTIL) 2.5 mg /3 mL (0.083 %) nebulizer solution Take 3 mLs (2.5 mg total) by nebulization every 6 (six) hours as needed for Wheezing. Rescue 90 mL 1    albuterol (VENTOLIN HFA) 90 mcg/actuation inhaler Inhale 2 puffs into the lungs every 6 (six) hours as needed for Wheezing. Rescue 18 g 2    ammonium lactate (LAC-HYDRIN) 12 % lotion APPLY TO DRY SKIN AND RUB IN WELL TWICE DAILY. NOTHING BETWEEN TOES 226 g 11    ammonium lactate (LAC-HYDRIN) 12 % lotion Apply topically as needed for Dry Skin. Apply to dry areas followed by thin layer of Vaseline with exception between toes. 225 g 11    aspirin (ECOTRIN) 81 MG EC tablet Take 81 mg by mouth once daily.      atorvastatin (LIPITOR) 80 MG tablet TAKE 1 TABLET BY MOUTH ONCE DAILY 90 tablet 3    BD ULTRA-FINE JORDON PEN NEEDLE 32 gauge x 5/32" Ndle 2 (two) times daily.      BD ULTRA-FINE JORDON PEN NEEDLE 32 gauge x 5/32" Ndle       blood sugar diagnostic (TRUE METRIX GLUCOSE TEST STRIP) Strp Test Blood Sugar 2 times a day 100 strip 11    blood-glucose meter kit To check BG 2 times daily, to use with insurance preferred meter 1 each 0    blood-glucose meter,continuous (DEXCOM G7 ) Misc Dexcom G7  uses directed 1 each 0    blood-glucose sensor (DEXCOM G7 SENSOR) Norma Use every 10 days as directed 3 each 11    CICLOPIROX-ITRACONAZOLE-UREA TOP CLEAN NAILS, SHAKE BOTTLE WELL, APPLY TWICE DAILY TO ALL AFFECTED NAILS USING APPLICATOR. (UP TO 2 MLS/DAY)      empagliflozin " "(JARDIANCE) 25 mg tablet Take 1 tablet (25 mg total) by mouth once daily. 90 tablet 2    ergocalciferol (ERGOCALCIFEROL) 50,000 unit Cap Take 1 capsule (50,000 Units total) by mouth every 7 days. 12 capsule 2    ezetimibe (ZETIA) 10 mg tablet TAKE 1 TABLET BY MOUTH ONCE DAILY 90 tablet 3    fluticasone propionate (FLONASE) 50 mcg/actuation nasal spray 1 spray (50 mcg total) by Each Nostril route once daily. 11.1 mL 1    fluticasone-umeclidin-vilanter (TRELEGY ELLIPTA) 100-62.5-25 mcg DsDv Inhale 1 puff into the lungs once daily. 28 each 3    furosemide (LASIX) 40 MG tablet TAKE 1 TABLET BY MOUTH TWICE DAILY 180 tablet 0    ketoconazole (NIZORAL) 2 % cream Apply topically once daily. 30 g 1    lancets (ACCU-CHEK FASTCLIX LANCET DRUM) Misc USE ONE DAILY 100 each 11    LANCETS MISC       LANTUS SOLOSTAR U-100 INSULIN 100 unit/mL (3 mL) InPn pen Inject 50U at breakfast and 30U at bedtime subcutaneously      latanoprost 0.005 % ophthalmic solution Apply 1 drop to eye.      metFORMIN (GLUCOPHAGE) 1000 MG tablet TAKE 1 TABLET BY MOUTH TWICE DAILY WITH MEALS 60 tablet 5    metoprolol succinate (TOPROL-XL) 50 MG 24 hr tablet Take 1 tablet (50 mg total) by mouth once daily. 90 tablet 3    pen needle, diabetic (BD ULTRA-FINE JORDON PEN NEEDLE) 32 gauge x 5/32" Ndle Use Twice a day to checks CBGS 60 each 11    sacubitriL-valsartan (ENTRESTO) 49-51 mg per tablet Take 1 tablet by mouth 2 (two) times daily. 180 tablet 3    SITagliptin phosphate (JANUVIA) 100 MG Tab Take 1 tablet (100 mg total) by mouth once daily. 90 tablet 3    spironolactone (ALDACTONE) 25 MG tablet TAKE 1 TABLET BY MOUTH ONCE DAILY 90 tablet 3    TRUEPLUS LANCETS 33 gauge Misc Apply topically 2 (two) times daily.       No current facility-administered medications for this encounter.       Review of Systems   All other systems reviewed and are negative.         Labs Reviewed:   Chemistry:  Lab Results   Component Value Date    BUN 36.6 (H) 04/25/2025    BUN 25.5 " "(H) 12/10/2024    CREATININE 1.06 (H) 04/25/2025    CREATININE 1.24 (H) 12/10/2024    EGFRNORACEVR 60 04/25/2025    EGFRNORACEVR 50 12/10/2024    AST 20 12/10/2024    AST 13 08/19/2024    ALT 11 12/10/2024    ALT 14 08/19/2024    HGBA1C 8.0 (H) 12/10/2024        Hematology:  Lab Results   Component Value Date    WBC 9.61 04/25/2025    WBC 12.03 (H) 12/10/2024    HGB 14.7 04/25/2025    HGB 15.2 12/10/2024    HCT 45.5 04/25/2025    HCT 46.6 12/10/2024     04/25/2025     12/10/2024       Inflammatory Markers:  No results found for: "HSCRP", "SEDRATE"     Objective:        Physical Exam  Vitals reviewed.   Cardiovascular:      Pulses:           Dorsalis pedis pulses are detected w/ Doppler on the right side and detected w/ Doppler on the left side.        Posterior tibial pulses are detected w/ Doppler on the right side and detected w/ Doppler on the left side.   Pulmonary:      Comments: Wearing O2 @ 2 liters continuously.  Feet:      Right foot:      Skin integrity: Callus and dry skin present.      Toenail Condition: Right toenails are abnormally thick and long. Fungal disease present.     Left foot:      Skin integrity: Callus and dry skin present.      Toenail Condition: Left toenails are abnormally thick and long. Fungal disease present.     Comments: Monofilament test done decreased sensation noted in all areas  Skin:     General: Skin is cool and dry.      Capillary Refill: Capillary refill takes less than 2 seconds.   Neurological:      Mental Status: She is alert.                                      Assessment:         ICD-10-CM ICD-9-CM   1. Encounter for comprehensive diabetic foot examination, type 2 diabetes mellitus  E11.9 250.00   2. Overgrown nail  L60.2 703.8   3. Dystrophic nail  L60.3 703.8   4. Callus of foot  L84 700   5. Bilateral foot pain  M79.671 729.5    M79.672    6. Type 2 diabetes mellitus with other skin complication, with long-term current use of insulin  E11.628 250.80    " Z79.4 V58.67   7. BMI 45.0-49.9, adult  Z68.42 V85.42   8. Oxygen dependent  Z99.81 V46.2         Plan:   Tissue pathology and/or culture taken:  [] Yes [x] No   Sharp debridement performed:   [] Yes [x] No   Labs ordered this visit:   [] Yes [x] No   Imaging ordered this visit:   [] Yes [x] No           1. Encounter for comprehensive diabetic foot examination, type 2 diabetes mellitus     Diabetic foot exam performed.  Instructed on proper foot care.   2. Overgrown nail     Trimmed.  Instructed on proper nail care   3. Dystrophic nail     Debride.  Instructed on proper nail care.   4. Callus of foot     Pared.  Instructed on proper footwear.   5. Bilateral foot pain     Relief with a paring      6. Type 2 diabetes mellitus with other skin complication, with long-term current use of insulin     Co-factor in wound development.    Last A1c 9.0.    Reinforced the importance of diet and medication compliance.    Must have a strict diabetic diet, take glucose lowering medications as prescribed and encourage lower HA1C.      7. BMI 45.0-49.9, adult     Reinforced diet and exercises tolerated.   8. Oxygen dependent     Requires use of O2 at 2 liters.    Reinforced the importance of smoking cessation around oxygen.     The time spent including preparing to see the patient, obtaining patient history and assessment, evaluation of the plan of care, patient/caregiver counseling and education, orders, documentation, coordination of care, and other professional medical management activities for today's encounter was 10 minute.    Time spent performing procedures during today's encounter was 30 minute.    Follow up in about 3 months (around 11/7/2025) for DFc and callus. Teaching provided on s/s to call wound clinic for promptly.  ER precautions taught for after hours and weekends.     NOEMI Mederos

## 2025-08-07 NOTE — PROGRESS NOTES
CHIEF COMPLAINT:   No chief complaint on file.                                    HPI:  Linn Mckeon 59 y.o. female  with a PMH significant for HFrecEF (40% in 2022), DM II (A1c 8.5), DAVION on BiPAP, HTN, HLD, Depression, Chronic hypoxic respirtory failure/COPD on home 2L O2, and current tobacco use who presents to cardiology clinic for follow up and ongoing care.    She has baseline COPD that is severe which is the main cause of her shortness of breath- she uses supplemental O2, especially when out in public. Echo completed November 2023 revealed EF of 59%, mild RV enlargement, overall normal systolic function, mild KS, otherwise unremarkable.  See full report below.  She states that her SOB/GOODMAN is stable.  Per her report, she had not been taking her pulmonology medications as prescribed, but since she has corrected this she is feeling much better.  She has occasional lightheadedness and dizziness but denies any syncopal episodes or any near-syncope.  Today she denies any chest pain, palpitations, PND,  lower extremity edema, claudication symptoms. She is currently still smoking, 1/2 ppd. Able to walk without assistive devices.  Sleeps on 3 pillows for orthopnea which is chronic.  She does not have any symptoms of volume overload today.  She does not do much formal exercise as she is significantly limited by her dyspnea.  She tries to follow a heart healthy diet.  She reports compliance with the current medications and is tolerating them well.    Historical Information: Patient had echocardiogram 11/2023 with normal EF 60% and not other significant abnormalities. Patient completed Lexiscan stress test on September 7, 2022 which was normal.  She also completed KWADWO testing on September 13, 2022 which revealed mild arterial flow reduction in the bilateral lower extremities.                                                                                                                                                                                                                                                                                                                                                                                                                                                                                      CARDIAC TESTING:  Echo 11.3.23    Left Ventricle: There is mild concentric hypertrophy. There is normal systolic function. Biplane (2D) method of discs ejection fraction is 59%.    Right Ventricle: Mild right ventricular enlargement. Systolic function is normal.    Aortic Valve: The aortic valve is a trileaflet valve. There is mild aortic valve sclerosis. There is normal leaflet mobility.    Mitral Valve: The mitral valve is structurally normal.    Pulmonic Valve: There is mild regurgitation.    IVC/SVC: Normal venous pressure at 3 mmHg.    Echo - 7/26/2022   Interpretation Summary  · Concentric hypertrophy and mildly decreased systolic function.  · The estimated ejection fraction is 40%.  · Grade I left ventricular diastolic dysfunction.  · Severe right ventricular enlargement.  · Mild left atrial enlargement.  · Moderate right atrial enlargement.  · Mild mitral regurgitation.  · Mild tricuspid regurgitation.  · Elevated central venous pressure (15 mmHg).  · The estimated PA systolic pressure is 56 mmHg.  · There is pulmonary hypertension.  · IVC is dilated and collapses<50% with inspiration.  · Mild to moderate pulmonic regurgitation.     Stress Test  Results for orders placed during the hospital encounter of 08/30/22  Nuclear Stress - Cardiology Interpreted  Interpretation Summary    Normal myocardial perfusion scan. There is no evidence of myocardial ischemia or infarction.    The gated perfusion images showed an ejection fraction of 55% at rest. The gated perfusion images showed an ejection fraction of 51% post stress.    The EKG portion of this study is abnormal but not diagnostic.    The  patient reported no chest pain during the stress test.    There were no arrhythmias during stress.  The patient has a low risk nuclear stress alex.  Nuclear stress test indicates no ischemia.     Lexiscan Stress Test 9.7.22:  Normal myocardial perfusion scan. There is no evidence of myocardial ischemia or infarction.  The gated perfusion images showed an ejection fraction of 55% at rest. The gated perfusion images showed an ejection fraction of 51% post stress.  The EKG portion of this study is abnormal but not diagnostic.  The patient reported no chest pain during the stress test.  There were no arrhythmias during stress.     KWADWO Testing 9.13.22:  The right lower extremity demonstrated multiphasic waveforms at all levels.   The KWADWO on the right was mildly abnormal.  The right lower extremity demonstrated mild arterial flow reduction.  The left lower extremity demonstrated multiphasic waveforms at all levels.   The KWADWO on the left was mildly abnormal.  The left lower extremity demonstrated mild arterial flow reduction.          Patient Active Problem List   Diagnosis    GOODMAN (dyspnea on exertion)    Chronic heart failure with preserved ejection fraction    Primary hypertension    Hyperlipidemia LDL goal <70    Type 2 diabetes mellitus with hyperglycemia, with long-term current use of insulin    Peripheral edema    DAVION on CPAP    Class 3 severe obesity due to excess calories with serious comorbidity and body mass index (BMI) of 45.0 to 49.9 in adult    Noncompliance with treatment plan    Xerosis of skin    Callus of foot    Overgrown nail    Non compliance w medication regimen    Heart failure with reduced ejection fraction    Cigarette nicotine dependence without complication    COPD (chronic obstructive pulmonary disease)    Vitamin D deficiency    Mycotic toenails    BMI 45.0-49.9, adult    Type 2 diabetes mellitus with skin complication, with long-term current use of insulin    Bilateral foot pain    Impaired gait     Diverticulosis large intestine w/o perforation or abscess w/o bleeding    Adenomatous polyp of descending colon    Adenomatous polyp of sigmoid colon    Adenomatous rectal polyp    Plantar warts    Oxygen dependent    Skin lump of arm, right    Hypertensive retinopathy of both eyes    Bilateral ocular hypertension     Past Surgical History:   Procedure Laterality Date    COLONOSCOPY, WITH POLYPECTOMY USING HOT BIOPSY FORCEPS N/A 01/25/2024    Procedure: COLONOSCOPY, WITH POLYPECTOMY USING HOT BIOPSY FORCEPS;  Surgeon: Yan Bashir MD;  Location: [facility] ENDOSCOPY;  Service: Endoscopy;  Laterality: N/A;  Cold forceps    HYSTERECTOMY      SURGICAL REMOVAL OF MASS OF UPPER EXTREMITY Right 4/25/2025    Procedure: EXCISION, MASS, UPPER EXTREMITY;  Surgeon: Manfred Parham MD;  Location: [facility] OR;  Service: General;  Laterality: Right;  Arm     Social History     Socioeconomic History    Marital status:     Number of children: 2   Tobacco Use    Smoking status: Every Day     Current packs/day: 0.50     Average packs/day: 0.5 packs/day for 42.6 years (21.3 ttl pk-yrs)     Types: Cigarettes     Start date: 1983     Passive exposure: Never    Smokeless tobacco: Never   Substance and Sexual Activity    Alcohol use: Not Currently    Drug use: Never    Sexual activity: Not Currently     Social Drivers of Health     Financial Resource Strain: Low Risk  (3/21/2024)    Overall Financial Resource Strain (CARDIA)     Difficulty of Paying Living Expenses: Not hard at all   Food Insecurity: No Food Insecurity (3/21/2024)    Hunger Vital Sign     Worried About Running Out of Food in the Last Year: Never true     Ran Out of Food in the Last Year: Never true   Transportation Needs: No Transportation Needs (6/24/2025)    PRAPARE - Transportation     Lack of Transportation (Medical): No     Lack of Transportation (Non-Medical): No   Physical Activity: Inactive (6/24/2025)    Exercise Vital Sign     Days of Exercise per  "Week: 0 days     Minutes of Exercise per Session: 0 min   Stress: No Stress Concern Present (3/21/2024)    [employer] [place] - Occupational Stress Questionnaire     Feeling of Stress : Not at all   Housing Stability: Unknown (9/4/2024)    Housing Stability Vital Sign     Unable to Pay for Housing in the Last Year: No     Homeless in the Last Year: No        Family History   Problem Relation Name Age of Onset    Hypertension Mother Rex Hood     Heart disease Mother Rex Hood     Diabetes Mother Rex Hood     Heart attack Father      Diabetes Sister Ellie Aguiar     Hypertension Sister Ellie Aguiar     Diabetes Brother Kushal Hood     Hypertension Brother Kushal Hood      Review of patient's allergies indicates:  No Known Allergies      ROS:                                                                                                                                                                             Negative except as stated in the history of present illness. See HPI for details.    PHYSICAL EXAM:  Visit Vitals  /73 (BP Location: Right arm, Patient Position: Sitting)   Pulse 71   Temp 98.1 °F (36.7 °C) (Oral)   Resp 20   Ht 5' 4" (1.626 m)   Wt 127.4 kg (280 lb 12.8 oz)   SpO2 (!) 94%   BMI 48.20 kg/m²         Physical Exam  Vitals reviewed.   Constitutional:       Appearance: Normal appearance. She is obese.   HENT:      Head: Normocephalic and atraumatic.      Nose: Nose normal.   Eyes:      General: No scleral icterus.     Extraocular Movements: Extraocular movements intact.   Neck:      Vascular: No carotid bruit.   Cardiovascular:      Rate and Rhythm: Normal rate and regular rhythm.      Pulses: Normal pulses.      Heart sounds: No murmur heard.  Pulmonary:      Effort: No respiratory distress.      Breath sounds: No wheezing.      Comments: On home oxygen  Abdominal:      General: There is no distension.      Palpations: Abdomen is " "soft.      Tenderness: There is no abdominal tenderness.   Musculoskeletal:         General: Normal range of motion.      Cervical back: Normal range of motion.      Right lower leg: No edema.      Left lower leg: No edema.   Skin:     General: Skin is warm and dry.   Neurological:      General: No focal deficit present.      Mental Status: She is alert and oriented to person, place, and time.   Psychiatric:         Mood and Affect: Mood normal.         Current Outpatient Medications   Medication Instructions    albuterol (PROVENTIL) 2.5 mg, Nebulization, Every 6 hours PRN, Rescue    albuterol (VENTOLIN HFA) 90 mcg/actuation inhaler 2 puffs, Inhalation, Every 6 hours PRN, Rescue    ammonium lactate (LAC-HYDRIN) 12 % lotion APPLY TO DRY SKIN AND RUB IN WELL TWICE DAILY. NOTHING BETWEEN TOES    ammonium lactate (LAC-HYDRIN) 12 % lotion Topical (Top), As needed (PRN), Apply to dry areas followed by thin layer of Vaseline with exception between toes.    aspirin (ECOTRIN) 81 mg, Daily    atorvastatin (LIPITOR) 80 mg, Oral    BD ULTRA-FINE JORDON PEN NEEDLE 32 gauge x 5/32" Ndle 2 times daily    BD ULTRA-FINE JORDON PEN NEEDLE 32 gauge x 5/32" Ndle No dose, route, or frequency recorded.    blood sugar diagnostic (TRUE METRIX GLUCOSE TEST STRIP) Strp Test Blood Sugar 2 times a day    blood-glucose meter kit To check BG 2 times daily, to use with insurance preferred meter    blood-glucose meter,continuous (DEXCOM G7 ) Misc Dexcom G7  uses directed    blood-glucose sensor (DEXCOM G7 SENSOR) Norma Use every 10 days as directed    CICLOPIROX-ITRACONAZOLE-UREA TOP CLEAN NAILS, SHAKE BOTTLE WELL, APPLY TWICE DAILY TO ALL AFFECTED NAILS USING APPLICATOR. (UP TO 2 MLS/DAY)    empagliflozin (JARDIANCE) 25 mg, Oral, Daily    ergocalciferol (ERGOCALCIFEROL) 50,000 Units, Oral, Every 7 days    ezetimibe (ZETIA) 10 mg, Oral    fluticasone propionate (FLONASE) 50 mcg, Each Nostril, Daily    fluticasone-umeclidin-vilanter " "(TRELEGY ELLIPTA) 100-62.5-25 mcg DsDv 1 puff, Inhalation, Daily    furosemide (LASIX) 40 mg, Oral, 2 times daily    ketoconazole (NIZORAL) 2 % cream Topical (Top), Daily    lancets (ACCU-CHEK FASTCLIX LANCET DRUM) Misc USE ONE DAILY    LANCETS MISC No dose, route, or frequency recorded.    LANTUS SOLOSTAR U-100 INSULIN 100 unit/mL (3 mL) InPn pen Inject 50U at breakfast and 30U at bedtime subcutaneously    latanoprost 0.005 % ophthalmic solution 1 drop    metFORMIN (GLUCOPHAGE) 1,000 mg, Oral, 2 times daily with meals    metoprolol succinate (TOPROL-XL) 50 mg, Oral, Daily    pen needle, diabetic (BD ULTRA-FINE JORDON PEN NEEDLE) 32 gauge x 5/32" Ndle Use Twice a day to checks CBGS    sacubitriL-valsartan (ENTRESTO) 49-51 mg per tablet 1 tablet, Oral, 2 times daily    SITagliptin phosphate (JANUVIA) 100 mg, Oral, Daily    spironolactone (ALDACTONE) 25 mg, Oral    TRUEPLUS LANCETS 33 gauge Misc 2 times daily        All medications, laboratory studies, cardiac diagnostic imaging reviewed.     Lab Results   Component Value Date    TRIG 73 12/10/2024    TRIG 58 03/18/2024    CREATININE 1.06 (H) 04/25/2025    MG 2.20 09/12/2022    K 4.7 04/25/2025        ASSESSMENT/PLAN:  Linn Mckeon 59 y.o. female  with a PMH significant for HFrecEF (59% per Echo November 2023), DM II (A1c 8.0), DAVION on BiPAP, HTN, HLD, Depression, Chronic hypoxic respirtory failure/COPD on home 2L O2, and current tobacco use who presents to cardiology clinic for preoperative evaluation. She is planned to have lipoma removal on her right arm under general anesthesia.      Heart Failure with Recovered Ejection Fraction   - NYHA Class IV symptoms (likely from COPD), ACC/AHA Stage C  - Ongoing SOB/GOODMAN  - Denying any other congestive symptoms including: swelling to face, legs, arms, abdomen   - Presumed nonischemic cardiomyopathy. Nuclear stress test negative for reversible ischemia   - Current GDMT: Entresto 49mg/51mg, Metoprolol Succinate 50mg daily, " Spironolactone 25, Jardiance 10mg  - Diuresis: Lasix 40mg BID  - no changes to medications this visit    HTN  - BP well controlled  - use GDMT as tolerated   - Counseled on low salt diet and exercise as tolerated    HLD  - LDL goal <70, LDL 52 per labs 12.10.24  - Continue Atorvastatin 80mg daily and Zetia 10mg daily  - Counseled on low-cholesterol, low-fat diet and exercise as tolerated    DM II on chronic insulin  - A1c 8.0, uncontrolled  - Management per PCP    COPD on home O2  Tobacco Abuse  DAVION on Trilogy  - Smokes approximately 1/2 pack of cigarettes  - Counseled on smoking cessation today   - Patient is trying to quit  - follow up with pulmonology as directed    Obesity  - Weight loss recommended       Follow up in cardiology clinic in approximately 4 months or sooner if needed   Follow up with PCP as directed   Please notify clinic if any new concerns or any change in symptoms

## 2025-08-07 NOTE — PATIENT INSTRUCTIONS
Follow up in cardiology clinic in approximately 4 months or sooner if needed   Follow up with PCP as directed   Please notify clinic if any new concerns or any change in symptoms

## 2025-08-11 PROBLEM — E11.9 ENCOUNTER FOR COMPREHENSIVE DIABETIC FOOT EXAMINATION, TYPE 2 DIABETES MELLITUS: Status: RESOLVED | Noted: 2023-04-13 | Resolved: 2025-08-11

## 2025-08-21 ENCOUNTER — OFFICE VISIT (OUTPATIENT)
Dept: ENDOCRINOLOGY | Facility: CLINIC | Age: 60
End: 2025-08-21
Payer: MEDICAID

## 2025-08-21 VITALS
HEIGHT: 64 IN | DIASTOLIC BLOOD PRESSURE: 77 MMHG | BODY MASS INDEX: 47.63 KG/M2 | TEMPERATURE: 97 F | RESPIRATION RATE: 18 BRPM | HEART RATE: 64 BPM | SYSTOLIC BLOOD PRESSURE: 117 MMHG | WEIGHT: 279 LBS

## 2025-08-21 DIAGNOSIS — E11.9 TYPE 2 DIABETES MELLITUS WITHOUT COMPLICATION, UNSPECIFIED WHETHER LONG TERM INSULIN USE: ICD-10-CM

## 2025-08-21 DIAGNOSIS — I50.20 HEART FAILURE WITH REDUCED EJECTION FRACTION: Chronic | ICD-10-CM

## 2025-08-21 DIAGNOSIS — E11.65 UNCONTROLLED TYPE 2 DIABETES MELLITUS WITH HYPERGLYCEMIA: Primary | ICD-10-CM

## 2025-08-21 DIAGNOSIS — N28.9 NEPHROPATHY: ICD-10-CM

## 2025-08-21 LAB — HBA1C MFR BLD: 7.6 %

## 2025-08-21 PROCEDURE — 99213 OFFICE O/P EST LOW 20 MIN: CPT | Mod: PBBFAC | Performed by: NURSE PRACTITIONER

## 2025-08-21 PROCEDURE — 83036 HEMOGLOBIN GLYCOSYLATED A1C: CPT | Mod: PBBFAC | Performed by: NURSE PRACTITIONER

## 2025-08-21 RX ORDER — BLOOD-GLUCOSE SENSOR
EACH MISCELLANEOUS
Qty: 3 EACH | Refills: 11 | Status: SHIPPED | OUTPATIENT
Start: 2025-08-21

## 2025-08-21 RX ORDER — METFORMIN HYDROCHLORIDE 1000 MG/1
1000 TABLET ORAL 2 TIMES DAILY WITH MEALS
Qty: 60 TABLET | Refills: 5 | Status: SHIPPED | OUTPATIENT
Start: 2025-08-21

## 2025-08-22 ENCOUNTER — TELEPHONE (OUTPATIENT)
Dept: INTERNAL MEDICINE | Facility: CLINIC | Age: 60
End: 2025-08-22
Payer: MEDICAID

## (undated) DEVICE — GLOVE SENSICARE PI GRN 8.5

## (undated) DEVICE — PENCIL ELECSURG ROCKER 15FT

## (undated) DEVICE — FORCEP ALLIGATOR 2.8MM W/NDL

## (undated) DEVICE — SUT MCRYL PLUS 4-0 PS2 27IN

## (undated) DEVICE — SPONGE GAUZE 16PLY 4X4

## (undated) DEVICE — KIT SURGICAL COLON .25 1.1OZ

## (undated) DEVICE — APPLICATOR CHLORAPREP ORN 26ML

## (undated) DEVICE — GLOVE SIGNATURE MICRO LTX 7

## (undated) DEVICE — GLOVE SENSICARE PI GRN 6.5

## (undated) DEVICE — NDL HYPO REG 25G X 1 1/2

## (undated) DEVICE — MANIFOLD 4 PORT

## (undated) DEVICE — Device

## (undated) DEVICE — GLOVE SIGNATURE MICRO LTX 6

## (undated) DEVICE — DRAPE HAND STERILE

## (undated) DEVICE — GLOVE SIGNATURE MICRO LTX 7.5

## (undated) DEVICE — ELECTRODE REM PLYHSV RETURN 9

## (undated) DEVICE — SOL NACL IRR 1000ML BTL

## (undated) DEVICE — SUT VICRYL 3-0 27 SH

## (undated) DEVICE — ADHESIVE DERMABOND ADVANCED

## (undated) DEVICE — GLOVE SENSICARE PI GRN 7

## (undated) DEVICE — SYR 10CC LUER LOCK

## (undated) DEVICE — GLOVE SENSICARE PI GRN 7.5

## (undated) DEVICE — GLOVE SIGNATURE MICRO LTX 8

## (undated) DEVICE — GOWN POLY REINF BRTH SLV XL

## (undated) DEVICE — GLOVE SIGNATURE MICRO LTX 6.5

## (undated) DEVICE — KIT SURGICAL TURNOVER